# Patient Record
Sex: FEMALE | Race: WHITE | NOT HISPANIC OR LATINO | Employment: OTHER | URBAN - METROPOLITAN AREA
[De-identification: names, ages, dates, MRNs, and addresses within clinical notes are randomized per-mention and may not be internally consistent; named-entity substitution may affect disease eponyms.]

---

## 2017-02-22 DIAGNOSIS — F51.01 PRIMARY INSOMNIA: ICD-10-CM

## 2017-02-22 RX ORDER — TRAZODONE HYDROCHLORIDE 50 MG/1
TABLET, FILM COATED ORAL
Qty: 30 TABLET | Refills: 1 | Status: SHIPPED | OUTPATIENT
Start: 2017-02-22 | End: 2017-06-30

## 2017-02-22 NOTE — TELEPHONE ENCOUNTER
Prescription approved per [facility] Refill Protocol.  Medina SANTOS RN    Pending Prescriptions:                       Disp   Refills    traZODone (DESYREL) 50 MG tablet [Pharmacy*30 tab*0        Sig: TAKE 1 TABLET(50 MG) BY MOUTH EVERY EVENING AS NEEDED           FOR SLEEP         Last Written Prescription Date: 5/24/2016  Last Fill Quantity: 30; # refills: 1  Last Office Visit with [facility], [facility] or [facility] prescribing provider:  10/11/2016        Last PHQ-9 score on record=   PHQ-9 SCORE 5/24/2016   Total Score -   Total Score 0       Lab Results   Component Value Date    AST 21 10/11/2016     Lab Results   Component Value Date    ALT 22 10/11/2016

## 2017-06-30 ENCOUNTER — OFFICE VISIT (OUTPATIENT)
Dept: FAMILY MEDICINE | Facility: CLINIC | Age: 67
End: 2017-06-30
Payer: COMMERCIAL

## 2017-06-30 VITALS
OXYGEN SATURATION: 95 % | HEIGHT: 67 IN | SYSTOLIC BLOOD PRESSURE: 122 MMHG | TEMPERATURE: 99.9 F | BODY MASS INDEX: 21.79 KG/M2 | HEART RATE: 59 BPM | DIASTOLIC BLOOD PRESSURE: 69 MMHG | WEIGHT: 138.8 LBS

## 2017-06-30 DIAGNOSIS — H53.9 VISION CHANGES: ICD-10-CM

## 2017-06-30 DIAGNOSIS — E78.5 HYPERLIPIDEMIA LDL GOAL <100: ICD-10-CM

## 2017-06-30 DIAGNOSIS — N39.41 URGE INCONTINENCE: ICD-10-CM

## 2017-06-30 DIAGNOSIS — R68.89 ABNORMAL ANKLE BRACHIAL INDEX: ICD-10-CM

## 2017-06-30 DIAGNOSIS — F33.0 MAJOR DEPRESSIVE DISORDER, RECURRENT EPISODE, MILD (H): ICD-10-CM

## 2017-06-30 DIAGNOSIS — R41.3 MEMORY LOSS: ICD-10-CM

## 2017-06-30 DIAGNOSIS — F10.21 HISTORY OF ALCOHOL DEPENDENCE (H): ICD-10-CM

## 2017-06-30 DIAGNOSIS — Z13.6 CARDIOVASCULAR SCREENING; LDL GOAL LESS THAN 160: ICD-10-CM

## 2017-06-30 DIAGNOSIS — F51.01 PRIMARY INSOMNIA: ICD-10-CM

## 2017-06-30 DIAGNOSIS — I10 ESSENTIAL HYPERTENSION WITH GOAL BLOOD PRESSURE LESS THAN 140/90: Primary | ICD-10-CM

## 2017-06-30 PROCEDURE — 99214 OFFICE O/P EST MOD 30 MIN: CPT | Performed by: FAMILY MEDICINE

## 2017-06-30 RX ORDER — TOLTERODINE TARTRATE 2 MG/1
TABLET, EXTENDED RELEASE ORAL
Qty: 30 TABLET | Refills: 5 | Status: SHIPPED | OUTPATIENT
Start: 2017-06-30 | End: 2017-07-06

## 2017-06-30 RX ORDER — SERTRALINE HYDROCHLORIDE 100 MG/1
100 TABLET, FILM COATED ORAL DAILY
Qty: 90 TABLET | Refills: 1 | Status: SHIPPED | OUTPATIENT
Start: 2017-06-30 | End: 2018-02-23

## 2017-06-30 RX ORDER — ATORVASTATIN CALCIUM 10 MG/1
10 TABLET, FILM COATED ORAL DAILY
Qty: 90 TABLET | Refills: 1 | Status: SHIPPED | OUTPATIENT
Start: 2017-06-30 | End: 2018-02-23

## 2017-06-30 RX ORDER — TRAZODONE HYDROCHLORIDE 50 MG/1
TABLET, FILM COATED ORAL
Qty: 30 TABLET | Refills: 1 | Status: SHIPPED | OUTPATIENT
Start: 2017-06-30 | End: 2017-07-06

## 2017-06-30 RX ORDER — LISINOPRIL AND HYDROCHLOROTHIAZIDE 12.5; 2 MG/1; MG/1
1 TABLET ORAL DAILY
Qty: 90 TABLET | Refills: 1 | Status: SHIPPED | OUTPATIENT
Start: 2017-06-30 | End: 2018-02-23

## 2017-06-30 NOTE — MR AVS SNAPSHOT
After Visit Summary   6/30/2017    Kimmie Carranza    MRN: [id number]           Patient Information     Date Of Birth          1950        Visit Information        Provider Department      6/30/2017 1:15 PM Dee Castillo MD [facility]        Today's Diagnoses     Memory loss    -  1    Urge incontinence        Hyperlipidemia LDL goal <100        Essential hypertension with goal blood pressure less than 140/90        Primary insomnia        Major depressive disorder, recurrent episode, mild (H)        CARDIOVASCULAR SCREENING; LDL GOAL LESS THAN 160        Abnormal ankle brachial index        Vision changes          Care Instructions    Plan-  Please check your medications- see if you have been taking lipitor or not...  Call us and let us know either way.    Make appointment to see the new ophthalmologist...    Make an appointment to see the neurologist- bring your son/daughter in with you for a better evaluation.    Make an appointment for a skin check with the dermatologist.    Get back to going to [facility]- rule of 3's.          Follow-ups after your visit        Additional Services     NEUROLOGY ADULT REFERRAL       Your provider has referred you to: [facility] [place]: [facility] [phone]   [email or web address]  UM: Neurology [facility] [phone]   [email or web address]  General Neurology  N: [facility] [phone]   [email or web address]    Reason for Referral: Consult    Please be aware that coverage of these services is subject to the terms and limitations of your health insurance plan.  Call member services at your health plan with any benefit or coverage questions.      Please bring the following with you to your appointment:    (1) Any X-Rays, CTs or MRIs which have been performed.  Contact the facility where they  were done to arrange for  prior to your scheduled appointment.    (2) List of current medications  (3) This referral request   (4) Any documents/labs given to you for this referral            OPHTHALMOLOGY ADULT REFERRAL       Your provider has referred you to:  [place]: [facility] [phone] [email or web address]  [facility], ALESSANDRA [facility] [phone]         Please be aware that coverage of these services is subject to the terms and limitations of your health insurance plan.  Call member services at your health plan with any benefit or coverage questions.      Please bring the following to your appointment:  >>   Any x-rays, CTs or MRIs which have been performed.  Contact the facility where they were done to arrange for  prior to your scheduled appointment.  Any new CT, MRI or other procedures ordered by your specialist must be performed at a [place] facility or coordinated by your clinic's referral office.    >>   List of current medications   >>   This referral request   >>   Any documents/labs given to you for this referral                  Who to contact     If you have questions or need follow up information about today's clinic visit or your schedule please contact [facility] directly at [phone].  Normal or non-critical lab and imaging results will be communicated to you by MyChart, letter or phone within 4 business days after the clinic has received the results. If you do not hear from us within 7 days, please contact the clinic through MyChart or phone. If you have a critical or abnormal lab result, we will notify you by phone as soon as possible.  Submit refill requests through [employer] or call your pharmacy and they will forward the refill request to us. Please allow 3 business days for your refill to be completed.          Additional Information About Your Visit        MyChart Information      [email or web address] lets you send messages to your doctor, view your test results, renew your prescriptions, schedule appointments and more. To sign up, go to [email or web address] . Click on \"Log in\" on the left side of the screen, which will take you to the Welcome page. Then click on \"Sign up Now\" on the right side of the page.     You will be asked to enter the access code listed below, as well as some personal information. Please follow the directions to create your username and password.     Your access code is: [email or web address]  Expires: 2017  2:07 PM     Your access code will  in 90 days. If you need help or a new code, please call your [place] clinic or [phone].        Care EveryWhere ID     This is your Care EveryWhere ID. This could be used by other organizations to access your [place] medical records  [id number]        Your Vitals Were     Pulse Temperature Height Pulse Oximetry BMI (Body Mass Index)       59 99.9  F (37.7  C) (Oral) 5' 6.5\" (1.689 m) 95% 22.07 kg/m2        Blood Pressure from Last 3 Encounters:   17 122/69   10/24/16 146/80   10/11/16 136/82    Weight from Last 3 Encounters:   17 138 lb 12.8 oz (63 kg)   10/24/16 140 lb (63.5 kg)   10/11/16 140 lb 6.4 oz (63.7 kg)              We Performed the Following     NEUROLOGY ADULT REFERRAL     OPHTHALMOLOGY ADULT REFERRAL          Today's Medication Changes          These changes are accurate as of: 17  2:07 PM.  If you have any questions, ask your nurse or doctor.               These medicines have changed or have updated prescriptions.        Dose/Directions    traZODone 50 MG tablet   Commonly known as:  DESYREL   This may have changed:  See the new instructions.   Used for:  Primary insomnia   Changed by:  Dee Castillo MD        TAKE 1 TABLET(50 MG) BY MOUTH EVERY EVENING AS NEEDED FOR SLEEP   Quantity:  30 tablet   Refills:  1            Where to get your medicines      These medications [facility] to " [facility] [address] [place] [address] [address] [place] [address] AT [facility]  [address] [facility] [address]     Phone:  [phone]     atorvastatin 10 MG tablet    lisinopril-hydrochlorothiazide 20-12.5 MG per tablet    sertraline 100 MG tablet    tolterodine 2 MG tablet    traZODone 50 MG tablet                Primary Care Provider Office Phone # Fax #    Dee Castillo [phone]       [facility] [address]  [facility] [address]        Equal Access to Services     WILLOW COLBERT : Hadii aad ku hadasho Soomaali, waaxda luqadaha, qaybta kaalmada adeegyada, ronny daltonin hayaan adeeg chino velazquez . So [facility] [phone].    ATENCIÓN: Si habla español, tiene a maurice disposición servicios gratuitos de asistencia lingüística. Yemi al [phone].    We comply with applicable federal civil rights laws and [place] laws. We do not discriminate on the basis of race, color, national origin, age, disability sex, sexual orientation or gender identity.            Thank you!     Thank you for choosing [facility]  for your care. Our goal is always to provide you with excellent care. Hearing back from our patients is one way we can continue to improve our services. Please take a few minutes to complete the written survey that you may receive in the mail after your visit with us. Thank you!             Your Updated Medication List - Protect others around you: Learn how to safely use, store and throw away your medicines at www.disposemymeds.org.          This list is accurate as of: 6/30/17  2:07 PM.  Always use your most recent med list.                   Brand Name Dispense Instructions for use Diagnosis    aspirin 81 MG EC tablet     90 tablet    Take 1 tablet (81 mg) by mouth daily    CARDIOVASCULAR SCREENING; LDL GOAL LESS THAN 160, Abnormal ankle brachial index, Hypertension goal BP (blood pressure) < 140/90       atorvastatin 10 MG tablet    LIPITOR     90 tablet    Take 1 tablet (10 mg) by mouth daily    CARDIOVASCULAR SCREENING; LDL GOAL LESS THAN 160, Abnormal ankle brachial index       BL BLOOD PRESSURE MONITOR Kit     1 kit    1 Units. One monitor, check weekly.    Hypertension goal BP (blood pressure) < 140/90       lisinopril-hydrochlorothiazide 20-12.5 MG per tablet    PRINZIDE/ZESTORETIC    90 tablet    Take 1 tablet by mouth daily    Essential hypertension with goal blood pressure less than 140/90       sertraline 100 MG tablet    ZOLOFT    90 tablet    Take 1 tablet (100 mg) by mouth daily    Major depressive disorder, recurrent episode, mild (H)       tolterodine 2 MG tablet    DETROL    30 tablet    Take one tablet daily by mouth as needed.    Urge incontinence       traZODone 50 MG tablet    DESYREL    30 tablet    TAKE 1 TABLET(50 MG) BY MOUTH EVERY EVENING AS NEEDED FOR SLEEP    Primary insomnia

## 2017-06-30 NOTE — NURSING NOTE
"Chief Complaint   Patient presents with     Hypertension     /69  Pulse 59  Temp 99.9  F (37.7  C) (Oral)  Ht 5' 6.5\" (1.689 m)  Wt 138 lb 12.8 oz (63 kg)  SpO2 95%  BMI 22.07 kg/m2 Estimated body mass index is 22.07 kg/(m^2) as calculated from the following:    Height as of this encounter: 5' 6.5\" (1.689 m).    Weight as of this encounter: 138 lb 12.8 oz (63 kg).  BP completed using cuff size: regular       Health Maintenance due pending provider review:  PHQ9    PHQ/ACT/TOMASA--Gave pt questionnare      Eva Orona CMA  "

## 2017-06-30 NOTE — PATIENT INSTRUCTIONS
Plan-  Please check your medications- see if you have been taking lipitor or not...  Call us and let us know either way.    Make appointment to see the new ophthalmologist...    Make an appointment to see the neurologist- bring your son/daughter in with you for a better evaluation.    Make an appointment for a skin check with the dermatologist.    Get back to going to [facility]- rule of 3's.

## 2017-06-30 NOTE — PROGRESS NOTES
SUBJECTIVE:                                                    Kimmie Carranza is a 67 year old female who presents to clinic today for the following health issues:      Hypertension Follow-up      Outpatient blood pressures are not being checked.    Low Salt Diet: no added salt      Amount of exercise or physical activity: 2-3 days/week for an average of greater than 60 minutes - [facility], walks, stairs in house, hardly sits down    Problems taking medications regularly: No    Medication side effects: none    Diet: regular (no restrictions)    HTN- good control with current meds- no se's, will continue.  Weight- has been going down- states food doesn't taste as good.  Grandmother got thin when she got older, no issues, lived a long time.    Etoh- no drinking, did stop going to AA, ~9 months ago.  Easy for her- should go back.    MDD- PHQ-9 is 0 today.  Mood is pretty good.  Insomnia- using the trazodone sometimes- works fine.    Incontinence- Detrol- uses it just when she's going to be out.  0-3x/wk use, hasn't used for past 2 wks.    Wondering about neurology referral-  Short term memory loss-  Other people are commenting now...  Asked her daughter- thought she's lost ~25%, son said ~50%.  Pt notes she had a concussion in '97, and has ADD, and h/o dementia on father's side- he started getting forgetful in his 60s, but very slow progression.    Lipids- pt can't remember for sure if she's taking her lipitor or not.  At some points sure she's taking it, at other points not sure.    Mole check referral-   No fam h/o, left hip spot, may be changing.      Problem list and histories reviewed & adjusted, as indicated.  Additional history: as documented    Patient Active Problem List   Diagnosis     Anxiety state     Urge incontinence     Hyperlipidemia LDL goal <100     Mild major depression (H)     Osteoarthritis     Essential hypertension with goal blood pressure less than 140/90     Advanced directives,  "counseling/discussion     Alcohol abuse     Abnormal ankle brachial index     Mild atherosclerosis of carotid artery     Insomnia     Bilateral hip pain     Somatic dysfunction of lumbar region     Chronic bilateral low back pain without sciatica     Nonallopathic lesion of sacroiliac region     Sacroiliac joint pain     Somatic dysfunction of pelvis region     Lumbar compression fracture (H)     Past Surgical History:   Procedure Laterality Date     C NONSPECIFIC PROCEDURE      Tubal ligation     C NONSPECIFIC PROCEDURE      laporoscopy     COLONOSCOPY N/A 10/24/2016    Procedure: COMBINED COLONOSCOPY, SINGLE OR MULTIPLE BIOPSY/POLYPECTOMY BY BIOPSY;  Surgeon: Kwaku Henry MD;  Location:  GI       Social History   Substance Use Topics     Smoking status: Light Tobacco Smoker     Last attempt to quit: 2000     Smokeless tobacco: Never Used      Comment: smokes occasionally     Alcohol use No      Comment: quit completely      Family History   Problem Relation Age of Onset     Hypertension Mother      CEREBROVASCULAR DISEASE Mother       of complications of stroke at age 82     HEART DISEASE Mother      atrial fib     OSTEOPOROSIS Mother      two hip fx's     Neurologic Disorder Mother      MS     Hypertension Father      Neurologic Disorder Father      dementia- Alzheimers     GASTROINTESTINAL DISEASE Father      Depression Father      Hypertension Brother      Neurologic Disorder Maternal Grandmother      Neurologic Disorder Sister      C.A.D. Paternal Grandmother 62      at 62, of MI     C.A.D. Paternal Aunt 62     \"\"     C.A.D. Paternal Aunt 62     \"\"     Colon Cancer Paternal Uncle          No current outpatient prescriptions on file.     Allergies   Allergen Reactions     No Known Drug Allergies      Recent Labs   Lab Test  17   1813  10/11/16   1123  08/19/15   1439   01/21/15   1201   LDL   --   158*  96   --   99   HDL   --   74  74   --   79   TRIG   --   101  96  " " --   94   ALT  19  22  34   < >   --    CR  0.68  0.71  0.71   < >  0.69   GFRESTIMATED  86  82  82   < >  86   GFRESTBLACK  >90   GFR Calc    >90   GFR Calc    >90   GFR Calc     < >  >90   GFR Calc     POTASSIUM  3.6  4.0  3.7   < >  4.1    < > = values in this interval not displayed.      BP Readings from Last 3 Encounters:   07/06/17 155/80   06/30/17 122/69   10/24/16 146/80    Wt Readings from Last 3 Encounters:   07/06/17 138 lb (62.6 kg)   06/30/17 138 lb 12.8 oz (63 kg)   10/24/16 140 lb (63.5 kg)                  Labs reviewed in EPIC    Reviewed and updated as needed this visit by clinical staff       Reviewed and updated as needed this visit by Provider         ROS:  Constitutional, HEENT, cardiovascular, pulmonary, gi and gu systems are negative, except as otherwise noted.    OBJECTIVE:     /69  Pulse 59  Temp 99.9  F (37.7  C) (Oral)  Ht 5' 6.5\" (1.689 m)  Wt 138 lb 12.8 oz (63 kg)  SpO2 95%  BMI 22.07 kg/m2  Body mass index is 22.07 kg/(m^2).  GENERAL APPEARANCE: healthy, alert and no distress     EYES: PERRL, sclera clear     HENT: nose and mouth without ulcers or lesions     NECK: no adenopathy, no asymmetry, masses, or scars and thyroid normal to palpation     RESP: lungs clear to auscultation - no rales, rhonchi or wheezes     CV: regular rates and rhythm, normal S1 S2, no S3 or S4 and no murmur, click or rub      Abdomen: soft, nontender, no HSM or masses and bowel sounds normal     Ext: warm, dry, no edema      Neuro: CN 2-12 grossly nl, fundi WNL bilaterally, UE and LE strength 5/5, nl sensation and DTR's.  Normal gait.      Psych: full range affect, normal speech and grooming, judgement and insight intact       ASSESSMENT/PLAN:       ICD-10-CM    1. Memory loss R41.3 NEUROLOGY ADULT REFERRAL   2. Urge incontinence N39.41 DISCONTINUED: tolterodine (DETROL) 2 MG tablet   3. Hyperlipidemia LDL goal <100 E78.5    4. " Essential hypertension with goal blood pressure less than 140/90 I10 lisinopril-hydrochlorothiazide (PRINZIDE/ZESTORETIC) 20-12.5 MG per tablet   5. Primary insomnia F51.01 DISCONTINUED: traZODone (DESYREL) 50 MG tablet   6. Major depressive disorder, recurrent episode, mild (H) F33.0 sertraline (ZOLOFT) 100 MG tablet   7. CARDIOVASCULAR SCREENING; LDL GOAL LESS THAN 160 Z13.6 atorvastatin (LIPITOR) 10 MG tablet   8. Abnormal ankle brachial index R68.89 atorvastatin (LIPITOR) 10 MG tablet   9. Vision changes H53.9 OPHTHALMOLOGY ADULT REFERRAL       Meds refilled, no changes, though want to ensure she's taking the lipitor- she'll call to confirm.  Memory- unable to do any quick testing here today.  Will refer to neurology for formal eval, and urged her to bring her daughter/son with her to that appt - see pt instructions.  F/u in 6 months for physical and f/u .    Patient Instructions   Plan-  Please check your medications- see if you have been taking lipitor or not...  Call us and let us know either way.    Make appointment to see the new ophthalmologist...    Make an appointment to see the neurologist- bring your son/daughter in with you for a better evaluation.    Make an appointment for a skin check with the dermatologist.    Get back to going to [facility]- rule of 3's.      Dee Castillo MD  [facility]

## 2017-07-01 ASSESSMENT — PATIENT HEALTH QUESTIONNAIRE - PHQ9: SUM OF ALL RESPONSES TO PHQ QUESTIONS 1-9: 0

## 2017-07-06 ENCOUNTER — APPOINTMENT (OUTPATIENT)
Dept: CT IMAGING | Facility: CLINIC | Age: 67
DRG: 552 | End: 2017-07-06
Attending: FAMILY MEDICINE
Payer: COMMERCIAL

## 2017-07-06 ENCOUNTER — HOSPITAL ENCOUNTER (INPATIENT)
Facility: CLINIC | Age: 67
LOS: 2 days | Discharge: HOME | DRG: 552 | End: 2017-07-08
Attending: FAMILY MEDICINE | Admitting: SURGERY
Payer: COMMERCIAL

## 2017-07-06 DIAGNOSIS — S32.020A COMPRESSION FRACTURE OF L2 LUMBAR VERTEBRA, CLOSED, INITIAL ENCOUNTER (H): ICD-10-CM
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
## 2017-07-06 DIAGNOSIS — S32.020A COMPRESSION FRACTURE OF L2, CLOSED, INITIAL ENCOUNTER (H): ICD-10-CM

## 2017-07-06 PROBLEM — S32.000A LUMBAR COMPRESSION FRACTURE (H): Status: ACTIVE | Noted: 2017-07-06

## 2017-07-06 LAB
ALBUMIN SERPL-MCNC: 3.3 G/DL (ref 3.4–5)
ALP SERPL-CCNC: 43 U/L (ref 40–150)
ALT SERPL W P-5'-P-CCNC: 19 U/L (ref 0–50)
ANION GAP SERPL CALCULATED.3IONS-SCNC: 8 MMOL/L (ref 3–14)
APTT PPP: 34 SEC (ref 22–37)
AST SERPL W P-5'-P-CCNC: 22 U/L (ref 0–45)
BASOPHILS # BLD AUTO: 0 10E9/L (ref 0–0.2)
BASOPHILS NFR BLD AUTO: 0.2 %
BILIRUB SERPL-MCNC: 0.7 MG/DL (ref 0.2–1.3)
BUN SERPL-MCNC: 10 MG/DL (ref 7–30)
CALCIUM SERPL-MCNC: 8.4 MG/DL (ref 8.5–10.1)
CHLORIDE SERPL-SCNC: 107 MMOL/L (ref 94–109)
CO2 SERPL-SCNC: 27 MMOL/L (ref 20–32)
CREAT SERPL-MCNC: 0.68 MG/DL (ref 0.52–1.04)
DIFFERENTIAL METHOD BLD: NORMAL
EOSINOPHIL # BLD AUTO: 0.1 10E9/L (ref 0–0.7)
EOSINOPHIL NFR BLD AUTO: 2.4 %
ERYTHROCYTE [DISTWIDTH] IN BLOOD BY AUTOMATED COUNT: 13.1 % (ref 10–15)
GFR SERPL CREATININE-BSD FRML MDRD: 86 ML/MIN/1.7M2
GLUCOSE BLDC GLUCOMTR-MCNC: 94 MG/DL (ref 70–99)
GLUCOSE SERPL-MCNC: 82 MG/DL (ref 70–99)
HCT VFR BLD AUTO: 40.2 % (ref 35–47)
HGB BLD-MCNC: 13.4 G/DL (ref 11.7–15.7)
IMM GRANULOCYTES # BLD: 0 10E9/L (ref 0–0.4)
IMM GRANULOCYTES NFR BLD: 0.3 %
INR PPP: 1 (ref 0.86–1.14)
LYMPHOCYTES # BLD AUTO: 1.5 10E9/L (ref 0.8–5.3)
LYMPHOCYTES NFR BLD AUTO: 26.2 %
MCH RBC QN AUTO: 32.9 PG (ref 26.5–33)
MCHC RBC AUTO-ENTMCNC: 33.3 G/DL (ref 31.5–36.5)
MCV RBC AUTO: 99 FL (ref 78–100)
MONOCYTES # BLD AUTO: 0.6 10E9/L (ref 0–1.3)
MONOCYTES NFR BLD AUTO: 9.9 %
NEUTROPHILS # BLD AUTO: 3.6 10E9/L (ref 1.6–8.3)
NEUTROPHILS NFR BLD AUTO: 61 %
NRBC # BLD AUTO: 0 10*3/UL
NRBC BLD AUTO-RTO: 0 /100
PLATELET # BLD AUTO: 203 10E9/L (ref 150–450)
POTASSIUM SERPL-SCNC: 3.6 MMOL/L (ref 3.4–5.3)
PROT SERPL-MCNC: 6.5 G/DL (ref 6.8–8.8)
RBC # BLD AUTO: 4.07 10E12/L (ref 3.8–5.2)
SODIUM SERPL-SCNC: 142 MMOL/L (ref 133–144)
WBC # BLD AUTO: 5.9 10E9/L (ref 4–11)

## 2017-07-06 PROCEDURE — 25000128 H RX IP 250 OP 636: Performed by: EMERGENCY MEDICINE

## 2017-07-06 PROCEDURE — 25000132 ZZH RX MED GY IP 250 OP 250 PS 637: Performed by: FAMILY MEDICINE

## 2017-07-06 PROCEDURE — 25000128 H RX IP 250 OP 636: Performed by: SURGERY

## 2017-07-06 PROCEDURE — 85730 THROMBOPLASTIN TIME PARTIAL: CPT | Performed by: FAMILY MEDICINE

## 2017-07-06 PROCEDURE — 85025 COMPLETE CBC W/AUTO DIFF WBC: CPT | Performed by: FAMILY MEDICINE

## 2017-07-06 PROCEDURE — 00000146 ZZHCL STATISTIC GLUCOSE BY METER IP

## 2017-07-06 PROCEDURE — 12000008 ZZH R&B INTERMEDIATE UMMC

## 2017-07-06 PROCEDURE — 72131 CT LUMBAR SPINE W/O DYE: CPT

## 2017-07-06 PROCEDURE — 99285 EMERGENCY DEPT VISIT HI MDM: CPT | Mod: Z6 | Performed by: FAMILY MEDICINE

## 2017-07-06 PROCEDURE — 85610 PROTHROMBIN TIME: CPT | Performed by: FAMILY MEDICINE

## 2017-07-06 PROCEDURE — 96374 THER/PROPH/DIAG INJ IV PUSH: CPT | Performed by: FAMILY MEDICINE

## 2017-07-06 PROCEDURE — 68200002 ZZH TRAUMA EVALUATION W/O CC LEVEL II: Performed by: FAMILY MEDICINE

## 2017-07-06 PROCEDURE — 80053 COMPREHEN METABOLIC PANEL: CPT | Performed by: FAMILY MEDICINE

## 2017-07-06 PROCEDURE — 99285 EMERGENCY DEPT VISIT HI MDM: CPT | Mod: 25 | Performed by: FAMILY MEDICINE

## 2017-07-06 PROCEDURE — 25000132 ZZH RX MED GY IP 250 OP 250 PS 637: Performed by: SURGERY

## 2017-07-06 RX ORDER — OXYCODONE AND ACETAMINOPHEN 5; 325 MG/1; MG/1
1 TABLET ORAL ONCE
Status: COMPLETED | OUTPATIENT
Start: 2017-07-06 | End: 2017-07-06

## 2017-07-06 RX ORDER — DEXTROSE MONOHYDRATE, SODIUM CHLORIDE, AND POTASSIUM CHLORIDE 50; 1.49; 4.5 G/1000ML; G/1000ML; G/1000ML
INJECTION, SOLUTION INTRAVENOUS CONTINUOUS
Status: DISCONTINUED | OUTPATIENT
Start: 2017-07-06 | End: 2017-07-06

## 2017-07-06 RX ORDER — ACETAMINOPHEN 325 MG/1
650 TABLET ORAL EVERY 4 HOURS PRN
Status: DISCONTINUED | OUTPATIENT
Start: 2017-07-06 | End: 2017-07-08 | Stop reason: HOSPADM

## 2017-07-06 RX ORDER — LIDOCAINE 50 MG/G
1 PATCH TOPICAL
Status: DISCONTINUED | OUTPATIENT
Start: 2017-07-06 | End: 2017-07-08 | Stop reason: HOSPADM

## 2017-07-06 RX ORDER — IBUPROFEN 600 MG/1
600 TABLET, FILM COATED ORAL ONCE
Status: COMPLETED | OUTPATIENT
Start: 2017-07-06 | End: 2017-07-06

## 2017-07-06 RX ORDER — CYCLOBENZAPRINE HCL 10 MG
10 TABLET ORAL ONCE
Status: COMPLETED | OUTPATIENT
Start: 2017-07-06 | End: 2017-07-06

## 2017-07-06 RX ORDER — LIDOCAINE 40 MG/G
CREAM TOPICAL
Status: DISCONTINUED | OUTPATIENT
Start: 2017-07-06 | End: 2017-07-08 | Stop reason: HOSPADM

## 2017-07-06 RX ORDER — POLYETHYLENE GLYCOL 3350 17 G/17G
17 POWDER, FOR SOLUTION ORAL DAILY PRN
Status: DISCONTINUED | OUTPATIENT
Start: 2017-07-06 | End: 2017-07-08

## 2017-07-06 RX ORDER — AMOXICILLIN 250 MG
1-2 CAPSULE ORAL 2 TIMES DAILY
Status: DISCONTINUED | OUTPATIENT
Start: 2017-07-06 | End: 2017-07-08

## 2017-07-06 RX ORDER — OXYCODONE HYDROCHLORIDE 5 MG/1
5-10 TABLET ORAL
Status: DISCONTINUED | OUTPATIENT
Start: 2017-07-06 | End: 2017-07-08 | Stop reason: HOSPADM

## 2017-07-06 RX ORDER — BISACODYL 10 MG
10 SUPPOSITORY, RECTAL RECTAL DAILY PRN
Status: DISCONTINUED | OUTPATIENT
Start: 2017-07-06 | End: 2017-07-08 | Stop reason: HOSPADM

## 2017-07-06 RX ORDER — HYDROMORPHONE HYDROCHLORIDE 1 MG/ML
.3-.5 INJECTION, SOLUTION INTRAMUSCULAR; INTRAVENOUS; SUBCUTANEOUS
Status: COMPLETED | OUTPATIENT
Start: 2017-07-06 | End: 2017-07-06

## 2017-07-06 RX ORDER — IBUPROFEN 600 MG/1
600 TABLET, FILM COATED ORAL EVERY 6 HOURS PRN
Status: DISCONTINUED | OUTPATIENT
Start: 2017-07-06 | End: 2017-07-08 | Stop reason: HOSPADM

## 2017-07-06 RX ORDER — HYDROMORPHONE HYDROCHLORIDE 1 MG/ML
.3-.5 INJECTION, SOLUTION INTRAMUSCULAR; INTRAVENOUS; SUBCUTANEOUS
Status: DISCONTINUED | OUTPATIENT
Start: 2017-07-06 | End: 2017-07-08 | Stop reason: HOSPADM

## 2017-07-06 RX ORDER — NALOXONE HYDROCHLORIDE 0.4 MG/ML
.1-.4 INJECTION, SOLUTION INTRAMUSCULAR; INTRAVENOUS; SUBCUTANEOUS
Status: DISCONTINUED | OUTPATIENT
Start: 2017-07-06 | End: 2017-07-08 | Stop reason: HOSPADM

## 2017-07-06 RX ORDER — METHOCARBAMOL 750 MG/1
750 TABLET, FILM COATED ORAL EVERY 6 HOURS PRN
Status: DISCONTINUED | OUTPATIENT
Start: 2017-07-06 | End: 2017-07-08

## 2017-07-06 RX ADMIN — CYCLOBENZAPRINE HYDROCHLORIDE 10 MG: 10 TABLET, FILM COATED ORAL at 14:21

## 2017-07-06 RX ADMIN — SENNOSIDES AND DOCUSATE SODIUM 2 TABLET: 8.6; 5 TABLET ORAL at 21:21

## 2017-07-06 RX ADMIN — OXYCODONE HYDROCHLORIDE AND ACETAMINOPHEN 1 TABLET: 5; 325 TABLET ORAL at 14:21

## 2017-07-06 RX ADMIN — OXYCODONE HYDROCHLORIDE 5 MG: 5 TABLET ORAL at 23:45

## 2017-07-06 RX ADMIN — IBUPROFEN 600 MG: 600 TABLET ORAL at 13:24

## 2017-07-06 RX ADMIN — HYDROMORPHONE HYDROCHLORIDE 0.5 MG: 1 INJECTION, SOLUTION INTRAMUSCULAR; INTRAVENOUS; SUBCUTANEOUS at 19:51

## 2017-07-06 RX ADMIN — LIDOCAINE 1 PATCH: 50 PATCH TOPICAL at 21:21

## 2017-07-06 RX ADMIN — ENOXAPARIN SODIUM 40 MG: 40 INJECTION SUBCUTANEOUS at 21:21

## 2017-07-06 ASSESSMENT — ENCOUNTER SYMPTOMS
MYALGIAS: 1
NUMBNESS: 0
ARTHRALGIAS: 0
BACK PAIN: 1
NAUSEA: 0
HALLUCINATIONS: 0
WEAKNESS: 0
DIFFICULTY URINATING: 0
SLEEP DISTURBANCE: 1
ABDOMINAL PAIN: 0
ACTIVITY CHANGE: 1
COLOR CHANGE: 0
VOMITING: 0
BRUISES/BLEEDS EASILY: 0
DYSPHORIC MOOD: 1
FEVER: 0
DECREASED CONCENTRATION: 1
CHEST TIGHTNESS: 0
CONFUSION: 0
HEMATURIA: 0
SHORTNESS OF BREATH: 0
NECK PAIN: 0
VOICE CHANGE: 0
EYE REDNESS: 0
NECK STIFFNESS: 0
FACIAL SWELLING: 0
WOUND: 0
TROUBLE SWALLOWING: 0
HEADACHES: 0

## 2017-07-06 ASSESSMENT — PAIN DESCRIPTION - DESCRIPTORS: DESCRIPTORS: ACHING

## 2017-07-06 NOTE — IP AVS SNAPSHOT
[address] [address]    Phone:  [phone]                                       After Visit Summary   7/6/2017    Kimmie Carranza    MRN: [id number]           After Visit Summary Signature Page     I have received my discharge instructions, and my questions have been answered. I have discussed any challenges I see with this plan with the nurse or doctor.    ..........................................................................................................................................  Patient/Patient Representative Signature      ..........................................................................................................................................  Patient Representative Print Name and Relationship to Patient    ..................................................               ................................................  Date                                            Time    ..........................................................................................................................................  Reviewed by Signature/Title    ...................................................              ..............................................  Date                                                            Time

## 2017-07-06 NOTE — PHARMACY-ADMISSION MEDICATION HISTORY
Admission medication history interview status for the 7/6/2017 admission is complete. See Epic admission navigator for allergy information, pharmacy, prior to admission medications and immunization status.     Medication history interview sources:  Patient, Pharmacy ([facility] on [address])    Changes made to PTA medication list (reason)  Added: Multivitamin  Deleted: None  Changed: None    Additional medication history information (including reliability of information, actions taken by pharmacist):    Patient was a good historian of her medication history. Pharmacy was called to confirm strength of medications, as patient was unsure.  Patient takes the tolterodine approximately 3 times per month; last dose was taken about 2 weeks ago.  Patient takes trazodone approximately 5 times per month; last dose was taken last night.      Prior to Admission medications    Medication Sig Last Dose Taking? Auth Provider   Multiple Vitamins-Minerals (MULTIVITAMIN ADULT PO) Take 1 tablet by mouth daily 7/5/2017 at 9AM Yes Unknown, Entered By History   TOLTERODINE TARTRATE PO Take 2 mg by mouth daily as needed for incontinence (Takes approximately 3 times per month) Past Month at Unknown time Yes Unknown, Entered By History   TRAZODONE HCL PO Take 50 mg by mouth nightly as needed for sleep (Takes approximately 5 times per month.) 7/5/2017 at 11PM Yes Unknown, Entered By History   sertraline (ZOLOFT) 100 MG tablet Take 1 tablet (100 mg) by mouth daily 7/5/2017 at 9AM Yes Dee Castillo MD   lisinopril-hydrochlorothiazide (PRINZIDE/ZESTORETIC) 20-12.5 MG per tablet Take 1 tablet by mouth daily 7/5/2017 at 9AM Yes Dee Castillo MD   atorvastatin (LIPITOR) 10 MG tablet Take 1 tablet (10 mg) by mouth daily 7/5/2017 at 9AM Yes Dee Castillo MD   aspirin 81 MG EC tablet Take 1 tablet (81 mg) by mouth daily 7/5/2017 at 9AM Yes Dee Castillo MD   Blood Pressure Monitoring ( BLOOD PRESSURE MONITOR)  KIT 1 Units. One monitor, check weekly.   Dee Castillo MD         Medication history completed by: Yared Sanches, Pharmacy Intern
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
## 2017-07-07 ENCOUNTER — APPOINTMENT (OUTPATIENT)
Dept: GENERAL RADIOLOGY | Facility: CLINIC | Age: 67
DRG: 552 | End: 2017-07-07
Payer: COMMERCIAL

## 2017-07-07 ENCOUNTER — APPOINTMENT (OUTPATIENT)
Dept: PHYSICAL THERAPY | Facility: CLINIC | Age: 67
DRG: 552 | End: 2017-07-07
Attending: PHYSICIAN ASSISTANT
Payer: COMMERCIAL

## 2017-07-07 LAB
ALBUMIN UR-MCNC: NEGATIVE MG/DL
ANION GAP SERPL CALCULATED.3IONS-SCNC: 6 MMOL/L (ref 3–14)
APPEARANCE UR: CLEAR
BILIRUB UR QL STRIP: NEGATIVE
BUN SERPL-MCNC: 11 MG/DL (ref 7–30)
CALCIUM SERPL-MCNC: 8.5 MG/DL (ref 8.5–10.1)
CHLORIDE SERPL-SCNC: 108 MMOL/L (ref 94–109)
CO2 SERPL-SCNC: 25 MMOL/L (ref 20–32)
COLOR UR AUTO: YELLOW
CREAT SERPL-MCNC: 0.65 MG/DL (ref 0.52–1.04)
GFR SERPL CREATININE-BSD FRML MDRD: ABNORMAL ML/MIN/1.7M2
GLUCOSE SERPL-MCNC: 103 MG/DL (ref 70–99)
GLUCOSE UR STRIP-MCNC: NEGATIVE MG/DL
HGB UR QL STRIP: NEGATIVE
KETONES UR STRIP-MCNC: 5 MG/DL
LEUKOCYTE ESTERASE UR QL STRIP: ABNORMAL
MUCOUS THREADS #/AREA URNS LPF: PRESENT /LPF
NITRATE UR QL: NEGATIVE
PH UR STRIP: 5.5 PH (ref 5–7)
POTASSIUM SERPL-SCNC: 3.7 MMOL/L (ref 3.4–5.3)
RBC #/AREA URNS AUTO: 3 /HPF (ref 0–2)
SODIUM SERPL-SCNC: 139 MMOL/L (ref 133–144)
SP GR UR STRIP: 1.01 (ref 1–1.03)
SQUAMOUS #/AREA URNS AUTO: <1 /HPF (ref 0–1)
URN SPEC COLLECT METH UR: ABNORMAL
UROBILINOGEN UR STRIP-MCNC: NORMAL MG/DL (ref 0–2)
WBC #/AREA URNS AUTO: 16 /HPF (ref 0–2)

## 2017-07-07 PROCEDURE — 40000193 ZZH STATISTIC PT WARD VISIT

## 2017-07-07 PROCEDURE — 72100 X-RAY EXAM L-S SPINE 2/3 VWS: CPT

## 2017-07-07 PROCEDURE — 97116 GAIT TRAINING THERAPY: CPT | Mod: GP

## 2017-07-07 PROCEDURE — 87186 SC STD MICRODIL/AGAR DIL: CPT | Performed by: SURGERY

## 2017-07-07 PROCEDURE — 25000132 ZZH RX MED GY IP 250 OP 250 PS 637: Performed by: PHYSICIAN ASSISTANT

## 2017-07-07 PROCEDURE — 87086 URINE CULTURE/COLONY COUNT: CPT | Performed by: SURGERY

## 2017-07-07 PROCEDURE — 25000128 H RX IP 250 OP 636: Performed by: SURGERY

## 2017-07-07 PROCEDURE — 87088 URINE BACTERIA CULTURE: CPT | Performed by: SURGERY

## 2017-07-07 PROCEDURE — 25000132 ZZH RX MED GY IP 250 OP 250 PS 637: Performed by: SURGERY

## 2017-07-07 PROCEDURE — 97530 THERAPEUTIC ACTIVITIES: CPT | Mod: GP

## 2017-07-07 PROCEDURE — 36415 COLL VENOUS BLD VENIPUNCTURE: CPT | Performed by: SURGERY

## 2017-07-07 PROCEDURE — 97161 PT EVAL LOW COMPLEX 20 MIN: CPT | Mod: GP

## 2017-07-07 PROCEDURE — 12000001 ZZH R&B MED SURG/OB UMMC

## 2017-07-07 PROCEDURE — L0637 LSO SC R ANT/POS PNL PRE CST: HCPCS

## 2017-07-07 PROCEDURE — 80048 BASIC METABOLIC PNL TOTAL CA: CPT | Performed by: SURGERY

## 2017-07-07 PROCEDURE — 81001 URINALYSIS AUTO W/SCOPE: CPT | Performed by: FAMILY MEDICINE

## 2017-07-07 RX ORDER — ACETAMINOPHEN 325 MG/1
650 TABLET ORAL 4 TIMES DAILY
Status: DISCONTINUED | OUTPATIENT
Start: 2017-07-07 | End: 2017-07-08 | Stop reason: HOSPADM

## 2017-07-07 RX ORDER — ATORVASTATIN CALCIUM 10 MG/1
10 TABLET, FILM COATED ORAL
Status: DISCONTINUED | OUTPATIENT
Start: 2017-07-07 | End: 2017-07-08 | Stop reason: HOSPADM

## 2017-07-07 RX ORDER — LISINOPRIL AND HYDROCHLOROTHIAZIDE 12.5; 2 MG/1; MG/1
1 TABLET ORAL DAILY
Status: DISCONTINUED | OUTPATIENT
Start: 2017-07-07 | End: 2017-07-08 | Stop reason: HOSPADM

## 2017-07-07 RX ORDER — ASPIRIN 81 MG/1
81 TABLET ORAL DAILY
Status: DISCONTINUED | OUTPATIENT
Start: 2017-07-07 | End: 2017-07-08 | Stop reason: HOSPADM

## 2017-07-07 RX ORDER — SERTRALINE HYDROCHLORIDE 100 MG/1
100 TABLET, FILM COATED ORAL DAILY
Status: DISCONTINUED | OUTPATIENT
Start: 2017-07-07 | End: 2017-07-08 | Stop reason: HOSPADM

## 2017-07-07 RX ADMIN — IBUPROFEN 600 MG: 600 TABLET ORAL at 08:03

## 2017-07-07 RX ADMIN — IBUPROFEN 600 MG: 600 TABLET ORAL at 14:05

## 2017-07-07 RX ADMIN — ACETAMINOPHEN 650 MG: 325 TABLET, FILM COATED ORAL at 08:03

## 2017-07-07 RX ADMIN — OXYCODONE HYDROCHLORIDE 10 MG: 5 TABLET ORAL at 20:16

## 2017-07-07 RX ADMIN — ACETAMINOPHEN 650 MG: 325 TABLET, FILM COATED ORAL at 12:32

## 2017-07-07 RX ADMIN — ASPIRIN 81 MG: 81 TABLET, COATED ORAL at 08:53

## 2017-07-07 RX ADMIN — METHOCARBAMOL 750 MG: 750 TABLET ORAL at 08:03

## 2017-07-07 RX ADMIN — SERTRALINE HYDROCHLORIDE 100 MG: 100 TABLET ORAL at 08:53

## 2017-07-07 RX ADMIN — ACETAMINOPHEN 650 MG: 325 TABLET, FILM COATED ORAL at 20:12

## 2017-07-07 RX ADMIN — ACETAMINOPHEN 650 MG: 325 TABLET, FILM COATED ORAL at 03:59

## 2017-07-07 RX ADMIN — OXYCODONE HYDROCHLORIDE 10 MG: 5 TABLET ORAL at 08:03

## 2017-07-07 RX ADMIN — OXYCODONE HYDROCHLORIDE 10 MG: 5 TABLET ORAL at 14:05

## 2017-07-07 RX ADMIN — ATORVASTATIN CALCIUM 10 MG: 10 TABLET, FILM COATED ORAL at 20:12

## 2017-07-07 RX ADMIN — SENNOSIDES AND DOCUSATE SODIUM 2 TABLET: 8.6; 5 TABLET ORAL at 20:12

## 2017-07-07 RX ADMIN — ENOXAPARIN SODIUM 40 MG: 40 INJECTION SUBCUTANEOUS at 20:12

## 2017-07-07 RX ADMIN — OXYCODONE HYDROCHLORIDE 10 MG: 5 TABLET ORAL at 03:59

## 2017-07-07 RX ADMIN — SENNOSIDES AND DOCUSATE SODIUM 1 TABLET: 8.6; 5 TABLET ORAL at 08:11

## 2017-07-07 RX ADMIN — LISINOPRIL AND HYDROCHLOROTHIAZIDE 1 TABLET: 12.5; 2 TABLET ORAL at 12:31

## 2017-07-07 ASSESSMENT — ENCOUNTER SYMPTOMS
ENDOCRINE NEGATIVE: 1
EYES NEGATIVE: 1
HEMATOLOGIC/LYMPHATIC NEGATIVE: 1
NEUROLOGICAL NEGATIVE: 1
BACK PAIN: 1
GASTROINTESTINAL NEGATIVE: 1
PSYCHIATRIC NEGATIVE: 1
ALLERGIC/IMMUNOLOGIC NEGATIVE: 1
RESPIRATORY NEGATIVE: 1
CONSTITUTIONAL NEGATIVE: 1
CARDIOVASCULAR NEGATIVE: 1

## 2017-07-07 ASSESSMENT — VISUAL ACUITY
OU: BASELINE;GLASSES

## 2017-07-07 NOTE — PROGRESS NOTES
S: Pt was seen today at 6A for eval/fitting for a LS corset as ordered by Dr. Her Dumas    O/G: The objective/goal is to reduce pain and motion of the lumbar spine.    A: At this time I have fit pt with a size S/M EXOS Chairback with BOA closures.  The pt was instructed on donning/doffing; care and cleaning of the LSO was explained.  Care was taken in selection of the proper size LSO to insure an intimate fit, provide clearance at the rectus femoris, when sitting and to avoid impingement at the breast anteriorly and scapula posteriorly.  Upon completion of the initial fitting the pt had no complaints, and the fit of the orthosis appeared to be satisfactory at this time.    P: the pt was instructed to call if any problems or questions arise.
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
## 2017-07-07 NOTE — PROGRESS NOTES
Brief Neurosurgery Note:     X-rays reviewed. Look stable.     Follow up in 1 month with Neurosurgery PA in clinic with X-rays at that time.     Neurosurgery signing off.     Contact the neurosurgery resident on call with questions.    Dial * * [phone]: Enter [phone] when prompted.   Norberto Biggs MD, PhD  Neurosurgery PGY-3

## 2017-07-07 NOTE — PROGRESS NOTES
[facility], [place]    Trauma Service Tertiary Survey     Date of Service: 07/07/2017    Trauma mechanism:Fall from ~5ft  Time/date of injury: 7/5/2017  Known Injuries:  1. Acute fracture of the superior L2 endplate    Other diagnoses:   1. Acute pain   2. Hyperlipidemia  3. Hypertension  4. Tobacco Abuse  5. Depression     Procedure: None     Plan:  1. Tert examination negative for additional injuries.   2. Appreciate Neurosurgery consult for L2 fracture. Plan for chair back brace for comfort, no precautions necessary. Repeat upright films.   3. Regular diet.   4. Resume home medications  5. Pain control: oral opioids, prn muscle relaxants,   6. PT/OT  consult  7. Smoking cessation counseling      Code status: Full code confirmed with patient.     General Cares:  GI Prophylaxis: regular diet, low risk for stress ulcer  DVT Prophylaxis: Lovenox 40 SC   Date of last stool/Bowel Regimen: dulocolax  Pulmonary toilet: incentive spirometer: IS, incentive spirometry     ETOH: Kimmie Carranza was asked if in the last 3-6 months there has been a time when she had 4 or more drinks in a single day/outing.. Patient answer to the screening question was in the negative. No intervention needed..    SUBJECTIVE:  Course reviewed. No acute events overnight. Pain control improved today. Feels slightly better today. Denies numbness or tingling or weakness. Denies fever, chills or sweats.     Review of Systems   Constitutional: Negative.    HENT: Negative.    Eyes: Negative.    Respiratory: Negative.    Cardiovascular: Negative.    Gastrointestinal: Negative.    Endocrine: Negative.    Genitourinary: Negative.    Musculoskeletal: Positive for back pain.   Skin: Negative.    Allergic/Immunologic: Negative.    Neurological: Negative.    Hematological: Negative.    Psychiatric/Behavioral: Negative.        OBJECTIVE:  Blood pressure 144/70, pulse (!) 48, temperature 98.1  F (36.7  C), temperature source Oral,  resp. rate 16, weight 62.6 kg (138 lb), SpO2 98 %, not currently breastfeeding.  Physical Exam   Constitutional: She is oriented to person, place, and time. She appears well-developed and well-nourished.   HENT:   Head: Normocephalic.   Mouth/Throat: Oropharynx is clear and moist.   Eyes: Pupils are equal, round, and reactive to light. Right eye exhibits no discharge. Left eye exhibits no discharge. No scleral icterus.   Neck: Normal range of motion. No tracheal deviation present.   Cardiovascular: Normal rate, regular rhythm and intact distal pulses.  Exam reveals no gallop and no friction rub.    No murmur heard.  Pulmonary/Chest: No respiratory distress. She has no wheezes. She has no rales. She exhibits no tenderness.   Abdominal: Soft. Bowel sounds are normal. She exhibits no distension and no mass. There is no tenderness. There is no rebound and no guarding.   Musculoskeletal: Normal range of motion. She exhibits no edema or tenderness.   L2 compression fracture--back pain    Neurological: She is alert and oriented to person, place, and time.   Skin: Skin is warm and dry. No rash noted. No erythema. No pallor.       ROUTINE LABS: (Last four results)  CMP  Recent Labs  Lab 07/07/17  0751 07/06/17  1813    142   POTASSIUM 3.7 3.6   CHLORIDE 108 107   CO2 25 27   ANIONGAP 6 8   * 82   BUN 11 10   CR 0.65 0.68   GFRESTIMATED >90Non  GFR Calc 86   GFRESTBLACK >90African American GFR Calc >90African American GFR Calc   JEANNETTE 8.5 8.4*   PROTTOTAL  --  6.5*   ALBUMIN  --  3.3*   BILITOTAL  --  0.7   ALKPHOS  --  43   AST  --  22   ALT  --  19     CBC  Recent Labs  Lab 07/06/17  1813   WBC 5.9   RBC 4.07   HGB 13.4   HCT 40.2   MCV 99   MCH 32.9   MCHC 33.3   RDW 13.1        INR  Recent Labs  Lab 07/06/17  1813   INR 1.00     Arterial Blood GasNo lab results found in last 7 days.    RADIOLOGY:  Results for orders placed or performed during the hospital encounter of 07/06/17   Lumbar  spine CT w/o contrast    Narrative    CT LUMBAR SPINE WITHOUT CONTRAST  7/6/2017 3:38 PM     HISTORY: Fall with low back pain. Please include the SI joint.     TECHNIQUE: Axial images of the lumbar spine were obtained without  intravenous contrast. Multiplanar reformations were performed.   Radiation dose for this scan was reduced using automated exposure  control, adjustment of the mA and/or kV according to patient size, or  iterative reconstruction technique.    COMPARISON: None.    FINDINGS: There are 5 lumbar type vertebral bodies assumed for the  purposes of this dictation.    There is an acute appearing fracture involving the superior endplate  of L2. The fracture extends anteriorly to the anterior aspect of the  vertebral body fracture, but does not appear to involve the posterior  endplate. There is slight buckling of the superior aspect of the L2  vertebral body towards the spinal canal without significant spinal  canal narrowing. The fracture does not appear to extend into the  pedicles or lamina. No other fractures are identified.    Visualized SI joints are unremarkable.    Severe bilateral facet hypertrophy at L4-5 right greater than left  without significant spinal canal narrowing. Bilateral facet  hypertrophy at L5-S1, left greater than right without significant  spinal canal or neural foraminal narrowing.      Impression    IMPRESSION:  Acute fracture of the superior L2 endplate also involving  the anterior aspect of the vertebral body. The fracture does not  appear to involve the posterior endplate although there is slight  buckling of the superior vertebral body towards the spinal canal. No  significant spinal canal narrowing at this level. No other fractures  identified.    Results discussed with Dr. Cal Ward at 4:03 PM on 7/6/2017.    NASRIN MATHEWS MD   XR Lumbar Spine 2/3 Views    Narrative    Exam: 2 views of the lumbar spine dated 7/7/2017.    COMPARISON: Outside CT dated  7/6/2017.    CLINICAL HISTORY: Fall.    FINDINGS: AP and lateral views of the lumbar spine were obtained.  There are 5 lumbar type vertebral bodies for the purposes of this  dictation. The bones are mildly osteopenic appearing. Redemonstration  of known fracture involving the superior endplate of the L2 vertebral  body with irregularity along the anterior superior aspect, not  significantly changed since the CT from the day prior. Multilevel disc  space narrowing in the lumbar spine. Degenerative changes in the left  hip, partially visualized.      Impression    IMPRESSION:  1. Stable appearance of known fracture involving the superior endplate  of the L2 vertebral body, better visualized on the CT scan from the  day prior.  2. Multilevel degenerative disc disease in the lumbar spine.    MD Yamil ESCOBAR PA-C

## 2017-07-07 NOTE — PLAN OF CARE
"Problem: Goal Outcome Summary  Goal: Goal Outcome Summary  Outcome: Improving  VSS. Neuro intact. Pt c/o mid to low back pain that is accompanied by spasms that \"feel like birthing contractions\". She was given tylenol, ibuprofen, methcarbamol, and oxycodone which patient reports have provided excellent relief from pain. Orthotics provided her with a soft back brace. She ambulates with SBA. Good PO regular diet. Pt plans to discharge home tomorrow after pain regimen is solidified.       "

## 2017-07-07 NOTE — PLAN OF CARE
Problem: Goal Outcome Summary  Goal: Goal Outcome Summary  PT 6A: Eval completed and 1x treatment initiated. LSO brace donned for OOB mobility. Bed mobility and transfers SBA. Patient ambulated 250' x2 and navigated 9 stairs with SBA, steady throughout. Pain well controlled during session with occasional low back muscle spasm. Patient plans to discharge home tomorrow, has met PT goals for safe d/c, will have assist form  and daughter. No further acute PT needs, will complete orders.     REC: Discharge home with assist from family and OP PT for pain control, core stabilization, and increased mobility. Patient in agreement with plan.     Physical Therapy Discharge Summary     Reason for therapy discharge:    Goals met for safe discharge home, planned discharge to home tomorrow.     Progress towards therapy goal(s). See goals on Care Plan in [facility] electronic health record for goal details.  Goals met     Therapy recommendation(s):    Continued therapy is recommended.  Rationale/Recommendations:  OP PT for increased core strengthening/stability, and increased activity tolerance with decreased pain.
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
## 2017-07-07 NOTE — PROVIDER NOTIFICATION
Writer notified regarding 2 different orders regarding HOB restrictions-orders written only 2 minutes apart.  1 order stated partial spinal prec (flat), other order states HOB < 30 degrees unless brace on.  [facility].  Verbally stated HOB to remain <30 degrees.  Writer asked MD to change orders but no new order place.  Updated bedside RN, Flower.  Will continue with POC.

## 2017-07-07 NOTE — PLAN OF CARE
Problem: Goal Outcome Summary  Goal: Goal Outcome Summary  Outcome: No Change  VSS, HR in the high 40's. A&Ox4. Neuros intact. Lower back pain controlled w/ prn oxy. Pt on strict bedrest, HOB <30 degrees. Voiding spont via bedpan. Regular diet. PIV SL. Daughter at bedside. Plan for xrays this morning. Continue to monitor and follow POC.

## 2017-07-07 NOTE — PROGRESS NOTES
[facility], [place]  Trauma Education Note    Date of Service: 07/07/2017     Trauma Mechanism: Fall from deck 5 ft.  Known Injuries:  Acute fracture of the superior L2 endplate     Assessment:    Education Needs   Vertebral fx care, pain management, osteoporosis, fall prevention, injury prevention.    Primary Learner: Patient    Other Learner(s): None    Barriers: None    Learning Style: Explain, handouts       Plan:    Education provided: Discussed what a compression fx is, treatment, s/sx of complications and osteoporosis. Discussed fall prevention post discharge and need to put railing on deck.    Trauma Mechanism: Fall from deck, discussed need for railing.    Treatment Plan Education: Discussed pain management, rehab, chairback brace and use.  Handouts provided: Vertebral compression fracture, osteoporosis  Follow up Education Needs: none    Samantha Sullivan

## 2017-07-07 NOTE — PLAN OF CARE
Problem: Goal Outcome Summary  Goal: Goal Outcome Summary  Outcome: No Change  Pt is AxOx4. VSS for her. HR runs in the high 40s and low 50s. [place] [facility] Pt was brought over by [facility] [employer]. Pt was able to slide from stretcher to bed. Pt had a L-2 fracture. Pt was given oxycodone in the [place] [facility] right before transfer. Pt has not requested any prn pain medication. Pt states she is comfortable at this time Pt was given a scheduled lidocaine patch on her lower back. Family brought pt a sandwich for dinner. No issues with swallowing. Plan is for daughter to spend the night. PIV SL. Pt has not voided yet. Will perform a UA when she does.  We will continue to monitor her.

## 2017-07-08 VITALS
HEART RATE: 67 BPM | RESPIRATION RATE: 16 BRPM | TEMPERATURE: 99.6 F | WEIGHT: 138 LBS | DIASTOLIC BLOOD PRESSURE: 67 MMHG | SYSTOLIC BLOOD PRESSURE: 135 MMHG | BODY MASS INDEX: 21.94 KG/M2 | OXYGEN SATURATION: 90 %

## 2017-07-08 LAB
CREAT SERPL-MCNC: 0.73 MG/DL (ref 0.52–1.04)
GFR SERPL CREATININE-BSD FRML MDRD: 79 ML/MIN/1.7M2
PLATELET # BLD AUTO: 192 10E9/L (ref 150–450)

## 2017-07-08 PROCEDURE — 25000132 ZZH RX MED GY IP 250 OP 250 PS 637: Performed by: SURGERY

## 2017-07-08 PROCEDURE — 25000132 ZZH RX MED GY IP 250 OP 250 PS 637: Performed by: PHYSICIAN ASSISTANT

## 2017-07-08 PROCEDURE — 36415 COLL VENOUS BLD VENIPUNCTURE: CPT | Performed by: SURGERY

## 2017-07-08 PROCEDURE — 85049 AUTOMATED PLATELET COUNT: CPT | Performed by: SURGERY

## 2017-07-08 PROCEDURE — 82565 ASSAY OF CREATININE: CPT | Performed by: SURGERY

## 2017-07-08 RX ORDER — CYCLOBENZAPRINE HCL 10 MG
10 TABLET ORAL 3 TIMES DAILY PRN
Status: DISCONTINUED | OUTPATIENT
Start: 2017-07-08 | End: 2017-07-08 | Stop reason: HOSPADM

## 2017-07-08 RX ORDER — BISACODYL 10 MG
10 SUPPOSITORY, RECTAL RECTAL DAILY PRN
Status: DISCONTINUED | OUTPATIENT
Start: 2017-07-08 | End: 2017-07-08

## 2017-07-08 RX ORDER — LIDOCAINE 50 MG/G
2 PATCH TOPICAL EVERY 24 HOURS
Qty: 30 PATCH | Refills: 0 | Status: SHIPPED | OUTPATIENT
Start: 2017-07-08 | End: 2018-02-28

## 2017-07-08 RX ORDER — AMOXICILLIN 250 MG
2 CAPSULE ORAL 2 TIMES DAILY
Status: DISCONTINUED | OUTPATIENT
Start: 2017-07-08 | End: 2017-07-08 | Stop reason: HOSPADM

## 2017-07-08 RX ORDER — POLYETHYLENE GLYCOL 3350 17 G/17G
17 POWDER, FOR SOLUTION ORAL DAILY
Qty: 7 PACKET | Refills: 1 | Status: SHIPPED | OUTPATIENT
Start: 2017-07-08 | End: 2017-07-18

## 2017-07-08 RX ORDER — ACETAMINOPHEN 325 MG/1
1000 TABLET ORAL 3 TIMES DAILY
Qty: 100 TABLET | Refills: 0 | Status: SHIPPED | OUTPATIENT
Start: 2017-07-08 | End: 2018-08-27

## 2017-07-08 RX ORDER — POLYETHYLENE GLYCOL 3350 17 G/17G
17 POWDER, FOR SOLUTION ORAL DAILY
Status: DISCONTINUED | OUTPATIENT
Start: 2017-07-08 | End: 2017-07-08 | Stop reason: HOSPADM

## 2017-07-08 RX ORDER — BISACODYL 10 MG
10 SUPPOSITORY, RECTAL RECTAL DAILY PRN
Qty: 30 SUPPOSITORY | Refills: 0 | Status: SHIPPED | OUTPATIENT
Start: 2017-07-08 | End: 2018-02-28

## 2017-07-08 RX ORDER — CYCLOBENZAPRINE HCL 10 MG
10 TABLET ORAL 3 TIMES DAILY PRN
Qty: 42 TABLET | Refills: 0 | Status: SHIPPED | OUTPATIENT
Start: 2017-07-08 | End: 2018-02-28

## 2017-07-08 RX ORDER — SULFAMETHOXAZOLE/TRIMETHOPRIM 800-160 MG
1 TABLET ORAL 2 TIMES DAILY
Status: DISCONTINUED | OUTPATIENT
Start: 2017-07-08 | End: 2017-07-08 | Stop reason: HOSPADM

## 2017-07-08 RX ORDER — OXYCODONE HYDROCHLORIDE 5 MG/1
5-10 TABLET ORAL
Qty: 30 TABLET | Refills: 0 | Status: SHIPPED | OUTPATIENT
Start: 2017-07-08 | End: 2017-07-13

## 2017-07-08 RX ORDER — CEFTRIAXONE 1 G/1
1 INJECTION, POWDER, FOR SOLUTION INTRAMUSCULAR; INTRAVENOUS EVERY 24 HOURS
Status: DISCONTINUED | OUTPATIENT
Start: 2017-07-08 | End: 2017-07-08

## 2017-07-08 RX ORDER — SULFAMETHOXAZOLE/TRIMETHOPRIM 800-160 MG
1 TABLET ORAL 2 TIMES DAILY
Qty: 14 TABLET | Refills: 0 | Status: SHIPPED | OUTPATIENT
Start: 2017-07-08 | End: 2017-07-15

## 2017-07-08 RX ADMIN — ACETAMINOPHEN 650 MG: 325 TABLET, FILM COATED ORAL at 15:46

## 2017-07-08 RX ADMIN — OXYCODONE HYDROCHLORIDE 10 MG: 5 TABLET ORAL at 08:00

## 2017-07-08 RX ADMIN — OXYCODONE HYDROCHLORIDE 10 MG: 5 TABLET ORAL at 11:15

## 2017-07-08 RX ADMIN — METHOCARBAMOL 750 MG: 750 TABLET ORAL at 04:29

## 2017-07-08 RX ADMIN — SENNOSIDES AND DOCUSATE SODIUM 2 TABLET: 8.6; 5 TABLET ORAL at 07:59

## 2017-07-08 RX ADMIN — ASPIRIN 81 MG: 81 TABLET, COATED ORAL at 07:59

## 2017-07-08 RX ADMIN — SERTRALINE HYDROCHLORIDE 100 MG: 100 TABLET ORAL at 07:59

## 2017-07-08 RX ADMIN — ACETAMINOPHEN 650 MG: 325 TABLET, FILM COATED ORAL at 08:00

## 2017-07-08 RX ADMIN — OXYCODONE HYDROCHLORIDE 10 MG: 5 TABLET ORAL at 01:37

## 2017-07-08 RX ADMIN — LISINOPRIL AND HYDROCHLOROTHIAZIDE 1 TABLET: 12.5; 2 TABLET ORAL at 07:59

## 2017-07-08 RX ADMIN — OXYCODONE HYDROCHLORIDE 10 MG: 5 TABLET ORAL at 15:46

## 2017-07-08 RX ADMIN — SULFAMETHOXAZOLE AND TRIMETHOPRIM 1 TABLET: 800; 160 TABLET ORAL at 12:59

## 2017-07-08 RX ADMIN — CYCLOBENZAPRINE HYDROCHLORIDE 10 MG: 10 TABLET, FILM COATED ORAL at 12:59

## 2017-07-08 ASSESSMENT — VISUAL ACUITY
OU: NORMAL ACUITY;BASELINE
OU: NORMAL ACUITY;BASELINE
OU: BASELINE;GLASSES
OU: BASELINE;GLASSES
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
## 2017-07-08 NOTE — PLAN OF CARE
Problem: Pain, Acute (Adult)  Goal: Identify Related Risk Factors and Signs and Symptoms  Related risk factors and signs and symptoms are identified upon initiation of Human Response Clinical Practice Guideline (CPG)   Outcome: No Change  VSS. A&Ox4. Moderate relief with oxycodone and robaxin. Up with SBA, LSO brace for comfort. Reg diet. Voiding spont. Passing flatus. PIV SL. Cont to monitor and with POC.

## 2017-07-08 NOTE — PLAN OF CARE
Problem: Individualization  Goal: Patient Preferences  Outcome: Improving  TMAX 99.6 orally, other vitals stable.  Neuros intact except for slight BLE weakness d/t low back pain.  Pt. Endorses low back pain and states that it radiates down her legs.  Pt. Has been taking Oxycodone prn and scheduled Tylenol with some relief.  MD started pt. On Flexeril in place of Robaxin bc she felt the Robaxin made her feel sleepy.  Pt. Has been wearing a chair back brace for comfort.  Ambulates independently in her room and is voiding spontaneously.  MD has stressed the importance of the pt having a BM in the near future.  Pt. Is aware and plans to take bowel meds when she gets home; declined to take the bowel meds (a side from [facility]) prior to dc.  The plan is for pt. To dc home with assist (this afternoon) and will follow up with OP PT.

## 2017-07-08 NOTE — DISCHARGE SUMMARY
[facility], [place]    Discharge Summary  Trauma Surgery Service    Date of Admission:  7/6/2017  Date of Discharge:  7/8/2017  4:30 PM  Discharging Provider: Dr. Schwarz/Aundrea Guerra PA-C     Date of Service (when I saw the patient): 07/08/17    Primary Provider: Dee Castillo  Primary Care clinic: [facility] [address] [address]  Phone: [phone]  Fax number: [phone]     Discharge Diagnoses     Acute fracture of the superior L2 endplate  Acute dramatic pain  Hyperlipidemia  Hypertension  Tobacco abuse  Depression    Hospital Course   HPI: Kimmie Carranza is a 67 year old female who presents as a direct admission with acute fracture of the superior L2 endplate. She was at home yesterday, when she fell off her deck ~5ft secondary to broken chair. Landed on her back and had immediate pain. No LOC, amnesia to events, or neuro deficits. Immediate back pain. Able to sleep through the night comfortable. Noted only severe spasms when walking and subsequently sought medical treatment today. Upon work-up at [facility], noted to have L2 compression fracture of endplate by CT scan. Transferred here for additional management. At time of exam only complains of back pain with movement.    Patient was seen by neurosurgery surgery.  She was placed in chairback brace for comfort.  Medical management is recommended.  Upright x-ray with hairback brace showed a stable alignment.  Patient pain was adequately controlled.  Patient was discharged home with a plan to follow up with neurosurgery in 1 month with repeat x-ray and follow-up.  She will discharge home in stable condition.  Follow up with a primary care doctor in the meantime.  Advised to return to the emergency room.  Referral to outpatient physical therapy, no limitation with therapy but recommended chairback brace.  Patient understands and agrees with the plan.    Traumatic  Injury  The patient sustained the above injury as a result of fall from 5 ft height.  She was seen by the Trauma Resource Nurse and injury prevention education was performed.  The mechanism of injury and factors contributing to the accident were discussed with the patient.  Strategies on how to prevent future accidents were reviewed.  The patient underwent tertiary examination to evaluate for additional injuries.  The systematic review did not find any other injuries.    Therapy Recommendations:   Current status of physical therapies on discharge: Continued therapy is recommended.  Rationale/Recommendations:  OP PT for increased core strengthening/stability, and increased activity tolerance with decreased pain.    Current status of occupational therapies on discharge: No OT needs were noted.      Significant Results and Procedures   None    Code Status   Full Code  Physical Exam                      Vitals:    07/06/17 1245   Weight: 62.6 kg (138 lb)     /67 (BP Location: Left arm)  Pulse 67  Temp 99.6  F (37.6  C) (Oral)  Resp 16  Wt 62.6 kg (138 lb)  SpO2 90%  BMI 21.94 kg/m2    Vital Signs with Ranges  Constitutional: She is oriented to person, place, and time. She appears well-developed and well-nourished.   HENT:   Head: Normocephalic.   Mouth/Throat: Oropharynx is clear and moist.   Eyes: Pupils are equal, round, and reactive to light. Right eye exhibits no discharge. Left eye exhibits no discharge. No scleral icterus.   Neck: Normal range of motion. No tracheal deviation present.   Cardiovascular: Normal rate, regular rhythm and intact distal pulses.  Exam reveals no gallop and no friction rub.    No murmur heard.  Pulmonary/Chest: No respiratory distress. She has no wheezes. She has no rales. She exhibits no tenderness.   Abdominal: Soft. Bowel sounds are normal. She exhibits no distension and no mass. There is no tenderness. There is no rebound and no guarding.   Musculoskeletal: Normal range of  motion. She exhibits no edema or tenderness.   L2 compression fracture--back pain    Neurological: She is alert and oriented to person, place, and time.   Skin: Skin is warm and dry. No rash noted. No erythema. No pallor.     Discharge Disposition   Discharged to home  Condition at discharge: Stable  Discharge VS: Blood pressure 148/80, pulse 81, temperature 99  F (37.2  C), temperature source Oral, resp. rate 16, weight 62.6 kg (138 lb), SpO2 95 %, not currently breastfeeding.    Consultations This Hospital Stay   MEDICATION HISTORY IP PHARMACY CONSULT  NEUROSURGERY IP CONSULT  ORTHOSIS SPINAL IP CONSULT  PHYSICAL THERAPY ADULT IP CONSULT    Discharge Orders     Physical Therapy Referral     Physical Therapy Referral     Reason for your hospital stay   You were hospitalized and treated for the following conditions:  1. L2 compression fracture   2. Acute pain     You were seen by the following services:  Trauma Service  Neurosurgery   Physical and Occupational therapies     Adult [facility] Follow-up and recommended labs and tests   Follow up with your primary care provider for continued medical care and hospital follow up in 5-10 days.     Trauma Clinic as needed   [facility]  Floor 4  [address] [place], [place] [address]   Appointments: [phone]    Neurosurgery Clinic -Follow up in 1 month with Neurosurgery PA in clinic with repeat AP/Lateral lumbar X-rays at that time.   Floor 3   [address] [place] [address]   Appointments: [phone]            Appointments on [place] and/or [facility] (with [facility] or [facility] provider or service). Call [phone] if you haven't heard regarding these appointments within 7 days of discharge.     Activity   Your activity upon discharge:   Brace as needed for comfort   Take your medications as directed by your care team  Increase your activity as you tolerate. Make sure you get enough rest. Limit strenuous activities until you feel  better.   Do not drive or operate machinery while on narcotic pain medications.   Do not drink alcohol while on narcotic pain medications.     When to contact your care team   Should you have any questions, you can reach us in the following ways. During weekday working hours Monday-Friday, 7:00 am - 4:00 pm, call [phone] to reach our nurse. After 4:00pm and on on weekends call  [phone] and ask  to page  the Trauma provider on call or neurosurgery on call provider     Return to the emergency department if you notice the following: fever over 101.5F,  feel dizzy or faint, fast or irregular heart beats, heavy sweating, increased shortness of breath, changes in walking, speech, or thinking or confusion,  constant nausea or vomiting, persistent pain or new drainage from your wounds.     In case of an emergency go to Emergency department or call [phone].     Reason for your hospital stay   Kimmie Carranza is a 67 year old female who presents as a direct admission with acute fracture of the superior L2 endplate. She was at home yesterday, when she fell off her deck ~5ft secondary to broken chair     Follow Up and recommended labs and tests   Follow up with primary care provider, Dee Castillo, within 7 days to evaluate treatment change.  No follow up labs or test are needed.     Activity   Your activity upon discharge: activity as tolerated, brace for comfort     When to contact your care team   Call your primary doctor if you have any of the following: temperature greater than 101F or increased pain.     Full Code     Full Code     Diet   Follow this diet upon discharge: regular diet     Diet   Follow this diet upon discharge:  Orders Placed This Encounter     Regular Diet Adult     Diet       Discharge Medications   Current Discharge Medication List      START taking these medications    Details   oxyCODONE (ROXICODONE) 5 MG IR tablet Take 1-2 tablets (5-10 mg) by mouth every 3 hours as needed for  moderate to severe pain  Qty: 30 tablet, Refills: 0    Associated Diagnoses: Compression fracture of L2 lumbar vertebra, closed, initial encounter (H)      acetaminophen (TYLENOL) 325 MG tablet Take 3 tablets (975 mg) by mouth 3 times daily  Qty: 100 tablet, Refills: 0    Associated Diagnoses: Compression fracture of L2 lumbar vertebra, closed, initial encounter (H)      lidocaine (LIDODERM) 5 % Patch Place 2 patches onto the skin every 24 hours  Qty: 30 patch, Refills: 0    Associated Diagnoses: Compression fracture of L2 lumbar vertebra, closed, initial encounter (H)      bisacodyl (DULCOLAX) 10 MG Suppository Place 1 suppository (10 mg) rectally daily as needed for constipation (IF Miralax ineffective)  Qty: 30 suppository, Refills: 0    Associated Diagnoses: Compression fracture of L2 lumbar vertebra, closed, initial encounter (H)      polyethylene glycol (MIRALAX/GLYCOLAX) Packet Take 17 g by mouth daily  Qty: 7 packet, Refills: 1    Associated Diagnoses: Compression fracture of L2 lumbar vertebra, closed, initial encounter (H)      sulfamethoxazole-trimethoprim (BACTRIM DS/SEPTRA DS) 800-160 MG per tablet Take 1 tablet by mouth 2 times daily for 7 days  Qty: 14 tablet, Refills: 0    Associated Diagnoses: Compression fracture of L2 lumbar vertebra, closed, initial encounter (H)      cyclobenzaprine (FLEXERIL) 10 MG tablet Take 1 tablet (10 mg) by mouth 3 times daily as needed for muscle spasms  Qty: 42 tablet, Refills: 0    Associated Diagnoses: Compression fracture of L2 lumbar vertebra, closed, initial encounter (H)         CONTINUE these medications which have NOT CHANGED    Details   Multiple Vitamins-Minerals (MULTIVITAMIN ADULT PO) Take 1 tablet by mouth daily      TOLTERODINE TARTRATE PO Take 2 mg by mouth daily as needed for incontinence (Takes approximately 3 times per month)      TRAZODONE HCL PO Take 50 mg by mouth nightly as needed for sleep (Takes approximately 5 times per month.)      sertraline  (ZOLOFT) 100 MG tablet Take 1 tablet (100 mg) by mouth daily  Qty: 90 tablet, Refills: 1    Associated Diagnoses: Major depressive disorder, recurrent episode, mild (H)      lisinopril-hydrochlorothiazide (PRINZIDE/ZESTORETIC) 20-12.5 MG per tablet Take 1 tablet by mouth daily  Qty: 90 tablet, Refills: 1    Associated Diagnoses: Essential hypertension with goal blood pressure less than 140/90      atorvastatin (LIPITOR) 10 MG tablet Take 1 tablet (10 mg) by mouth daily  Qty: 90 tablet, Refills: 1    Associated Diagnoses: CARDIOVASCULAR SCREENING; LDL GOAL LESS THAN 160; Abnormal ankle brachial index      aspirin 81 MG EC tablet Take 1 tablet (81 mg) by mouth daily  Qty: 90 tablet, Refills: 3    Associated Diagnoses: CARDIOVASCULAR SCREENING; LDL GOAL LESS THAN 160; Abnormal ankle brachial index; Hypertension goal BP (blood pressure) < 140/90      Blood Pressure Monitoring (BL BLOOD PRESSURE MONITOR) KIT 1 Units. One monitor, check weekly.  Qty: 1 kit, Refills: 0    Associated Diagnoses: Hypertension goal BP (blood pressure) < 140/90           Allergies   Allergies   Allergen Reactions     No Known Drug Allergies      Data   Most Recent 3 CBC's:  Recent Labs   Lab Test  07/08/17   0816  07/06/17   1813   WBC   --   5.9   HGB   --   13.4   MCV   --   99   PLT  192  203      Most Recent 3 BMP's:  Recent Labs   Lab Test  07/08/17   0816  07/07/17   0751  07/06/17 1813  10/11/16   1123   NA   --   139  142  139   POTASSIUM   --   3.7  3.6  4.0   CHLORIDE   --   108  107  107   CO2   --   25  27  26   BUN   --   11  10  13   CR  0.73  0.65  0.68  0.71   ANIONGAP   --   6  8  6   JEANNETTE   --   8.5  8.4*  8.8   GLC   --   103*  82  79     Most Recent 2 LFT's:  Recent Labs   Lab Test  07/06/17   1813  10/11/16   1123   AST  22  21   ALT  19  22   ALKPHOS  43  53   BILITOTAL  0.7  0.7     Most Recent INR's and Anticoagulation Dosing History:  Anticoagulation Dose History     Recent Dosing and Labs Latest Ref Rng & Units  7/6/2017    INR 0.86 - 1.14 1.00        Most Recent 3 Troponin's:No lab results found.  Most Recent 6 Bacteria Isolates From Any Culture (See EPIC Reports for Culture Details):  Recent Labs   Lab Test  07/07/17   0001   CULT  Culture in progress     Most Recent TSH, T4 and A1c Labs:No lab results found.  Results for orders placed or performed during the hospital encounter of 07/06/17   Lumbar spine CT w/o contrast    Narrative    CT LUMBAR SPINE WITHOUT CONTRAST  7/6/2017 3:38 PM     HISTORY: Fall with low back pain. Please include the SI joint.     TECHNIQUE: Axial images of the lumbar spine were obtained without  intravenous contrast. Multiplanar reformations were performed.   Radiation dose for this scan was reduced using automated exposure  control, adjustment of the mA and/or kV according to patient size, or  iterative reconstruction technique.    COMPARISON: None.    FINDINGS: There are 5 lumbar type vertebral bodies assumed for the  purposes of this dictation.    There is an acute appearing fracture involving the superior endplate  of L2. The fracture extends anteriorly to the anterior aspect of the  vertebral body fracture, but does not appear to involve the posterior  endplate. There is slight buckling of the superior aspect of the L2  vertebral body towards the spinal canal without significant spinal  canal narrowing. The fracture does not appear to extend into the  pedicles or lamina. No other fractures are identified.    Visualized SI joints are unremarkable.    Severe bilateral facet hypertrophy at L4-5 right greater than left  without significant spinal canal narrowing. Bilateral facet  hypertrophy at L5-S1, left greater than right without significant  spinal canal or neural foraminal narrowing.      Impression    IMPRESSION:  Acute fracture of the superior L2 endplate also involving  the anterior aspect of the vertebral body. The fracture does not  appear to involve the posterior endplate although  there is slight  buckling of the superior vertebral body towards the spinal canal. No  significant spinal canal narrowing at this level. No other fractures  identified.    Results discussed with Dr. Cal Ward at 4:03 PM on 7/6/2017.    NASRIN MATHEWS MD   XR Lumbar Spine 2/3 Views    Narrative    Exam: 2 views of the lumbar spine dated 7/7/2017.    COMPARISON: Outside CT dated 7/6/2017.    CLINICAL HISTORY: Fall.    FINDINGS: AP and lateral views of the lumbar spine were obtained.  There are 5 lumbar type vertebral bodies for the purposes of this  dictation. The bones are mildly osteopenic appearing. Redemonstration  of known fracture involving the superior endplate of the L2 vertebral  body with irregularity along the anterior superior aspect, not  significantly changed since the CT from the day prior. Multilevel disc  space narrowing in the lumbar spine. Degenerative changes in the left  hip, partially visualized.      Impression    IMPRESSION:  1. Stable appearance of known fracture involving the superior endplate  of the L2 vertebral body, better visualized on the CT scan from the  day prior.  2. Multilevel degenerative disc disease in the lumbar spine.    JESE LEI MD       Time Spent on this Encounter   I, Aundrea Guerra PA-C , personally saw the patient today and spent greater than 30 minutes discharging this patient.    We appreciate the opportunity to care for your patient while in the hospital.  Should you have any questions about their injuries or this discharge summary our contact information is below.    Trauma Services  [facility]   Department of Critical Care and Acute Care Surgery  [address]  [place] [address]  Office: [phone]  Clinic Nurse: [phone]

## 2017-07-08 NOTE — PLAN OF CARE
Problem: Goal Outcome Summary  Goal: Goal Outcome Summary  Outcome: Improving  AVSS - moy but per pt is her baseline. Neuros intact. Pain well controlled with PRN Oxycodone x1 and Robaxin x1. PIV SL. Reg. VDSP. LSO for comfort. Will continue to monitor and follow with POC.

## 2017-07-08 NOTE — PLAN OF CARE
Problem: Goal Outcome Summary  Goal: Goal Outcome Summary  Outcome: Adequate for Discharge Date Met:  07/08/17  AVS Printed and reviewed, Pain medications given. Medications to DC pharm. Transport called to take patient to pharmacy and to front doors to meet  who will be taking her home. Ready for Discharge.

## 2017-07-10 ENCOUNTER — TELEPHONE (OUTPATIENT)
Dept: FAMILY MEDICINE | Facility: CLINIC | Age: 67
End: 2017-07-10

## 2017-07-10 ENCOUNTER — CARE COORDINATION (OUTPATIENT)
Dept: CARE COORDINATION | Facility: CLINIC | Age: 67
End: 2017-07-10

## 2017-07-10 DIAGNOSIS — S32.020A COMPRESSION FRACTURE OF L2 LUMBAR VERTEBRA, CLOSED, INITIAL ENCOUNTER (H): ICD-10-CM

## 2017-07-10 LAB
BACTERIA SPEC CULT: ABNORMAL
Lab: ABNORMAL
MICRO REPORT STATUS: ABNORMAL
MICROORGANISM SPEC CULT: ABNORMAL
MICROORGANISM SPEC CULT: ABNORMAL
SPECIMEN SOURCE: ABNORMAL

## 2017-07-10 NOTE — PROGRESS NOTES
"[facility]  \"Hello, my name is Zoila Espinoza , and I am calling from the [facility].  I want to check in and see how you are doing, after leaving the hospital.  You may also receive a call from your Care Coordinator (care team), but I want to make sure you don t have any urgent needs.  I have a couple questions to review with you:     Post-Discharge Outreach                                                    Kimmie Carranza is a 67 year old female     Follow-up Appointments           Adult [facility] Follow-up and recommended labs and tests         Follow up with your primary care provider for continued medical care and hospital follow up in 5-10 days.      Trauma Clinic as needed   [facility]  Floor 4  [address] [place] [address]   Appointments: [phone]     Neurosurgery Clinic -Follow up in 1 month with Neurosurgery PA in clinic with repeat AP/Lateral lumbar X-rays at that time.   Floor 3   [address] [place] [address]   Appointments: [phone]                 Appointments on [place] and/or [facility] (with [facility] or [facility] provider or service). Call [phone] if you haven't heard regarding these appointments within 7 days of discharge.               Follow Up and recommended labs and tests         Follow up with primary care provider, Dee Castillo, within 7 days to evaluate treatment change.  No follow up labs or test are needed.                  Care Team:    Patient Care Team       Relationship Specialty Notifications Start End    Dee Castillo MD PCP - General   [birth year]     Phone: [phone] Fax: [phone]         [facility] [address] [address]            Transition of Care Review                                                      Patient was called three times and no answer so post 24 hr DC follow up calls will be closed " out                     Plan                                                      Thanks for your time.  Your Care Coordinator may follow-up within the next couple days.  In the meantime if you have questions, concerns or problems call your care team.        Zoila Espinoza

## 2017-07-10 NOTE — TELEPHONE ENCOUNTER
Hospital F/U 7/8/2017 DX: Compression Fracture Of L2 Lumbar Vertebra, Closed, Initial Encounter ED/IP 0/1    [phone] (home)

## 2017-07-11 NOTE — TELEPHONE ENCOUNTER
ED / Discharge Outreach Protocol    Patient Contact    Attempt # 1    Was call answered?  No.  Unable to leave message. (VM not setup)    Discharge Orders     Physical Therapy Referral      Physical Therapy Referral      Reason for your hospital stay   You were hospitalized and treated for the following conditions:  1. L2 compression fracture   2. Acute pain     You were seen by the following services:  Trauma Service  Neurosurgery   Physical and Occupational therapies      Adult [facility] Follow-up and recommended labs and tests   Follow up with your primary care provider for continued medical care and hospital follow up in 5-10 days.     Trauma Clinic as needed   [facility]  Floor 4  [address] [place] [address]   Appointments: [phone]    Neurosurgery Clinic -Follow up in 1 month with Neurosurgery PA in clinic with repeat AP/Lateral lumbar X-rays at that time.   Floor 3   [address] [place] [address]   Appointments: [phone]    Appointments on [place] and/or [facility] (with [facility] or [facility] provider or service). Call [phone] if you haven't heard regarding these appointments within 7 days of discharge.      Activity   Your activity upon discharge:   Brace as needed for comfort   Take your medications as directed by your care team  Increase your activity as you tolerate. Make sure you get enough rest. Limit strenuous activities until you feel better.   Do not drive or operate machinery while on narcotic pain medications.   Do not drink alcohol while on narcotic pain medications.      When to contact your care team   Should you have any questions, you can reach us in the following ways. During weekday working hours Monday-Friday, 7:00 am - 4:00 pm, call [phone] to reach our nurse. After 4:00pm and on on weekends call  [phone] and ask  to page  the Trauma provider on call or neurosurgery on call provider     Return to the emergency  department if you notice the following: fever over 101.5F,  feel dizzy or faint, fast or irregular heart beats, heavy sweating, increased shortness of breath, changes in walking, speech, or thinking or confusion,  constant nausea or vomiting, persistent pain or new drainage from your wounds.     In case of an emergency go to Emergency department or call 911.       Follow Up and recommended labs and tests   Follow up with primary care provider, Dee Castillo, within 7 days to evaluate treatment change.  No follow up labs or test are needed.

## 2017-07-12 NOTE — TELEPHONE ENCOUNTER
ED / Discharge Outreach Protocol    Patient Contact    Attempt # 2    Was call answered?  No.  Left message on voicemail with information to call me back (now  working)    Medina SANTOS RN

## 2017-07-13 RX ORDER — OXYCODONE HYDROCHLORIDE 5 MG/1
5-10 TABLET ORAL
Qty: 10 TABLET | Refills: 0 | Status: SHIPPED | OUTPATIENT
Start: 2017-07-13 | End: 2018-02-28

## 2017-07-13 NOTE — TELEPHONE ENCOUNTER
Patient informed.  Pt would like daughter Rosa Carranza to p/u Rx  Left at  for daughter to p/u  Medina SANTOS RN

## 2017-07-13 NOTE — TELEPHONE ENCOUNTER
CW,  Patient is scheduled for hospital f/u appt with you 7/18/2017  States she is doing well.  Only has 2 tabs of Oxycodone left to get through the weekend.  Reviewed [place] , only controlled substance refill from 7/8/2017 #30 Oxycodone by Aundrea Guerra.  Patient requesting a couple tabs to get through.  Please advise.  Medina SANTOS RN

## 2017-07-13 NOTE — TELEPHONE ENCOUNTER
She's gone through a lot of the #30- 28 since d/c on 7/8/17...  Needs to taper down her use and use sparingly, ruth with her history of etoh dependence to avoid risk of dependence on narcotics.  Will send in #10, and she should make these last or wean off completely.    Thanks- CW

## 2017-07-18 ENCOUNTER — OFFICE VISIT (OUTPATIENT)
Dept: FAMILY MEDICINE | Facility: CLINIC | Age: 67
End: 2017-07-18
Payer: COMMERCIAL

## 2017-07-18 VITALS
BODY MASS INDEX: 21.52 KG/M2 | DIASTOLIC BLOOD PRESSURE: 80 MMHG | HEART RATE: 63 BPM | TEMPERATURE: 98.5 F | OXYGEN SATURATION: 100 % | WEIGHT: 137.1 LBS | SYSTOLIC BLOOD PRESSURE: 127 MMHG | HEIGHT: 67 IN
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
## 2017-07-18 DIAGNOSIS — S32.000D LUMBAR COMPRESSION FRACTURE, WITH ROUTINE HEALING, SUBSEQUENT ENCOUNTER: Primary | ICD-10-CM

## 2017-07-18 PROCEDURE — 99495 TRANSJ CARE MGMT MOD F2F 14D: CPT | Performed by: FAMILY MEDICINE

## 2017-07-18 NOTE — MR AVS SNAPSHOT
After Visit Summary   7/18/2017    Kimmie Carranza    MRN: [id number]           Patient Information     Date Of Birth          1950        Visit Information        Provider Department      7/18/2017 3:30 PM Dee Castillo MD [facility]        Today's Diagnoses     Lumbar compression fracture, with routine healing, subsequent encounter    -  1    Fall, subsequent encounter           Follow-ups after your visit        Your next 10 appointments already scheduled     Aug 29, 2017 12:00 PM CDT   (Arrive by 11:45 AM)   XR LUMBAR SPINE 2/3 VIEWS with UCXR1   [facility] ([facility])    [address]  [address] [facility] [id number] [phone]           Please bring a list of your current medicines to your exam. (Include vitamins, minerals and over-thecounter medicines.) Leave your valuables at home.  Tell your doctor if there is a chance you may be pregnant.  You do not need to do anything special for this exam.            Aug 29, 2017 12:30 PM CDT   (Arrive by 12:15 PM)   New Patient Visit with Nancy Carlton PA-C   [facility] Neurosurgery ([facility])    [address]  [address] [facility] [address] [phone]              Future tests that were ordered for you today     Open Future Orders        Priority Expected Expires Ordered    MA Screen Bilateral w/Zhen Routine  8/3/2018 8/3/2017            Who to contact     If you have questions or need follow up information about today's clinic visit or your schedule please contact [facility] directly at [phone].  Normal or non-critical lab and imaging results will be communicated to you by MyChart, letter or phone within 4 business days after the clinic has received the results. If you do not hear from us within 7 days, please contact the clinic through MyChart or phone. If you have a critical or abnormal lab result, we will  "notify you by phone as soon as possible.  Submit refill requests through [employer] or call your pharmacy and they will forward the refill request to us. Please allow 3 business days for your refill to be completed.          Additional Information About Your Visit        [employer] Information     Emair lets you send messages to your doctor, view your test results, renew your prescriptions, schedule appointments and more. To sign up, go to [email or web address] . Click on \"Log in\" on the left side of the screen, which will take you to the Welcome page. Then click on \"Sign up Now\" on the right side of the page.     You will be asked to enter the access code listed below, as well as some personal information. Please follow the directions to create your username and password.     Your access code is: [email or web address]  Expires: 2017  2:07 PM     Your access code will  in 90 days. If you need help or a new code, please call your [facility] clinic or [phone].        Care EveryWhere ID     This is your Care EveryWhere ID. This could be used by other organizations to access your [facility] medical records  [id number]        Your Vitals Were     Pulse Temperature Height Pulse Oximetry BMI (Body Mass Index)       63 98.5  F (36.9  C) (Oral) 5' 6.5\" (1.689 m) 100% 21.8 kg/m2        Blood Pressure from Last 3 Encounters:   17 127/80   17 135/67   17 122/69    Weight from Last 3 Encounters:   17 137 lb 1.6 oz (62.2 kg)   17 138 lb (62.6 kg)   17 138 lb 12.8 oz (63 kg)              Today, you had the following     No orders found for display       Primary Care Provider Office Phone # Fax #    Dee Castillo [phone] [address] [address]        Equal Access to Services     WILLOW COLBERT AH: Yanni De Leon, phill lau, qaybta kaalmada adeegronny ventura. So [facility] " [phone].    ATENCIÓN: Si margot bowman, tiene a maurice disposición servicios gratuitos de asistencia lingüística. Yemi infante [phone].    We comply with applicable federal civil rights laws and [place] laws. We do not discriminate on the basis of race, color, national origin, age, disability sex, sexual orientation or gender identity.            Thank you!     Thank you for choosing [facility]  for your care. Our goal is always to provide you with excellent care. Hearing back from our patients is one way we can continue to improve our services. Please take a few minutes to complete the written survey that you may receive in the mail after your visit with us. Thank you!             Your Updated Medication List - Protect others around you: Learn how to safely use, store and throw away your medicines at www.disposemymeds.org.          This list is accurate as of: 7/18/17 11:59 PM.  Always use your most recent med list.                   Brand Name Dispense Instructions for use Diagnosis    acetaminophen 325 MG tablet    TYLENOL    100 tablet    Take 3 tablets (975 mg) by mouth 3 times daily    Compression fracture of L2 lumbar vertebra, closed, initial encounter (H)       aspirin 81 MG EC tablet     90 tablet    Take 1 tablet (81 mg) by mouth daily    CARDIOVASCULAR SCREENING; LDL GOAL LESS THAN 160, Abnormal ankle brachial index, Hypertension goal BP (blood pressure) < 140/90       atorvastatin 10 MG tablet    LIPITOR    90 tablet    Take 1 tablet (10 mg) by mouth daily    CARDIOVASCULAR SCREENING; LDL GOAL LESS THAN 160, Abnormal ankle brachial index       bisacodyl 10 MG Suppository    DULCOLAX    30 suppository    Place 1 suppository (10 mg) rectally daily as needed for constipation (IF Miralax ineffective)    Compression fracture of L2 lumbar vertebra, closed, initial encounter (H)       BL BLOOD PRESSURE MONITOR Kit     1 kit    1 Units. One monitor, check weekly.    Hypertension goal BP (blood  pressure) < 140/90       cyclobenzaprine 10 MG tablet    FLEXERIL    42 tablet    Take 1 tablet (10 mg) by mouth 3 times daily as needed for muscle spasms    Compression fracture of L2 lumbar vertebra, closed, initial encounter (H)       lidocaine 5 % Patch    LIDODERM    30 patch    Place 2 patches onto the skin every 24 hours    Compression fracture of L2 lumbar vertebra, closed, initial encounter (H)       lisinopril-hydrochlorothiazide 20-12.5 MG per tablet    PRINZIDE/ZESTORETIC    90 tablet    Take 1 tablet by mouth daily    Essential hypertension with goal blood pressure less than 140/90       MULTIVITAMIN ADULT PO      Take 1 tablet by mouth daily        oxyCODONE 5 MG IR tablet    ROXICODONE    10 tablet    Take 1-2 tablets (5-10 mg) by mouth every 3 hours as needed for moderate to severe pain    Compression fracture of L2 lumbar vertebra, closed, initial encounter (H)       sertraline 100 MG tablet    ZOLOFT    90 tablet    Take 1 tablet (100 mg) by mouth daily    Major depressive disorder, recurrent episode, mild (H)       TOLTERODINE TARTRATE PO      Take 2 mg by mouth daily as needed for incontinence (Takes approximately 3 times per month)        TRAZODONE HCL PO      Take 50 mg by mouth nightly as needed for sleep (Takes approximately 5 times per month.)
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
## 2017-07-18 NOTE — NURSING NOTE
"Chief Complaint   Patient presents with     Hospital F/U       Initial /80  Pulse 63  Temp 98.5  F (36.9  C) (Oral)  Ht 5' 6.5\" (1.689 m)  Wt 137 lb 1.6 oz (62.2 kg)  SpO2 100%  BMI 21.8 kg/m2 Estimated body mass index is 21.8 kg/(m^2) as calculated from the following:    Height as of this encounter: 5' 6.5\" (1.689 m).    Weight as of this encounter: 137 lb 1.6 oz (62.2 kg).  Medication Reconciliation: complete      Health Maintenance that is potentially due pending provider review:  NONE    n/a    CHERELLE Boyd  "

## 2017-07-19 ENCOUNTER — THERAPY VISIT (OUTPATIENT)
Dept: PHYSICAL THERAPY | Facility: CLINIC | Age: 67
End: 2017-07-19
Payer: COMMERCIAL

## 2017-07-19 DIAGNOSIS — M54.50 LUMBAGO: Primary | ICD-10-CM

## 2017-07-19 PROCEDURE — 97161 PT EVAL LOW COMPLEX 20 MIN: CPT | Mod: GP | Performed by: PHYSICAL THERAPIST

## 2017-07-19 PROCEDURE — G8978 MOBILITY CURRENT STATUS: HCPCS | Mod: GP | Performed by: PHYSICAL THERAPIST

## 2017-07-19 PROCEDURE — 97110 THERAPEUTIC EXERCISES: CPT | Mod: GP | Performed by: PHYSICAL THERAPIST

## 2017-07-19 PROCEDURE — G8979 MOBILITY GOAL STATUS: HCPCS | Mod: GP | Performed by: PHYSICAL THERAPIST

## 2017-07-19 ASSESSMENT — PATIENT HEALTH QUESTIONNAIRE - PHQ9: SUM OF ALL RESPONSES TO PHQ QUESTIONS 1-9: 7

## 2017-07-19 NOTE — PROGRESS NOTES
Subjective:    Patient is a 67 year old female presenting with rehab back hpi.   Kimmie Carranza is a 67 year old female with a lumbar condition.  Condition occurred with:  A fall/slip.  Condition occurred: in the community.  This is a new condition  Pt reports she fell on July 5th 2017 and fx her L2 vertebrae. She was in the hospital for 2 days and then released. PT referral 7-8-17. .    Patient reports pain:  Lower lumbar spine, mid lumbar spine and upper lumbar spine.  Radiates to:  No radiation.  Pain is described as aching and is intermittent and reported as 4/10.  Associated symptoms:  Loss of motion/stiffness. Pain is worse during the day and worse in the P.M..  Symptoms are exacerbated by bending, standing and walking and relieved by rest.  Since onset symptoms are gradually improving.  Special tests:  X-ray.                                                    Objective:    System         Lumbar/SI Evaluation  ROM:    AROM Lumbar:   Flexion:          Limited 75% slight pain  Ext:                    To neutral   Side Bend:        Left:  Limited 50% slight pain    Right:  Limited 50% slight pain  Rotation:           Left:     Right:   Side Glide:        Left:     Right:           Lumbar Myotomes:  normal            Lumbar DTR's:  not assessed      Cord Signs:  not assessed    Lumbar Dermtomes:  normal                Neural Tension/Mobility:  Lumbar:  Normal        Lumbar Palpation:  not assessed      Functional Tests:  not assessed        Lumbar Provocation:  normal      Spinal Segmental Conclusions: L2 compression fx                                                       General     ROS    Assessment/Plan:      Patient is a 67 year old female with lumbar complaints.    Patient has the following significant findings with corresponding treatment plan.                Diagnosis 1:  LBP L2 compression fx  Pain -  hot/cold therapy and manual therapy  Decreased ROM/flexibility - manual therapy and therapeutic  exercise  Decreased joint mobility - manual therapy and therapeutic exercise  Impaired muscle performance - neuro re-education  Decreased function - therapeutic activities    Therapy Evaluation Codes:   1) History comprised of:   Personal factors that impact the plan of care:      None.    Comorbidity factors that impact the plan of care are:      None.     Medications impacting care: None.  2) Examination of Body Systems comprised of:   Body structures and functions that impact the plan of care:      Lumbar spine.   Activity limitations that impact the plan of care are:      Standing and Walking.  3) Clinical presentation characteristics are:   Stable/Uncomplicated.  4) Decision-Making    Low complexity using standardized patient assessment instrument and/or measureable assessment of functional outcome.  Cumulative Therapy Evaluation is: Low complexity.    Previous and current functional limitations:  (See Goal Flow Sheet for this information)    Short term and Long term goals: (See Goal Flow Sheet for this information)     Communication ability:  Patient appears to be able to clearly communicate and understand verbal and written communication and follow directions correctly.  Treatment Explanation - The following has been discussed with the patient:   RX ordered/plan of care  Anticipated outcomes  Possible risks and side effects  This patient would benefit from PT intervention to resume normal activities.   Rehab potential is good.    Frequency:  1 X week, once daily  Duration:  for 6 weeks  Discharge Plan:  Achieve all LTG.  Independent in home treatment program.  Reach maximal therapeutic benefit.    Please refer to the daily flowsheet for treatment today, total treatment time and time spent performing 1:1 timed codes.

## 2017-07-19 NOTE — MR AVS SNAPSHOT
"              After Visit Summary   7/19/2017    Kimmie Carranza    MRN: [id number]           Patient Information     Date Of Birth          1950        Visit Information        Provider Department      7/19/2017 9:30 AM Maryse Lino, PT [place] [facility] Physical Therapy        Today's Diagnoses     Lumbago    -  1       Follow-ups after your visit        Your next 10 appointments already scheduled     Jul 26, 2017 11:30 AM CDT   GAL[facility] Spine with Maryse Lino PT   [facility] Physical Therapy ([facility] [place]  )    [address]  [place] [address] [phone]              Who to contact     If you have questions or need follow up information about today's clinic visit or your schedule please contact [place] [facility] PHYSICAL THERAPY directly at [phone].  Normal or non-critical lab and imaging results will be communicated to you by BuildCirclehart, letter or phone within 4 business days after the clinic has received the results. If you do not hear from us within 7 days, please contact the clinic through BuildCirclehart or phone. If you have a critical or abnormal lab result, we will notify you by phone as soon as possible.  Submit refill requests through [employer] or call your pharmacy and they will forward the refill request to us. Please allow 3 business days for your refill to be completed.          Additional Information About Your Visit        BuildCirclehart Information     [employer] lets you send messages to your doctor, view your test results, renew your prescriptions, schedule appointments and more. To sign up, go to [email or web address] . Click on \"Log in\" on the left side of the screen, which will take you to the Welcome page. Then click on \"Sign up Now\" on the right side of the page.     You will be asked to enter the access code listed below, as well as some personal information. Please follow the directions to create " your username and password.     Your access code is: [email or web address]  Expires: 2017  2:07 PM     Your access code will  in 90 days. If you need help or a new code, please call your [place] clinic or [phone].        Care EveryWhere ID     This is your Care EveryWhere ID. This could be used by other organizations to access your [place] medical records  [id number]         Blood Pressure from Last 3 Encounters:   17 127/80   17 135/67   17 122/69    Weight from Last 3 Encounters:   17 62.2 kg (137 lb 1.6 oz)   17 62.6 kg (138 lb)   17 63 kg (138 lb 12.8 oz)              We Performed the Following     HC PT EVAL, LOW COMPLEXITY     GALO INITIAL EVAL REPORT     THERAPEUTIC EXERCISES        Primary Care Provider Office Phone # Fax #    Dee Castillo [phone]       [facility] [address] [address]        Equal Access to Services     WILLOW COLBERT AH: Hadii aad ku hadasho Soomaali, waaxda luqadaha, qaybta kaalmada adeegyada, ronny velazquez . So [facility] [phone].    ATENCIÓN: Si habla español, tiene a maurice disposición servicios gratuitos de asistencia lingüística. Llame al [phone].    We comply with applicable federal civil rights laws and [place] laws. We do not discriminate on the basis of race, color, national origin, age, disability sex, sexual orientation or gender identity.            Thank you!     Thank you for choosing [facility] PHYSICAL THERAP[facility]  for your care. Our goal is always to provide you with excellent care. Hearing back from our patients is one way we can continue to improve our services. Please take a few minutes to complete the written survey that you may receive in the mail after your visit with us. Thank you!             Your Updated Medication List - Protect others around you: Learn how to safely use, store and throw away your  medicines at www.disposemymeds.org.          This list is accurate as of: 7/19/17 10:09 AM.  Always use your most recent med list.                   Brand Name Dispense Instructions for use Diagnosis    acetaminophen 325 MG tablet    TYLENOL    100 tablet    Take 3 tablets (975 mg) by mouth 3 times daily    Compression fracture of L2 lumbar vertebra, closed, initial encounter (H)       aspirin 81 MG EC tablet     90 tablet    Take 1 tablet (81 mg) by mouth daily    CARDIOVASCULAR SCREENING; LDL GOAL LESS THAN 160, Abnormal ankle brachial index, Hypertension goal BP (blood pressure) < 140/90       atorvastatin 10 MG tablet    LIPITOR    90 tablet    Take 1 tablet (10 mg) by mouth daily    CARDIOVASCULAR SCREENING; LDL GOAL LESS THAN 160, Abnormal ankle brachial index       bisacodyl 10 MG Suppository    DULCOLAX    30 suppository    Place 1 suppository (10 mg) rectally daily as needed for constipation (IF Miralax ineffective)    Compression fracture of L2 lumbar vertebra, closed, initial encounter (H)       BL BLOOD PRESSURE MONITOR Kit     1 kit    1 Units. One monitor, check weekly.    Hypertension goal BP (blood pressure) < 140/90       cyclobenzaprine 10 MG tablet    FLEXERIL    42 tablet    Take 1 tablet (10 mg) by mouth 3 times daily as needed for muscle spasms    Compression fracture of L2 lumbar vertebra, closed, initial encounter (H)       lidocaine 5 % Patch    LIDODERM    30 patch    Place 2 patches onto the skin every 24 hours    Compression fracture of L2 lumbar vertebra, closed, initial encounter (H)       lisinopril-hydrochlorothiazide 20-12.5 MG per tablet    PRINZIDE/ZESTORETIC    90 tablet    Take 1 tablet by mouth daily    Essential hypertension with goal blood pressure less than 140/90       MULTIVITAMIN ADULT PO      Take 1 tablet by mouth daily        oxyCODONE 5 MG IR tablet    ROXICODONE    10 tablet    Take 1-2 tablets (5-10 mg) by mouth every 3 hours as needed for moderate to severe pain     Compression fracture of L2 lumbar vertebra, closed, initial encounter (H)       sertraline 100 MG tablet    ZOLOFT    90 tablet    Take 1 tablet (100 mg) by mouth daily    Major depressive disorder, recurrent episode, mild (H)       TOLTERODINE TARTRATE PO      Take 2 mg by mouth daily as needed for incontinence (Takes approximately 3 times per month)        TRAZODONE HCL PO      Take 50 mg by mouth nightly as needed for sleep (Takes approximately 5 times per month.)

## 2017-07-19 NOTE — LETTER
[facility] ATHLETIC [facility] PHYSICAL [facility]  [address]  [place] [address] [phone]    2017    Re: Kimmie Carranza   :   1950  MRN:  [id number]   REFERRING PHYSICIAN:   Aundrea Guerra    [facility] ATHLETIC [facility] PHYSICAL [facility]  Date of Initial Evaluation:  2017  Visits: 1 Rxs Used: 1  Reason for Referral:  Lumbago    EVALUATION SUMMARY  Subjective:  Patient is a 67 year old female presenting with rehab back hpi.   Kimmie Carranza is a 67 year old female with a lumbar condition.  Condition occurred with:  A fall/slip.  Condition occurred: in the community.  This is a new condition  Pt reports she fell on 2017 and fx her L2 vertebrae. She was in the hospital for 2 days and then released. PT referral 17.     Patient reports pain:  Lower lumbar spine, mid lumbar spine and upper lumbar spine.  Radiates to:  No radiation.  Pain is described as aching and is intermittent and reported as 4/10.  Associated symptoms:  Loss of motion/stiffness. Pain is worse during the day and worse in the P.M..  Symptoms are exacerbated by bending, standing and walking and relieved by rest.  Since onset symptoms are gradually improving.  Special tests:  X-ray.                      Pertinent medical history includes:  Osteoarthritis, high blood pressure and depression.  Medical allergies: no.  Other surgeries include:  No.  Current medications:  Anti-inflammatory, muscle relaxants and anti-depressants.  Current occupation is Retired. Primary job tasks include:  Lifting and other (Carrying, Pushing/Pulling).  Oswestry Score: 40 %    Objective:  System  Lumbar/SI Evaluation  ROM:    AROM Lumbar:   Flexion:          Limited 75% slight pain  Ext:                    To neutral   Side Bend:        Left:  Limited 50% slight pain    Right:  Limited 50% slight pain  Rotation:           Left:     Right:   Side Glide:        Left:     Right:   Lumbar Myotomes:   normal  Lumbar DTR's:  not assessed  Cord Signs:  not assessed  Lumbar Dermtomes:  normal  Neural Tension/Mobility:  Lumbar:  Normal    Lumbar Palpation:  not assessed  Functional Tests:  not assessed  Lumbar Provocation:  normal  Spinal Segmental Conclusions: L2 compression fx  General   ROS        Re: Kimmie Carranza   :   1950    Assessment/Plan:    Patient is a 67 year old female with lumbar complaints.    Patient has the following significant findings with corresponding treatment plan.                Diagnosis 1:  LBP L2 compression fx  Pain -  hot/cold therapy and manual therapy  Decreased ROM/flexibility - manual therapy and therapeutic exercise  Decreased joint mobility - manual therapy and therapeutic exercise  Impaired muscle performance - neuro re-education  Decreased function - therapeutic activities    Therapy Evaluation Codes:   1) History comprised of:   Personal factors that impact the plan of care:      None.    Comorbidity factors that impact the plan of care are:      None.     Medications impacting care: None.  2) Examination of Body Systems comprised of:   Body structures and functions that impact the plan of care:      Lumbar spine.   Activity limitations that impact the plan of care are:      Standing and Walking.  3) Clinical presentation characteristics are:   Stable/Uncomplicated.  4) Decision-Making    Low complexity using standardized patient assessment instrument and/or measureable   assessment of functional outcome.  Cumulative Therapy Evaluation is: Low complexity.  Previous and current functional limitations:  (See Goal Flow Sheet for this information)    Short term and Long term goals: (See Goal Flow Sheet for this information)   Communication ability:  Patient appears to be able to clearly communicate and understand verbal and written communication and follow directions correctly.  Treatment Explanation - The following has been discussed with the patient:   RX ordered/plan of  care  Anticipated outcomes  Possible risks and side effects  This patient would benefit from PT intervention to resume normal activities.   Rehab potential is good.    Frequency:  1 X week, once daily  Duration:  for 6 weeks  Discharge Plan:  Achieve all LTG.  Independent in home treatment program.  Reach maximal therapeutic benefit.    Thank you for your referral.    INQUIRIES  Therapist:  Maryse Lino  PT [facility]  [facility] PHYSICAL THERAP[facility]  [address]  [place] [address]  Phone: [phone]  Fax: [phone]

## 2017-07-19 NOTE — PROGRESS NOTES
Subjective:    Patient is a 67 year old female presenting with rehab back hpi.                                      Pertinent medical history includes:  Osteoarthritis, high blood pressure and depression.  Medical allergies: no.  Other surgeries include:  No.  Current medications:  Anti-inflammatory, muscle relaxants and anti-depressants.  Current occupation is Retired.    Primary job tasks include:  Lifting and other (Carrying, Pushing/Pulling).              Oswestry Score: 40 %                 Objective:    System    Physical Exam    General     ROS    Assessment/Plan:

## 2017-07-26 ENCOUNTER — THERAPY VISIT (OUTPATIENT)
Dept: PHYSICAL THERAPY | Facility: CLINIC | Age: 67
End: 2017-07-26
Payer: COMMERCIAL

## 2017-07-26 DIAGNOSIS — M54.50 LUMBAGO: ICD-10-CM

## 2017-07-26 PROCEDURE — 97110 THERAPEUTIC EXERCISES: CPT | Mod: GP | Performed by: PHYSICAL THERAPIST

## 2017-07-26 NOTE — MR AVS SNAPSHOT
"              After Visit Summary   2017    Kimmie Carranza    MRN: [id number]           Patient Information     Date Of Birth          1950        Visit Information        Provider Department      2017 11:30 AM Maryse Lino PT [facility] Physical Therapy        Today's Diagnoses     Lumbago           Follow-ups after your visit        Who to contact     If you have questions or need follow up information about today's clinic visit or your schedule please contact [facility] PHYSICAL THERAPY directly at [phone].  Normal or non-critical lab and imaging results will be communicated to you by edjinghart, letter or phone within 4 business days after the clinic has received the results. If you do not hear from us within 7 days, please contact the clinic through edjinghart or phone. If you have a critical or abnormal lab result, we will notify you by phone as soon as possible.  Submit refill requests through [employer] or call your pharmacy and they will forward the refill request to us. Please allow 3 business days for your refill to be completed.          Additional Information About Your Visit        MyChart Information     [employer] lets you send messages to your doctor, view your test results, renew your prescriptions, schedule appointments and more. To sign up, go to [email or web address] . Click on \"Log in\" on the left side of the screen, which will take you to the Welcome page. Then click on \"Sign up Now\" on the right side of the page.     You will be asked to enter the access code listed below, as well as some personal information. Please follow the directions to create your username and password.     Your access code is: [email or web address]  Expires: 2017  2:07 PM     Your access code will  in 90 days. If you need help or a new code, please call your [place] clinic or [phone].        Care EveryWhere ID     This is your Care " EveryWhere ID. This could be used by other organizations to access your [place] medical records  [id number]         Blood Pressure from Last 3 Encounters:   07/18/17 127/80   07/08/17 135/67   06/30/17 122/69    Weight from Last 3 Encounters:   07/18/17 62.2 kg (137 lb 1.6 oz)   07/06/17 62.6 kg (138 lb)   06/30/17 63 kg (138 lb 12.8 oz)              We Performed the Following     THERAPEUTIC EXERCISES        Primary Care Provider Office Phone # Fax #    Dee Castillo [phone]       [facility] [address]  [facility] [address]        Equal Access to Services     WILLOW COLBERT : Hadii kerry ku hadasho Soginaali, waaxda luqadaha, qaybta kaalmada adeegyada, waxay krystlein amadou velazquez . So [facility] [phone].    ATENCIÓN: Si habla español, tiene a maurice disposición servicios gratuitos de asistencia lingüística. Yemi al [phone].    We comply with applicable federal civil rights laws and [place] laws. We do not discriminate on the basis of race, color, national origin, age, disability sex, sexual orientation or gender identity.            Thank you!     Thank you for choosing [facility] PHYSICAL THERAPY  for your care. Our goal is always to provide you with excellent care. Hearing back from our patients is one way we can continue to improve our services. Please take a few minutes to complete the written survey that you may receive in the mail after your visit with us. Thank you!             Your Updated Medication List - Protect others around you: Learn how to safely use, store and throw away your medicines at www.disposemymeds.org.          This list is accurate as of: 7/26/17 11:59 AM.  Always use your most recent med list.                   Brand Name Dispense Instructions for use Diagnosis    acetaminophen 325 MG tablet    TYLENOL    100 tablet    Take 3 tablets (975 mg) by mouth 3 times daily    Compression fracture  of L2 lumbar vertebra, closed, initial encounter (H)       aspirin 81 MG EC tablet     90 tablet    Take 1 tablet (81 mg) by mouth daily    CARDIOVASCULAR SCREENING; LDL GOAL LESS THAN 160, Abnormal ankle brachial index, Hypertension goal BP (blood pressure) < 140/90       atorvastatin 10 MG tablet    LIPITOR    90 tablet    Take 1 tablet (10 mg) by mouth daily    CARDIOVASCULAR SCREENING; LDL GOAL LESS THAN 160, Abnormal ankle brachial index       bisacodyl 10 MG Suppository    DULCOLAX    30 suppository    Place 1 suppository (10 mg) rectally daily as needed for constipation (IF Miralax ineffective)    Compression fracture of L2 lumbar vertebra, closed, initial encounter (H)       BL BLOOD PRESSURE MONITOR Kit     1 kit    1 Units. One monitor, check weekly.    Hypertension goal BP (blood pressure) < 140/90       cyclobenzaprine 10 MG tablet    FLEXERIL    42 tablet    Take 1 tablet (10 mg) by mouth 3 times daily as needed for muscle spasms    Compression fracture of L2 lumbar vertebra, closed, initial encounter (H)       lidocaine 5 % Patch    LIDODERM    30 patch    Place 2 patches onto the skin every 24 hours    Compression fracture of L2 lumbar vertebra, closed, initial encounter (H)       lisinopril-hydrochlorothiazide 20-12.5 MG per tablet    PRINZIDE/ZESTORETIC    90 tablet    Take 1 tablet by mouth daily    Essential hypertension with goal blood pressure less than 140/90       MULTIVITAMIN ADULT PO      Take 1 tablet by mouth daily        oxyCODONE 5 MG IR tablet    ROXICODONE    10 tablet    Take 1-2 tablets (5-10 mg) by mouth every 3 hours as needed for moderate to severe pain    Compression fracture of L2 lumbar vertebra, closed, initial encounter (H)       sertraline 100 MG tablet    ZOLOFT    90 tablet    Take 1 tablet (100 mg) by mouth daily    Major depressive disorder, recurrent episode, mild (H)       TOLTERODINE TARTRATE PO      Take 2 mg by mouth daily as needed for incontinence (Takes  approximately 3 times per month)        TRAZODONE HCL PO      Take 50 mg by mouth nightly as needed for sleep (Takes approximately 5 times per month.)

## 2017-07-31 DIAGNOSIS — S32.020A COMPRESSION FRACTURE OF L2 (H): Primary | ICD-10-CM

## 2017-08-14 ENCOUNTER — HOSPITAL ENCOUNTER (OUTPATIENT)
Dept: MAMMOGRAPHY | Facility: CLINIC | Age: 67
Discharge: HOME | End: 2017-08-14
Attending: FAMILY MEDICINE | Admitting: FAMILY MEDICINE
Payer: COMMERCIAL

## 2017-08-14 DIAGNOSIS — Z12.31 VISIT FOR SCREENING MAMMOGRAM: ICD-10-CM

## 2017-08-14 PROCEDURE — 77063 BREAST TOMOSYNTHESIS BI: CPT

## 2017-08-14 PROCEDURE — G0202 SCR MAMMO BI INCL CAD: HCPCS

## 2017-08-18 PROBLEM — M54.50 LUMBAGO: Status: RESOLVED | Noted: 2017-07-19 | Resolved: 2017-08-18

## 2017-08-18 NOTE — PROGRESS NOTES
Subjective:    HPI                    Objective:    System    Physical Exam    General     ROS    Assessment/Plan:      DISCHARGE REPORT    Progress reporting period is from 7-19-17 to 7-26-17.       SUBJECTIVE   Subjective: Doing well.     Current Pain level: 3/10.     Previous pain level was  4/10  .   Changes in function:  Yes (See Goal flowsheet attached for changes in current functional level)  Adverse reaction to treatment or activity: None    OBJECTIVE  Changes noted in objective findings:  The objective findings below are from DOS 7-26-17.  Objective: Feeling less pain. Usually having only slight LBP. Did  not go back to the pool yet. Is planning to see MD for xrays after the 1st of August. Has been up on her feet with not much pain. Plan for her to work on her own.      ASSESSMENT/PLAN  Updated problem list and treatment plan: Diagnosis 1:  LBP    STG/LTGs have been met or progress has been made towards goals:  Yes (See Goal flow sheet completed today.)  Assessment of Progress: The patient's condition is improving.  Self Management Plans:  Patient has been instructed in a home treatment program.    Kimmie continues to require the following intervention to meet STG and LTG's:  PT intervention is no longer required to meet STG/LTG.    Recommendations:  This patient is ready to be discharged from therapy and continue their home treatment program.    Please refer to the daily flowsheet for treatment today, total treatment time and time spent performing 1:1 timed codes.

## 2017-08-29 ENCOUNTER — OFFICE VISIT (OUTPATIENT)
Dept: NEUROSURGERY | Facility: CLINIC | Age: 67
End: 2017-08-29

## 2017-08-29 VITALS
BODY MASS INDEX: 23.14 KG/M2 | HEART RATE: 50 BPM | SYSTOLIC BLOOD PRESSURE: 157 MMHG | WEIGHT: 144 LBS | HEIGHT: 66 IN | DIASTOLIC BLOOD PRESSURE: 78 MMHG

## 2017-08-29 DIAGNOSIS — S32.020D COMPRESSION FRACTURE OF L2, WITH ROUTINE HEALING, SUBSEQUENT ENCOUNTER: Primary | ICD-10-CM

## 2017-08-29 ASSESSMENT — ENCOUNTER SYMPTOMS
ARTHRALGIAS: 1
JOINT SWELLING: 0
MYALGIAS: 1
STIFFNESS: 1
MUSCLE WEAKNESS: 0
NECK PAIN: 0
MUSCLE CRAMPS: 0
BACK PAIN: 1

## 2017-08-29 ASSESSMENT — PAIN SCALES - GENERAL: PAINLEVEL: NO PAIN (0)
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
## 2017-08-29 NOTE — NURSING NOTE
Chief Complaint   Patient presents with     Consult     ED NOTE 07/07/2017/ XRAY PRIOR, L2 compression fracture.    Gila Alvarez, CMA

## 2017-08-29 NOTE — MR AVS SNAPSHOT
After Visit Summary   8/29/2017    Kimmie Carranza    MRN: [id number]           Patient Information     Date Of Birth          1950        Visit Information        Provider Department      8/29/2017 12:30 PM Nancy Carlton PA-C [facility] Neurosurgery        Today's Diagnoses     Compression fracture of L2 (H)    -  1       Follow-ups after your visit        Follow-up notes from your care team     Return in about 4 weeks (around 9/26/2017) for Routine Visit.      Your next 10 appointments already scheduled     Sep 26, 2017  2:00 PM CDT   (Arrive by 1:45 PM)   XR LUMBAR SPINE 2/3 VIEWS with UCXR1   [facility] ([facility] [place])    [address]  [address] [facility] [address] [phone]           Please bring a list of your current medicines to your exam. (Include vitamins, minerals and over-thecounter medicines.) Leave your valuables at home.  Tell your doctor if there is a chance you may be pregnant.  You do not need to do anything special for this exam.            Sep 26, 2017  2:30 PM CDT   (Arrive by 2:15 PM)   Return Visit with JEN [facility] Neurosurgery ([facility])    [address]  3rd Floor  [facility] [address] [phone]              Future tests that were ordered for you today     Open Future Orders        Priority Expected Expires Ordered    XR Lumbar Spine 2-3 Views* Routine 8/29/2017 8/29/2018 8/29/2017            Who to contact     Please call your clinic at [phone] to:    Ask questions about your health    Make or cancel appointments    Discuss your medicines    Learn about your test results    Speak to your doctor   If you have compliments or concerns about an experience at your clinic, or if you wish to file a complaint, please contact [facility] Patient Relations at [phone] or email us at [email or web address]          "Additional Information About Your Visit        e(ye)BRAINhart Information     Devario is an electronic gateway that provides easy, online access to your medical records. With Devario, you can request a clinic appointment, read your test results, renew a prescription or communicate with your care team.     To sign up for Devario visit the website at [email or web address]   You will be asked to enter the access code listed below, as well as some personal information. Please follow the directions to create your username and password.     Your access code is: [email or web address]  Expires: 2017  2:07 PM     Your access code will  in 90 days. If you need help or a new code, please contact your [facility] or call [phone] for assistance.        Care EveryWhere ID     This is your Care EveryWhere ID. This could be used by other organizations to access your [place] medical records  [id number]        Your Vitals Were     Pulse Height BMI (Body Mass Index)             50 1.676 m (5' 6\") 23.24 kg/m2          Blood Pressure from Last 3 Encounters:   17 157/78   17 127/80   17 135/67    Weight from Last 3 Encounters:   17 65.3 kg (144 lb)   17 62.2 kg (137 lb 1.6 oz)   17 62.6 kg (138 lb)               Primary Care Provider Office Phone # Fax #    Dee Castillo [phone] [address] [id number]        Equal Access to Services     [facility] : Hadii aad ku hadasho Soomaali, waaxda luqadaha, qaybta kaalmada binta, ronny velazquez . So [facility] [phone].    ATENCIÓN: Si habla español, tiene a maurice disposición servicios gratuitos de asistencia lingüística. Llame al [phone].    We comply with applicable federal civil rights laws and [place] laws. We do not discriminate on the basis of race, color, national origin, age, disability sex, sexual orientation or gender " identity.            Thank you!     Thank you for choosing [facility] NEUROSURGERY  for your care. Our goal is always to provide you with excellent care. Hearing back from our patients is one way we can continue to improve our services. Please take a few minutes to complete the written survey that you may receive in the mail after your visit with us. Thank you!             Your Updated Medication List - Protect others around you: Learn how to safely use, store and throw away your medicines at www.disposemymeds.org.          This list is accurate as of: 8/29/17  1:15 PM.  Always use your most recent med list.                   Brand Name Dispense Instructions for use Diagnosis    acetaminophen 325 MG tablet    TYLENOL    100 tablet    Take 3 tablets (975 mg) by mouth 3 times daily    Compression fracture of L2 lumbar vertebra, closed, initial encounter (H)       aspirin 81 MG EC tablet     90 tablet    Take 1 tablet (81 mg) by mouth daily    CARDIOVASCULAR SCREENING; LDL GOAL LESS THAN 160, Abnormal ankle brachial index, Hypertension goal BP (blood pressure) < 140/90       atorvastatin 10 MG tablet    LIPITOR    90 tablet    Take 1 tablet (10 mg) by mouth daily    CARDIOVASCULAR SCREENING; LDL GOAL LESS THAN 160, Abnormal ankle brachial index       bisacodyl 10 MG Suppository    DULCOLAX    30 suppository    Place 1 suppository (10 mg) rectally daily as needed for constipation (IF Miralax ineffective)    Compression fracture of L2 lumbar vertebra, closed, initial encounter (H)       BL BLOOD PRESSURE MONITOR Kit     1 kit    1 Units. One monitor, check weekly.    Hypertension goal BP (blood pressure) < 140/90       cyclobenzaprine 10 MG tablet    FLEXERIL    42 tablet    Take 1 tablet (10 mg) by mouth 3 times daily as needed for muscle spasms    Compression fracture of L2 lumbar vertebra, closed, initial encounter (H)       lidocaine 5 % Patch    LIDODERM    30 patch    Place 2 patches onto the skin every 24 hours     Compression fracture of L2 lumbar vertebra, closed, initial encounter (H)       lisinopril-hydrochlorothiazide 20-12.5 MG per tablet    PRINZIDE/ZESTORETIC    90 tablet    Take 1 tablet by mouth daily    Essential hypertension with goal blood pressure less than 140/90       MULTIVITAMIN ADULT PO      Take 1 tablet by mouth daily        oxyCODONE 5 MG IR tablet    ROXICODONE    10 tablet    Take 1-2 tablets (5-10 mg) by mouth every 3 hours as needed for moderate to severe pain    Compression fracture of L2 lumbar vertebra, closed, initial encounter (H)       sertraline 100 MG tablet    ZOLOFT    90 tablet    Take 1 tablet (100 mg) by mouth daily    Major depressive disorder, recurrent episode, mild (H)       TOLTERODINE TARTRATE PO      Take 2 mg by mouth daily as needed for incontinence (Takes approximately 3 times per month)        TRAZODONE HCL PO      Take 50 mg by mouth nightly as needed for sleep (Takes approximately 5 times per month.)

## 2017-09-08 ENCOUNTER — TRANSFERRED RECORDS (OUTPATIENT)
Dept: HEALTH INFORMATION MANAGEMENT | Facility: CLINIC | Age: 67
End: 2017-09-08

## 2018-02-23 DIAGNOSIS — R68.89 ABNORMAL ANKLE BRACHIAL INDEX: ICD-10-CM

## 2018-02-23 DIAGNOSIS — F33.0 MAJOR DEPRESSIVE DISORDER, RECURRENT EPISODE, MILD (H): ICD-10-CM

## 2018-02-23 DIAGNOSIS — Z13.6 CARDIOVASCULAR SCREENING; LDL GOAL LESS THAN 160: ICD-10-CM

## 2018-02-23 DIAGNOSIS — I10 ESSENTIAL HYPERTENSION WITH GOAL BLOOD PRESSURE LESS THAN 140/90: ICD-10-CM

## 2018-02-23 RX ORDER — ATORVASTATIN CALCIUM 10 MG/1
TABLET, FILM COATED ORAL
Qty: 30 TABLET | Refills: 0 | Status: SHIPPED | OUTPATIENT
Start: 2018-02-23 | End: 2018-02-28

## 2018-02-23 RX ORDER — LISINOPRIL AND HYDROCHLOROTHIAZIDE 12.5; 2 MG/1; MG/1
TABLET ORAL
Qty: 30 TABLET | Refills: 0 | Status: SHIPPED | OUTPATIENT
Start: 2018-02-23 | End: 2018-02-28

## 2018-02-23 NOTE — TELEPHONE ENCOUNTER
"Requested Prescriptions   Pending Prescriptions Disp Refills     atorvastatin (LIPITOR) 10 MG tablet [Pharmacy Med Name: ATORVASTATIN 10MG TABLETS] 90 tablet 0     Sig: TAKE 1 TABLET(10 MG) BY MOUTH DAILY    Statins Protocol Failed    2/23/2018 10:29 AM       Failed - LDL on file in past 12 months    Recent Labs   Lab Test  10/11/16   1123   LDL  158*            Passed - No abnormal creatine kinase in past 12 months    No lab results found.         Passed - Recent or future visit with authorizing provider    Patient had office visit in the last year or has a visit in the next 30 days with authorizing provider.  See \"Patient Info\" tab in inbasket, or \"Choose Columns\" in Meds & Orders section of the refill encounter.            Passed - Patient is age 18 or older       Passed - No active pregnancy on record       Passed - No positive pregnancy test in past 12 months        lisinopril-hydrochlorothiazide (PRINZIDE/ZESTORETIC) 20-12.5 MG per tablet [Pharmacy Med Name: LISINOPRIL-HCTZ 20/12.5MG TABLETS] 90 tablet 0     Sig: TAKE 1 TABLET BY MOUTH DAILY    Diuretics (Including Combos) Protocol Failed    2/23/2018 10:29 AM       Failed - Blood pressure under 140/90 in past 12 months    BP Readings from Last 3 Encounters:   08/29/17 157/78   07/18/17 127/80   07/08/17 135/67                Passed - Recent or future visit with authorizing provider's specialty    Patient had office visit in the last year or has a visit in the next 30 days with authorizing provider.  See \"Patient Info\" tab in inbasket, or \"Choose Columns\" in Meds & Orders section of the refill encounter.            Passed - Patient is age 18 or older       Passed - No active pregancy on record       Passed - Normal serum creatinine on file in past 12 months    Recent Labs   Lab Test  07/08/17   0816   CR  0.73             Passed - Normal serum potassium on file in past 12 months    Recent Labs   Lab Test  07/07/17   0751   POTASSIUM  3.7                   " Passed - Normal serum sodium on file in past 12 months    Recent Labs   Lab Test  07/07/17   0751   NA  139             Passed - No positive pregnancy test in past 12 months

## 2018-02-23 NOTE — TELEPHONE ENCOUNTER
Medication is being filled for 1 time refill only due to:  Patient needs to be seen because it has been more than one year since last visit.   Due for physical and labs.  Last 10/11/16.  Vianey Almendarez RN

## 2018-02-26 RX ORDER — SERTRALINE HYDROCHLORIDE 100 MG/1
TABLET, FILM COATED ORAL
Qty: 30 TABLET | Refills: 0 | Status: SHIPPED | OUTPATIENT
Start: 2018-02-26 | End: 2018-02-28

## 2018-02-26 NOTE — TELEPHONE ENCOUNTER
"Medication is being filled for 1 time refill only due to:  upcoming appt   Medina SANTOS RN    Requested Prescriptions   Pending Prescriptions Disp Refills     sertraline (ZOLOFT) 100 MG tablet [Pharmacy Med Name: SERTRALINE 100MG TABLETS] 90 tablet 0     Sig: TAKE 1 TABLET(100 MG) BY MOUTH DAILY    SSRIs Protocol Failed    2/23/2018  4:46 PM       Failed - PHQ-9 score less than 5 in past 6 months    The PHQ-9 criteria is meant to fail. It requires a PHQ-9 score review         Failed - No positive pregnancy test in last 12 months       Passed - Patient is age 18 or older       Passed - No active pregnancy on record       Passed - Recent (6 mo) or future visit with authorizing provider's specialty    Patient had office visit in the last 6 months or has a visit in the next 30 days with authorizing provider.  See \"Patient Info\" tab in inbasket, or \"Choose Columns\" in Meds & Orders section of the refill encounter.            Next 5 appointments (look out 90 days)     Feb 28, 2018  1:30 PM CST   PHYSICAL with Dee Castillo MD   [facility] ([facility])    [address] [id number] [phone]                  "

## 2018-02-28 ENCOUNTER — OFFICE VISIT (OUTPATIENT)
Dept: FAMILY MEDICINE | Facility: CLINIC | Age: 68
End: 2018-02-28
Payer: COMMERCIAL

## 2018-02-28 VITALS
WEIGHT: 136.5 LBS | HEIGHT: 66 IN | SYSTOLIC BLOOD PRESSURE: 124 MMHG | TEMPERATURE: 99.7 F | OXYGEN SATURATION: 97 % | DIASTOLIC BLOOD PRESSURE: 68 MMHG | BODY MASS INDEX: 21.94 KG/M2 | HEART RATE: 53 BPM

## 2018-02-28 DIAGNOSIS — R41.3 MEMORY CHANGE: ICD-10-CM

## 2018-02-28 DIAGNOSIS — Z00.00 ENCOUNTER FOR ROUTINE ADULT HEALTH EXAMINATION WITHOUT ABNORMAL FINDINGS: Primary | ICD-10-CM

## 2018-02-28 DIAGNOSIS — I10 ESSENTIAL HYPERTENSION WITH GOAL BLOOD PRESSURE LESS THAN 140/90: ICD-10-CM

## 2018-02-28 DIAGNOSIS — Z13.6 CARDIOVASCULAR SCREENING; LDL GOAL LESS THAN 160: ICD-10-CM

## 2018-02-28 DIAGNOSIS — F33.0 MAJOR DEPRESSIVE DISORDER, RECURRENT EPISODE, MILD (H): ICD-10-CM

## 2018-02-28 DIAGNOSIS — S32.020D CLOSED COMPRESSION FRACTURE OF L2 LUMBAR VERTEBRA WITH ROUTINE HEALING, SUBSEQUENT ENCOUNTER: ICD-10-CM

## 2018-02-28 DIAGNOSIS — R68.89 ABNORMAL ANKLE BRACHIAL INDEX: ICD-10-CM

## 2018-02-28 DIAGNOSIS — F32.0 MILD MAJOR DEPRESSION (H): ICD-10-CM

## 2018-02-28 DIAGNOSIS — F41.1 ANXIETY STATE: ICD-10-CM

## 2018-02-28 DIAGNOSIS — E78.5 HYPERLIPIDEMIA LDL GOAL <100: ICD-10-CM

## 2018-02-28 DIAGNOSIS — F51.01 PRIMARY INSOMNIA: ICD-10-CM

## 2018-02-28 PROBLEM — S32.020A CLOSED COMPRESSION FRACTURE OF SECOND LUMBAR VERTEBRA (H): Status: ACTIVE | Noted: 2017-07-06

## 2018-02-28 PROCEDURE — G0439 PPPS, SUBSEQ VISIT: HCPCS | Performed by: FAMILY MEDICINE

## 2018-02-28 PROCEDURE — 80048 BASIC METABOLIC PNL TOTAL CA: CPT | Performed by: FAMILY MEDICINE

## 2018-02-28 PROCEDURE — 82043 UR ALBUMIN QUANTITATIVE: CPT | Performed by: FAMILY MEDICINE

## 2018-02-28 PROCEDURE — 36415 COLL VENOUS BLD VENIPUNCTURE: CPT | Performed by: FAMILY MEDICINE

## 2018-02-28 PROCEDURE — 80061 LIPID PANEL: CPT | Performed by: FAMILY MEDICINE

## 2018-02-28 RX ORDER — LISINOPRIL AND HYDROCHLOROTHIAZIDE 12.5; 2 MG/1; MG/1
1 TABLET ORAL DAILY
Qty: 90 TABLET | Refills: 1 | Status: SHIPPED | OUTPATIENT
Start: 2018-02-28 | End: 2018-09-11

## 2018-02-28 RX ORDER — SERTRALINE HYDROCHLORIDE 100 MG/1
50 TABLET, FILM COATED ORAL DAILY
Qty: 90 TABLET | Refills: 1 | Status: SHIPPED | OUTPATIENT
Start: 2018-02-28 | End: 2019-04-10

## 2018-02-28 RX ORDER — ATORVASTATIN CALCIUM 10 MG/1
TABLET, FILM COATED ORAL
Qty: 90 TABLET | Refills: 1 | Status: SHIPPED | OUTPATIENT
Start: 2018-02-28 | End: 2018-09-11

## 2018-02-28 RX ORDER — TRAZODONE HYDROCHLORIDE 50 MG/1
50 TABLET, FILM COATED ORAL
Qty: 30 TABLET | Refills: 3 | Status: SHIPPED | OUTPATIENT
Start: 2018-02-28 | End: 2019-05-05

## 2018-02-28 ASSESSMENT — ACTIVITIES OF DAILY LIVING (ADL)
CURRENT_FUNCTION: NO ASSISTANCE NEEDED
I_NEED_ASSISTANCE_FOR_THE_FOLLOWING_DAILY_ACTIVITIES:: NO ASSISTANCE IS NEEDED

## 2018-02-28 NOTE — MR AVS SNAPSHOT
After Visit Summary   2/28/2018    Kimmie Carranza    MRN: [id number]           Patient Information     Date Of Birth          1950        Visit Information        Provider Department      2/28/2018 1:30 PM Dee Castillo MD [facility]        Today's Diagnoses     Encounter for routine adult health examination without abnormal findings    -  1    Anxiety state        Mild major depression (H)        Essential hypertension with goal blood pressure less than 140/90        Hyperlipidemia LDL goal <100        Closed compression fracture of L2 lumbar vertebra with routine healing, subsequent encounter        Primary insomnia        Major depressive disorder, recurrent episode, mild (H)        CARDIOVASCULAR SCREENING; LDL GOAL LESS THAN 160        Abnormal ankle brachial index          Care Instructions      Preventive Health Recommendations    Female Ages 65 +    Yearly exam:     See your health care provider every year in order to  o Review health changes.   o Discuss preventive care.    o Review your medicines if your doctor has prescribed any.      You no longer need a yearly Pap test unless you've had an abnormal Pap test in the past 10 years. If you have vaginal symptoms, such as bleeding or discharge, be sure to talk with your provider about a Pap test.      Every 1 to 2 years, have a mammogram.  If you are over 69, talk with your health care provider about whether or not you want to continue having screening mammograms.      Every 10 years, have a colonoscopy. Or, have a yearly FIT test (stool test). These exams will check for colon cancer.       Have a cholesterol test every 5 years, or more often if your doctor advises it.       Have a diabetes test (fasting glucose) every three years. If you are at risk for diabetes, you should have this test more often.       At age 65, have a bone density scan (DEXA) to check for osteoporosis (brittle bone disease).    Shots:    Get a flu  shot each year.    Get a tetanus shot every 10 years.    Talk to your doctor about your pneumonia vaccines. There are now two you should receive - Pneumovax (PPSV 23) and Prevnar (PCV 13).    Talk to your doctor about the shingles vaccine.    Talk to your doctor about the hepatitis B vaccine.    Nutrition:     Eat at least 5 servings of fruits and vegetables each day.      Eat whole-grain bread, whole-wheat pasta and brown rice instead of white grains and rice.      Talk to your provider about Calcium and Vitamin D.     Lifestyle    Exercise at least 150 minutes a week (30 minutes a day, 5 days a week). This will help you control your weight and prevent disease.      Limit alcohol to one drink per day.      No smoking.       Wear sunscreen to prevent skin cancer.       See your dentist twice a year for an exam and cleaning.      See your eye doctor every 1 to 2 years to screen for conditions such as glaucoma, macular degeneration and cataracts.          Follow-ups after your visit        Additional Services     OPHTHALMOLOGY ADULT REFERRAL       Your provider has referred you to:  N: [facility] [place] [phone] [email or web address]      Please be aware that coverage of these services is subject to the terms and limitations of your health insurance plan.  Call member services at your health plan with any benefit or coverage questions.      Please bring the following to your appointment:  >>   Any x-rays, CTs or MRIs which have been performed.  Contact the facility where they were done to arrange for  prior to your scheduled appointment.  Any new CT, MRI or other procedures ordered by your specialist must be performed at a [place] facility or coordinated by your clinic's referral office.    >>   List of current medications   >>   This referral request   >>   Any documents/labs given to you for this referral                  Who to contact     If you have questions or  "need follow up information about today's clinic visit or your schedule please contact [facility] directly at [phone].  Normal or non-critical lab and imaging results will be communicated to you by MyChart, letter or phone within 4 business days after the clinic has received the results. If you do not hear from us within 7 days, please contact the clinic through [facility] or phone. If you have a critical or abnormal lab result, we will notify you by phone as soon as possible.  Submit refill requests through [employer] or call your pharmacy and they will forward the refill request to us. Please allow 3 business days for your refill to be completed.          Additional Information About Your Visit        [employer] Information     [employer] lets you send messages to your doctor, view your test results, renew your prescriptions, schedule appointments and more. To sign up, go to [email or web address] . Click on \"Log in\" on the left side of the screen, which will take you to the Welcome page. Then click on \"Sign up Now\" on the right side of the page.     You will be asked to enter the access code listed below, as well as some personal information. Please follow the directions to create your username and password.     Your access code is: [email or web address]  Expires: 2018  2:20 PM     Your access code will  in 90 days. If you need help or a new code, please call your [place] clinic or [phone].        Care EveryWhere ID     This is your Care EveryWhere ID. This could be used by other organizations to access your [place] medical records  [id number]        Your Vitals Were     Pulse Temperature Height Pulse Oximetry Breastfeeding? BMI (Body Mass Index)    53 99.7  F (37.6  C) (Oral) 5' 6\" (1.676 m) 97% No 22.03 kg/m2       Blood Pressure from Last 3 Encounters:   18 124/68   17 157/78   17 127/80    Weight from Last 3 Encounters:   18 136 lb 8 oz (61.9 kg)   17 144 lb (65.3 " kg)   07/18/17 137 lb 1.6 oz (62.2 kg)              We Performed the Following     Albumin Random Urine Quantitative with Creat Ratio     Basic metabolic panel  (Ca, Cl, CO2, Creat, Gluc, K, Na, BUN)     Lipid panel reflex to direct LDL Fasting     OPHTHALMOLOGY ADULT REFERRAL          Today's Medication Changes          These changes are accurate as of 2/28/18  2:20 PM.  If you have any questions, ask your nurse or doctor.               These medicines have changed or have updated prescriptions.        Dose/Directions    atorvastatin 10 MG tablet   Commonly known as:  LIPITOR   This may have changed:  See the new instructions.   Used for:  CARDIOVASCULAR SCREENING; LDL GOAL LESS THAN 160, Abnormal ankle brachial index   Changed by:  Dee Castillo MD        TAKE 1 TABLET(10 MG) BY MOUTH DAILY   Quantity:  90 tablet   Refills:  1       lisinopril-hydrochlorothiazide 20-12.5 MG per tablet   Commonly known as:  PRINZIDE/ZESTORETIC   This may have changed:  See the new instructions.   Used for:  Essential hypertension with goal blood pressure less than 140/90   Changed by:  Dee Castillo MD        Dose:  1 tablet   Take 1 tablet by mouth daily   Quantity:  90 tablet   Refills:  1       sertraline 100 MG tablet   Commonly known as:  ZOLOFT   This may have changed:  See the new instructions.   Used for:  Major depressive disorder, recurrent episode, mild (H)   Changed by:  Dee Castillo MD        Dose:  50 mg   Take 0.5 tablets (50 mg) by mouth daily   Quantity:  90 tablet   Refills:  1       * TRAZODONE HCL PO   This may have changed:  Another medication with the same name was added. Make sure you understand how and when to take each.   Changed by:  Dee Castillo MD        Dose:  50 mg   Take 50 mg by mouth nightly as needed for sleep (Takes approximately 5 times per month.)   Refills:  0       * traZODone 50 MG tablet   Commonly known as:  DESYREL   This may have changed:  You  were already taking a medication with the same name, and this prescription was added. Make sure you understand how and when to take each.   Used for:  Primary insomnia   Changed by:  Dee Castillo MD        Dose:  50 mg   Take 1 tablet (50 mg) by mouth nightly as needed Takes approximately 5 times per month.   Quantity:  30 tablet   Refills:  3       * Notice:  This list has 2 medication(s) that are the same as other medications prescribed for you. Read the directions carefully, and ask your doctor or other care provider to review them with you.         Where to get your medicines      These medications were sent to [facility] [address] [place] [address] AT [address] [address]    Hours:  24-hours Phone:  [phone]     atorvastatin 10 MG tablet    lisinopril-hydrochlorothiazide 20-12.5 MG per tablet    sertraline 100 MG tablet    traZODone 50 MG tablet                Primary Care Provider Office Phone # Fax #    Dee Castillo [phone] [address] [address]        Equal Access to Services     MELLISSA COLBERT AH: Hadii aad ku hadasho Soomaali, waaxda luqadaha, qaybta kaalmada adealmayajayashree, ronny wilson. So [facility] [phone].    ATENCIÓN: Si habla español, tiene a maurice disposición servicios gratuitos de asistencia lingüística. Yemi al [phone].    We comply with applicable federal civil rights laws and [place] laws. We do not discriminate on the basis of race, color, national origin, age, disability, sex, sexual orientation, or gender identity.            Thank you!     Thank you for choosing [facility]  for your care. Our goal is always to provide you with excellent care. Hearing back from our patients is one way we can continue to improve our services. Please take a few minutes to complete the written survey that you may receive in the mail after your  visit with us. Thank you!             Your Updated Medication List - Protect others around you: Learn how to safely use, store and throw away your medicines at www.disposemymeds.org.          This list is accurate as of 2/28/18  2:20 PM.  Always use your most recent med list.                   Brand Name Dispense Instructions for use Diagnosis    acetaminophen 325 MG tablet    TYLENOL    100 tablet    Take 3 tablets (975 mg) by mouth 3 times daily    Compression fracture of L2 lumbar vertebra, closed, initial encounter (H)       aspirin 81 MG EC tablet     90 tablet    Take 1 tablet (81 mg) by mouth daily    CARDIOVASCULAR SCREENING; LDL GOAL LESS THAN 160, Abnormal ankle brachial index, Hypertension goal BP (blood pressure) < 140/90       atorvastatin 10 MG tablet    LIPITOR    90 tablet    TAKE 1 TABLET(10 MG) BY MOUTH DAILY    CARDIOVASCULAR SCREENING; LDL GOAL LESS THAN 160, Abnormal ankle brachial index       BL BLOOD PRESSURE MONITOR Kit     1 kit    1 Units. One monitor, check weekly.    Hypertension goal BP (blood pressure) < 140/90       lisinopril-hydrochlorothiazide 20-12.5 MG per tablet    PRINZIDE/ZESTORETIC    90 tablet    Take 1 tablet by mouth daily    Essential hypertension with goal blood pressure less than 140/90       MULTIVITAMIN ADULT PO      Take 1 tablet by mouth daily        sertraline 100 MG tablet    ZOLOFT    90 tablet    Take 0.5 tablets (50 mg) by mouth daily    Major depressive disorder, recurrent episode, mild (H)       TOLTERODINE TARTRATE PO      Take 2 mg by mouth daily as needed for incontinence (Takes approximately 3 times per month)        * TRAZODONE HCL PO      Take 50 mg by mouth nightly as needed for sleep (Takes approximately 5 times per month.)        * traZODone 50 MG tablet    DESYREL    30 tablet    Take 1 tablet (50 mg) by mouth nightly as needed Takes approximately 5 times per month.    Primary insomnia       * Notice:  This list has 2 medication(s) that are the same  as other medications prescribed for you. Read the directions carefully, and ask your doctor or other care provider to review them with you.

## 2018-02-28 NOTE — PROGRESS NOTES
SUBJECTIVE:   Kimmie Carranza is a 67 year old female who presents for Preventive Visit.    Are you in the first 12 months of your Medicare coverage?  No    Physical   Annual:     Getting at least 3 servings of Calcium per day::  Yes    Bi-annual eye exam::  Yes    Dental care twice a year::  Yes    Sleep apnea or symptoms of sleep apnea::  None    Diet::  Regular (no restrictions)    Frequency of exercise::  2-3 days/week    Duration of exercise::  30-45 minutes    Taking medications regularly::  Yes    Medication side effects::  None    Additional concerns today::  No    Ability to successfully perform activities of daily living: no assistance needed  Home Safety:  Lack of grab bars in the bathroom  Hearing Impairment: need to ask people to speak up or repeat themselves, find that men's voices are easier to understand than woman's and difficulty understanding soft or whispered speech    Pt will go [facility] test.    Fall risk:  Fallen 2 or more times in the past year?: No  Any fall with injury in the past year?: Yes    COGNITIVE SCREEN  1) Repeat 3 items (Banana, Sunrise, Chair)    2) Clock draw: NORMAL  3) 3 item recall: Recalls 3 objects  Results: 3 items recalled: COGNITIVE IMPAIRMENT LESS LIKELY    Mini-CogTM Copyright S [employer]. Licensed by the author for use in [facility]; reprinted with permission (ender@[email or web address]). All rights reserved.        ----F/u L2 compression fx in 7/17.  Doing much better.   Doing back strengthening at the gym.  Main sx is stiffness, hard to get totally upright after sitting for awhile.  Walking, standing, sitting, lying down are fine.  Did see neurosurg in 8/17, thought she was doing well.  Rec f/u in 9/17, but pt forgot about appt.  Likely fine unless worsening sx's.    ---Memory concerns-   Pt did go to a neurologist to discuss her memory- she didn't think there was much memory loss.  Offered neuropsych testing, but optional, and pt did not pursue.  B12 and vit  D testing normal.    --Should probably get her eyes checked.  [place], [place], seems like she got the wrong rx.    --MDD- mood is doing 'fine, totally fine'.  Would like to stay on the zoloft.  Has been stable for awhile, less stressors.  Would be interested in trying to lower dose to 50mg/d.    --HTN- stable on current meds.  No se's.  Urine albumin- high at last check in 10/16, will recheck today, along with BMP.    --Lipids- high in '16 with LDL in '160s.  Is fasting today- will recheck.  Has been back on lipitor since ~6/17.    --Insomnia- taking the trazodone prn, maybe 5x/times/mo.  No se's.   Would like to continue.      Reviewed and updated as needed this visit by clinical staff  Tobacco  Allergies  Meds  Problems       Reviewed and updated as needed this visit by Provider  Allergies  Meds  Problems        Social History   Substance Use Topics     Smoking status: Light Tobacco Smoker     Last attempt to quit: 1/1/2000     Smokeless tobacco: Never Used      Comment: smokes occasionally     Alcohol use No      Comment: quit completely 2-2015       No flowsheet data found.      Today's PHQ-2 Score:   PHQ-2 ( 1999 [employer]) 2/28/2018   Q1: Little interest or pleasure in doing things 0   Q2: Feeling down, depressed or hopeless 0   PHQ-2 Score 0   Q1: Little interest or pleasure in doing things Not at all   Q2: Feeling down, depressed or hopeless Not at all   PHQ-2 Score 0       Do you feel safe in your environment - Yes    Do you have a Health Care Directive?: No: Advance care planning reviewed with patient; information given to patient to review.    Current providers sharing in care for this patient include:   Patient Care Team:  Dee Castillo MD as PCP - General  Nancy Carlton PA-C as Physician Assistant (Neurosurgery)    The following health maintenance items are reviewed in Epic and correct as of today:  Health Maintenance   Topic Date Due     URINE DRUG SCREEN Q1 YR  05/22/1965     TOMASA  QUESTIONNAIRE 1 YEAR  10/11/2017     DEPRESSION ACTION PLAN Q1 YR  10/11/2017     PHQ-9 Q6 MONTHS  01/18/2018     FALL RISK ASSESSMENT  06/30/2018     INFLUENZA VACCINE (SYSTEM ASSIGNED)  09/01/2018     MAMMO SCREEN Q2 YR (SYSTEM ASSIGNED)  08/14/2019     ADVANCE DIRECTIVE PLANNING Q5 YRS  08/19/2020     COLONOSCOPY Q5 YR  10/24/2021     TETANUS IMMUNIZATION (SYSTEM ASSIGNED)  01/18/2023     LIPID SCREEN Q5 YR FEMALE (SYSTEM ASSIGNED)  02/28/2023     DEXA SCAN SCREENING (SYSTEM ASSIGNED)  Completed     PNEUMOCOCCAL  Completed     HEPATITIS C SCREENING  Completed     Labs reviewed in EPIC  BP Readings from Last 3 Encounters:   02/28/18 124/68   08/29/17 157/78   07/18/17 127/80    Wt Readings from Last 3 Encounters:   02/28/18 136 lb 8 oz (61.9 kg)   08/29/17 144 lb (65.3 kg)   07/18/17 137 lb 1.6 oz (62.2 kg)                  Patient Active Problem List   Diagnosis     Anxiety state     Urge incontinence     Hyperlipidemia LDL goal <100     Mild major depression (H)     Osteoarthritis     Essential hypertension with goal blood pressure less than 140/90     Advanced directives, counseling/discussion     Alcohol abuse     Abnormal ankle brachial index     Mild atherosclerosis of carotid artery     Insomnia     Bilateral hip pain     Somatic dysfunction of lumbar region     Chronic bilateral low back pain without sciatica     Nonallopathic lesion of sacroiliac region     Sacroiliac joint pain     Somatic dysfunction of pelvis region     Closed compression fracture of second lumbar vertebra (H)     Memory change     Past Surgical History:   Procedure Laterality Date     C NONSPECIFIC PROCEDURE  1985    Tubal ligation     C NONSPECIFIC PROCEDURE  1979    laporoscopy     COLONOSCOPY N/A 10/24/2016    Procedure: COMBINED COLONOSCOPY, SINGLE OR MULTIPLE BIOPSY/POLYPECTOMY BY BIOPSY;  Surgeon: Kwaku Henry MD;  Location:  GI       Social History   Substance Use Topics     Smoking status: Light Tobacco Smoker      "Last attempt to quit: 2000     Smokeless tobacco: Never Used      Comment: smokes occasionally     Alcohol use No      Comment: quit completely      Family History   Problem Relation Age of Onset     Hypertension Mother      CEREBROVASCULAR DISEASE Mother       of complications of stroke at age 82     HEART DISEASE Mother      atrial fib     OSTEOPOROSIS Mother      two hip fx's     Neurologic Disorder Mother      MS     Hypertension Father      Neurologic Disorder Father      dementia- Alzheimers     GASTROINTESTINAL DISEASE Father      Depression Father      Hypertension Brother      Neurologic Disorder Maternal Grandmother      Neurologic Disorder Sister      C.A.D. Paternal Grandmother 62      at 62, of MI     C.A.D. Paternal Aunt 62     \"\"     C.A.D. Paternal Aunt 62     \"\"     Colon Cancer Paternal Uncle          Current Outpatient Prescriptions   Medication Sig Dispense Refill     traZODone (DESYREL) 50 MG tablet Take 1 tablet (50 mg) by mouth nightly as needed Takes approximately 5 times per month. 30 tablet 3     sertraline (ZOLOFT) 100 MG tablet Take 0.5 tablets (50 mg) by mouth daily 90 tablet 1     atorvastatin (LIPITOR) 10 MG tablet TAKE 1 TABLET(10 MG) BY MOUTH DAILY 90 tablet 1     lisinopril-hydrochlorothiazide (PRINZIDE/ZESTORETIC) 20-12.5 MG per tablet Take 1 tablet by mouth daily 90 tablet 1     Multiple Vitamins-Minerals (MULTIVITAMIN ADULT PO) Take 1 tablet by mouth daily       TOLTERODINE TARTRATE PO Take 2 mg by mouth daily as needed for incontinence (Takes approximately 3 times per month)       TRAZODONE HCL PO Take 50 mg by mouth nightly as needed for sleep (Takes approximately 5 times per month.)       aspirin 81 MG EC tablet Take 1 tablet (81 mg) by mouth daily 90 tablet 3     Blood Pressure Monitoring ( BLOOD PRESSURE MONITOR) KIT 1 Units. One monitor, check weekly. 1 kit 0     acetaminophen (TYLENOL) 325 MG tablet Take 3 tablets (975 mg) by mouth 3 times daily 100 " "tablet 0     Allergies   Allergen Reactions     No Known Drug Allergies      Recent Labs   Lab Test  02/28/18   1427  07/08/17   0816  07/07/17   0751  07/06/17   1813  10/11/16   1123  08/19/15   1439   LDL  95   --    --    --   158*  96   HDL  82   --    --    --   74  74   TRIG  50   --    --    --   101  96   ALT   --    --    --   19  22  34   CR  0.71  0.73  0.65  0.68  0.71  0.71   GFRESTIMATED  82  79  >90  Non  GFR Calc    86  82  82   GFRESTBLACK  >90  >90  African American GFR Calc    >90   GFR Calc    >90   GFR Calc    >90   GFR Calc    >90   GFR Calc     POTASSIUM  3.9   --   3.7  3.6  4.0  3.7        Review of Systems  Constitutional, HEENT, cardiovascular, pulmonary, GI, , musculoskeletal, neuro, skin, endocrine and psych systems are negative, except as otherwise noted.    OBJECTIVE:   /68  Pulse 53  Temp 99.7  F (37.6  C) (Oral)  Ht 5' 6\" (1.676 m)  Wt 136 lb 8 oz (61.9 kg)  SpO2 97%  Breastfeeding? No  BMI 22.03 kg/m2 Estimated body mass index is 22.03 kg/(m^2) as calculated from the following:    Height as of this encounter: 5' 6\" (1.676 m).    Weight as of this encounter: 136 lb 8 oz (61.9 kg).  Physical Exam  GENERAL APPEARANCE: healthy, alert and no distress  EYES: Eyes grossly normal to inspection, PERRL and conjunctivae and sclerae normal  HENT: ear canals and TM's normal, nose and mouth without ulcers or lesions, oropharynx clear and oral mucous membranes moist  NECK: no adenopathy, no asymmetry, masses, or scars and thyroid normal to palpation  RESP: lungs clear to auscultation - no rales, rhonchi or wheezes  BREAST: normal without masses, tenderness or nipple discharge and no palpable axillary masses or adenopathy  CV: regular rate and rhythm, normal S1 S2, no S3 or S4, no murmur, click or rub, no peripheral edema and peripheral pulses strong  ABDOMEN: soft, nontender, no hepatosplenomegaly, no " masses and bowel sounds normal  MS: no musculoskeletal defects are noted and gait is age appropriate without ataxia  SKIN: no suspicious lesions or rashes  NEURO: Normal strength and tone, sensory exam grossly normal, mentation intact and speech normal  PSYCH: mentation appears normal and affect normal/bright    ASSESSMENT / PLAN:       ICD-10-CM    1. Encounter for routine adult health examination without abnormal findings Z00.00 OPHTHALMOLOGY ADULT REFERRAL   2. Anxiety state F41.1    3. Mild major depression (H) F32.0    4. Essential hypertension with goal blood pressure less than 140/90 I10 Basic metabolic panel  (Ca, Cl, CO2, Creat, Gluc, K, Na, BUN)     Albumin Random Urine Quantitative with Creat Ratio     lisinopril-hydrochlorothiazide (PRINZIDE/ZESTORETIC) 20-12.5 MG per tablet   5. Hyperlipidemia LDL goal <100 E78.5 Lipid panel reflex to direct LDL Fasting   6. Closed compression fracture of L2 lumbar vertebra with routine healing, subsequent encounter S32.020D    7. Primary insomnia F51.01 traZODone (DESYREL) 50 MG tablet   8. Major depressive disorder, recurrent episode, mild (H) F33.0 sertraline (ZOLOFT) 100 MG tablet   9. CARDIOVASCULAR SCREENING; LDL GOAL LESS THAN 160 Z13.6 atorvastatin (LIPITOR) 10 MG tablet   10. Abnormal ankle brachial index R68.89 atorvastatin (LIPITOR) 10 MG tablet   11. Memory change R41.3      CPE- Discussed diet, calcium and exercise.  Went over Self Breast Exam.  Thin prep pap was not done.  Eyes and Teeth or UTD or recommended f/u.  No immunizations needed today.  See orders below for tests ordered and screening needed.      Anxiety/MDD- sx's stable for awhile on zoloft 100mg/d.  Will try lowering dose to 50mg/d and see how she's doing.  F/u for her regular q6mo f/u if doing well, rtc for appointment if sx's worsening.    HTN- good control on current meds, will cont.  Labs as above today.    Lipids- elevated at last check, but now back on lipitor 10mg/d.  Will recheck  "fasting labs today.    Memory concerns- saw neurology, B12/Vit D testing okay, was offered neuropsych testing, but pt declined for now.    Hearing/eye concerns- will check hearing test at [facility], eye referral given.    Insomnia- cont prn trazodone use.      End of Life Planning:  Patient currently has an advanced directive: Yes.  Practitioner is supportive of decision.    COUNSELING:  Reviewed preventive health counseling, as reflected in patient instructions        Estimated body mass index is 22.03 kg/(m^2) as calculated from the following:    Height as of this encounter: 5' 6\" (1.676 m).    Weight as of this encounter: 136 lb 8 oz (61.9 kg).  Weight management plan noted, stable and monitoring   reports that she has been smoking.  She has never used smokeless tobacco.      Appropriate preventive services were discussed with this patient, including applicable screening as appropriate for cardiovascular disease, diabetes, osteopenia/osteoporosis, and glaucoma.  As appropriate for age/gender, discussed screening for colorectal cancer, prostate cancer, breast cancer, and cervical cancer. Checklist reviewing preventive services available has been given to the patient.    Reviewed patients plan of care and provided an AVS. The Basic Care Plan (routine screening as documented in Health Maintenance) for Kimmie meets the Care Plan requirement. This Care Plan has been established and reviewed with the Patient.    Counseling Resources:  ATP IV Guidelines  Pooled Cohorts Equation Calculator  Breast Cancer Risk Calculator  FRAX Risk Assessment  ICSI Preventive Guidelines  Dietary Guidelines for Americans, 2010  USDA's MyPlate  ASA Prophylaxis  Lung CA Screening    Dee Castillo MD  [facility]  "

## 2018-02-28 NOTE — LETTER
March 1, 2018      Kimmie Carranza  [address]  [place] [facility] [address]        Dear ,    We are writing to inform you of your test results.    -Your microalbumin level (which is the urine test that can signal signs of early chronic kidney disease if elevated to >30) looks lower (better) this time which is good to see.   -Your basic metabolic panel (which includes electrolyte levels, blood sugar level and kidney function tests) is normal.   -Your cholesterol panel looks much better back on the lipitor with a low LDL (the bad cholesterol - down from 158 to 95) and a continued high HDL (the good cholesterol).     Resulted Orders   Lipid panel reflex to direct LDL Fasting   Result Value Ref Range    Cholesterol 187 <200 mg/dL    Triglycerides 50 <150 mg/dL    HDL Cholesterol 82 >49 mg/dL    LDL Cholesterol Calculated 95 <100 mg/dL      Comment:      Desirable:       <100 mg/dl    Non HDL Cholesterol 105 <130 mg/dL   Basic metabolic panel  (Ca, Cl, CO2, Creat, Gluc, K, Na, BUN)   Result Value Ref Range    Sodium 138 133 - 144 mmol/L    Potassium 3.9 3.4 - 5.3 mmol/L    Chloride 102 94 - 109 mmol/L    Carbon Dioxide 28 20 - 32 mmol/L    Anion Gap 8 3 - 14 mmol/L    Glucose 84 70 - 99 mg/dL    Urea Nitrogen 13 7 - 30 mg/dL    Creatinine 0.71 0.52 - 1.04 mg/dL    GFR Estimate 82 >60 mL/min/1.7m2      Comment:      Non  GFR Calc    GFR Estimate If Black >90 >60 mL/min/1.7m2      Comment:       GFR Calc    Calcium 9.4 8.5 - 10.1 mg/dL   Albumin Random Urine Quantitative with Creat Ratio   Result Value Ref Range    Creatinine Urine 49 mg/dL    Albumin Urine mg/L 9 mg/L    Albumin Urine mg/g Cr 18.52 0 - 25 mg/g Cr       If you have any questions or concerns, please call the clinic at the number listed above.       Sincerely,        Dee Castillo MD

## 2018-03-01 LAB
ANION GAP SERPL CALCULATED.3IONS-SCNC: 8 MMOL/L (ref 3–14)
BUN SERPL-MCNC: 13 MG/DL (ref 7–30)
CALCIUM SERPL-MCNC: 9.4 MG/DL (ref 8.5–10.1)
CHLORIDE SERPL-SCNC: 102 MMOL/L (ref 94–109)
CHOLEST SERPL-MCNC: 187 MG/DL
CO2 SERPL-SCNC: 28 MMOL/L (ref 20–32)
CREAT SERPL-MCNC: 0.71 MG/DL (ref 0.52–1.04)
CREAT UR-MCNC: 49 MG/DL
GFR SERPL CREATININE-BSD FRML MDRD: 82 ML/MIN/1.7M2
GLUCOSE SERPL-MCNC: 84 MG/DL (ref 70–99)
HDLC SERPL-MCNC: 82 MG/DL
LDLC SERPL CALC-MCNC: 95 MG/DL
MICROALBUMIN UR-MCNC: 9 MG/L
MICROALBUMIN/CREAT UR: 18.52 MG/G CR (ref 0–25)
NONHDLC SERPL-MCNC: 105 MG/DL
POTASSIUM SERPL-SCNC: 3.9 MMOL/L (ref 3.4–5.3)
SODIUM SERPL-SCNC: 138 MMOL/L (ref 133–144)
TRIGL SERPL-MCNC: 50 MG/DL

## 2018-03-01 ASSESSMENT — PATIENT HEALTH QUESTIONNAIRE - PHQ9: SUM OF ALL RESPONSES TO PHQ QUESTIONS 1-9: 0

## 2018-03-01 NOTE — PROGRESS NOTES
Please send letter below with copy of results.  Thanks! CW    Here are your lab results from your recent visit...  -Your microalbumin level (which is the urine test that can signal signs of early chronic kidney disease if elevated to >30) looks lower (better) this time which is good to see.  -Your basic metabolic panel (which includes electrolyte levels, blood sugar level and kidney function tests) is normal.  -Your cholesterol panel looks much better back on the lipitor with a low LDL (the bad cholesterol - down from 158 to 95) and a continued high HDL (the good cholesterol).     Please let me know if you have any questions.  Best,   Juwan Castillo MD

## 2018-08-08 NOTE — IP AVS SNAPSHOT
MRN:[id number]                      After Visit Summary   7/6/2017    Kimmie Carranza    MRN: [id number]           Thank you!     Thank you for choosing [facility] for your care. Our goal is always to provide you with excellent care. Hearing back from our patients is one way we can continue to improve our services. Please take a few minutes to complete the written survey that you may receive in the mail after you visit with us. Thank you!        Patient Information     Date Of Birth          1950        Designated Caregiver       Most Recent Value    Caregiver    Will someone help with your care after discharge? yes    Name of designated caregiver Rohith    Phone number of caregiver [phone]    Caregiver address [address]      About your hospital stay     You were admitted on:  July 6, 2017 You last received care in the:  [address] [facility]    You were discharged on:  July 8, 2017        Reason for your hospital stay       You were hospitalized and treated for the following conditions:  1. L2 compression fracture   2. Acute pain     You were seen by the following services:  Trauma Service  Neurosurgery   Physical and Occupational therapies            Reason for your hospital stay       Kimmie Carranza is a 67 year old female who presents as a direct admission with acute fracture of the superior L2 endplate. She was at home yesterday, when she fell off her deck ~5ft secondary to broken chair                  Who to Call     For medical emergencies, please call 911.  For non-urgent questions about your medical care, please call your primary care provider or clinic, [phone]          Attending Provider     Provider Specialty    Cal Ward MD Emergency Medicine    [place], Delmis Harvey MD Surgery       Primary Care Provider Office Phone # Fax #    Dee Castillo [phone]       When to contact your care team       Should you have any  1. Have you been to the ER, urgent care clinic since your last visit? Hospitalized since your last visit? No     2. Have you seen or consulted any other health care providers outside of the [address] [place] [address] since your last visit? Include any pap smears or colon screening.  No questions, you can reach us in the following ways. During weekday working hours Monday-Friday, 7:00 am - 4:00 pm, call [phone] to reach our nurse. After 4:00pm and on on weekends call  [phone] and ask  to page  the Trauma provider on call or neurosurgery on call provider     Return to the emergency department if you notice the following: fever over 101.5F,  feel dizzy or faint, fast or irregular heart beats, heavy sweating, increased shortness of breath, changes in walking, speech, or thinking or confusion,  constant nausea or vomiting, persistent pain or new drainage from your wounds.     In case of an emergency go to Emergency department or call 911.            When to contact your care team       Call your primary doctor if you have any of the following: temperature greater than 101F or increased pain.                  After Care Instructions     Activity       Your activity upon discharge:   Brace as needed for comfort   Take your medications as directed by your care team  Increase your activity as you tolerate. Make sure you get enough rest. Limit strenuous activities until you feel better.   Do not drive or operate machinery while on narcotic pain medications.   Do not drink alcohol while on narcotic pain medications.            Activity       Your activity upon discharge: activity as tolerated, brace for comfort            Diet       Follow this diet upon discharge: regular diet            Diet       Follow this diet upon discharge:   Orders Placed This Encounter      Regular Diet Adult      Diet                  Follow-up Appointments     Adult [facility] [place] [facility] Follow-up and recommended labs and tests       Follow up with your primary care provider for continued medical care and hospital follow up in 5-10 days.     Trauma Clinic as needed   [facility]  Floor 4  [address] [place] [address]   Appointments: [phone]    Neurosurgery Clinic -Follow up in 1 month  "with Neurosurgery PA in clinic with repeat AP/Lateral lumbar X-rays at that time.   Floor 3   [address] [place] [address]   Appointments: [phone]            Appointments on [place] and/or [facility] (with [facility] or [facility] provider or service). Call [phone] if you haven't heard regarding these appointments within 7 days of discharge.            Follow Up and recommended labs and tests       Follow up with primary care provider, Dee Castillo, within 7 days to evaluate treatment change.  No follow up labs or test are needed.                  Additional Services     Physical Therapy Referral       Dx: L2 compression fracture   Evaluate and treat as indicated            Physical Therapy Referral       *This therapy referral will be filtered to a centralized scheduling office at [facility] and the patient will receive a call to schedule an appointment at a [facility] location most convenient for them. *     [facility] provides Physical Therapy evaluation and treatment and many specialty services across the [facility] system.  If requesting a specialty program, please choose from the list below.    If you have not heard from the scheduling office within 2 business days, please call [phone] for all locations, with the exception of [place], please call [phone].  Treatment: Evaluation & Treatment  Special Instructions/Modalities: Evaluate and treat  Special Programs: None    Please be aware that coverage of these services is subject to the terms and limitations of your health insurance plan.  Call member services at your health plan with any benefit or coverage questions.      **Note to Provider:  If you are referring outside of [facility] for the therapy appointment, please list the name of the location in the \"special instructions\" above, print the referral and give to the patient to schedule the appointment.                  Pending Results  " "   Date and Time Order Name Status Description    2017 0001 Urine Culture Aerobic Bacterial Preliminary             Statement of Approval     Ordered          17 1446  I have reviewed and agree with all the recommendations and orders detailed in this document.  EFFECTIVE NOW     Approved and electronically signed by:  Aundrea Guerra PA-C             Admission Information     Date & Time Provider Department Dept. Phone    2017 Delmis Rose MD [address] [facility] [phone]      Your Vitals Were     Blood Pressure Pulse Temperature Respirations Weight Pulse Oximetry    135/67 (BP Location: Left arm) 67 99.6  F (37.6  C) (Oral) 16 62.6 kg (138 lb) 90%    BMI (Body Mass Index)                   21.94 kg/m2           MyChart Information     [facility] lets you send messages to your doctor, view your test results, renew your prescriptions, schedule appointments and more. To sign up, go to [email or web address] . Click on \"Log in\" on the left side of the screen, which will take you to the Welcome page. Then click on \"Sign up Now\" on the right side of the page.     You will be asked to enter the access code listed below, as well as some personal information. Please follow the directions to create your username and password.     Your access code is: [email or web address]  Expires: 2017  2:07 PM     Your access code will  in 90 days. If you need help or a new code, please call your [facility] clinic or [phone].        Care EveryWhere ID     This is your Care EveryWhere ID. This could be used by other organizations to access your [facility] medical records  [id number]        Equal Access to Services     [facility] Medical CenterJULIA AH: Hadii kerry De Leon, waaxda luqadaha, qaybta kaalmada ronny judd. So [facility] [phone].    ATENCIÓN: Si habla español, tiene a maurice disposición servicios gratuitos de asistencia lingüística. Llame al [phone].    We " comply with applicable federal civil rights laws and [place] laws. We do not discriminate on the basis of race, color, national origin, age, disability sex, sexual orientation or gender identity.               Review of your medicines      START taking        Dose / Directions    acetaminophen 325 MG tablet   Commonly known as:  TYLENOL   Used for:  Compression fracture of L2 lumbar vertebra, closed, initial encounter (H)        Dose:  1000 mg   Take 3 tablets (975 mg) by mouth 3 times daily   Quantity:  100 tablet   Refills:  0       bisacodyl 10 MG Suppository   Commonly known as:  DULCOLAX   Used for:  Compression fracture of L2 lumbar vertebra, closed, initial encounter (H)        Dose:  10 mg   Place 1 suppository (10 mg) rectally daily as needed for constipation (IF Miralax ineffective)   Quantity:  30 suppository   Refills:  0       cyclobenzaprine 10 MG tablet   Commonly known as:  FLEXERIL   Used for:  Compression fracture of L2 lumbar vertebra, closed, initial encounter (H)        Dose:  10 mg   Take 1 tablet (10 mg) by mouth 3 times daily as needed for muscle spasms   Quantity:  42 tablet   Refills:  0       lidocaine 5 % Patch   Commonly known as:  LIDODERM   Used for:  Compression fracture of L2 lumbar vertebra, closed, initial encounter (H)        Dose:  2 patch   Place 2 patches onto the skin every 24 hours   Quantity:  30 patch   Refills:  0       oxyCODONE 5 MG IR tablet   Commonly known as:  ROXICODONE   Used for:  Compression fracture of L2 lumbar vertebra, closed, initial encounter (H)        Dose:  5-10 mg   Take 1-2 tablets (5-10 mg) by mouth every 3 hours as needed for moderate to severe pain   Quantity:  30 tablet   Refills:  0       polyethylene glycol Packet   Commonly known as:  MIRALAX/GLYCOLAX   Indication:  Constipation   Used for:  Compression fracture of L2 lumbar vertebra, closed, initial encounter (H)        Dose:  17 g   Take 17 g by mouth daily   Quantity:  7 packet   Refills:   1       sulfamethoxazole-trimethoprim 800-160 MG per tablet   Commonly known as:  BACTRIM DS/SEPTRA DS   Indication:  Urinary Tract Infection   Used for:  Compression fracture of L2 lumbar vertebra, closed, initial encounter (H)        Dose:  1 tablet   Take 1 tablet by mouth 2 times daily for 7 days   Quantity:  14 tablet   Refills:  0         CONTINUE these medicines which have NOT CHANGED        Dose / Directions    aspirin 81 MG EC tablet   Used for:  CARDIOVASCULAR SCREENING; LDL GOAL LESS THAN 160, Abnormal ankle brachial index, Hypertension goal BP (blood pressure) < 140/90        Dose:  81 mg   Take 1 tablet (81 mg) by mouth daily   Quantity:  90 tablet   Refills:  3       atorvastatin 10 MG tablet   Commonly known as:  LIPITOR   Used for:  CARDIOVASCULAR SCREENING; LDL GOAL LESS THAN 160, Abnormal ankle brachial index        Dose:  10 mg   Take 1 tablet (10 mg) by mouth daily   Quantity:  90 tablet   Refills:  1       BL BLOOD PRESSURE MONITOR Kit   Used for:  Hypertension goal BP (blood pressure) < 140/90        Dose:  1 Units   1 Units. One monitor, check weekly.   Quantity:  1 kit   Refills:  0       lisinopril-hydrochlorothiazide 20-12.5 MG per tablet   Commonly known as:  PRINZIDE/ZESTORETIC   Used for:  Essential hypertension with goal blood pressure less than 140/90        Dose:  1 tablet   Take 1 tablet by mouth daily   Quantity:  90 tablet   Refills:  1       MULTIVITAMIN ADULT PO        Dose:  1 tablet   Take 1 tablet by mouth daily   Refills:  0       sertraline 100 MG tablet   Commonly known as:  ZOLOFT   Used for:  Major depressive disorder, recurrent episode, mild (H)        Dose:  100 mg   Take 1 tablet (100 mg) by mouth daily   Quantity:  90 tablet   Refills:  1       TOLTERODINE TARTRATE PO        Dose:  2 mg   Take 2 mg by mouth daily as needed for incontinence (Takes approximately 3 times per month)   Refills:  0       TRAZODONE HCL PO        Dose:  50 mg   Take 50 mg by mouth nightly as  needed for sleep (Takes approximately 5 times per month.)   Refills:  0            Where to get your medicines      These medications were sent to [facility] [id number] [facility] [address] [place] [address] AT [address] [address]     Phone:  [phone]     acetaminophen 325 MG tablet    bisacodyl 10 MG Suppository    cyclobenzaprine 10 MG tablet    lidocaine 5 % Patch    polyethylene glycol Packet    sulfamethoxazole-trimethoprim 800-160 MG per tablet         Some of these will need a paper prescription and others can be bought over the counter. Ask your nurse if you have questions.     Bring a paper prescription for each of these medications     oxyCODONE 5 MG IR tablet                Protect others around you: Learn how to safely use, store and throw away your medicines at www.disposemymeds.org.             Medication List: This is a list of all your medications and when to take them. Check marks below indicate your daily home schedule. Keep this list as a reference.      Medications           Morning Afternoon Evening Bedtime As Needed    acetaminophen 325 MG tablet   Commonly known as:  TYLENOL   Take 3 tablets (975 mg) by mouth 3 times daily   Last time this was given:  650 mg on 7/8/2017  8:00 AM                                aspirin 81 MG EC tablet   Take 1 tablet (81 mg) by mouth daily   Last time this was given:  81 mg on 7/8/2017  7:59 AM                                atorvastatin 10 MG tablet   Commonly known as:  LIPITOR   Take 1 tablet (10 mg) by mouth daily   Last time this was given:  10 mg on 7/7/2017  8:12 PM                                bisacodyl 10 MG Suppository   Commonly known as:  DULCOLAX   Place 1 suppository (10 mg) rectally daily as needed for constipation (IF Miralax ineffective)                                BL BLOOD PRESSURE MONITOR Kit   1 Units. One monitor, check weekly.                                cyclobenzaprine 10 MG tablet   Commonly  known as:  FLEXERIL   Take 1 tablet (10 mg) by mouth 3 times daily as needed for muscle spasms   Last time this was given:  10 mg on 7/8/2017 12:59 PM                                lidocaine 5 % Patch   Commonly known as:  LIDODERM   Place 2 patches onto the skin every 24 hours   Last time this was given:  1 patch on 7/6/2017  9:21 PM                                lisinopril-hydrochlorothiazide 20-12.5 MG per tablet   Commonly known as:  PRINZIDE/ZESTORETIC   Take 1 tablet by mouth daily   Last time this was given:  1 tablet on 7/8/2017  7:59 AM                                MULTIVITAMIN ADULT PO   Take 1 tablet by mouth daily                                oxyCODONE 5 MG IR tablet   Commonly known as:  ROXICODONE   Take 1-2 tablets (5-10 mg) by mouth every 3 hours as needed for moderate to severe pain   Last time this was given:  10 mg on 7/8/2017 11:15 AM                                polyethylene glycol Packet   Commonly known as:  MIRALAX/GLYCOLAX   Take 17 g by mouth daily                                sertraline 100 MG tablet   Commonly known as:  ZOLOFT   Take 1 tablet (100 mg) by mouth daily   Last time this was given:  100 mg on 7/8/2017  7:59 AM                                sulfamethoxazole-trimethoprim 800-160 MG per tablet   Commonly known as:  BACTRIM DS/SEPTRA DS   Take 1 tablet by mouth 2 times daily for 7 days   Last time this was given:  1 tablet on 7/8/2017 12:59 PM                                TOLTERODINE TARTRATE PO   Take 2 mg by mouth daily as needed for incontinence (Takes approximately 3 times per month)                                TRAZODONE HCL PO   Take 50 mg by mouth nightly as needed for sleep (Takes approximately 5 times per month.)

## 2018-08-27 ENCOUNTER — OFFICE VISIT (OUTPATIENT)
Dept: FAMILY MEDICINE | Facility: CLINIC | Age: 68
End: 2018-08-27
Payer: COMMERCIAL

## 2018-08-27 VITALS
HEIGHT: 66 IN | WEIGHT: 136.1 LBS | SYSTOLIC BLOOD PRESSURE: 170 MMHG | BODY MASS INDEX: 21.87 KG/M2 | OXYGEN SATURATION: 98 % | HEART RATE: 45 BPM | TEMPERATURE: 98.5 F | DIASTOLIC BLOOD PRESSURE: 78 MMHG

## 2018-08-27 DIAGNOSIS — R30.0 DYSURIA: Primary | ICD-10-CM

## 2018-08-27 LAB
ALBUMIN UR-MCNC: NEGATIVE MG/DL
APPEARANCE UR: CLEAR
BACTERIA #/AREA URNS HPF: ABNORMAL /HPF
BILIRUB UR QL STRIP: NEGATIVE
COLOR UR AUTO: YELLOW
GLUCOSE UR STRIP-MCNC: NEGATIVE MG/DL
HGB UR QL STRIP: ABNORMAL
KETONES UR STRIP-MCNC: NEGATIVE MG/DL
LEUKOCYTE ESTERASE UR QL STRIP: ABNORMAL
NITRATE UR QL: NEGATIVE
NON-SQ EPI CELLS #/AREA URNS LPF: ABNORMAL /LPF
PH UR STRIP: 5.5 PH (ref 5–7)
RBC #/AREA URNS AUTO: ABNORMAL /HPF
SOURCE: ABNORMAL
SP GR UR STRIP: 1.02 (ref 1–1.03)
UROBILINOGEN UR STRIP-ACNC: 0.2 EU/DL (ref 0.2–1)
WBC #/AREA URNS AUTO: ABNORMAL /HPF

## 2018-08-27 PROCEDURE — 81001 URINALYSIS AUTO W/SCOPE: CPT | Performed by: FAMILY MEDICINE

## 2018-08-27 PROCEDURE — 99213 OFFICE O/P EST LOW 20 MIN: CPT | Performed by: FAMILY MEDICINE

## 2018-08-27 NOTE — PROGRESS NOTES
SUBJECTIVE:   Kimmie Carranza is a 68 year old female who presents to clinic today for the following health issues:    URINARY TRACT SYMPTOMS      Duration: 1-2 weeks or so    Description  urgency and tingling     Intensity:  mild    Accompanying signs and symptoms:  Fever/chills: no   Flank pain no   Nausea and vomiting: no   Vaginal symptoms: none  Abdominal/Pelvic Pain: no     History  History of frequent UTI's: no   History of kidney stones: no   Sexually Active: no   Possibility of pregnancy: No    Precipitating or alleviating factors: None    Therapies tried and outcome: OTC Demenals   Outcome: with relief    Patient presents to clinic for evaluation of persistent urinary symptoms for the past 1 week.  Has not been sexually active.  Denies any blood in her urine or stool.  Denies any vaginal bleeding or vaginal discharge.  Denies any personal history of renal stones.  Denies any fevers or chills.    Problem list and histories reviewed & adjusted, as indicated.  Additional history: as documented    Patient Active Problem List   Diagnosis     Anxiety state     Urge incontinence     Hyperlipidemia LDL goal <100     Mild major depression (H)     Osteoarthritis     Essential hypertension with goal blood pressure less than 140/90     Advanced directives, counseling/discussion     Alcohol abuse     Abnormal ankle brachial index     Mild atherosclerosis of carotid artery     Insomnia     Bilateral hip pain     Somatic dysfunction of lumbar region     Chronic bilateral low back pain without sciatica     Nonallopathic lesion of sacroiliac region     Sacroiliac joint pain     Somatic dysfunction of pelvis region     Closed compression fracture of second lumbar vertebra (H)     Memory change     Past Surgical History:   Procedure Laterality Date     C NONSPECIFIC PROCEDURE  1985    Tubal ligation     C NONSPECIFIC PROCEDURE  1979    laporoscopy     COLONOSCOPY N/A 10/24/2016    Procedure: COMBINED COLONOSCOPY, SINGLE OR  "MULTIPLE BIOPSY/POLYPECTOMY BY BIOPSY;  Surgeon: Kwaku Henry MD;  Location:  GI       Social History   Substance Use Topics     Smoking status: Light Tobacco Smoker     Last attempt to quit: 2000     Smokeless tobacco: Never Used      Comment: smokes occasionally     Alcohol use No      Comment: quit completely      Family History   Problem Relation Age of Onset     Hypertension Mother      Cerebrovascular Disease Mother       of complications of stroke at age 82     HEART DISEASE Mother      atrial fib     Osteoperosis Mother      two hip fx's     Neurologic Disorder Mother      MS     Hypertension Father      Neurologic Disorder Father      dementia- Alzheimers     GASTROINTESTINAL DISEASE Father      Depression Father      Hypertension Brother      Neurologic Disorder Maternal Grandmother      Neurologic Disorder Sister      C.A.D. Paternal Grandmother 62      at 62, of [place]     C.A.D. Paternal Aunt 62     \"\"     C.A.D. Paternal Aunt 62     \"\"     Colon Cancer Paternal Uncle            Reviewed and updated as needed this visit by clinical staff       Reviewed and updated as needed this visit by Provider         ROS:  Constitutional, HEENT, cardiovascular, pulmonary, gi and gu systems are negative, except as otherwise noted.    OBJECTIVE:     /78  Pulse (!) 45  Temp 98.5  F (36.9  C) (Oral)  Ht 5' 6\" (1.676 m)  Wt 136 lb 1.6 oz (61.7 kg)  SpO2 98%  Breastfeeding? No  BMI 21.97 kg/m2  Body mass index is 21.97 kg/(m^2).  GENERAL: healthy, alert and no distress  RESP: lungs clear to auscultation - no rales, rhonchi or wheezes  CV: regular rate and rhythm, normal S1 S2, no S3 or S4, no murmur, click or rub, no peripheral edema and peripheral pulses strong  ABDOMEN: soft, nontender, no hepatosplenomegaly, no masses and bowel sounds normal  BACK: no CVA tenderness, no paralumbar tenderness    Diagnostic Test Results:  Results for orders placed or performed in visit on 18 " (from the past 24 hour(s))   UA reflex to Microscopic and Culture   Result Value Ref Range    Color Urine Yellow     Appearance Urine Clear     Glucose Urine Negative NEG^Negative mg/dL    Bilirubin Urine Negative NEG^Negative    Ketones Urine Negative NEG^Negative mg/dL    Specific Gravity Urine 1.020 1.003 - 1.035    Blood Urine Trace (A) NEG^Negative    pH Urine 5.5 5.0 - 7.0 pH    Protein Albumin Urine Negative NEG^Negative mg/dL    Urobilinogen Urine 0.2 0.2 - 1.0 EU/dL    Nitrite Urine Negative NEG^Negative    Leukocyte Esterase Urine Small (A) NEG^Negative    Source Midstream Urine    Urine Microscopic   Result Value Ref Range    WBC Urine 0 - 5 OTO5^0 - 5 /HPF    RBC Urine 2-5 (A) OTO2^O - 2 /HPF    Squamous Epithelial /LPF Urine Few FEW^Few /LPF    Bacteria Urine Few (A) NEG^Negative /HPF       ASSESSMENT/PLAN:   1. Dysuria  Assessment: minimal urinary symptoms, trace blood and small LE otherwise clean sample. No indication for treatment today. I would recommend increasing her fluid hydration. Return to clinic if urinary symptoms persisted. Patient was informed that she will need a repeat UA to ensure that the red blood cells in her urine resolve.    Plan:  - UA reflex to Microscopic and Culture  - Urine Microscopic    Latoya Eugene MD  [facility]

## 2018-08-27 NOTE — MR AVS SNAPSHOT
"              After Visit Summary   2018    Kimmie Carranza    MRN: [id number]           Patient Information     Date Of Birth          1950        Visit Information        Provider Department      2018 9:40 AM Latoya Eugene MD [facility]        Today's Diagnoses     Dysuria    -  1       Follow-ups after your visit        Who to contact     If you have questions or need follow up information about today's clinic visit or your schedule please contact [facility] directly at [phone].  Normal or non-critical lab and imaging results will be communicated to you by Fivetranhart, letter or phone within 4 business days after the clinic has received the results. If you do not hear from us within 7 days, please contact the clinic through Fivetranhart or phone. If you have a critical or abnormal lab result, we will notify you by phone as soon as possible.  Submit refill requests through Comviva or call your pharmacy and they will forward the refill request to us. Please allow 3 business days for your refill to be completed.          Additional Information About Your Visit        MyChart Information     Comviva lets you send messages to your doctor, view your test results, renew your prescriptions, schedule appointments and more. To sign up, go to [email or web address] . Click on \"Log in\" on the left side of the screen, which will take you to the Welcome page. Then click on \"Sign up Now\" on the right side of the page.     You will be asked to enter the access code listed below, as well as some personal information. Please follow the directions to create your username and password.     Your access code is: [email or web address]  Expires: 2018  9:38 AM     Your access code will  in 90 days. If you need help or a new code, please call your [facility] or [phone].        Care EveryWhere ID     This is your Care EveryWhere ID. This could be used by other organizations to access " "your [place] medical records  [id number]        Your Vitals Were     Pulse Temperature Height Pulse Oximetry Breastfeeding? BMI (Body Mass Index)    45 98.5  F (36.9  C) (Oral) 5' 6\" (1.676 m) 98% No 21.97 kg/m2       Blood Pressure from Last 3 Encounters:   08/27/18 170/78   02/28/18 124/68   08/29/17 157/78    Weight from Last 3 Encounters:   08/27/18 136 lb 1.6 oz (61.7 kg)   02/28/18 136 lb 8 oz (61.9 kg)   08/29/17 144 lb (65.3 kg)              We Performed the Following     UA reflex to Microscopic and Culture     Urine Microscopic        Primary Care Provider Office Phone # Fax #    Dee Castillo [phone] [address] [address]        Equal Access to Services     [facility]: Hadii aad ku hadasho Soomaali, waaxda luqadaha, qaybta kaalmada adeegyada, waxay idiin hayaan chris skinneraragerardo velazquez . So [facility] [phone].    ATENCIÓN: Si habla español, tiene a maurice disposición servicios gratuitos de asistencia lingüística. Yemi al [phone].    We comply with applicable federal civil rights laws and [place] laws. We do not discriminate on the basis of race, color, national origin, age, disability, sex, sexual orientation, or gender identity.            Thank you!     Thank you for choosing [facility]  for your care. Our goal is always to provide you with excellent care. Hearing back from our patients is one way we can continue to improve our services. Please take a few minutes to complete the written survey that you may receive in the mail after your visit with us. Thank you!             Your Updated Medication List - Protect others around you: Learn how to safely use, store and throw away your medicines at www.disposemymeds.org.          This list is accurate as of 8/27/18  3:44 PM.  Always use your most recent med list.                   Brand Name Dispense Instructions for use Diagnosis    aspirin 81 MG EC tablet     90 tablet    Take 1 " tablet (81 mg) by mouth daily    CARDIOVASCULAR SCREENING; LDL GOAL LESS THAN 160, Abnormal ankle brachial index, Hypertension goal BP (blood pressure) < 140/90       atorvastatin 10 MG tablet    LIPITOR    90 tablet    TAKE 1 TABLET(10 MG) BY MOUTH DAILY    CARDIOVASCULAR SCREENING; LDL GOAL LESS THAN 160, Abnormal ankle brachial index       BL BLOOD PRESSURE MONITOR Kit     1 kit    1 Units. One monitor, check weekly.    Hypertension goal BP (blood pressure) < 140/90       lisinopril-hydrochlorothiazide 20-12.5 MG per tablet    PRINZIDE/ZESTORETIC    90 tablet    Take 1 tablet by mouth daily    Essential hypertension with goal blood pressure less than 140/90       MULTIVITAMIN ADULT PO      Take 1 tablet by mouth daily        sertraline 100 MG tablet    ZOLOFT    90 tablet    Take 0.5 tablets (50 mg) by mouth daily    Major depressive disorder, recurrent episode, mild (H)       TOLTERODINE TARTRATE PO      Take 2 mg by mouth daily as needed for incontinence (Takes approximately 3 times per month)        * TRAZODONE HCL PO      Take 50 mg by mouth nightly as needed for sleep (Takes approximately 5 times per month.)        * traZODone 50 MG tablet    DESYREL    30 tablet    Take 1 tablet (50 mg) by mouth nightly as needed Takes approximately 5 times per month.    Primary insomnia       * Notice:  This list has 2 medication(s) that are the same as other medications prescribed for you. Read the directions carefully, and ask your doctor or other care provider to review them with you.

## 2018-08-28 ASSESSMENT — PATIENT HEALTH QUESTIONNAIRE - PHQ9: SUM OF ALL RESPONSES TO PHQ QUESTIONS 1-9: 0

## 2018-09-11 ENCOUNTER — OFFICE VISIT (OUTPATIENT)
Dept: FAMILY MEDICINE | Facility: CLINIC | Age: 68
End: 2018-09-11
Payer: COMMERCIAL

## 2018-09-11 VITALS
HEART RATE: 61 BPM | HEIGHT: 66 IN | DIASTOLIC BLOOD PRESSURE: 88 MMHG | SYSTOLIC BLOOD PRESSURE: 146 MMHG | BODY MASS INDEX: 21.6 KG/M2 | WEIGHT: 134.4 LBS | OXYGEN SATURATION: 96 % | TEMPERATURE: 98.7 F

## 2018-09-11 DIAGNOSIS — Z13.6 CARDIOVASCULAR SCREENING; LDL GOAL LESS THAN 160: ICD-10-CM

## 2018-09-11 DIAGNOSIS — I65.22 MILD ATHEROSCLEROSIS OF LEFT CAROTID ARTERY: ICD-10-CM

## 2018-09-11 DIAGNOSIS — E78.5 HYPERLIPIDEMIA LDL GOAL <100: ICD-10-CM

## 2018-09-11 DIAGNOSIS — I10 ESSENTIAL HYPERTENSION WITH GOAL BLOOD PRESSURE LESS THAN 140/90: Primary | ICD-10-CM

## 2018-09-11 DIAGNOSIS — R68.89 ABNORMAL ANKLE BRACHIAL INDEX: ICD-10-CM

## 2018-09-11 PROCEDURE — 80048 BASIC METABOLIC PNL TOTAL CA: CPT | Performed by: FAMILY MEDICINE

## 2018-09-11 PROCEDURE — 99214 OFFICE O/P EST MOD 30 MIN: CPT | Performed by: FAMILY MEDICINE

## 2018-09-11 PROCEDURE — 36415 COLL VENOUS BLD VENIPUNCTURE: CPT | Performed by: FAMILY MEDICINE

## 2018-09-11 RX ORDER — ATORVASTATIN CALCIUM 20 MG/1
TABLET, FILM COATED ORAL
Qty: 30 TABLET | Refills: 1 | Status: SHIPPED | OUTPATIENT
Start: 2018-09-11 | End: 2019-05-05

## 2018-09-11 RX ORDER — LISINOPRIL AND HYDROCHLOROTHIAZIDE 12.5; 2 MG/1; MG/1
2 TABLET ORAL DAILY
Qty: 60 TABLET | Refills: 0 | Status: SHIPPED | OUTPATIENT
Start: 2018-09-11 | End: 2018-10-03

## 2018-09-11 NOTE — PROGRESS NOTES
,   /SUBJECTIVE:   Kimmie Carranza is a 68 year old female who presents to clinic today for the following health issues:    Hypertension Follow-up    Outpatient blood pressures are being checked at home.  Results are 175's/90's.    Low Salt Diet: no added salt    Amount of exercise or physical activity: 3 days a week    Problems taking medications regularly: Yes, problems remembering to take (occationally)    Medication side effects: none    Diet: regular (no restrictions)    BP very high at recent visit to r/o UTI.  At home, readings have been high since that appt-  205/99, 159/100, 148/83, 180/80,  176/82, 159/108, 174/91, 191/89, 210/91, 169/81, 137/72, 105/71, 102/79, 169/85, 159/96, 158/90, 157/97, 134/84, 140/97.    BP recheck today - 146/86  165/88 and 173/59    Went to cardiovascular screening place- last went there ~3 yrs ago.  They said she should come in because her L carotid artery was abnormal, and they should recheck.  SHINE slightly abnl on one side, but doesn't have claudication sx's.    Smokes- one a couple times a week, bums them from friends.    The 10-year ASCVD risk score (Sally NED Jr, et al., 2013) is: 19%    Values used to calculate the score:      Age: 68 years      Sex: Female      Is Non- : No      Diabetic: No      Tobacco smoker: Yes      Systolic Blood Pressure: 146 mmHg      Is BP treated: Yes      HDL Cholesterol: 82 mg/dL      Total Cholesterol: 187 mg/dL       Problem list and histories reviewed & adjusted, as indicated.  Additional history: as documented    Patient Active Problem List   Diagnosis     Anxiety state     Urge incontinence     Hyperlipidemia LDL goal <100     Mild major depression (H)     Osteoarthritis     Essential hypertension with goal blood pressure less than 140/90     Advanced directives, counseling/discussion     Alcohol abuse     Abnormal ankle brachial index     Mild atherosclerosis of carotid artery     Insomnia     Bilateral hip pain      Somatic dysfunction of lumbar region     Chronic bilateral low back pain without sciatica     Nonallopathic lesion of sacroiliac region     Sacroiliac joint pain     Somatic dysfunction of pelvis region     Closed compression fracture of second lumbar vertebra (H)     Memory change      Current Outpatient Prescriptions   Medication Sig Dispense Refill     aspirin 81 MG EC tablet Take 1 tablet (81 mg) by mouth daily 90 tablet 3     atorvastatin (LIPITOR) 20 MG tablet TAKE 1 TABLET(10 MG) BY MOUTH DAILY 30 tablet 1     Blood Pressure Monitor KIT 1 Units daily as needed 1 kit 0     Blood Pressure Monitoring (BL BLOOD PRESSURE MONITOR) KIT 1 Units. One monitor, check weekly. 1 kit 0     lisinopril-hydrochlorothiazide (PRINZIDE/ZESTORETIC) 20-12.5 MG per tablet Take 2 tablets by mouth daily 60 tablet 0     Multiple Vitamins-Minerals (MULTIVITAMIN ADULT PO) Take 1 tablet by mouth daily       sertraline (ZOLOFT) 100 MG tablet Take 0.5 tablets (50 mg) by mouth daily 90 tablet 1     TOLTERODINE TARTRATE PO Take 2 mg by mouth daily as needed for incontinence (Takes approximately 3 times per month)       traZODone (DESYREL) 50 MG tablet Take 1 tablet (50 mg) by mouth nightly as needed Takes approximately 5 times per month. 30 tablet 3     [DISCONTINUED] atorvastatin (LIPITOR) 10 MG tablet TAKE 1 TABLET(10 MG) BY MOUTH DAILY 90 tablet 1     [DISCONTINUED] lisinopril-hydrochlorothiazide (PRINZIDE/ZESTORETIC) 20-12.5 MG per tablet Take 1 tablet by mouth daily 90 tablet 1     [DISCONTINUED] TRAZODONE HCL PO Take 50 mg by mouth nightly as needed for sleep (Takes approximately 5 times per month.)       Allergies   Allergen Reactions     No Known Drug Allergies      Recent Labs   Lab Test  02/28/18   1427  07/08/17   0816  07/07/17   0751  07/06/17   1813  10/11/16   1123  08/19/15   1439   LDL  95   --    --    --   158*  96   HDL  82   --    --    --   74  74   TRIG  50   --    --    --   101  96   ALT   --    --    --   19  22  34  "  CR  0.71  0.73  0.65  0.68  0.71  0.71   GFRESTIMATED  82  79  >90  Non  GFR Calc    86  82  82   GFRESTBLACK  >90  >90  African American GFR Calc    >90   GFR Calc    >90   GFR Calc    >90   GFR Calc    >90   GFR Calc     POTASSIUM  3.9   --   3.7  3.6  4.0  3.7      BP Readings from Last 3 Encounters:   09/11/18 146/88   08/27/18 170/78   02/28/18 124/68    Wt Readings from Last 3 Encounters:   09/11/18 134 lb 6.4 oz (61 kg)   08/27/18 136 lb 1.6 oz (61.7 kg)   02/28/18 136 lb 8 oz (61.9 kg)                  Labs reviewed in EPIC    Reviewed and updated as needed this visit by clinical staff  Tobacco  Allergies  Meds       Reviewed and updated as needed this visit by Provider         ROS:  Constitutional, HEENT, cardiovascular, pulmonary, gi and gu systems are negative, except as otherwise noted.    OBJECTIVE:     /88  Pulse 61  Temp 98.7  F (37.1  C) (Oral)  Ht 5' 6\" (1.676 m)  Wt 134 lb 6.4 oz (61 kg)  SpO2 96%  Breastfeeding? No  BMI 21.69 kg/m2  Body mass index is 21.69 kg/(m^2).  GENERAL APPEARANCE: healthy, alert and no distress     EYES: PERRL, sclera clear     HENT: nose and mouth without ulcers or lesions     NECK: no adenopathy, no asymmetry, masses, or scars and thyroid normal to palpation     RESP: lungs clear to auscultation - no rales, rhonchi or wheezes     CV: regular rates and rhythm, normal S1 S2, no S3 or S4 and no murmur, click or rub      Abdomen: soft, nontender, no HSM or masses and bowel sounds normal     Ext: warm, dry, no edema       ASSESSMENT/PLAN:       ICD-10-CM    1. Essential hypertension with goal blood pressure less than 140/90 I10 Blood Pressure Monitor KIT     lisinopril-hydrochlorothiazide (PRINZIDE/ZESTORETIC) 20-12.5 MG per tablet     Basic metabolic panel  (Ca, Cl, CO2, Creat, Gluc, K, Na, BUN)   2. Mild atherosclerosis of left carotid artery I65.22    3. Hyperlipidemia LDL goal " <100 E78.5    4. CARDIOVASCULAR SCREENING; LDL GOAL LESS THAN 160 Z13.6 atorvastatin (LIPITOR) 20 MG tablet   5. Abnormal ankle brachial index R68.89 atorvastatin (LIPITOR) 20 MG tablet     HTN- Pt with increased BP readings today and at last visit.  Home BP monitor much higher than office reading side-to-side- rec new monitor- rx sent.  Will double lisin/hctz.  BMP today and rtc in 1-3 weeks for BP and BMP recheck.    Lipids- likely could benefit from higher-intensity statin, so will increase lipitor from 10mg to 20mg/d.      Cardiovascular risk- pt had screening done a few yrs ago, discussed best interventions are improving BP control, lipid control, stopping smoking and improving her diet. Okay to stay on asa 81mg with her ASCVD risk and fam h/o.  With no sx's, would not intervene for PAD or mild carotid artery stenosis.  Recs as below.    Risks and benefits of medication(s) including potential side effects reviewed with patient.  Questions answered.     RTC in 1-3 wks.      Patient Instructions   Stop Smoking completely.    Look at Eat to Live/Eat for Health books (can start with [place] documentary).    Hypertension-   Increase lisinopril/hctz to 2 tab daily (so 40mg/25mg/day).    Return for appointment with Dr. Castillo in 1-3 weeks.    Lipids-  Increase lipitor to 20mg/day.  Recheck fasting lipids after 6 weeks or appointment after that time.      Dee Castillo MD  [facility]

## 2018-09-11 NOTE — MR AVS SNAPSHOT
After Visit Summary   9/11/2018    Kimmie Carranza    MRN: [id number]           Patient Information     Date Of Birth          1950        Visit Information        Provider Department      9/11/2018 3:00 PM Dee Castillo MD [facility]        Today's Diagnoses     Essential hypertension with goal blood pressure less than 140/90    -  1    Mild atherosclerosis of left carotid artery        Hyperlipidemia LDL goal <100        CARDIOVASCULAR SCREENING; LDL GOAL LESS THAN 160        Abnormal ankle brachial index          Care Instructions    Stop Smoking completely.    Look at Eat to Live/Eat for Health books (can start with [place] documentary).    Hypertension-   Increase lisinopril/hctz to 2 tab daily (so 40mg/25mg/day).    Return for appointment with Dr. Castillo in 1-3 weeks.    Lipids-  Increase lipitor to 20mg/day.  Recheck fasting lipids after 6 weeks or appointment after that time.            Follow-ups after your visit        Who to contact     If you have questions or need follow up information about today's clinic visit or your schedule please contact [facility] directly at [phone].  Normal or non-critical lab and imaging results will be communicated to you by Stabilitechhart, letter or phone within 4 business days after the clinic has received the results. If you do not hear from us within 7 days, please contact the clinic through FRESSt or phone. If you have a critical or abnormal lab result, we will notify you by phone as soon as possible.  Submit refill requests through Glowbiotics or call your pharmacy and they will forward the refill request to us. Please allow 3 business days for your refill to be completed.          Additional Information About Your Visit        MyChart Information     Glowbiotics lets you send messages to your doctor, view your test results, renew your prescriptions, schedule appointments and more. To sign up, go to  [email or web address] . Click on \"Log in\" on the left side of the screen, which will take you to the Welcome page. Then click on \"Sign up Now\" on the right side of the page.     You will be asked to enter the access code listed below, as well as some personal information. Please follow the directions to create your username and password.     Your access code is: [email or web address]  Expires: 2018  9:38 AM     Your access code will  in 90 days. If you need help or a new code, please call your [place] clinic or [phone].        Care EveryWhere ID     This is your Care EveryWhere ID. This could be used by other organizations to access your [place] medical records  [id number]        Your Vitals Were     Pulse Temperature Height Pulse Oximetry Breastfeeding? BMI (Body Mass Index)    61 98.7  F (37.1  C) (Oral) 5' 6\" (1.676 m) 96% No 21.69 kg/m2       Blood Pressure from Last 3 Encounters:   18 146/88   18 170/78   18 124/68    Weight from Last 3 Encounters:   18 134 lb 6.4 oz (61 kg)   18 136 lb 1.6 oz (61.7 kg)   18 136 lb 8 oz (61.9 kg)              We Performed the Following     Basic metabolic panel  (Ca, Cl, CO2, Creat, Gluc, K, Na, BUN)          Today's Medication Changes          These changes are accurate as of 18  4:14 PM.  If you have any questions, ask your nurse or doctor.               These medicines have changed or have updated prescriptions.        Dose/Directions    atorvastatin 20 MG tablet   Commonly known as:  LIPITOR   This may have changed:  medication strength   Used for:  CARDIOVASCULAR SCREENING; LDL GOAL LESS THAN 160, Abnormal ankle brachial index   Changed by:  Dee Castillo MD        TAKE 1 TABLET(10 MG) BY MOUTH DAILY   Quantity:  30 tablet   Refills:  1       * BL BLOOD PRESSURE MONITOR Kit   This may have changed:  Another medication with the same name was added. Make sure you understand how and when to take each.   Used " for:  Hypertension goal BP (blood pressure) < 140/90   Changed by:  Dee Castillo MD        Dose:  1 Units   1 Units. One monitor, check weekly.   Quantity:  1 kit   Refills:  0       * Blood Pressure Monitor Kit   This may have changed:  You were already taking a medication with the same name, and this prescription was added. Make sure you understand how and when to take each.   Used for:  Essential hypertension with goal blood pressure less than 140/90   Changed by:  Dee Castillo MD        Dose:  1 Units   1 Units daily as needed   Quantity:  1 kit   Refills:  0       lisinopril-hydrochlorothiazide 20-12.5 MG per tablet   Commonly known as:  PRINZIDE/ZESTORETIC   This may have changed:  how much to take   Used for:  Essential hypertension with goal blood pressure less than 140/90   Changed by:  Dee Castillo MD        Dose:  2 tablet   Take 2 tablets by mouth daily   Quantity:  60 tablet   Refills:  0       * Notice:  This list has 2 medication(s) that are the same as other medications prescribed for you. Read the directions carefully, and ask your doctor or other care provider to review them with you.         Where to get your medicines      These medications were sent to [facility] [address] [place] [address] AT [place] [address]    Hours:  24-hours Phone:  [phone]     atorvastatin 20 MG tablet    Blood Pressure Monitor Kit    lisinopril-hydrochlorothiazide 20-12.5 MG per tablet                Primary Care Provider Office Phone # Fax #    Dee Castillo [phone]       [facility] [address] [address]        Equal Access to Services     [facility]: Hadii kerry iglesias hadasho Soomaali, waaxda luqadaha, qaybta kaalmada adeegyada, ronny wilson. So [facility] [place] [phone].    ATENCIÓN: Si habla español, tiene a maurice disposición servicios gratuitos de asistencia  lingüísticaDenise Bragg al [phone].    We comply with applicable federal civil rights laws and [place] laws. We do not discriminate on the basis of race, color, national origin, age, disability, sex, sexual orientation, or gender identity.            Thank you!     Thank you for choosing [facility]  for your care. Our goal is always to provide you with excellent care. Hearing back from our patients is one way we can continue to improve our services. Please take a few minutes to complete the written survey that you may receive in the mail after your visit with us. Thank you!             Your Updated Medication List - Protect others around you: Learn how to safely use, store and throw away your medicines at www.disposemymeds.org.          This list is accurate as of 9/11/18  4:14 PM.  Always use your most recent med list.                   Brand Name Dispense Instructions for use Diagnosis    aspirin 81 MG EC tablet     90 tablet    Take 1 tablet (81 mg) by mouth daily    CARDIOVASCULAR SCREENING; LDL GOAL LESS THAN 160, Abnormal ankle brachial index, Hypertension goal BP (blood pressure) < 140/90       atorvastatin 20 MG tablet    LIPITOR    30 tablet    TAKE 1 TABLET(10 MG) BY MOUTH DAILY    CARDIOVASCULAR SCREENING; LDL GOAL LESS THAN 160, Abnormal ankle brachial index       * BL BLOOD PRESSURE MONITOR Kit     1 kit    1 Units. One monitor, check weekly.    Hypertension goal BP (blood pressure) < 140/90       * Blood Pressure Monitor Kit     1 kit    1 Units daily as needed    Essential hypertension with goal blood pressure less than 140/90       lisinopril-hydrochlorothiazide 20-12.5 MG per tablet    PRINZIDE/ZESTORETIC    60 tablet    Take 2 tablets by mouth daily    Essential hypertension with goal blood pressure less than 140/90       MULTIVITAMIN ADULT PO      Take 1 tablet by mouth daily        sertraline 100 MG tablet    ZOLOFT    90 tablet    Take 0.5 tablets (50 mg) by mouth daily    Major  depressive disorder, recurrent episode, mild (H)       TOLTERODINE TARTRATE PO      Take 2 mg by mouth daily as needed for incontinence (Takes approximately 3 times per month)        traZODone 50 MG tablet    DESYREL    30 tablet    Take 1 tablet (50 mg) by mouth nightly as needed Takes approximately 5 times per month.    Primary insomnia       * Notice:  This list has 2 medication(s) that are the same as other medications prescribed for you. Read the directions carefully, and ask your doctor or other care provider to review them with you.

## 2018-09-11 NOTE — PATIENT INSTRUCTIONS
Stop Smoking completely.    Look at Eat to Live/Eat for Health books (can start with [place] documentary).    Hypertension-   Increase lisinopril/hctz to 2 tab daily (so 40mg/25mg/day).    Return for appointment with Dr. Castillo in 1-3 weeks.    Lipids-  Increase lipitor to 20mg/day.  Recheck fasting lipids after 6 weeks or appointment after that time.

## 2018-09-12 LAB
ANION GAP SERPL CALCULATED.3IONS-SCNC: 11 MMOL/L (ref 3–14)
BUN SERPL-MCNC: 10 MG/DL (ref 7–30)
CALCIUM SERPL-MCNC: 9.2 MG/DL (ref 8.5–10.1)
CHLORIDE SERPL-SCNC: 105 MMOL/L (ref 94–109)
CO2 SERPL-SCNC: 26 MMOL/L (ref 20–32)
CREAT SERPL-MCNC: 0.62 MG/DL (ref 0.52–1.04)
GFR SERPL CREATININE-BSD FRML MDRD: >90 ML/MIN/1.7M2
GLUCOSE SERPL-MCNC: 74 MG/DL (ref 70–99)
POTASSIUM SERPL-SCNC: 3.9 MMOL/L (ref 3.4–5.3)
SODIUM SERPL-SCNC: 142 MMOL/L (ref 133–144)

## 2018-09-13 DIAGNOSIS — N39.41 URGE INCONTINENCE: ICD-10-CM

## 2018-09-13 NOTE — PROGRESS NOTES
Please send letter below with copy of results.  Thanks! CW    Here are your lab results from your recent visit...  -Your basic metabolic panel (which includes electrolyte levels, blood sugar level and kidney function tests) looks normal.    Please let me know if you have any questions.  Best,   Juwan Castillo MD

## 2018-09-14 RX ORDER — TOLTERODINE TARTRATE 2 MG/1
TABLET, EXTENDED RELEASE ORAL
Qty: 30 TABLET | Refills: 5 | Status: SHIPPED | OUTPATIENT
Start: 2018-09-14 | End: 2019-05-05

## 2018-09-14 NOTE — TELEPHONE ENCOUNTER
"Routing refill request to provider for review/approval because:  Medication is reported/historical  Medina SANTOS RN    Requested Prescriptions   Pending Prescriptions Disp Refills     tolterodine (DETROL) 2 MG tablet [Pharmacy Med Name: TOLTERODINE TARTRATE 2MG TABLETS] 30 tablet 0     Sig: TAKE 1 TABLET BY MOUTH DAILY AS NEEDED    Muscarinic Antagonists (Urinary Incontinence Agents) Passed    9/13/2018  3:28 PM       Passed - Recent (12 mo) or future (30 days) visit within the authorizing provider's specialty    Patient had office visit in the last 12 months or has a visit in the next 30 days with authorizing provider or within the authorizing provider's specialty.  See \"Patient Info\" tab in inbasket, or \"Choose Columns\" in Meds & Orders section of the refill encounter.           Passed - Patient does not have a diagnosis of glaucoma on the problem list    If glaucoma diagnosis is new, refer refill to physician.         Passed - Patient is 18 years of age or older            "

## 2018-09-26 ENCOUNTER — TELEPHONE (OUTPATIENT)
Dept: FAMILY MEDICINE | Facility: CLINIC | Age: 68
End: 2018-09-26

## 2018-09-26 NOTE — TELEPHONE ENCOUNTER
Called pt and LMM on cell number to call back to schedule follow up HTN appointment with Dr. Castillo.LIZZ Maher, CMA

## 2018-10-03 ENCOUNTER — OFFICE VISIT (OUTPATIENT)
Dept: FAMILY MEDICINE | Facility: CLINIC | Age: 68
End: 2018-10-03
Payer: COMMERCIAL

## 2018-10-03 VITALS
DIASTOLIC BLOOD PRESSURE: 52 MMHG | HEIGHT: 66 IN | SYSTOLIC BLOOD PRESSURE: 104 MMHG | WEIGHT: 134 LBS | BODY MASS INDEX: 21.53 KG/M2 | HEART RATE: 52 BPM | OXYGEN SATURATION: 98 % | TEMPERATURE: 98.6 F

## 2018-10-03 DIAGNOSIS — E78.5 HYPERLIPIDEMIA LDL GOAL <100: ICD-10-CM

## 2018-10-03 DIAGNOSIS — L98.9 SKIN LESION: ICD-10-CM

## 2018-10-03 DIAGNOSIS — I10 ESSENTIAL HYPERTENSION WITH GOAL BLOOD PRESSURE LESS THAN 140/90: Primary | ICD-10-CM

## 2018-10-03 PROCEDURE — 99214 OFFICE O/P EST MOD 30 MIN: CPT | Performed by: FAMILY MEDICINE

## 2018-10-03 RX ORDER — LISINOPRIL AND HYDROCHLOROTHIAZIDE 12.5; 2 MG/1; MG/1
1 TABLET ORAL DAILY
Qty: 30 TABLET | Refills: 0 | Status: SHIPPED | OUTPATIENT
Start: 2018-10-03 | End: 2019-04-11

## 2018-10-03 NOTE — NURSING NOTE
"Chief Complaint   Patient presents with     Hypertension     /52  Pulse 52  Temp 98.6  F (37  C) (Oral)  Ht 5' 6\" (1.676 m)  Wt 134 lb (60.8 kg)  SpO2 98%  BMI 21.63 kg/m2 Estimated body mass index is 21.63 kg/(m^2) as calculated from the following:    Height as of this encounter: 5' 6\" (1.676 m).    Weight as of this encounter: 134 lb (60.8 kg).        Health Maintenance due pending provider review:  NONE    n/a    Marti Erazo CMA  "

## 2018-10-03 NOTE — MR AVS SNAPSHOT
After Visit Summary   10/3/2018    Kimmie Carranza    MRN: [id number]           Patient Information     Date Of Birth          1950        Visit Information        Provider Department      10/3/2018 3:30 PM Dee Castillo MD [facility]        Today's Diagnoses     Essential hypertension with goal blood pressure less than 140/90    -  1    Skin lesion        Hyperlipidemia LDL goal <100           Follow-ups after your visit        Additional Services     DERMATOLOGY REFERRAL       Your provider has referred you to: [facility]: [facility] [phone]   [email or web address]  [facility]: [facility] [place] [phone]   [email or web address]    Please be aware that coverage of these services is subject to the terms and limitations of your health insurance plan.  Call member services at your health plan with any benefit or coverage questions.      Please bring the following with you to your appointment:    (1) Any X-Rays, CTs or MRIs which have been performed.  Contact the facility where they were done to arrange for  prior to your scheduled appointment.    (2) List of current medications  (3) This referral request   (4) Any documents/labs given to you for this referral                  Follow-up notes from your care team     Return in about 4 weeks (around 10/31/2018) for BP Recheck, Depression follow-up, Fasting labs at appointment.      Your next 10 appointments already scheduled     Oct 30, 2018  9:30 AM CDT   Office Visit with Dee Castillo MD   [facility] ([facility])    [address] [address] [phone]           Bring a current list of meds and any records pertaining to this visit. For Physicals, please bring immunization records and any forms needing to be filled out. Please arrive 10 minutes early to complete paperwork.              Who to  "contact     If you have questions or need follow up information about today's clinic visit or your schedule please contact [facility] directly at [phone].  Normal or non-critical lab and imaging results will be communicated to you by MyChart, letter or phone within 4 business days after the clinic has received the results. If you do not hear from us within 7 days, please contact the clinic through MyChart or phone. If you have a critical or abnormal lab result, we will notify you by phone as soon as possible.  Submit refill requests through [address] or call your pharmacy and they will forward the refill request to us. Please allow 3 business days for your refill to be completed.          Additional Information About Your Visit        [facility] Information     [facility] lets you send messages to your doctor, view your test results, renew your prescriptions, schedule appointments and more. To sign up, go to [email or web address] . Click on \"Log in\" on the left side of the screen, which will take you to the Welcome page. Then click on \"Sign up Now\" on the right side of the page.     You will be asked to enter the access code listed below, as well as some personal information. Please follow the directions to create your username and password.     Your access code is: [email or web address]  Expires: 2018  9:38 AM     Your access code will  in 90 days. If you need help or a new code, please call your [place] clinic or [phone].        Care EveryWhere ID     This is your Care EveryWhere ID. This could be used by other organizations to access your [place] medical records  [id number]        Your Vitals Were     Pulse Temperature Height Pulse Oximetry BMI (Body Mass Index)       52 98.6  F (37  C) (Oral) 5' 6\" (1.676 m) 98% 21.63 kg/m2        Blood Pressure from Last 3 Encounters:   10/03/18 104/52   18 146/88   18 170/78    Weight from Last 3 Encounters:   10/03/18 134 lb (60.8 kg) "   09/11/18 134 lb 6.4 oz (61 kg)   08/27/18 136 lb 1.6 oz (61.7 kg)              We Performed the Following     DERMATOLOGY REFERRAL          Today's Medication Changes          These changes are accurate as of 10/3/18 11:59 PM.  If you have any questions, ask your nurse or doctor.               These medicines have changed or have updated prescriptions.        Dose/Directions    * BL BLOOD PRESSURE MONITOR Kit   This may have changed:  Another medication with the same name was added. Make sure you understand how and when to take each.   Used for:  Hypertension goal BP (blood pressure) < 140/90   Changed by:  Dee Castillo MD        Dose:  1 Units   1 Units. One monitor, check weekly.   Quantity:  1 kit   Refills:  0       * Blood Pressure Monitor Kit   This may have changed:  Another medication with the same name was added. Make sure you understand how and when to take each.   Used for:  Essential hypertension with goal blood pressure less than 140/90   Changed by:  Dee Castillo MD        Dose:  1 Units   1 Units daily as needed   Quantity:  1 kit   Refills:  0       * Blood Pressure Monitor Kit   This may have changed:  You were already taking a medication with the same name, and this prescription was added. Make sure you understand how and when to take each.   Used for:  Essential hypertension with goal blood pressure less than 140/90   Changed by:  Dee Castillo MD        Dose:  1 Units   1 Units daily as needed (blood pressure check)   Quantity:  1 kit   Refills:  0       lisinopril-hydrochlorothiazide 20-12.5 MG per tablet   Commonly known as:  PRINZIDE/ZESTORETIC   This may have changed:  how much to take   Used for:  Essential hypertension with goal blood pressure less than 140/90   Changed by:  Dee Castillo MD        Dose:  1 tablet   Take 1 tablet by mouth daily   Quantity:  30 tablet   Refills:  0       * Notice:  This list has 3 medication(s) that are the same as  other medications prescribed for you. Read the directions carefully, and ask your doctor or other care provider to review them with you.         Where to get your medicines      These medications were sent to [facility] [id number] [place] [address] AT [address] [address]    Hours:  24-hours Phone:  [phone]     Blood Pressure Monitor Kit    lisinopril-hydrochlorothiazide 20-12.5 MG per tablet                Primary Care Provider Office Phone # Fax #    Dee Castillo [phone] [address] [address]        Equal Access to Services     WILLOW COLBERT : Hadii aad ku hadasho Soomaali, waaxda luqadaha, qaybta kaalmada adeegyada, waxyan wilson. So [facility] [phone].    ATENCIÓN: Si habla español, tiene a maurice disposición servicios gratuitos de asistencia lingüística. [facility] [phone].    We comply with applicable federal civil rights laws and [place] laws. We do not discriminate on the basis of race, color, national origin, age, disability, sex, sexual orientation, or gender identity.            Thank you!     Thank you for choosing [facility]  for your care. Our goal is always to provide you with excellent care. Hearing back from our patients is one way we can continue to improve our services. Please take a few minutes to complete the written survey that you may receive in the mail after your visit with us. Thank you!             Your Updated Medication List - Protect others around you: Learn how to safely use, store and throw away your medicines at www.disposemymeds.org.          This list is accurate as of 10/3/18 11:59 PM.  Always use your most recent med list.                   Brand Name Dispense Instructions for use Diagnosis    aspirin 81 MG EC tablet     90 tablet    Take 1 tablet (81 mg) by mouth daily    CARDIOVASCULAR SCREENING; LDL GOAL LESS THAN 160,  Abnormal ankle brachial index, Hypertension goal BP (blood pressure) < 140/90       atorvastatin 20 MG tablet    LIPITOR    30 tablet    TAKE 1 TABLET(10 MG) BY MOUTH DAILY    CARDIOVASCULAR SCREENING; LDL GOAL LESS THAN 160, Abnormal ankle brachial index       * BL BLOOD PRESSURE MONITOR Kit     1 kit    1 Units. One monitor, check weekly.    Hypertension goal BP (blood pressure) < 140/90       * Blood Pressure Monitor Kit     1 kit    1 Units daily as needed    Essential hypertension with goal blood pressure less than 140/90       * Blood Pressure Monitor Kit     1 kit    1 Units daily as needed (blood pressure check)    Essential hypertension with goal blood pressure less than 140/90       lisinopril-hydrochlorothiazide 20-12.5 MG per tablet    PRINZIDE/ZESTORETIC    30 tablet    Take 1 tablet by mouth daily    Essential hypertension with goal blood pressure less than 140/90       MULTIVITAMIN ADULT PO      Take 1 tablet by mouth daily        sertraline 100 MG tablet    ZOLOFT    90 tablet    Take 0.5 tablets (50 mg) by mouth daily    Major depressive disorder, recurrent episode, mild (H)       tolterodine 2 MG tablet    DETROL    30 tablet    TAKE 1 TABLET BY MOUTH DAILY AS NEEDED    Urge incontinence       traZODone 50 MG tablet    DESYREL    30 tablet    Take 1 tablet (50 mg) by mouth nightly as needed Takes approximately 5 times per month.    Primary insomnia       * Notice:  This list has 3 medication(s) that are the same as other medications prescribed for you. Read the directions carefully, and ask your doctor or other care provider to review them with you.

## 2018-10-03 NOTE — PROGRESS NOTES
SUBJECTIVE:   Kimmie Carranza is a 68 year old female who presents to clinic today for the following health issues:    Hypertension Follow-up    [facility] 154/86    Low Salt Diet: no added salt    Amount of exercise or physical activity: 3 x week    Problems taking medications regularly: No    Medication side effects: unknown    Diet: regular (no restrictions)    Follow-up from last visit ~ 3wks ago- was having very high BP readings at home prior to that time.  Office bp 146/88 at that visit, so we doubled the prinzide 2 tablets/day (from 1/day).  Since then, she did feel lightheaded yesterday, little bit, maybe one other time.  While she had been standing up for a bit.  No vertigo, just felt woozy.  Didn't have to sit down, felt fine.  BP recheck 96/62 today.    At last visit lipitor increased to 20mg/d (from 10mg/d) due to newer recommendations- no se's.    Hasn't smoked any cigarettes- couple yrs of just smoking every couple weeks, when around friends who smoke- bums one.      Problem list and histories reviewed & adjusted, as indicated.  Additional history: as documented    Patient Active Problem List   Diagnosis     Anxiety state     Urge incontinence     Hyperlipidemia LDL goal <100     Mild major depression (H)     Osteoarthritis     Essential hypertension with goal blood pressure less than 140/90     Advanced directives, counseling/discussion     Alcohol abuse     Abnormal ankle brachial index     Mild atherosclerosis of carotid artery     Insomnia     Bilateral hip pain     Somatic dysfunction of lumbar region     Chronic bilateral low back pain without sciatica     Nonallopathic lesion of sacroiliac region     Sacroiliac joint pain     Somatic dysfunction of pelvis region     Closed compression fracture of second lumbar vertebra (H)     Memory change      Current Outpatient Prescriptions   Medication Sig Dispense Refill     aspirin 81 MG EC tablet Take 1 tablet (81 mg) by  mouth daily 90 tablet 3     atorvastatin (LIPITOR) 20 MG tablet TAKE 1 TABLET(10 MG) BY MOUTH DAILY 30 tablet 1     Blood Pressure Monitor KIT 1 Units daily as needed (blood pressure check) 1 kit 0     lisinopril-hydrochlorothiazide (PRINZIDE/ZESTORETIC) 20-12.5 MG per tablet Take 1 tablet by mouth daily 30 tablet 0     Multiple Vitamins-Minerals (MULTIVITAMIN ADULT PO) Take 1 tablet by mouth daily       sertraline (ZOLOFT) 100 MG tablet Take 0.5 tablets (50 mg) by mouth daily 90 tablet 1     tolterodine (DETROL) 2 MG tablet TAKE 1 TABLET BY MOUTH DAILY AS NEEDED 30 tablet 5     traZODone (DESYREL) 50 MG tablet Take 1 tablet (50 mg) by mouth nightly as needed Takes approximately 5 times per month. 30 tablet 3     Blood Pressure Monitor KIT 1 Units daily as needed 1 kit 0     Blood Pressure Monitoring (BL BLOOD PRESSURE MONITOR) KIT 1 Units. One monitor, check weekly. (Patient not taking: Reported on 10/3/2018) 1 kit 0     Allergies   Allergen Reactions     No Known Drug Allergies      Recent Labs   Lab Test  09/11/18   1616  02/28/18   1427   07/06/17   1813  10/11/16   1123  08/19/15   1439   LDL   --   95   --    --   158*  96   HDL   --   82   --    --   74  74   TRIG   --   50   --    --   101  96   ALT   --    --    --   19  22  34   CR  0.62  0.71   < >  0.68  0.71  0.71   GFRESTIMATED  >90  82   < >  86  82  82   GFRESTBLACK  >90  >90   < >  >90   GFR Calc    >90   GFR Calc    >90   GFR Calc     POTASSIUM  3.9  3.9   < >  3.6  4.0  3.7    < > = values in this interval not displayed.      BP Readings from Last 3 Encounters:   10/03/18 104/52   09/11/18 146/88   08/27/18 170/78    Wt Readings from Last 3 Encounters:   10/03/18 134 lb (60.8 kg)   09/11/18 134 lb 6.4 oz (61 kg)   08/27/18 136 lb 1.6 oz (61.7 kg)           Labs reviewed in EPIC    Reviewed and updated as needed this visit by clinical staff  Tobacco  Allergies  Meds  Problems       Reviewed and  "updated as needed this visit by Provider  Allergies  Meds  Problems         ROS:  Constitutional, HEENT, cardiovascular, pulmonary, gi and gu systems are negative, except as otherwise noted.    OBJECTIVE:     /52  Pulse 52  Temp 98.6  F (37  C) (Oral)  Ht 5' 6\" (1.676 m)  Wt 134 lb (60.8 kg)  SpO2 98%  BMI 21.63 kg/m2  Body mass index is 21.63 kg/(m^2).   BP recheck 96/62  GENERAL APPEARANCE: healthy, alert and no distress     EYES: PERRL, sclera clear     HENT: nose and mouth without ulcers or lesions     NECK: no adenopathy, no asymmetry, masses, or scars and thyroid normal to palpation     RESP: lungs clear to auscultation - no rales, rhonchi or wheezes     CV: regular rates and rhythm, normal S1 S2, no S3 or S4 and no murmur, click or rub      Abdomen: soft, nontender, no HSM or masses and bowel sounds normal     Ext: warm, dry, no edema      Psych: full range affect, normal speech and grooming, judgement and insight intact     Diagnostic Test Results:  Results for orders placed or performed in visit on 09/11/18   Basic metabolic panel  (Ca, Cl, CO2, Creat, Gluc, K, Na, BUN)   Result Value Ref Range    Sodium 142 133 - 144 mmol/L    Potassium 3.9 3.4 - 5.3 mmol/L    Chloride 105 94 - 109 mmol/L    Carbon Dioxide 26 20 - 32 mmol/L    Anion Gap 11 3 - 14 mmol/L    Glucose 74 70 - 99 mg/dL    Urea Nitrogen 10 7 - 30 mg/dL    Creatinine 0.62 0.52 - 1.04 mg/dL    GFR Estimate >90 >60 mL/min/1.7m2    GFR Estimate If Black >90 >60 mL/min/1.7m2    Calcium 9.2 8.5 - 10.1 mg/dL       ASSESSMENT/PLAN:       ICD-10-CM    1. Essential hypertension with goal blood pressure less than 140/90 I10 lisinopril-hydrochlorothiazide (PRINZIDE/ZESTORETIC) 20-12.5 MG per tablet     Blood Pressure Monitor KIT   2. Skin lesion L98.9 DERMATOLOGY REFERRAL   3. Hyperlipidemia LDL goal <100 E78.5      HTN- BP quite low, and very low on recheck today after increasing prinzide to 40/25mg/d at last visit.  [facility], and systolic was in 150s, but she has been having some mild orthostatic sx's.  Will have her lower prinzide back down to 20/12.5mg/d, and will send in a blood monitoring kit for home to check on her own (bring back to f/u appt to check [id number]).  Risks and benefits of medication(s) including potential side effects reviewed with patient.  Questions answered.     Lipids- doing well with the lipitor increase from 10mg/d to 20mg/d, no se's.  Will recheck fasting lipids at next appt in 3-4 weeks.    Skin concerns- will refer to derm per pt request.    Cont avoiding bumming cigarettes from her friends- has avoided having any since last visit.     Dee Castillo MD  [facility]

## 2019-04-10 ENCOUNTER — TELEPHONE (OUTPATIENT)
Dept: FAMILY MEDICINE | Facility: CLINIC | Age: 69
End: 2019-04-10

## 2019-04-10 ENCOUNTER — OFFICE VISIT (OUTPATIENT)
Dept: FAMILY MEDICINE | Facility: CLINIC | Age: 69
End: 2019-04-10
Payer: COMMERCIAL

## 2019-04-10 VITALS
OXYGEN SATURATION: 99 % | WEIGHT: 134.1 LBS | TEMPERATURE: 98 F | BODY MASS INDEX: 21.55 KG/M2 | HEIGHT: 66 IN | HEART RATE: 49 BPM | DIASTOLIC BLOOD PRESSURE: 70 MMHG | SYSTOLIC BLOOD PRESSURE: 148 MMHG

## 2019-04-10 DIAGNOSIS — E78.5 HYPERLIPIDEMIA LDL GOAL <100: ICD-10-CM

## 2019-04-10 DIAGNOSIS — Z00.01 ENCOUNTER FOR ROUTINE ADULT MEDICAL EXAM WITH ABNORMAL FINDINGS: Primary | ICD-10-CM

## 2019-04-10 DIAGNOSIS — I10 ESSENTIAL HYPERTENSION WITH GOAL BLOOD PRESSURE LESS THAN 140/90: ICD-10-CM

## 2019-04-10 DIAGNOSIS — G47.00 INSOMNIA, UNSPECIFIED TYPE: ICD-10-CM

## 2019-04-10 DIAGNOSIS — F41.1 ANXIETY STATE: ICD-10-CM

## 2019-04-10 DIAGNOSIS — R41.3 MEMORY CHANGE: ICD-10-CM

## 2019-04-10 DIAGNOSIS — F33.0 MAJOR DEPRESSIVE DISORDER, RECURRENT EPISODE, MILD (H): ICD-10-CM

## 2019-04-10 DIAGNOSIS — Z00.01 ENCOUNTER FOR GENERAL ADULT MEDICAL EXAMINATION WITH ABNORMAL FINDINGS: ICD-10-CM

## 2019-04-10 DIAGNOSIS — F32.0 MILD MAJOR DEPRESSION (H): ICD-10-CM

## 2019-04-10 PROCEDURE — 80048 BASIC METABOLIC PNL TOTAL CA: CPT | Performed by: FAMILY MEDICINE

## 2019-04-10 PROCEDURE — G0439 PPPS, SUBSEQ VISIT: HCPCS | Performed by: FAMILY MEDICINE

## 2019-04-10 PROCEDURE — 80061 LIPID PANEL: CPT | Performed by: FAMILY MEDICINE

## 2019-04-10 PROCEDURE — 99214 OFFICE O/P EST MOD 30 MIN: CPT | Mod: 25 | Performed by: FAMILY MEDICINE

## 2019-04-10 PROCEDURE — 82043 UR ALBUMIN QUANTITATIVE: CPT | Performed by: FAMILY MEDICINE

## 2019-04-10 PROCEDURE — 36415 COLL VENOUS BLD VENIPUNCTURE: CPT | Performed by: FAMILY MEDICINE

## 2019-04-10 RX ORDER — SERTRALINE HYDROCHLORIDE 100 MG/1
100 TABLET, FILM COATED ORAL DAILY
Qty: 30 TABLET | Refills: 1 | Status: SHIPPED | OUTPATIENT
Start: 2019-04-10 | End: 2019-05-21

## 2019-04-10 ASSESSMENT — ANXIETY QUESTIONNAIRES
7. FEELING AFRAID AS IF SOMETHING AWFUL MIGHT HAPPEN: SEVERAL DAYS
IF YOU CHECKED OFF ANY PROBLEMS ON THIS QUESTIONNAIRE, HOW DIFFICULT HAVE THESE PROBLEMS MADE IT FOR YOU TO DO YOUR WORK, TAKE CARE OF THINGS AT HOME, OR GET ALONG WITH OTHER PEOPLE: NOT DIFFICULT AT ALL
5. BEING SO RESTLESS THAT IT IS HARD TO SIT STILL: SEVERAL DAYS
2. NOT BEING ABLE TO STOP OR CONTROL WORRYING: SEVERAL DAYS
3. WORRYING TOO MUCH ABOUT DIFFERENT THINGS: SEVERAL DAYS
6. BECOMING EASILY ANNOYED OR IRRITABLE: NOT AT ALL
1. FEELING NERVOUS, ANXIOUS, OR ON EDGE: SEVERAL DAYS
GAD7 TOTAL SCORE: 6

## 2019-04-10 ASSESSMENT — ACTIVITIES OF DAILY LIVING (ADL): CURRENT_FUNCTION: NO ASSISTANCE NEEDED

## 2019-04-10 ASSESSMENT — PATIENT HEALTH QUESTIONNAIRE - PHQ9
5. POOR APPETITE OR OVEREATING: SEVERAL DAYS
SUM OF ALL RESPONSES TO PHQ QUESTIONS 1-9: 7

## 2019-04-10 ASSESSMENT — MIFFLIN-ST. JEOR: SCORE: 1155.02

## 2019-04-10 NOTE — LETTER
April 12, 2019      Kimmie Carranza  [address] [facility] [address]        Dear ,    We are writing to inform you of your test results.    -Your microalbumin level (which is the urine test that can signal signs of early chronic kidney disease if elevated to >30) looks very low which is good to see, especially given the higher blood pressure readings at your recent appointment.   -Your basic metabolic panel (which includes electrolyte levels, blood sugar level and kidney function tests) looks completely normal.   -Your cholesterol panel looks fine, but a bit worse than last time with an LDL (the bad cholesterol) of 120, which is up from 95.  Your HDL (the good cholesterol) still looks great in the high 70s.     Resulted Orders   Lipid panel reflex to direct LDL Fasting   Result Value Ref Range    Cholesterol 208 (H) <200 mg/dL      Comment:      Desirable:       <200 mg/dl    Triglycerides 54 <150 mg/dL      Comment:      Fasting specimen    HDL Cholesterol 77 >49 mg/dL    LDL Cholesterol Calculated 120 (H) <100 mg/dL      Comment:      Above desirable:  100-129 mg/dl  Borderline High:  130-159 mg/dL  High:             160-189 mg/dL  Very high:       >189 mg/dl      Non HDL Cholesterol 131 (H) <130 mg/dL      Comment:      Above Desirable:  130-159 mg/dl  Borderline high:  160-189 mg/dl  High:             190-219 mg/dl  Very high:       >219 mg/dl     Basic metabolic panel  (Ca, Cl, CO2, Creat, Gluc, K, Na, BUN)   Result Value Ref Range    Sodium 140 133 - 144 mmol/L    Potassium 3.8 3.4 - 5.3 mmol/L    Chloride 106 94 - 109 mmol/L    Carbon Dioxide 25 20 - 32 mmol/L    Anion Gap 9 3 - 14 mmol/L    Glucose 70 70 - 99 mg/dL      Comment:      Fasting specimen    Urea Nitrogen 18 7 - 30 mg/dL    Creatinine 0.72 0.52 - 1.04 mg/dL    GFR Estimate 85 >60 mL/min/[1.73_m2]      Comment:      Non  GFR Calc  Starting 12/18/2018, serum creatinine based estimated GFR (eGFR) will be   calculated  using the Chronic Kidney Disease Epidemiology Collaboration   (CKD-EPI) equation.      GFR Estimate If Black >90 >60 mL/min/[1.73_m2]      Comment:       GFR Calc  Starting 12/18/2018, serum creatinine based estimated GFR (eGFR) will be   calculated using the Chronic Kidney Disease Epidemiology Collaboration   (CKD-EPI) equation.      Calcium 9.1 8.5 - 10.1 mg/dL   Albumin Random Urine Quantitative with Creat Ratio   Result Value Ref Range    Creatinine Urine 90 mg/dL    Albumin Urine mg/L 10 mg/L    Albumin Urine mg/g Cr 11.72 0 - 25 mg/g Cr       If you have any questions or concerns, please call the clinic at the number listed above.       Sincerely,        Dee Castillo MD

## 2019-04-10 NOTE — Clinical Note
Please call pt if she hasn't called back with the medication/dose she's taking for her blood pressure.  Prinzide should have run out many months ago, but she thinks she has been taking old ones.Will send in new rx when we know what she'd been taking.Also make sure she has a f/u appt, and has daughter come to that appointment.

## 2019-04-10 NOTE — PATIENT INSTRUCTIONS
Will increase your zoloft back up to 100mg/day.    Follow-up in 6 weeks for mood/anxiety follow-up.  Will also recheck your blood pressure to see if still high.  Check your blood pressure at gym and write down results.  Bring in all of your pill bottles (should have run out of current prinzide prescription in ~11/18).    Bring in your daughter to the appointment so we can talk about memory issues.      Patient Education   Personalized Prevention Plan  You are due for the preventive services outlined below.  Your care team is available to assist you in scheduling these services.  If you have already completed any of these items, please share that information with your care team to update in your medical record.  Health Maintenance Due   Topic Date Due     URINE DRUG SCREEN Q1 YR  05/22/1965     Zoster (Shingles) Vaccine (2 of 3) 05/09/2012     TOMASA QUESTIONNAIRE 1 YEAR  10/11/2017     Depression Assessment - every 6 months  02/27/2019     Annual Wellness Visit  02/28/2019     FALL RISK ASSESSMENT  02/28/2019       Signs of Hearing Loss     Hearing much better with one ear can be a sign of hearing loss.     Hearing loss is a problem shared by many people. In fact, it is one of the most common health conditions, particularly as people age. Most people over age 65 have some hearing loss, and by age 80, almost everyone does. Because hearing loss usually occurs slowly over the years, you may not realize your hearing ability has gotten worse.  Have your hearing checked  Contact your healthcare provider if you:    Have to strain to hear normal conversation    Have to watch other people s faces very carefully to follow what they re saying    Need to ask people to repeat what they ve said    Often misunderstand what people are saying    Turn the volume of the television or radio up so high that others complain    Feel that people are mumbling when they re talking to you    Find that the effort to hear leaves you feeling tired  and irritated    Notice, when using the phone, that you hear better with one ear than the other  Date Last Reviewed: 12/1/2016 2000-2018 [employer]. [address] [place] [address], [place] [address]. All rights reserved. This information is not intended as a substitute for professional medical care. Always follow your healthcare professional's instructions.          Urinary Incontinence, Female (Adult)  Urinary incontinence means loss of control of the bladder. This problem affects many women, especially as they get older. If you have incontinence, you may be embarrassed to ask for help. But know that this problem can be treated.  Types of Incontinence  There are different types of incontinence. Two of the main types are described here. You can have more than one type.    Stress incontinence. With this type, urine leaks when pressure (stress) is put on the bladder. This may happen when you cough, sneeze, or laugh. Stress incontinence most often occurs because the pelvic floor muscles that support the bladder and urethra are weak. This can happen after pregnancy and vaginal childbirth or a hysterectomy. It can also be due to excess body weight or hormone changes.    Urge incontinence (also called overactive bladder). With this type, a sudden urge to urinate is felt often. This may happen even though there may not be much urine in the bladder. The need to urinate often during the night is common. Urge incontinence most often occurs because of bladder spasms. This may be due to bladder irritation or infection. Damage to bladder nerves or pelvic muscles, constipation, and certain medicines can also lead to urge incontinence.  Treatment of urinary incontinence depends on the cause. Further evaluation is needed to find the type you have. This will likely include an exam and certain tests. Based on the results, you and your healthcare provider can then plan treatment. Until a diagnosis is made, the home care tips  below can help relieve symptoms.  Home care    Do pelvic floor muscle exercises, if they are prescribed. The pelvic floor muscles help support the bladder and urethra. Many women find that their symptoms improve when doing special exercises that strengthen these muscles. To do the exercises contract the muscles you would use to stop your stream of urine, but do this when you re not urinating. Hold for 10 seconds, then relax. Repeat 10 to 20 times in a row, at least 3 times a day. Your provider may give you other instructions for how to do the exercises and how often.    Keep a bladder diary. This helps track how often and how much you urinate over a set period of time. Bring this diary with you to your next visit with the provider. The information can help your provider learn more about your bladder problem.    Lose weight, if advised to by your provider. Excess weight puts pressure on the bladder. Your provider can help you create a weight-loss plan that s right for you. This may include exercising more and making certain diet changes.    Don't consume foods and drinks that may irritate the bladder. These can include alcohol and caffeinated drinks.    Quit smoking. Smoking and other tobacco use can lead to chronic cough that strains the pelvic floor muscles. Smoking may also damage the bladder and urethra. Talk with your provider about treatments or methods you can use to quit smoking.    If drinking large amounts of fluid causes you to have symptoms, you may be advised to limit your fluid intake. You may also be advised to drink most of your fluids during the day and to limit fluids at night.    If you re worried about urine leakage or accidents, you may wear absorbent pads to catch urine. Change the pads often. This helps reduce discomfort. It may also reduce the risk of skin or bladder infections.  Follow-up care  Follow up with your healthcare provider, or as directed. It may take some to find the right  treatment for your problem. Your treatment plan may include special therapies or medicines. Certain procedures or surgery may also be options. Be sure to discuss any questions you have with your provider.  When to seek medical advice  Call the healthcare provider right away if any of these occur:    Fever of 100.4 F (38 C) or higher, or as directed by your provider    Bladder pain or fullness    Abdominal swelling    Nausea or vomiting    Back pain    Weakness, dizziness or fainting  Date Last Reviewed: 10/1/2017    6115-4213 [employer]. [address] [place] [address]. All rights reserved. This information is not intended as a substitute for professional medical care. Always follow your healthcare professional's instructions.

## 2019-04-10 NOTE — NURSING NOTE
"Chief Complaint   Patient presents with     Physical     /70   Pulse (!) 49   Temp 98  F (36.7  C) (Oral)   Ht 1.676 m (5' 6\")   Wt 60.8 kg (134 lb 1.6 oz)   SpO2 99%   BMI 21.64 kg/m   Estimated body mass index is 21.64 kg/m  as calculated from the following:    Height as of this encounter: 1.676 m (5' 6\").    Weight as of this encounter: 60.8 kg (134 lb 1.6 oz).        Health Maintenance due pending provider review:  PHQ9 and GAD7    PHQ/ACT/TOMASA--Gave pt questionnare    Marti Erazo CMA  "

## 2019-04-10 NOTE — PROGRESS NOTES
"SUBJECTIVE:   Kimmie Carranza is a 68 year old female who presents for Preventive Visit.  Are you in the first 12 months of your Medicare coverage?  No    Healthy Habits:     In general, how would you rate your overall health?  Good    Frequency of exercise:  2-3 days/week    Duration of exercise:  30-45 minutes    Do you usually eat at least 4 servings of fruit and vegetables a day, include whole grains    & fiber and avoid regularly eating high fat or \"junk\" foods?  Yes    Taking medications regularly:  No    Barriers to taking medications:  Problems remembering to take them    Ability to successfully perform activities of daily living:  No assistance needed    Home Safety:  Lack of grab bars in the bathroom    Hearing Impairment:  Feel that people are mumbling or not speaking clearly and need to ask people to speak up or repeat themselves    In the past 6 months, have you been bothered by leaking of urine? Yes    In general, how would you rate your overall mental or emotional health?  Good      PHQ-2 Total Score: 2    Additional concerns today:  Yes    MDD- zoloft 50mg/d.  She does think she's been more anxious on the lower dose.  Lowered dose from 100mg/d to 50mg/d back in 2/18.  Does have a week medication set-up.  Sometimes does forget a dose, or gets too late.  Did send in as 90 tabs to take 1/2 tabs last in 2/18, so could have had a year supply.      HTN- lowered her prinzide from 40/25mg/d to 20/12.5mg/d at her 10/18 appt.  Had recommended f/u in 3 wks after that, and only sent in #30 of meds, but she doesn't recall those instructions.  As only #30 given to her at her 10/18 appt, should have run out, but she doesn't think she has- unsure why- likely due to 'stock at home'?  She hasn't had dizziness since the lowered dose, though.  Can't recall any of the BP's if she's checked.  Pt will call when she gets home to look at what dose she's been taking lately...      Memory-   Has missed a couple appts, forgot some " of them.  Forgot if she checked her BP on her own or not, doesn't recall readings.  Living with daughter some of the time, helping out with her grandson.  5d/wk there, 2d/wk at her house.  Appt/gym closer to her house, so that's been hard.  Driving still.   does many of the bills/taxes.    Her father had dementia- started likely in his 60s.    Not Alzheimers, general dementia, did end up in a memory care unit, lived into his 90s.  Alex also had dementia.  PGGM- also likely had dementia.    She did see neurology in the past- see 2/18 notes.  They offered neuropsych testing, but pt did not pursue.    She has hearing aids, but didn't wear them today.      Six Item Cognitive Impairment Test   (6CIT):      What year is it?                               Correct - 0 points    What month is it?                               Correct - 0 points      Give the patient an address to remember with five components:   Bakari Workman ( first and last name - 2 components)   [address]  (number and name of street - 2 components)   [place] ( city - 1 component)      About what time is it (within the hour)? Correct - 0 points    Count backwards from 20 to 1:   Correct - 0 points    Say the months of the year in reverse: Correct - 0 points    Repeat the address phrase:   Correct - 0 points    Total 6CIT Score:      0/28    Interpretation: The 6CIT uses an inverse score and questions are weighted to produce a total out of 28. Scores of 0-7 are considered normal and 8 or more significant.    Advantages The test has high sensitivity without compromising specificity even in mild dementia. It is easy to translate linguistically and culturally.  Disadvantages The main disadvantage is in the scoring and weighting of the test, which is initially confusing, however computer models have simplified this greatly.    Probability Statistics: At the 7/8 cut off: Overall figures sensitivity 90% specificity 100%, in mild dementia  sensitivity = 78% , specificity = 100%    Copyright 2000 [facility], [facility]. Courtesy of Dr. Matty Saunders        Do you feel safe in your environment? Yes    Do you have a Health Care Directive? Yes: Advance Directive has been received and scanned.       Fall risk  Fallen 2 or more times in the past year?: No  Any fall with injury in the past year?: No    Cognitive Screening   1) Repeat 3 items (Leader, Season, Table)    2) Clock draw: NORMAL  3) 3 item recall: Recalls 3 objects  Results: 3 items recalled: COGNITIVE IMPAIRMENT LESS LIKELY    Mini-CogTM Copyright S Chey. Licensed by the author for use in [facility]; reprinted with permission (soveronica@[facility]). All rights reserved.      Do you have sleep apnea, excessive snoring or daytime drowsiness?: yes    Reviewed and updated as needed this visit by clinical staff  Tobacco  Allergies  Meds  Med Hx  Surg Hx  Fam Hx  Soc Hx        Reviewed and updated as needed this visit by Provider  Tobacco  Allergies  Meds  Problems  Med Hx  Surg Hx  Fam Hx        Social History     Tobacco Use     Smoking status: Light Tobacco Smoker     Last attempt to quit: 2000     Years since quittin.2     Smokeless tobacco: Never Used     Tobacco comment: smokes occasionally   Substance Use Topics     Alcohol use: No     Alcohol/week: 0.0 oz     Comment: quit completely          Alcohol Use 4/10/2019   Prescreen: >3 drinks/day or >7 drinks/week? Not Applicable   Prescreen: >3 drinks/day or >7 drinks/week? -   No flowsheet data found.      Current providers sharing in care for this patient include:   Patient Care Team:  Dee Castillo MD as PCP - General  Dee Castillo MD as Assigned PCP  Nancy Carlton PA-C as Physician Assistant (Neurosurgery)    The following health maintenance items are reviewed in Epic and correct as of today:  Health Maintenance   Topic Date Due     ZOSTER IMMUNIZATION (2 of 3)  05/09/2012     FALL RISK ASSESSMENT  02/28/2019     MAMMO SCREEN Q2 YR (SYSTEM ASSIGNED)  08/14/2019     PHQ-9 Q6 MONTHS  10/10/2019     MEDICARE ANNUAL WELLNESS VISIT  04/10/2020     ADVANCE DIRECTIVE PLANNING Q5 YRS  08/19/2020     COLONOSCOPY Q5 YR  10/24/2021     DTAP/TDAP/TD IMMUNIZATION (3 - Td) 01/18/2023     LIPID SCREEN Q5 YR FEMALE (SYSTEM ASSIGNED)  04/10/2024     DEXA SCAN SCREENING (SYSTEM ASSIGNED)  Completed     DEPRESSION ACTION PLAN  Completed     INFLUENZA VACCINE  Completed     PNEUMOCOCCAL IMMUNIZATION 65+ LOW/MEDIUM RISK  Completed     HEPATITIS C SCREENING  Completed     IPV IMMUNIZATION  Aged Out     MENINGITIS IMMUNIZATION  Aged Out       Labs reviewed in EPIC  BP Readings from Last 3 Encounters:   04/10/19 148/70   10/03/18 104/52   09/11/18 146/88    Wt Readings from Last 3 Encounters:   04/10/19 60.8 kg (134 lb 1.6 oz)   10/03/18 60.8 kg (134 lb)   09/11/18 61 kg (134 lb 6.4 oz)                  Patient Active Problem List   Diagnosis     Anxiety state     Urge incontinence     Hyperlipidemia LDL goal <100     Mild major depression (H)     Osteoarthritis     Essential hypertension with goal blood pressure less than 140/90     Advanced directives, counseling/discussion     Alcohol abuse     Abnormal ankle brachial index     Mild atherosclerosis of carotid artery     Insomnia     Bilateral hip pain     Somatic dysfunction of lumbar region     Chronic bilateral low back pain without sciatica     Nonallopathic lesion of sacroiliac region     Sacroiliac joint pain     Somatic dysfunction of pelvis region     Closed compression fracture of second lumbar vertebra (H)     Memory change     Past Surgical History:   Procedure Laterality Date     C NONSPECIFIC PROCEDURE  1985    Tubal ligation     C NONSPECIFIC PROCEDURE  1979    laporoscopy     COLONOSCOPY N/A 10/24/2016    Procedure: COMBINED COLONOSCOPY, SINGLE OR MULTIPLE BIOPSY/POLYPECTOMY BY BIOPSY;  Surgeon: Kwaku Henry MD;   "Location:  GI       Social History     Tobacco Use     Smoking status: Light Tobacco Smoker     Last attempt to quit: 2000     Years since quittin.2     Smokeless tobacco: Never Used     Tobacco comment: smokes occasionally   Substance Use Topics     Alcohol use: No     Alcohol/week: 0.0 oz     Comment: quit completely      Family History   Problem Relation Age of Onset     Hypertension Mother      Cerebrovascular Disease Mother          of complications of stroke at age 82     Heart Disease Mother         atrial fib     Osteoporosis Mother         two hip fx's     Neurologic Disorder Mother         MS     Hypertension Father      Neurologic Disorder Father         dementia- Alzheimers     Gastrointestinal Disease Father      Depression Father      Hypertension Brother      Neurologic Disorder Maternal Grandmother      Neurologic Disorder Sister      C.A.D. Paternal Grandmother 62         at 62, of MI     C.A.D. Paternal Aunt 62        \"\"     C.A.D. Paternal Aunt 62        \"\"     Colon Cancer Paternal Uncle          Current Outpatient Medications   Medication Sig Dispense Refill     aspirin 81 MG EC tablet Take 1 tablet (81 mg) by mouth daily 90 tablet 3     atorvastatin (LIPITOR) 20 MG tablet TAKE 1 TABLET(10 MG) BY MOUTH DAILY 30 tablet 1     Blood Pressure Monitor KIT 1 Units daily as needed (blood pressure check) 1 kit 0     Blood Pressure Monitor KIT 1 Units daily as needed 1 kit 0     lisinopril-hydrochlorothiazide (PRINZIDE/ZESTORETIC) 20-12.5 MG per tablet Take 1 tablet by mouth daily 30 tablet 0     Multiple Vitamins-Minerals (MULTIVITAMIN ADULT PO) Take 1 tablet by mouth daily       sertraline (ZOLOFT) 100 MG tablet Take 1 tablet (100 mg) by mouth daily 30 tablet 1     traZODone (DESYREL) 50 MG tablet Take 1 tablet (50 mg) by mouth nightly as needed Takes approximately 5 times per month. 30 tablet 3     Blood Pressure Monitoring (BL BLOOD PRESSURE MONITOR) KIT 1 Units. One monitor, " "check weekly. (Patient not taking: Reported on 10/3/2018) 1 kit 0     tolterodine (DETROL) 2 MG tablet TAKE 1 TABLET BY MOUTH DAILY AS NEEDED (Patient not taking: Reported on 4/10/2019) 30 tablet 5     Allergies   Allergen Reactions     No Known Drug Allergies      Recent Labs   Lab Test 04/10/19  1126 09/11/18  1616 02/28/18  1427  07/06/17  1813 10/11/16  1123 08/19/15  1439   *  --  95  --   --  158* 96   HDL 77  --  82  --   --  74 74   TRIG 54  --  50  --   --  101 96   ALT  --   --   --   --  19 22 34   CR 0.72 0.62 0.71   < > 0.68 0.71 0.71   GFRESTIMATED 85 >90 82   < > 86 82 82   GFRESTBLACK >90 >90 >90   < > >90   GFR Calc   >90   GFR Calc   >90   GFR Calc     POTASSIUM 3.8 3.9 3.9   < > 3.6 4.0 3.7    < > = values in this interval not displayed.        Review of Systems  Constitutional, HEENT, cardiovascular, pulmonary, gi and gu systems are negative, except as otherwise noted.    OBJECTIVE:   /70   Pulse (!) 49   Temp 98  F (36.7  C) (Oral)   Ht 1.676 m (5' 6\")   Wt 60.8 kg (134 lb 1.6 oz)   SpO2 99%   BMI 21.64 kg/m   Estimated body mass index is 21.64 kg/m  as calculated from the following:    Height as of this encounter: 1.676 m (5' 6\").    Weight as of this encounter: 60.8 kg (134 lb 1.6 oz).   Bp recheck 156/80.  Physical Exam   GENERAL: healthy, alert and no distress  EYES: Eyes grossly normal to inspection, PERRL and conjunctivae and sclerae normal  HENT: ear canals and TM's normal, nose and mouth without ulcers or lesions  NECK: no adenopathy, no asymmetry, masses, or scars and thyroid normal to palpation  RESP: lungs clear to auscultation - no rales, rhonchi or wheezes  BREAST: normal without masses, tenderness or nipple discharge and no palpable axillary masses or adenopathy  CV: regular rate and rhythm, normal S1 S2, no S3 or S4, no murmur, click or rub, no peripheral edema and peripheral pulses strong  ABDOMEN: soft, " nontender, no hepatosplenomegaly, no masses and bowel sounds normal  MS: no gross musculoskeletal defects noted, no edema  SKIN: no suspicious lesions or rashes  NEURO: Normal strength and tone, mentation intact and speech normal  PSYCH: mentation appears normal, affect normal/bright    Diagnostic Test Results:  Results for orders placed or performed in visit on 04/10/19   Lipid panel reflex to direct LDL Fasting   Result Value Ref Range    Cholesterol 208 (H) <200 mg/dL    Triglycerides 54 <150 mg/dL    HDL Cholesterol 77 >49 mg/dL    LDL Cholesterol Calculated 120 (H) <100 mg/dL    Non HDL Cholesterol 131 (H) <130 mg/dL   Basic metabolic panel  (Ca, Cl, CO2, Creat, Gluc, K, Na, BUN)   Result Value Ref Range    Sodium 140 133 - 144 mmol/L    Potassium 3.8 3.4 - 5.3 mmol/L    Chloride 106 94 - 109 mmol/L    Carbon Dioxide 25 20 - 32 mmol/L    Anion Gap 9 3 - 14 mmol/L    Glucose 70 70 - 99 mg/dL    Urea Nitrogen 18 7 - 30 mg/dL    Creatinine 0.72 0.52 - 1.04 mg/dL    GFR Estimate 85 >60 mL/min/[1.73_m2]    GFR Estimate If Black >90 >60 mL/min/[1.73_m2]    Calcium 9.1 8.5 - 10.1 mg/dL   Albumin Random Urine Quantitative with Creat Ratio   Result Value Ref Range    Creatinine Urine 90 mg/dL    Albumin Urine mg/L 10 mg/L    Albumin Urine mg/g Cr 11.72 0 - 25 mg/g Cr     TOMASA-7 SCORE 5/24/2016 10/11/2016 4/10/2019   Total Score 0 0 6       ASSESSMENT / PLAN:       ICD-10-CM    1. Encounter for routine adult medical exam with abnormal findings Z00.01    2. Major depressive disorder, recurrent episode, mild (H) F33.0 sertraline (ZOLOFT) 100 MG tablet   3. Anxiety state F41.1    4. Essential hypertension with goal blood pressure less than 140/90 I10 Basic metabolic panel  (Ca, Cl, CO2, Creat, Gluc, K, Na, BUN)     Albumin Random Urine Quantitative with Creat Ratio   5. Memory change R41.3    6. Hyperlipidemia LDL goal <100 E78.5 Lipid panel reflex to direct LDL Fasting   7. Insomnia, unspecified type G47.00    8. Mild major  depression (H) F32.0 sertraline (ZOLOFT) 100 MG tablet   9. Encounter for general adult medical examination with abnormal findings Z00.01      CPE - Discussed diet, calcium and exercise.  Went over Self Breast Exam.  Thin prep pap was not done.  Eyes and Teeth or UTD or recommended f/u.  No immunizations needed today, but discussed shingrix- she'll try to remember to check with her insurance.  See orders below for tests ordered and screening needed.      MDD/Anxiety- Zoloft lowered to 50mg/d from 100mg/d at her 2/18 a little over a year ago.  Not addressed since.  Did get the equivalent of a year's supply at that time, but would have run out a couple months ago if taking them regularly.  She does think her anxiety is a bit worse, and her TOMASA is up to '6', from '0' the last couple times.  Will increase zoloft back up to 100mg/d and f/u in ~6 wks for recheck.    HTN- Last addressed in 10/18, when her BP was lower, and she was having dizziness.    Lowered her prinzide in half, to 20/12.5mg/d, but only sent in #30 with the instructions to f/u in 3 wks to recheck BP and check labs.  Pt doesn't recall instructions, doesn't recall BP readings if/when she's checked.  Does recall she's missed some appts.  She does think she's been taking the prinzide- how?  Thinks she had some old bottles stashed around.  But she would have needed ~4-5 months of extra medications.  Pt will call when she gets home to check on the bottle she's been taking, and we'll have triage call later if she forgets to call to check on dose.      (Addendum- triage called pt after appt, and pt reports she was taking the 20/12.5mg of prinzide.  With her elevated BP, will increase the lisinopril to 30mg/d, and do a separate rx for the hydrochlorothiazide 12.5mg/d.  Will recheck BP, bottles, and BMP when she returns for her f/u appt in 6 wks).    Memory changes- concerned with pt's lack of recall of changes to her blood pressure medications, no recollection of  "home/gym BP checks, forgotten appts.  6-CIT testing today is okay, however.  Will have her bring in her medication bottles to the next appt, and bring in her daughter to get her impressions of her mother's memory.  Pt does have a fam h/o dementia- father in his 60s.  Will have triage call her to confirm medication doses, confirm f/u appt and remind her to bring her daughter.    Lipids- will check fasting lipids today- on lipitor 20mg/d, no se's.  Lipitor rx should have run out in 11/18.  Lipid panel from appt today with only a slight increase in LDL, though - from 90s to 120.  LDL was up to 178 in 10/16 when she was likely off her medications.  Wonder if she is taking her pills intermittently??    Insomnia- pt takes trazodone prn. Last sent in 2/18-   Called pharmacy to check on last date of fills for her rx meds- all were from 2018.  Very concerning for her memory.  Prinzide - 9/18  zoloft - 10/18  lipitor- 6/18  Trazodone- 3/18      Return in about 6 weeks (around 5/22/2019) for Depression follow-up, Anxiety follow-up, BP Recheck.  Triage called pt to make appt, and reminded her to bring her daughter (or ).      End of Life Planning:  Patient currently has an advanced directive: Yes.  Practitioner is supportive of decision.    COUNSELING:  Reviewed preventive health counseling, as reflected in patient instructions    BP Readings from Last 1 Encounters:   04/10/19 148/70     Estimated body mass index is 21.64 kg/m  as calculated from the following:    Height as of this encounter: 1.676 m (5' 6\").    Weight as of this encounter: 60.8 kg (134 lb 1.6 oz).      Weight management plan noted, stable and monitoring     reports that she has been smoking.  She has never used smokeless tobacco.      Appropriate preventive services were discussed with this patient, including applicable screening as appropriate for cardiovascular disease, diabetes, osteopenia/osteoporosis, and glaucoma.  As appropriate for age/gender, " discussed screening for colorectal cancer, prostate cancer, breast cancer, and cervical cancer. Checklist reviewing preventive services available has been given to the patient.    Reviewed patients plan of care and provided an AVS. The Basic Care Plan (routine screening as documented in Health Maintenance) for Kimmie meets the Care Plan requirement. This Care Plan has been established and reviewed with the Patient.    Counseling Resources:  ATP IV Guidelines  Pooled Cohorts Equation Calculator  Breast Cancer Risk Calculator  FRAX Risk Assessment  ICSI Preventive Guidelines  Dietary Guidelines for Americans, 2010  [employer]'s MyPlate  ASA Prophylaxis  Lung CA Screening    Dee Castillo MD  [facility]    Identified Health Risks:    The patient was provided with written information regarding signs of hearing loss.  Information on urinary incontinence and treatment options given to patient.

## 2019-04-10 NOTE — TELEPHONE ENCOUNTER
Dee Castillo MD  [facility] [place] Triage             Please call pt if she hasn't called back with the medication/dose she's taking for her blood pressure.  Prinzide should have run out many months ago, but she thinks she has been taking old ones.   Will send in new rx when we know what she'd been taking.   Also make sure she has a f/u appt, and has daughter come to that appointment.

## 2019-04-11 LAB
ANION GAP SERPL CALCULATED.3IONS-SCNC: 9 MMOL/L (ref 3–14)
BUN SERPL-MCNC: 18 MG/DL (ref 7–30)
CALCIUM SERPL-MCNC: 9.1 MG/DL (ref 8.5–10.1)
CHLORIDE SERPL-SCNC: 106 MMOL/L (ref 94–109)
CHOLEST SERPL-MCNC: 208 MG/DL
CO2 SERPL-SCNC: 25 MMOL/L (ref 20–32)
CREAT SERPL-MCNC: 0.72 MG/DL (ref 0.52–1.04)
CREAT UR-MCNC: 90 MG/DL
GFR SERPL CREATININE-BSD FRML MDRD: 85 ML/MIN/{1.73_M2}
GLUCOSE SERPL-MCNC: 70 MG/DL (ref 70–99)
HDLC SERPL-MCNC: 77 MG/DL
LDLC SERPL CALC-MCNC: 120 MG/DL
MICROALBUMIN UR-MCNC: 10 MG/L
MICROALBUMIN/CREAT UR: 11.72 MG/G CR (ref 0–25)
NONHDLC SERPL-MCNC: 131 MG/DL
POTASSIUM SERPL-SCNC: 3.8 MMOL/L (ref 3.4–5.3)
SODIUM SERPL-SCNC: 140 MMOL/L (ref 133–144)
TRIGL SERPL-MCNC: 54 MG/DL

## 2019-04-11 RX ORDER — LISINOPRIL AND HYDROCHLOROTHIAZIDE 12.5; 2 MG/1; MG/1
1 TABLET ORAL DAILY
Qty: 30 TABLET | Refills: 1 | Status: SHIPPED | OUTPATIENT
Start: 2019-04-11 | End: 2019-06-28

## 2019-04-11 ASSESSMENT — ANXIETY QUESTIONNAIRES: GAD7 TOTAL SCORE: 6

## 2019-04-11 NOTE — RESULT ENCOUNTER NOTE
Please send letter below with copy of results.  Thanks! CW    Here are your lab results from your recent visit...  -Your microalbumin level (which is the urine test that can signal signs of early chronic kidney disease if elevated to >30) looks very low which is good to see, especially given the higher blood pressure readings at your recent appointment.  -Your basic metabolic panel (which includes electrolyte levels, blood sugar level and kidney function tests) looks completely normal.  -Your cholesterol panel looks fine, but a bit worse than last time with an LDL (the bad cholesterol) of 120, which is up from 95.  Your HDL (the good cholesterol) still looks great in the high 70s.    Please let me know if you have any questions.  Best,   Juwan Castillo MD

## 2019-04-11 NOTE — TELEPHONE ENCOUNTER
Noted - will postpone this message so can call pt ~1-2 weeks before her appointment to remind her of below  Appt is 5/28/2019  Will postpone message to 5/20/2019  Medina SANTOS RN

## 2019-04-11 NOTE — TELEPHONE ENCOUNTER
CW,   Spoke with pt - confirmed she's been taking Lisinopril Hydrochlorothiazide 20-12.5mg  Pended Rx and pharmacy   Discussed appt too     Next 5 appointments (look out 90 days)    May 28, 2019 10:00 AM CDT  Office Visit with Dee Castillo MD  [facility] [address] [facility] [address] [phone]        Will bring daughter with   Please advise on refill  Thanks,  Medina SANTOS RN

## 2019-04-11 NOTE — TELEPHONE ENCOUNTER
Thank you for calling her.    I called her pharmacy, and got the last time she filled any of her rx meds from there.  All were in 2018, so she should have been out of the prinzide and lipitor for quite some time unless she had a lot of extras at home as she stated. The zoloft would be close given she's been taking 1/2 tab, and she takes the trazodone prn.       Prinzide - last fill 9/18 (#60 sent)  Lipitor- last fill 6/18 (#90 rx from 2/18 with 1 refill, #30 send in 9/18, but not filled after that)  Zoloft - last fill 10/18 (#90 rx, but 180 day supply)  Trazodone- last fill 3/18 (takes prn)    Will send in #30 w/ refill of the prinzide at the same dose for now as it's hard to know if she's been taking them consistently or not, and recheck at her next appt.    Triage- could you call her prior to her next appt and remind her to bring in her rx bottles, recordings of some of her BP checks, and remind her to bring in her daughter or  to the appt?    Thank you!  CW

## 2019-05-05 ENCOUNTER — APPOINTMENT (OUTPATIENT)
Dept: GENERAL RADIOLOGY | Facility: CLINIC | Age: 69
DRG: 281 | End: 2019-05-05
Attending: EMERGENCY MEDICINE
Payer: COMMERCIAL

## 2019-05-05 ENCOUNTER — HOSPITAL ENCOUNTER (INPATIENT)
Facility: CLINIC | Age: 69
LOS: 1 days | Discharge: HOME | DRG: 281 | End: 2019-05-06
Attending: EMERGENCY MEDICINE | Admitting: INTERNAL MEDICINE
Payer: COMMERCIAL

## 2019-05-05 ENCOUNTER — APPOINTMENT (OUTPATIENT)
Dept: CT IMAGING | Facility: CLINIC | Age: 69
DRG: 281 | End: 2019-05-05
Attending: EMERGENCY MEDICINE
Payer: COMMERCIAL

## 2019-05-05 DIAGNOSIS — I10 ESSENTIAL HYPERTENSION WITH GOAL BLOOD PRESSURE LESS THAN 140/90: ICD-10-CM

## 2019-05-05 DIAGNOSIS — R07.9 CHEST PAIN, UNSPECIFIED TYPE: ICD-10-CM

## 2019-05-05 DIAGNOSIS — R07.89 CHEST WALL PAIN: Primary | ICD-10-CM

## 2019-05-05 DIAGNOSIS — R07.9 CHEST PAIN: ICD-10-CM

## 2019-05-05 LAB
ALBUMIN SERPL-MCNC: 4.5 G/DL (ref 3.4–5)
ALBUMIN UR-MCNC: NEGATIVE MG/DL
ALP SERPL-CCNC: 54 U/L (ref 40–150)
ALT SERPL W P-5'-P-CCNC: 27 U/L (ref 0–50)
ANION GAP SERPL CALCULATED.3IONS-SCNC: 8 MMOL/L (ref 3–14)
APPEARANCE UR: CLEAR
AST SERPL W P-5'-P-CCNC: 35 U/L (ref 0–45)
BACTERIA #/AREA URNS HPF: ABNORMAL /HPF
BASOPHILS # BLD AUTO: 0 10E9/L (ref 0–0.2)
BASOPHILS NFR BLD AUTO: 0.2 %
BILIRUB DIRECT SERPL-MCNC: 0.2 MG/DL (ref 0–0.2)
BILIRUB SERPL-MCNC: 0.7 MG/DL (ref 0.2–1.3)
BILIRUB UR QL STRIP: NEGATIVE
BUN SERPL-MCNC: 17 MG/DL (ref 7–30)
CALCIUM SERPL-MCNC: 9.1 MG/DL (ref 8.5–10.1)
CHLORIDE SERPL-SCNC: 102 MMOL/L (ref 94–109)
CO2 SERPL-SCNC: 29 MMOL/L (ref 20–32)
COLOR UR AUTO: ABNORMAL
CREAT SERPL-MCNC: 0.75 MG/DL (ref 0.52–1.04)
D DIMER PPP FEU-MCNC: 0.5 UG/ML FEU (ref 0–0.5)
DIFFERENTIAL METHOD BLD: NORMAL
EOSINOPHIL # BLD AUTO: 0.1 10E9/L (ref 0–0.7)
EOSINOPHIL NFR BLD AUTO: 1.2 %
ERYTHROCYTE [DISTWIDTH] IN BLOOD BY AUTOMATED COUNT: 13.3 % (ref 10–15)
GFR SERPL CREATININE-BSD FRML MDRD: 81 ML/MIN/{1.73_M2}
GLUCOSE SERPL-MCNC: 101 MG/DL (ref 70–99)
GLUCOSE UR STRIP-MCNC: NEGATIVE MG/DL
HCT VFR BLD AUTO: 43.6 % (ref 35–47)
HGB BLD-MCNC: 14.5 G/DL (ref 11.7–15.7)
HGB UR QL STRIP: NEGATIVE
IMM GRANULOCYTES # BLD: 0 10E9/L (ref 0–0.4)
IMM GRANULOCYTES NFR BLD: 0.1 %
KETONES UR STRIP-MCNC: NEGATIVE MG/DL
LEUKOCYTE ESTERASE UR QL STRIP: NEGATIVE
LIPASE SERPL-CCNC: 288 U/L (ref 73–393)
LYMPHOCYTES # BLD AUTO: 2.1 10E9/L (ref 0.8–5.3)
LYMPHOCYTES NFR BLD AUTO: 25.5 %
MAGNESIUM SERPL-MCNC: 2 MG/DL (ref 1.6–2.3)
MCH RBC QN AUTO: 32.4 PG (ref 26.5–33)
MCHC RBC AUTO-ENTMCNC: 33.3 G/DL (ref 31.5–36.5)
MCV RBC AUTO: 98 FL (ref 78–100)
MONOCYTES # BLD AUTO: 0.4 10E9/L (ref 0–1.3)
MONOCYTES NFR BLD AUTO: 5.2 %
NEUTROPHILS # BLD AUTO: 5.5 10E9/L (ref 1.6–8.3)
NEUTROPHILS NFR BLD AUTO: 67.8 %
NITRATE UR QL: NEGATIVE
NRBC # BLD AUTO: 0 10*3/UL
NRBC BLD AUTO-RTO: 0 /100
PH UR STRIP: 7.5 PH (ref 5–7)
PLATELET # BLD AUTO: 266 10E9/L (ref 150–450)
POTASSIUM SERPL-SCNC: 3.9 MMOL/L (ref 3.4–5.3)
PROT SERPL-MCNC: 8.3 G/DL (ref 6.8–8.8)
RBC # BLD AUTO: 4.47 10E12/L (ref 3.8–5.2)
RBC #/AREA URNS AUTO: 2 /HPF (ref 0–2)
SODIUM SERPL-SCNC: 139 MMOL/L (ref 133–144)
SOURCE: ABNORMAL
SP GR UR STRIP: 1 (ref 1–1.03)
TROPONIN I SERPL-MCNC: 0.18 UG/L (ref 0–0.04)
TROPONIN I SERPL-MCNC: 0.38 UG/L (ref 0–0.04)
TROPONIN I SERPL-MCNC: 0.44 UG/L (ref 0–0.04)
TROPONIN I SERPL-MCNC: <0.015 UG/L (ref 0–0.04)
UROBILINOGEN UR STRIP-MCNC: NORMAL MG/DL (ref 0–2)
WBC # BLD AUTO: 8.1 10E9/L (ref 4–11)
WBC #/AREA URNS AUTO: 1 /HPF (ref 0–5)

## 2019-05-05 PROCEDURE — 36415 COLL VENOUS BLD VENIPUNCTURE: CPT | Performed by: INTERNAL MEDICINE

## 2019-05-05 PROCEDURE — 85025 COMPLETE CBC W/AUTO DIFF WBC: CPT | Performed by: EMERGENCY MEDICINE

## 2019-05-05 PROCEDURE — 25000128 H RX IP 250 OP 636: Performed by: EMERGENCY MEDICINE

## 2019-05-05 PROCEDURE — 96368 THER/DIAG CONCURRENT INF: CPT

## 2019-05-05 PROCEDURE — 93010 ELECTROCARDIOGRAM REPORT: CPT | Mod: 59 | Performed by: EMERGENCY MEDICINE

## 2019-05-05 PROCEDURE — 96366 THER/PROPH/DIAG IV INF ADDON: CPT

## 2019-05-05 PROCEDURE — 84484 ASSAY OF TROPONIN QUANT: CPT | Performed by: EMERGENCY MEDICINE

## 2019-05-05 PROCEDURE — 84484 ASSAY OF TROPONIN QUANT: CPT | Performed by: INTERNAL MEDICINE

## 2019-05-05 PROCEDURE — 93005 ELECTROCARDIOGRAM TRACING: CPT

## 2019-05-05 PROCEDURE — 83690 ASSAY OF LIPASE: CPT | Performed by: EMERGENCY MEDICINE

## 2019-05-05 PROCEDURE — 25000125 ZZHC RX 250: Performed by: EMERGENCY MEDICINE

## 2019-05-05 PROCEDURE — 93308 TTE F-UP OR LMTD: CPT | Mod: 26 | Performed by: EMERGENCY MEDICINE

## 2019-05-05 PROCEDURE — 93005 ELECTROCARDIOGRAM TRACING: CPT | Mod: 76 | Performed by: EMERGENCY MEDICINE

## 2019-05-05 PROCEDURE — 80076 HEPATIC FUNCTION PANEL: CPT | Performed by: EMERGENCY MEDICINE

## 2019-05-05 PROCEDURE — 85379 FIBRIN DEGRADATION QUANT: CPT | Performed by: EMERGENCY MEDICINE

## 2019-05-05 PROCEDURE — 80048 BASIC METABOLIC PNL TOTAL CA: CPT | Performed by: EMERGENCY MEDICINE

## 2019-05-05 PROCEDURE — 83735 ASSAY OF MAGNESIUM: CPT | Performed by: EMERGENCY MEDICINE

## 2019-05-05 PROCEDURE — 96375 TX/PRO/DX INJ NEW DRUG ADDON: CPT | Performed by: EMERGENCY MEDICINE

## 2019-05-05 PROCEDURE — 93005 ELECTROCARDIOGRAM TRACING: CPT | Performed by: EMERGENCY MEDICINE

## 2019-05-05 PROCEDURE — 96376 TX/PRO/DX INJ SAME DRUG ADON: CPT | Performed by: EMERGENCY MEDICINE

## 2019-05-05 PROCEDURE — 93308 TTE F-UP OR LMTD: CPT | Performed by: EMERGENCY MEDICINE

## 2019-05-05 PROCEDURE — 99285 EMERGENCY DEPT VISIT HI MDM: CPT | Mod: 25 | Performed by: EMERGENCY MEDICINE

## 2019-05-05 PROCEDURE — 25000132 ZZH RX MED GY IP 250 OP 250 PS 637: Performed by: EMERGENCY MEDICINE

## 2019-05-05 PROCEDURE — 74174 CTA ABD&PLVS W/CONTRAST: CPT

## 2019-05-05 PROCEDURE — 93010 ELECTROCARDIOGRAM REPORT: CPT | Performed by: INTERNAL MEDICINE

## 2019-05-05 PROCEDURE — 96366 THER/PROPH/DIAG IV INF ADDON: CPT | Performed by: EMERGENCY MEDICINE

## 2019-05-05 PROCEDURE — 81001 URINALYSIS AUTO W/SCOPE: CPT | Performed by: EMERGENCY MEDICINE

## 2019-05-05 PROCEDURE — 96365 THER/PROPH/DIAG IV INF INIT: CPT | Mod: 59 | Performed by: EMERGENCY MEDICINE

## 2019-05-05 PROCEDURE — G0378 HOSPITAL OBSERVATION PER HR: HCPCS

## 2019-05-05 PROCEDURE — 85520 HEPARIN ASSAY: CPT | Performed by: INTERNAL MEDICINE

## 2019-05-05 PROCEDURE — 71045 X-RAY EXAM CHEST 1 VIEW: CPT

## 2019-05-05 RX ORDER — HEPARIN SODIUM 10000 [USP'U]/100ML
0-3500 INJECTION, SOLUTION INTRAVENOUS CONTINUOUS
Status: DISCONTINUED | OUTPATIENT
Start: 2019-05-05 | End: 2019-05-06

## 2019-05-05 RX ORDER — NITROGLYCERIN 0.4 MG/1
0.4 TABLET SUBLINGUAL EVERY 5 MIN PRN
Status: DISCONTINUED | OUTPATIENT
Start: 2019-05-05 | End: 2019-05-06 | Stop reason: HOSPADM

## 2019-05-05 RX ORDER — ASPIRIN 81 MG/1
324 TABLET, CHEWABLE ORAL ONCE
Status: COMPLETED | OUTPATIENT
Start: 2019-05-05 | End: 2019-05-05

## 2019-05-05 RX ORDER — MORPHINE SULFATE 4 MG/ML
2 INJECTION, SOLUTION INTRAMUSCULAR; INTRAVENOUS ONCE
Status: COMPLETED | OUTPATIENT
Start: 2019-05-05 | End: 2019-05-05

## 2019-05-05 RX ORDER — HEPARIN SODIUM 10000 [USP'U]/100ML
0-3500 INJECTION, SOLUTION INTRAVENOUS CONTINUOUS
Status: DISCONTINUED | OUTPATIENT
Start: 2019-05-05 | End: 2019-05-05

## 2019-05-05 RX ORDER — IOPAMIDOL 755 MG/ML
100 INJECTION, SOLUTION INTRAVASCULAR ONCE
Status: COMPLETED | OUTPATIENT
Start: 2019-05-05 | End: 2019-05-05

## 2019-05-05 RX ORDER — NALOXONE HYDROCHLORIDE 0.4 MG/ML
.1-.4 INJECTION, SOLUTION INTRAMUSCULAR; INTRAVENOUS; SUBCUTANEOUS
Status: DISCONTINUED | OUTPATIENT
Start: 2019-05-05 | End: 2019-05-06 | Stop reason: HOSPADM

## 2019-05-05 RX ORDER — SERTRALINE HYDROCHLORIDE 100 MG/1
100 TABLET, FILM COATED ORAL DAILY
Status: DISCONTINUED | OUTPATIENT
Start: 2019-05-06 | End: 2019-05-06 | Stop reason: HOSPADM

## 2019-05-05 RX ORDER — ATORVASTATIN CALCIUM 10 MG/1
10 TABLET, FILM COATED ORAL DAILY
COMMUNITY
End: 2019-05-05

## 2019-05-05 RX ORDER — MORPHINE SULFATE 2 MG/ML
2 INJECTION, SOLUTION INTRAMUSCULAR; INTRAVENOUS ONCE
Status: COMPLETED | OUTPATIENT
Start: 2019-05-05 | End: 2019-05-05

## 2019-05-05 RX ORDER — NITROGLYCERIN 20 MG/100ML
.07-2 INJECTION INTRAVENOUS CONTINUOUS
Status: DISCONTINUED | OUTPATIENT
Start: 2019-05-05 | End: 2019-05-06

## 2019-05-05 RX ORDER — ACETAMINOPHEN 325 MG/1
650 TABLET ORAL EVERY 4 HOURS PRN
Status: DISCONTINUED | OUTPATIENT
Start: 2019-05-05 | End: 2019-05-06 | Stop reason: HOSPADM

## 2019-05-05 RX ORDER — ASPIRIN 81 MG/1
81 TABLET ORAL DAILY
Status: DISCONTINUED | OUTPATIENT
Start: 2019-05-06 | End: 2019-05-06 | Stop reason: HOSPADM

## 2019-05-05 RX ORDER — TOLTERODINE TARTRATE 2 MG/1
2 TABLET, EXTENDED RELEASE ORAL DAILY PRN
COMMUNITY
End: 2021-12-15

## 2019-05-05 RX ORDER — LIDOCAINE 40 MG/G
CREAM TOPICAL
Status: DISCONTINUED | OUTPATIENT
Start: 2019-05-05 | End: 2019-05-06 | Stop reason: HOSPADM

## 2019-05-05 RX ORDER — LORAZEPAM 1 MG/1
1 TABLET ORAL ONCE
Status: COMPLETED | OUTPATIENT
Start: 2019-05-05 | End: 2019-05-05

## 2019-05-05 RX ORDER — NITROGLYCERIN 0.4 MG/1
0.4 TABLET SUBLINGUAL EVERY 5 MIN PRN
Status: DISCONTINUED | OUTPATIENT
Start: 2019-05-05 | End: 2019-05-05

## 2019-05-05 RX ORDER — HYDRALAZINE HYDROCHLORIDE 20 MG/ML
10 INJECTION INTRAMUSCULAR; INTRAVENOUS ONCE
Status: COMPLETED | OUTPATIENT
Start: 2019-05-05 | End: 2019-05-05

## 2019-05-05 RX ORDER — ATORVASTATIN CALCIUM 10 MG/1
10 TABLET, FILM COATED ORAL DAILY
Status: DISCONTINUED | OUTPATIENT
Start: 2019-05-06 | End: 2019-05-06 | Stop reason: HOSPADM

## 2019-05-05 RX ORDER — ONDANSETRON 2 MG/ML
4 INJECTION INTRAMUSCULAR; INTRAVENOUS ONCE
Status: COMPLETED | OUTPATIENT
Start: 2019-05-05 | End: 2019-05-05

## 2019-05-05 RX ORDER — ATORVASTATIN CALCIUM 20 MG/1
20 TABLET, FILM COATED ORAL DAILY
COMMUNITY
End: 2019-07-24

## 2019-05-05 RX ORDER — TRAZODONE HYDROCHLORIDE 50 MG/1
50 TABLET, FILM COATED ORAL
COMMUNITY
End: 2020-10-14

## 2019-05-05 RX ORDER — NITROGLYCERIN 0.4 MG/1
0.4 TABLET SUBLINGUAL ONCE
Status: COMPLETED | OUTPATIENT
Start: 2019-05-05 | End: 2019-05-05

## 2019-05-05 RX ORDER — ACETAMINOPHEN 650 MG/1
650 SUPPOSITORY RECTAL EVERY 4 HOURS PRN
Status: DISCONTINUED | OUTPATIENT
Start: 2019-05-05 | End: 2019-05-06 | Stop reason: HOSPADM

## 2019-05-05 RX ORDER — ALUMINA, MAGNESIA, AND SIMETHICONE 2400; 2400; 240 MG/30ML; MG/30ML; MG/30ML
30 SUSPENSION ORAL EVERY 4 HOURS PRN
Status: DISCONTINUED | OUTPATIENT
Start: 2019-05-05 | End: 2019-05-06 | Stop reason: HOSPADM

## 2019-05-05 RX ORDER — LISINOPRIL AND HYDROCHLOROTHIAZIDE 12.5; 2 MG/1; MG/1
1 TABLET ORAL DAILY
Status: DISCONTINUED | OUTPATIENT
Start: 2019-05-06 | End: 2019-05-06 | Stop reason: HOSPADM

## 2019-05-05 RX ADMIN — NITROGLYCERIN 0.4 MG: 0.4 TABLET SUBLINGUAL at 16:39

## 2019-05-05 RX ADMIN — NITROGLYCERIN 0.4 MG: 0.4 TABLET SUBLINGUAL at 12:08

## 2019-05-05 RX ADMIN — ASPIRIN 81 MG CHEWABLE TABLET 324 MG: 81 TABLET CHEWABLE at 15:34

## 2019-05-05 RX ADMIN — HEPARIN SODIUM 750 UNITS/HR: 10000 INJECTION, SOLUTION INTRAVENOUS at 22:48

## 2019-05-05 RX ADMIN — SODIUM CHLORIDE 75 ML: 9 INJECTION, SOLUTION INTRAVENOUS at 13:50

## 2019-05-05 RX ADMIN — LORAZEPAM 1 MG: 1 TABLET ORAL at 12:08

## 2019-05-05 RX ADMIN — NITROGLYCERIN 0.4 MG: 0.4 TABLET SUBLINGUAL at 17:06

## 2019-05-05 RX ADMIN — NITROGLYCERIN 0.07 MCG/KG/MIN: 20 INJECTION INTRAVENOUS at 17:25

## 2019-05-05 RX ADMIN — Medication 2 G: at 17:29

## 2019-05-05 RX ADMIN — HYDRALAZINE HYDROCHLORIDE 10 MG: 20 INJECTION INTRAMUSCULAR; INTRAVENOUS at 17:04

## 2019-05-05 RX ADMIN — MORPHINE SULFATE 2 MG: 2 INJECTION, SOLUTION INTRAMUSCULAR; INTRAVENOUS at 17:18

## 2019-05-05 RX ADMIN — ONDANSETRON 4 MG: 2 INJECTION INTRAMUSCULAR; INTRAVENOUS at 17:02

## 2019-05-05 RX ADMIN — IOPAMIDOL 80 ML: 755 INJECTION, SOLUTION INTRAVENOUS at 13:49

## 2019-05-05 RX ADMIN — MORPHINE SULFATE 2 MG: 4 INJECTION, SOLUTION INTRAMUSCULAR; INTRAVENOUS at 18:08

## 2019-05-05 ASSESSMENT — MIFFLIN-ST. JEOR: SCORE: 1146.41

## 2019-05-05 NOTE — PHARMACY-ADMISSION MEDICATION HISTORY
"Admission medication history for the May 5, 2019 admission is complete.     Interview sources:  Patient, Patient's daughter, Ganesh    Reliability of source: Good    Medication compliance: Poor - patient reports recent \"short term memory loss\" problems that she is currently trying to address with her PCP. This has resulted in her continuously forgetting to take her medications. In April/May 2019, patient refilled lisinopril, sertraline, and tolterodine but not atorvastatin. Prior to this fill, her last fill of these 4 medications was September 2018 for 30 days supply.    Preferred Outpatient Pharmacy: [facility]    Changes made to PTA medication list (reason)  Added: None  Deleted: None  Changed: Atorvastatin: 10mg tablet take 1 tablet by mouth daily changed to 20mg tablet take 1 tablet by mouth daily.    Additional medication history information:   Patient confirmed the only medication she took today was her lisinopril/HCTZ.  Patient reports that her sertraline dose was increased from 50mg daily to 100mg daily a few weeks ago due to increasing anxiety.  Telephone encounter from 4/10/2019 indicates issues with compliance based on pharmacy dispensing history.  Tolterodine: Confirmed with pharmacy this is PRN. Per chart review, patient takes this for trips or if she's drinking a lot of fluids.    Prior to Admission medications    Medication Sig Last Dose Taking? Auth Provider   aspirin 81 MG EC tablet Take 1 tablet (81 mg) by mouth daily Past Month at Unknown Yes Dee Castillo MD   lisinopril-hydrochlorothiazide (PRINZIDE/ZESTORETIC) 20-12.5 MG tablet Take 1 tablet by mouth daily 5/5/2019 at AM Yes Dee Castillo MD   Multiple Vitamins-Minerals (MULTIVITAMIN ADULT PO) Take 1 tablet by mouth daily 5/4/2019 at AM Yes Unknown, Entered By History   sertraline (ZOLOFT) 100 MG tablet Take 1 tablet (100 mg) by mouth daily 5/4/2019 at Unknown time Yes Dee Castillo MD   atorvastatin (LIPITOR) " 20 MG tablet Take 20 mg by mouth daily Not taking greater than 6 months  Unknown, Entered By History   Blood Pressure Monitor KIT 1 Units daily as needed (blood pressure check)   Dee Castillo MD   tolterodine (DETROL) 2 MG tablet Take 2 mg by mouth daily as needed for incontinence (Uses rarely) More than a month at Unknown time  Unknown, Entered By History   traZODone (DESYREL) 50 MG tablet Take 50 mg by mouth nightly as needed for sleep (Uses very rarely) More than a month at Unknown time  Unknown, Entered By History       Time spent: 25 minutes    Medication history completed by:   Yared Sanches, Pharmacy Intern
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
## 2019-05-05 NOTE — LETTER
May 6, 2019      To Whom It May Concern,     Kathryn Carranza was admitted to the the [facility] due to an acute medical condition on 5/5/19. Due to her care and admission, she missed a flight though it was medically necessary she was in the hospital.                Sincerely,          Jeremias Hermosillo PA-C   May 6, 2019

## 2019-05-05 NOTE — LETTER
May 6, 2019      To Whom It May Concern,     Kathryn Carranza was admitted to the [facility] due to an acute medical condition on 5/5/19. Due to her care and admission, she missed a flight. It was medically necessary that she was in the hospital.             Sincerely,      Jeremias Hermosillo PA-C   May 6, 2019
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
## 2019-05-05 NOTE — ED NOTES
Patient signed out to me at the end of my partner's shift with the plan to transfer the patient to  under the care of cardiology after aspirin and nitroglycerin sublingually.  Heparin was apparently not recommended.    Upon my evaluation the patient, the patient states she is still having pain rated at an 8 on a 1-10 scale and her blood pressure was still uncontrolled after hydralazine.  Hydralazine had been used by my partner because the patient was already in a sinus bradycardia.    Patient was then given morphine as well as started on nitro drip for her chest pain and blood pressure control by me.    Medications   nitroGLYcerin (NITROSTAT) sublingual tablet 0.4 mg (0.4 mg Sublingual Given 5/5/19 1706)   nitroGLYcerin 50 mg in D5W 250 mL (adult std) infusion (0.093 mcg/kg/min × 61.2 kg Intravenous Rate/Dose Change 5/5/19 1755)   nitroGLYcerin (NITROSTAT) sublingual tablet 0.4 mg (0.4 mg Sublingual Given 5/5/19 1208)   LORazepam (ATIVAN) tablet 1 mg (1 mg Oral Given 5/5/19 1208)   iopamidol (ISOVUE-370) solution 100 mL (80 mLs Intravenous Given 5/5/19 1349)   saline bag (75 mLs Intravenous Given 5/5/19 1350)   aspirin (ASA) chewable tablet 324 mg (324 mg Oral Given 5/5/19 1534)   nitroGLYcerin (NITROSTAT) sublingual tablet 0.4 mg (0.4 mg Sublingual Given 5/5/19 1639)   ondansetron (ZOFRAN) injection 4 mg (4 mg Intravenous Given 5/5/19 1702)   hydrALAZINE (APRESOLINE) injection 10 mg (10 mg Intravenous Given 5/5/19 1704)   morphine (PF) injection 2 mg (2 mg Intravenous Given 5/5/19 1718)   magnesium sulfate 2 g in NS intermittent infusion ([employer]ar[employer] or [facility] Cmpd) (2 g Intravenous New Bag 5/5/19 1729)   morphine (PF) injection 2 mg (2 mg Intravenous Given 5/5/19 1808)       At this time the patient is pain-free with a blood pressure of 146/74 and a pulse of 58    Patient will be transferred to  under the care of cardiology    Final diagnoses:   Chest pain - probable nonSTEMI     Rickey He MD        Rickey He MD  05/05/19 [id number]

## 2019-05-05 NOTE — ED TRIAGE NOTES
Did yoga a few days ago and is having right sided chest spasm that came on about 3 days ago.  Says the spasms get better when she bends over to stretch while she is standing.    Pt is hypertensive with a BP of 185/85.  She did take her lisinipril this morning.  Has a temp of 100.      Is concerned because she leaves for [employer] tomorrow.

## 2019-05-06 ENCOUNTER — APPOINTMENT (OUTPATIENT)
Dept: CARDIOLOGY | Facility: CLINIC | Age: 69
DRG: 281 | End: 2019-05-06
Payer: COMMERCIAL

## 2019-05-06 VITALS
DIASTOLIC BLOOD PRESSURE: 58 MMHG | TEMPERATURE: 98.5 F | RESPIRATION RATE: 16 BRPM | HEIGHT: 66 IN | OXYGEN SATURATION: 98 % | WEIGHT: 132.2 LBS | SYSTOLIC BLOOD PRESSURE: 127 MMHG | BODY MASS INDEX: 21.24 KG/M2 | HEART RATE: 55 BPM

## 2019-05-06 PROBLEM — R07.9 CHEST PAIN: Status: ACTIVE | Noted: 2019-05-06

## 2019-05-06 LAB
ANION GAP SERPL CALCULATED.3IONS-SCNC: 8 MMOL/L (ref 3–14)
BUN SERPL-MCNC: 16 MG/DL (ref 7–30)
CALCIUM SERPL-MCNC: 9 MG/DL (ref 8.5–10.1)
CHLORIDE SERPL-SCNC: 103 MMOL/L (ref 94–109)
CO2 SERPL-SCNC: 27 MMOL/L (ref 20–32)
CREAT SERPL-MCNC: 0.77 MG/DL (ref 0.52–1.04)
ERYTHROCYTE [DISTWIDTH] IN BLOOD BY AUTOMATED COUNT: 13.3 % (ref 10–15)
GFR SERPL CREATININE-BSD FRML MDRD: 79 ML/MIN/{1.73_M2}
GLUCOSE SERPL-MCNC: 82 MG/DL (ref 70–99)
HCT VFR BLD AUTO: 42.8 % (ref 35–47)
HGB BLD-MCNC: 13.8 G/DL (ref 11.7–15.7)
INTERPRETATION ECG - MUSE: NORMAL
LMWH PPP CHRO-ACNC: 0.25 IU/ML
LMWH PPP CHRO-ACNC: 0.34 IU/ML
MAGNESIUM SERPL-MCNC: 2.4 MG/DL (ref 1.6–2.3)
MCH RBC QN AUTO: 31.7 PG (ref 26.5–33)
MCHC RBC AUTO-ENTMCNC: 32.2 G/DL (ref 31.5–36.5)
MCV RBC AUTO: 98 FL (ref 78–100)
PLATELET # BLD AUTO: 253 10E9/L (ref 150–450)
POTASSIUM SERPL-SCNC: 3.2 MMOL/L (ref 3.4–5.3)
POTASSIUM SERPL-SCNC: 4.7 MMOL/L (ref 3.4–5.3)
RBC # BLD AUTO: 4.35 10E12/L (ref 3.8–5.2)
SODIUM SERPL-SCNC: 138 MMOL/L (ref 133–144)
TROPONIN I SERPL-MCNC: 0.35 UG/L (ref 0–0.04)
WBC # BLD AUTO: 7 10E9/L (ref 4–11)

## 2019-05-06 PROCEDURE — 99152 MOD SED SAME PHYS/QHP 5/>YRS: CPT | Performed by: INTERNAL MEDICINE

## 2019-05-06 PROCEDURE — 96368 THER/DIAG CONCURRENT INF: CPT

## 2019-05-06 PROCEDURE — 99238 HOSP IP/OBS DSCHRG MGMT 30/<: CPT | Mod: 25 | Performed by: PHYSICIAN ASSISTANT

## 2019-05-06 PROCEDURE — 25000128 H RX IP 250 OP 636: Performed by: INTERNAL MEDICINE

## 2019-05-06 PROCEDURE — B2111ZZ FLUOROSCOPY OF MULTIPLE CORONARY ARTERIES USING LOW OSMOLAR CONTRAST: ICD-10-PCS | Performed by: INTERNAL MEDICINE

## 2019-05-06 PROCEDURE — 84132 ASSAY OF SERUM POTASSIUM: CPT | Performed by: INTERNAL MEDICINE

## 2019-05-06 PROCEDURE — 25000132 ZZH RX MED GY IP 250 OP 250 PS 637: Performed by: INTERNAL MEDICINE

## 2019-05-06 PROCEDURE — 93454 CORONARY ARTERY ANGIO S&I: CPT | Performed by: INTERNAL MEDICINE

## 2019-05-06 PROCEDURE — 84484 ASSAY OF TROPONIN QUANT: CPT | Performed by: INTERNAL MEDICINE

## 2019-05-06 PROCEDURE — 85027 COMPLETE CBC AUTOMATED: CPT | Performed by: EMERGENCY MEDICINE

## 2019-05-06 PROCEDURE — C1894 INTRO/SHEATH, NON-LASER: HCPCS | Performed by: INTERNAL MEDICINE

## 2019-05-06 PROCEDURE — 93306 TTE W/DOPPLER COMPLETE: CPT | Mod: 26 | Performed by: INTERNAL MEDICINE

## 2019-05-06 PROCEDURE — 25000132 ZZH RX MED GY IP 250 OP 250 PS 637: Performed by: PHYSICIAN ASSISTANT

## 2019-05-06 PROCEDURE — 93005 ELECTROCARDIOGRAM TRACING: CPT

## 2019-05-06 PROCEDURE — 36415 COLL VENOUS BLD VENIPUNCTURE: CPT | Performed by: INTERNAL MEDICINE

## 2019-05-06 PROCEDURE — 93306 TTE W/DOPPLER COMPLETE: CPT

## 2019-05-06 PROCEDURE — 83735 ASSAY OF MAGNESIUM: CPT | Performed by: EMERGENCY MEDICINE

## 2019-05-06 PROCEDURE — 96366 THER/PROPH/DIAG IV INF ADDON: CPT

## 2019-05-06 PROCEDURE — G0378 HOSPITAL OBSERVATION PER HR: HCPCS

## 2019-05-06 PROCEDURE — 80048 BASIC METABOLIC PNL TOTAL CA: CPT | Performed by: EMERGENCY MEDICINE

## 2019-05-06 PROCEDURE — 40000987 ZZH STATISTIC CONSCIOUS SEDATION < 10 MINUTES: Performed by: INTERNAL MEDICINE

## 2019-05-06 PROCEDURE — 25000125 ZZHC RX 250: Performed by: INTERNAL MEDICINE

## 2019-05-06 PROCEDURE — 93454 CORONARY ARTERY ANGIO S&I: CPT | Mod: 26 | Performed by: INTERNAL MEDICINE

## 2019-05-06 PROCEDURE — C1887 CATHETER, GUIDING: HCPCS | Performed by: INTERNAL MEDICINE

## 2019-05-06 PROCEDURE — 21400000 ZZH R&B CCU UMMC

## 2019-05-06 PROCEDURE — 36415 COLL VENOUS BLD VENIPUNCTURE: CPT | Performed by: EMERGENCY MEDICINE

## 2019-05-06 PROCEDURE — 85520 HEPARIN ASSAY: CPT | Performed by: EMERGENCY MEDICINE

## 2019-05-06 PROCEDURE — 93010 ELECTROCARDIOGRAM REPORT: CPT | Performed by: INTERNAL MEDICINE

## 2019-05-06 PROCEDURE — 27210794 ZZH OR GENERAL SUPPLY STERILE: Performed by: INTERNAL MEDICINE

## 2019-05-06 RX ORDER — NITROGLYCERIN 5 MG/ML
VIAL (ML) INTRAVENOUS
Status: DISCONTINUED | OUTPATIENT
Start: 2019-05-06 | End: 2019-05-06 | Stop reason: HOSPADM

## 2019-05-06 RX ORDER — TRAMADOL HYDROCHLORIDE 50 MG/1
50 TABLET ORAL
Status: DISCONTINUED | OUTPATIENT
Start: 2019-05-06 | End: 2019-05-06 | Stop reason: HOSPADM

## 2019-05-06 RX ORDER — AMLODIPINE BESYLATE 2.5 MG/1
2.5 TABLET ORAL AT BEDTIME
Qty: 60 TABLET | Refills: 0 | Status: SHIPPED | OUTPATIENT
Start: 2019-05-06 | End: 2019-06-28

## 2019-05-06 RX ORDER — NALOXONE HYDROCHLORIDE 0.4 MG/ML
.1-.4 INJECTION, SOLUTION INTRAMUSCULAR; INTRAVENOUS; SUBCUTANEOUS
Status: CANCELLED | OUTPATIENT
Start: 2019-05-06

## 2019-05-06 RX ORDER — AMLODIPINE BESYLATE 2.5 MG/1
2.5 TABLET ORAL AT BEDTIME
Status: DISCONTINUED | OUTPATIENT
Start: 2019-05-06 | End: 2019-05-06 | Stop reason: HOSPADM

## 2019-05-06 RX ORDER — MAGNESIUM SULFATE HEPTAHYDRATE 40 MG/ML
4 INJECTION, SOLUTION INTRAVENOUS EVERY 4 HOURS PRN
Status: DISCONTINUED | OUTPATIENT
Start: 2019-05-06 | End: 2019-05-06 | Stop reason: HOSPADM

## 2019-05-06 RX ORDER — TRAMADOL HYDROCHLORIDE 50 MG/1
50 TABLET ORAL EVERY 6 HOURS PRN
Qty: 20 TABLET | Refills: 0 | Status: SHIPPED | OUTPATIENT
Start: 2019-05-06 | End: 2019-05-11

## 2019-05-06 RX ORDER — ACETAMINOPHEN 325 MG/1
650 TABLET ORAL EVERY 4 HOURS PRN
Qty: 30 TABLET | Refills: 0 | Status: SHIPPED | OUTPATIENT
Start: 2019-05-06 | End: 2023-04-03

## 2019-05-06 RX ORDER — NALOXONE HYDROCHLORIDE 0.4 MG/ML
.2-.4 INJECTION, SOLUTION INTRAMUSCULAR; INTRAVENOUS; SUBCUTANEOUS
Status: CANCELLED | OUTPATIENT
Start: 2019-05-06 | End: 2019-05-07

## 2019-05-06 RX ORDER — HEPARIN SODIUM 1000 [USP'U]/ML
INJECTION, SOLUTION INTRAVENOUS; SUBCUTANEOUS
Status: DISCONTINUED | OUTPATIENT
Start: 2019-05-06 | End: 2019-05-06 | Stop reason: HOSPADM

## 2019-05-06 RX ORDER — NICARDIPINE HYDROCHLORIDE 2.5 MG/ML
INJECTION INTRAVENOUS
Status: DISCONTINUED | OUTPATIENT
Start: 2019-05-06 | End: 2019-05-06 | Stop reason: HOSPADM

## 2019-05-06 RX ORDER — LANOLIN ALCOHOL/MO/W.PET/CERES
3 CREAM (GRAM) TOPICAL
Status: DISCONTINUED | OUTPATIENT
Start: 2019-05-06 | End: 2019-05-06 | Stop reason: HOSPADM

## 2019-05-06 RX ORDER — ATROPINE SULFATE 0.1 MG/ML
0.5 INJECTION INTRAVENOUS EVERY 5 MIN PRN
Status: CANCELLED | OUTPATIENT
Start: 2019-05-06 | End: 2019-05-07

## 2019-05-06 RX ORDER — LORAZEPAM 0.5 MG/1
1 TABLET ORAL ONCE
Status: COMPLETED | OUTPATIENT
Start: 2019-05-06 | End: 2019-05-06

## 2019-05-06 RX ORDER — POTASSIUM CHLORIDE 1.5 G/1.58G
20-40 POWDER, FOR SOLUTION ORAL
Status: DISCONTINUED | OUTPATIENT
Start: 2019-05-06 | End: 2019-05-06 | Stop reason: HOSPADM

## 2019-05-06 RX ORDER — FENTANYL CITRATE 50 UG/ML
25-50 INJECTION, SOLUTION INTRAMUSCULAR; INTRAVENOUS
Status: CANCELLED | OUTPATIENT
Start: 2019-05-06 | End: 2019-05-07

## 2019-05-06 RX ORDER — POTASSIUM CHLORIDE 7.45 MG/ML
10 INJECTION INTRAVENOUS
Status: DISCONTINUED | OUTPATIENT
Start: 2019-05-06 | End: 2019-05-06 | Stop reason: HOSPADM

## 2019-05-06 RX ORDER — LIDOCAINE 40 MG/G
CREAM TOPICAL
Status: CANCELLED | OUTPATIENT
Start: 2019-05-06

## 2019-05-06 RX ORDER — FENTANYL CITRATE 50 UG/ML
INJECTION, SOLUTION INTRAMUSCULAR; INTRAVENOUS
Status: DISCONTINUED | OUTPATIENT
Start: 2019-05-06 | End: 2019-05-06 | Stop reason: HOSPADM

## 2019-05-06 RX ORDER — POTASSIUM CHLORIDE 750 MG/1
20-40 TABLET, EXTENDED RELEASE ORAL
Status: DISCONTINUED | OUTPATIENT
Start: 2019-05-06 | End: 2019-05-06 | Stop reason: HOSPADM

## 2019-05-06 RX ORDER — POTASSIUM CL/LIDO/0.9 % NACL 10MEQ/0.1L
10 INTRAVENOUS SOLUTION, PIGGYBACK (ML) INTRAVENOUS
Status: DISCONTINUED | OUTPATIENT
Start: 2019-05-06 | End: 2019-05-06 | Stop reason: HOSPADM

## 2019-05-06 RX ORDER — POTASSIUM CHLORIDE 29.8 MG/ML
20 INJECTION INTRAVENOUS
Status: DISCONTINUED | OUTPATIENT
Start: 2019-05-06 | End: 2019-05-06 | Stop reason: HOSPADM

## 2019-05-06 RX ORDER — FLUMAZENIL 0.1 MG/ML
0.2 INJECTION, SOLUTION INTRAVENOUS
Status: CANCELLED | OUTPATIENT
Start: 2019-05-06 | End: 2019-05-07

## 2019-05-06 RX ADMIN — POTASSIUM CHLORIDE 40 MEQ: 750 TABLET, EXTENDED RELEASE ORAL at 08:05

## 2019-05-06 RX ADMIN — FENTANYL CITRATE 50 MCG: 50 INJECTION, SOLUTION INTRAMUSCULAR; INTRAVENOUS at 15:49

## 2019-05-06 RX ADMIN — LISINOPRIL AND HYDROCHLOROTHIAZIDE 1 TABLET: 12.5; 2 TABLET ORAL at 09:46

## 2019-05-06 RX ADMIN — ASPIRIN 81 MG: 81 TABLET, COATED ORAL at 09:46

## 2019-05-06 RX ADMIN — TRAMADOL HYDROCHLORIDE 50 MG: 50 TABLET, COATED ORAL at 20:04

## 2019-05-06 RX ADMIN — AMLODIPINE BESYLATE 2.5 MG: 2.5 TABLET ORAL at 20:04

## 2019-05-06 RX ADMIN — SERTRALINE HYDROCHLORIDE 100 MG: 100 TABLET ORAL at 09:46

## 2019-05-06 RX ADMIN — LORAZEPAM 1 MG: 0.5 TABLET ORAL at 09:46

## 2019-05-06 RX ADMIN — POTASSIUM CHLORIDE 20 MEQ: 750 TABLET, EXTENDED RELEASE ORAL at 10:34

## 2019-05-06 RX ADMIN — ATORVASTATIN CALCIUM 10 MG: 10 TABLET, FILM COATED ORAL at 09:46

## 2019-05-06 ASSESSMENT — ACTIVITIES OF DAILY LIVING (ADL)
TRANSFERRING: 0-->INDEPENDENT
ADLS_ACUITY_SCORE: 12
SWALLOWING: 0-->SWALLOWS FOODS/LIQUIDS WITHOUT DIFFICULTY
TOILETING: 0-->INDEPENDENT
ADLS_ACUITY_SCORE: 12
DRESS: 0-->INDEPENDENT
BATHING: 0-->INDEPENDENT
FALL_HISTORY_WITHIN_LAST_SIX_MONTHS: NO
RETIRED_COMMUNICATION: 0-->UNDERSTANDS/COMMUNICATES WITHOUT DIFFICULTY
AMBULATION: 0-->INDEPENDENT
COGNITION: 0 - NO COGNITION ISSUES REPORTED
ADLS_ACUITY_SCORE: 12
RETIRED_EATING: 0-->INDEPENDENT
ADLS_ACUITY_SCORE: 12

## 2019-05-06 NOTE — DISCHARGE SUMMARY
"  [address] [address]  p: [phone]    Discharge Summary: Cardiology Service    Kimmie Carranza MRN# [id number]   YOB: 1950 Age: 68 year old       Admission Date: 5/5/2019  Discharge Date: 05/06/19      Discharge Diagnoses:  1. NSTEMI secondary to hypertensive urgency   2. Hypertensive urgency, resolved   3. Musculoskeletal chest wall pain and spasm  4. HLD  5. Thoracic aortic arch dilated 4.0 CM  6. Tobacco       Pertinent Procedures:  Coronary angiogram     Consults:  NA    Imaging with results:  Echocardiogram 5/5/19:  Global and regional left ventricular function is normal with an EF of 60-65%.  Right ventricular function, chamber size, wall motion, and thickness are  normal.  The inferior vena cava is normal.  No pericardial effusion is present.    Coronary Angiogram 5/6/19:  Minimal non-obstructive CAD     Other imaging studies:  CTA: IMPRESSION: The root of the aortic arch measures 4.0 cm in diameter.  There is no evidence of aortic dissection or acute abnormality.    Brief HPI:  Ms. JAKE Carranza is a 67yo woman with a PMH notable for HTN, HLD, MDD and anxiety, urinary urge incontinence who presented to the ED on 5/5 with chief complaint of chest discomfort.    Per patient, she was in her usual state of health until 3 days prior to presenting to the emergency room.  She had completed her yoga workout and had attended a massage appointment.  She began to develop an intermittent \"chest contractions\" along her left mid axillary chest extending below her left breast.  This was associated with the mild nausea, was unchanged based on activity level, was improved by deep stretches such as bending down to touch her toes and deep slow inspirations.  She also states the pain was somewhat positional, improved by laying on her right side and worse with laying on her left side. Over the next 3 days she continued to have intermittent " "episodes of \"chest contractions\" which progressed in severity. She presented on Sunday, May 5 because of concerns due to the severity of pain and was notably hypertensive with /110 mmHg and a troponin was elevated to 0.03.     She did well throughout her stay. She did have one recurrence of her discomfort the morning of discharge but nothing after. Coronary arteries were without focal stenosis or culprit to explain her symptoms, and her TTE was without any structural abnormality. At this point, suspect her symptoms were due to MSK chest wall pain.       Hospital Course by Diagnosis:    Chest Pain, likely musculoskeletal pain with muscle spasm  NSTEMI secondary to hypertensive urgency, clean coronaries   Cardiac risk factors including HTN, HLD, occasional tobacco, and strong family history of premature CAD. Presents with recurrent episodes of substernal CP that was atypical in that it was relieved with some positional maneuvering. EKG reflects possible LVH but no acute ST segment change or TWI. Troponin was elevated to 0.3 > 0.4 (peak) and suspect this was in part due to hypertensive urgency as her presenting BP was 190/110 mmHg. Her BP was elevated likely due to pain. Renal functio WNL. TTE with normal biventricular function, no valvular disease or other structural abnormality seen. CT & CXR reassuring as well; no evidence of acute fracture or other etiology. Considered doing a non-invasive study such as CT coronaries however patient had acute onset of crushing chest pain at rest that improved with increasing the nitroglycerin drip so proceeded with coronary angiogram that showed minimal, non-obstructing CAD. Cannot exclude coronary vasospasm and so will start her on a low dose of amlodipine to treat this as well has her BP. Low suspicion for myocarditis. We will also treat her pain and send her home to follow up with her PCP.    - Tylenol 325 mg 1-2 tablets q 4   - Ultram 50 mg as needed q 6 hours for 5 days " for mod-severe pain   - Continue daily asa and statin for primary prevention  - Amlodipine 2.5 mg at bedtime, may titrate to 5 mg >> 10 mg   - Follow up with PCP, can titrate BP medications and follow up regarding chest wall pain and spasm     # Hypertensive urgency:  Home meds: lisinopril/hctz. BPs improved since transfer on Nitroglycerin gtt. No h/a nor AMS, but does have slight troponin elevation. May have been d/t pain. serotonin specific reuptake inhibitor (sertraline) can also aggravate.   - Will start amlodipine 2.5 mg at bedtime and discharge for follow up with PCP for BP recheck and consideration of workup for secondary hypertensive sources though her BP is WNL at time of discharge     # HLD - continue pta statin    # Depression/Anxiety - cont PTA sertraline  # Insomnia - PTA trazodone        Condition on discharge  Temp:  [97.6  F (36.4  C)-98.7  F (37.1  C)] 97.6  F (36.4  C)  Pulse:  [54-97] 61  Heart Rate:  [54-78] 68  Resp:  [10-30] 16  BP: ()/() 115/59  SpO2:  [94 %-100 %] 95 %  General: Alert, interactive, NAD  Eyes: sclera anicteric, EOMI  Neck: JVP flat, carotid 2+ bilaterally  Cardiovascular: regular rate and rhythm, normal S1 and S2, no murmurs, gallops, or rubs  Resp: clear to auscultation bilaterally, no rales, wheezes, or rhonchi  GI: Soft, nontender, nondistended. +BS.  No HSM or masses, no rebound or guarding.  Extremities: No edema, no cyanosis or clubbing, dorsalis pedis and posterior tibialis pulses 2+ bilaterally. Right radial wrist without hematoma or bruit.   Skin: Warm and dry, no jaundice or rash  Neuro: CN 2-12 intact, moves all extremities equally  Psych: Alert & oriented x 3        Discharge medications:   Current Discharge Medication List      START taking these medications    Details   acetaminophen (TYLENOL) 325 MG tablet Take 2 tablets (650 mg) by mouth every 4 hours as needed for mild pain  Qty: 30 tablet, Refills: 0    Associated Diagnoses: Chest wall pain       amLODIPine (NORVASC) 2.5 MG tablet Take 1 tablet (2.5 mg) by mouth At Bedtime  Qty: 60 tablet, Refills: 0    Associated Diagnoses: Essential hypertension with goal blood pressure less than 140/90      traMADol (ULTRAM) 50 MG tablet Take 1 tablet (50 mg) by mouth every 6 hours as needed for moderate to severe pain or severe pain  Qty: 20 tablet, Refills: 0    Associated Diagnoses: Chest wall pain         CONTINUE these medications which have NOT CHANGED    Details   aspirin 81 MG EC tablet Take 1 tablet (81 mg) by mouth daily  Qty: 90 tablet, Refills: 3    Associated Diagnoses: CARDIOVASCULAR SCREENING; LDL GOAL LESS THAN 160; Abnormal ankle brachial index; Hypertension goal BP (blood pressure) < 140/90      lisinopril-hydrochlorothiazide (PRINZIDE/ZESTORETIC) 20-12.5 MG tablet Take 1 tablet by mouth daily  Qty: 30 tablet, Refills: 1    Associated Diagnoses: Essential hypertension with goal blood pressure less than 140/90      Multiple Vitamins-Minerals (MULTIVITAMIN ADULT PO) Take 1 tablet by mouth daily      sertraline (ZOLOFT) 100 MG tablet Take 1 tablet (100 mg) by mouth daily  Qty: 30 tablet, Refills: 1    Associated Diagnoses: Major depressive disorder, recurrent episode, mild (H); Mild major depression (H)      atorvastatin (LIPITOR) 20 MG tablet Take 20 mg by mouth daily      Blood Pressure Monitor KIT 1 Units daily as needed (blood pressure check)  Qty: 1 kit, Refills: 0    Associated Diagnoses: Essential hypertension with goal blood pressure less than 140/90      tolterodine (DETROL) 2 MG tablet Take 2 mg by mouth daily as needed for incontinence (Uses rarely)      traZODone (DESYREL) 50 MG tablet Take 50 mg by mouth nightly as needed for sleep (Uses very rarely)             Labs or imaging requiring follow-up after discharge:  BMP      Follow-up:  PCP in 5-7 days     Code status:  Full    Discharge d/w Dr. Deneen LOTT   Cardiology  Pager [phone]    Patient Care Team:  Jonathan  Dee Mcgee MD as PCP - General  [facility]  Nancy Carlton PA-C as Physician Assistant (Neurosurgery)

## 2019-05-06 NOTE — ED NOTES
Patient had a brief episode of recurrent chest pain and at this time will be loaded with heparin.  Patient again now pain-free.    MD Neri Sellers Ford Christian, MD  05/05/19 1041

## 2019-05-06 NOTE — PLAN OF CARE
D: Admit 5/5 with CP x 3 days; positive trops. Hx: Anxiety, depression, PAD, family hx of CAD.   I/A: Vitals stable in absence of CP. CP episodes x 2 today; patient reports crushing, radiating pain to back, severity 7/10, lasting 10 minutes. Nitroglycerin gtt running at 0.192 mcg/kg/min; stopped in cathlab. Hepain gtt at 750ml/hr; 10a without change in rate today; stopped prior to cath lab. SB with long QT. Lorazepam 1mg given x1 today for anxiety. NPO for angiogram today. Patient slept on and off today waiting for angiogram. Left for cath lab about 1430; report received from cath lab at 1600. No evidence of vessel disease; TR band in place with 13 ml air.   P: Update CSI if questions/concerns.   Elaine Valentin RN

## 2019-05-06 NOTE — PROGRESS NOTES
"  Cardiology Progress Note  Interval:  No acute events overnight  Patient feeling well this AM, then had recurrence of chest pain   Heparin and nitroglycerin gtt running  BP well controlled    Plan:   -NPO for coronary angiogram  -TTE today with normal bivent function   -Continue heparin for now  -Continue nitroglycerine for now     Objective:  Most recent vital signs:  /89 (BP Location: Left arm)   Pulse 60   Temp 98.7  F (37.1  C) (Oral)   Resp 16   Ht 1.676 m (5' 6\")   Wt 60 kg (132 lb 3.2 oz)   SpO2 98%   BMI 21.34 kg/m    Temp:  [98.1  F (36.7  C)-100  F (37.8  C)] 98.7  F (37.1  C)  Pulse:  [] 60  Heart Rate:  [52-96] 69  Resp:  [10-40] 16  BP: ()/() 121/89  SpO2:  [90 %-100 %] 98 %  Wt Readings from Last 2 Encounters:   05/05/19 60 kg (132 lb 3.2 oz)   04/10/19 60.8 kg (134 lb 1.6 oz)       Intake/Output Summary (Last 24 hours) at 5/6/2019 1126  Last data filed at 5/6/2019 0830  Gross per 24 hour   Intake 415.22 ml   Output 200 ml   Net 215.22 ml     Physical exam:  General: In bed, in NAD  HEENT: EOMI, PERRLA, no scleral icterus or injection  CARDIAC: RRR, no m/r/g appreciated. Peripheral pulses 2+  RESP: CTAB, no wheezes, rhonchi or crackles appreciated.  GI: NABS, NT/ND, no guarding or rebound  :   EXTREMITIES: NO LE edema, pulses 2+. No bruits appreciated.   SKIN: No acute lesions appreciated  NEURO: Alert and oriented X3, CN II-XII grossly intact, no focal neurological deficits noted    Labs (Past three days):  CBC  Recent Labs   Lab 05/06/19  0552 05/05/19  1230   WBC 7.0 8.1   RBC 4.35 4.47   HGB 13.8 14.5   HCT 42.8 43.6   MCV 98 98   MCH 31.7 32.4   MCHC 32.2 33.3   RDW 13.3 13.3    266     BMP  Recent Labs   Lab 05/06/19  0552 05/05/19  1230    139   POTASSIUM 3.2* 3.9   CHLORIDE 103 102   CO2 27 29   ANIONGAP 8 8   GLC 82 101*   BUN 16 17   CR 0.77 0.75   GFRESTIMATED 79 81   GFRESTBLACK >90 >90   JEANNETTE 9.0 9.1   MAG 2.4* 2.0     Troponins:  Lab Results " "  Component Value Date    TROPI 0.347 () 05/06/2019    TROPI 0.443 () 05/05/2019    TROPI 0.380 () 05/05/2019    TROPI 0.176 () 05/05/2019    TROPI <0.015 05/05/2019        INRNo lab results found in last 7 days.  Liver panel  Recent Labs   Lab 05/05/19  1230   PROTTOTAL 8.3   ALBUMIN 4.5   BILITOTAL 0.7   ALKPHOS 54   AST 35   ALT 27       Imaging/procedure results:    TTE 5/5/19  Global and regional left ventricular function is normal with an EF of 60-65%.  Right ventricular function, chamber size, wall motion, and thickness are  normal.  The inferior vena cava is normal.  No pericardial effusion is present.      Assessment & Plan:    Chest Pain  NSTEMI of undetermined etiology  Cardiac risk factors including HTN, HLD, occasional tobacco, and strong family history of premature CAD. Presents with recurrent episodes of substernal CP, no clear exertional component - symptoms due seem to occur of their own accord. Each episode of chest pain described as \"crushing,\" lasting about 30min, with associated nausea but no SOB. EKG reflects history of hypertension and possible LVH but no acute ST segment change or TWI. Troponin was elevated to 0.3 > 0.4 (peak) and suspect this was in part due to hypertensive urgency. Renal functio WNL. TTE today with normal biventricular function, no valvular disease or other structural abnormality seen. CT & CXR reassuring as well. Considered doing a non-invasive study such as CT coronaries however patient had acute onset of crushing chest pain at rest that improved with increasing the nitroglycerin drip so will plan to cath due to ongoing symptoms as well as cardiac risk factors as outlined above.  - NPO for coronary angiogram today   - repeat EKG  - heparin & asa   - nitroglycerin gtt for now   - If no culprit lesion to correspond with symptoms, will consider symptom management and discharge      # Hypertensive urgency:  Home meds: lisinopril/hctz. BPs improved since transfer on " Nitroglycerin gtt. No h/a nor AMS, but does have slight troponin elevation. May have been d/t pain. serotonin specific reuptake inhibitor (sertraline) can also aggravate.   - BMP  - trend trop as above   - continue nitroglycerin gtt   - consider evaluation for other etiologies if no cardiac cause found  - Will start amlodipine 5 mg at bedtime and discharge for follow up with PCP      # HLD - continue pta statin    # Depression/Anxiety - cont PTA sertraline  # Insomnia - hold trazodone for now; can add back if needed, melatonin    Patient discussed w/ Dr. Ojeda.    Jeremias LOTT  [facility]  Cardiology  [id number]

## 2019-05-06 NOTE — PLAN OF CARE
D: Chest pain, trop elevated x3. PMhx dep/anxiety, HTN, mild PAD, admits to smoking 4 cigarettes a day.    I: Monitored vitals and assessed pt status.  Running: Heparin @750u/hr, nitroglycerin @0.9mcg/kg/min  PRN:    A: A0x4. VSS, on 2L NC. SB 50-60. Afebrile. Pain controlled with NTG. Pt is pain free at the moment.    No intake/output data recorded.    Temp:  [98.1  F (36.7  C)-100  F (37.8  C)] 98.1  F (36.7  C)  Pulse:  [] 58  Heart Rate:  [52-96] 58  Resp:  [10-40] 16  BP: ()/() 145/76  SpO2:  [90 %-100 %] 97 %      P: Continue to monitor Pt status and report changes to treatment team. NPO for angiogram Monday.

## 2019-05-06 NOTE — PROGRESS NOTES
Admission at 2200    Diagnosis:CP  Admitted from:[facility]  Via: Cart  Accompanied by: EMT  Belongings: Placed in closet; valuables sent home with family, declined sending any items to security.  Admission Profile: started and it needs to be ompleted  Teaching: orientation to unit, call don't fall, use of console, meal times, visiting hours, when to call for the RN (angina/sob/dizziness, etc.), and enforced importance of safety   Access: Pt came with PIV- infusing at 0.9  Telemetry: Placed on patient  Height/Weight: Completed  Pt came with nitroglycerin gtt at 0.9mcg/kg/min via PIV.    Up on arrival pt denied any michelle or discomfort.     Pt is stable. Started Heparin gtt at ~2248.

## 2019-05-07 ENCOUNTER — PATIENT OUTREACH (OUTPATIENT)
Dept: CARE COORDINATION | Facility: CLINIC | Age: 69
End: 2019-05-07

## 2019-05-07 LAB — INTERPRETATION ECG - MUSE: NORMAL

## 2019-05-07 NOTE — PLAN OF CARE
Vss.  Monitor shows sr.  Right wrist tr band off at 2030.  Site wdl.  Voiding.  Instructed on discharge. Info given on meds.  Denies questions.  Discharged to home.  Her friend drove her.

## 2019-05-14 ENCOUNTER — OFFICE VISIT (OUTPATIENT)
Dept: FAMILY MEDICINE | Facility: CLINIC | Age: 69
End: 2019-05-14
Payer: COMMERCIAL

## 2019-05-14 VITALS
RESPIRATION RATE: 16 BRPM | SYSTOLIC BLOOD PRESSURE: 145 MMHG | DIASTOLIC BLOOD PRESSURE: 79 MMHG | HEART RATE: 57 BPM | BODY MASS INDEX: 21.21 KG/M2 | HEIGHT: 66 IN | TEMPERATURE: 98.8 F | OXYGEN SATURATION: 98 % | WEIGHT: 132 LBS

## 2019-05-14 DIAGNOSIS — I10 ESSENTIAL HYPERTENSION WITH GOAL BLOOD PRESSURE LESS THAN 140/90: ICD-10-CM

## 2019-05-14 DIAGNOSIS — I16.9 HYPERTENSIVE CRISIS: ICD-10-CM

## 2019-05-14 DIAGNOSIS — R07.89 ATYPICAL CHEST PAIN: Primary | ICD-10-CM

## 2019-05-14 DIAGNOSIS — R41.3 MEMORY DEFICIT: ICD-10-CM

## 2019-05-14 LAB — VIT B12 SERPL-MCNC: 629 PG/ML (ref 193–986)

## 2019-05-14 PROCEDURE — 82607 VITAMIN B-12: CPT | Performed by: FAMILY MEDICINE

## 2019-05-14 PROCEDURE — 80053 COMPREHEN METABOLIC PANEL: CPT | Performed by: FAMILY MEDICINE

## 2019-05-14 PROCEDURE — 99214 OFFICE O/P EST MOD 30 MIN: CPT | Performed by: FAMILY MEDICINE

## 2019-05-14 PROCEDURE — 36415 COLL VENOUS BLD VENIPUNCTURE: CPT | Performed by: FAMILY MEDICINE

## 2019-05-14 PROCEDURE — 84443 ASSAY THYROID STIM HORMONE: CPT | Performed by: FAMILY MEDICINE

## 2019-05-14 ASSESSMENT — MIFFLIN-ST. JEOR: SCORE: 1145.5

## 2019-05-14 NOTE — PROGRESS NOTES
SUBJECTIVE:   Kimmie Carranza is a 68 year old female who presents to clinic today for the following   health issues: She is here today with her daughter Abbie for the first time.    Hospital Follow-up Visit:    Hospital/Nursing Home/IP Rehab Facility: [facility]  Date of Admission: 05/05/2019  Date of Discharge: 05/06/2019  Reason(s) for Admission: chest pain        Pt started having a 'contraction feeling' in her chest.  At ER, sx's improved with nitroglycerine, and tropinins were increasing, so they checked coronary angiogram- which was clear (minimal non-obstructive CAD), and CTA which had no evidence of dissection or acute abnormality and a cardiac ECHO which looked good.    They dx her with a hypertensive crisis found with very high BPs.  They thought her chest sx's were likely due to MSK pain, or muscle spasm.    They did not talk about if she had been taking her medications regularly.  They added norvasc 2.5mg/d to add to her prinzide for BP and possible coronary vasospasm.   They also gave her ultram for the pain.  Rec continuing her daily asa and statin.      Daughter Abbie here today.  She does note short-term memory issues with her mother.  Sx's for last couple yrs, hadn't noticed much worsening lately, though.  Not all the time or terrible.  She could see her forgetting to take her pills.  Then she'll remember, or say 'she hasn't taken it'.    Lives with her daughter (Abbie- here today) much of the time (helps take care of Abbie's son), and the other part of the time she lives with her  and other daughter (Rosa) ~40% of the time.    She's driving, doesn't seem to have issues with it as far as Abbie or pt has noticed.  She helps take care of Abbie's son, who has special needs and meds.  She's with him for about 6 hrs after he gets home from school while mother is at work.  He gets pills at two different times after school.  Her daughter has never suspected that he's missed  any of his meds that are to be given by her mom.      Six Item Cognitive Impairment Test   (6CIT):      What year is it?                               Correct - 0 points    What month is it?                               Correct - 0 points      Give the patient an address to remember with five components:   Bakari Workman ( first and last name - 2 components)   [address]  (number and name of street - 2 components)   [place] ( city - 1 component)      About what time is it (within the hour)? Correct - 0 points    Count backwards from 20 to 1:   Correct - 0 points    Say the months of the year in reverse: Correct - 0 points    Repeat the address phrase:   2 errors - 4 points    Total 6CIT Score:      4/28    Interpretation: The 6CIT uses an inverse score and questions are weighted to produce a total out of 28. Scores of 0-7 are considered normal and 8 or more significant.    Advantages The test has high sensitivity without compromising specificity even in mild dementia. It is easy to translate linguistically and culturally.  Disadvantages The main disadvantage is in the scoring and weighting of the test, which is initially confusing, however computer models have simplified this greatly.    Probability Statistics: At the 7/8 cut off: Overall figures sensitivity 90% specificity 100%, in mild dementia sensitivity = 78% , specificity = 100%    Copyright 2000 The [facility], [facility]. Courtesy of Dr. Matty Saunders            Problems taking medications regularly:  No, but patient states she feels a little more tired with new bp med       Medication changes since discharge: Yes, patient was prescribed new med for bp       Problems adhering to non-medication therapy:  None    Summary of hospitalization:  [facility] discharge summary reviewed  Diagnostic Tests/Treatments reviewed.  Follow up needed: none  Other Healthcare Providers Involved in Patient s Care:         None  Update since  discharge: improved.     Post Discharge Medication Reconciliation: discharge medications reconciled, continue medications without change.  Will discontinue ultram, though, as she hasn't been taking that for pain.    Plan of care communicated with patient and family     Coding guidelines for this visit:  Type of Medical   Decision Making Face-to-Face Visit       within 7 Days of discharge Face-to-Face Visit        within 14 days of discharge   Moderate Complexity [id number]   High Complexity [id number]                Additional history: as documented    Reviewed  and updated as needed this visit by clinical staff  Tobacco  Allergies  Meds  Problems  Med Hx  Surg Hx  Fam Hx         Reviewed and updated as needed this visit by Provider  Tobacco  Allergies  Meds  Problems  Med Hx  Surg Hx  Fam Hx           Patient Active Problem List   Diagnosis     Anxiety state     Urge incontinence     Hyperlipidemia LDL goal <100     Mild major depression (H)     Osteoarthritis     Essential hypertension with goal blood pressure less than 140/90     Advanced directives, counseling/discussion     Alcohol abuse     Abnormal ankle brachial index     Mild atherosclerosis of carotid artery     Insomnia     Bilateral hip pain     Somatic dysfunction of lumbar region     Chronic bilateral low back pain without sciatica     Nonallopathic lesion of sacroiliac region     Sacroiliac joint pain     Somatic dysfunction of pelvis region     Closed compression fracture of second lumbar vertebra (H)     Memory change     Chest pain     Past Surgical History:   Procedure Laterality Date     C NONSPECIFIC PROCEDURE  1985    Tubal ligation     C NONSPECIFIC PROCEDURE  1979    laporoscopy     COLONOSCOPY N/A 10/24/2016    Procedure: COMBINED COLONOSCOPY, SINGLE OR MULTIPLE BIOPSY/POLYPECTOMY BY BIOPSY;  Surgeon: Kwaku Henry MD;  Location:  GI     CV CORONARY ANGIOGRAM N/A 5/6/2019    Procedure: Coronary Angiogram;  Surgeon:  "Viktor Brothers MD;  Location:  HEART CARDIAC CATH LAB       Social History     Tobacco Use     Smoking status: Light Tobacco Smoker     Last attempt to quit: 2000     Years since quittin.3     Smokeless tobacco: Never Used     Tobacco comment: smokes occasionally   Substance Use Topics     Alcohol use: No     Alcohol/week: 0.0 oz     Comment: quit completely      Family History   Problem Relation Age of Onset     Hypertension Mother      Cerebrovascular Disease Mother          of complications of stroke at age 82     Heart Disease Mother         atrial fib     Osteoporosis Mother         two hip fx's     Neurologic Disorder Mother         MS     Hypertension Father      Neurologic Disorder Father         dementia- Alzheimers     Gastrointestinal Disease Father      Depression Father      Hypertension Brother      Neurologic Disorder Maternal Grandmother      Neurologic Disorder Sister      C.A.D. Paternal Grandmother 62         at 62, of MI     C.A.D. Paternal Aunt 62        \"\"     C.A.D. Paternal Aunt 62        \"\"     Colon Cancer Paternal Uncle          Current Outpatient Medications   Medication Sig Dispense Refill     acetaminophen (TYLENOL) 325 MG tablet Take 2 tablets (650 mg) by mouth every 4 hours as needed for mild pain 30 tablet 0     amLODIPine (NORVASC) 2.5 MG tablet Take 1 tablet (2.5 mg) by mouth At Bedtime 60 tablet 0     aspirin 81 MG EC tablet Take 1 tablet (81 mg) by mouth daily 90 tablet 3     atorvastatin (LIPITOR) 20 MG tablet Take 20 mg by mouth daily       lisinopril-hydrochlorothiazide (PRINZIDE/ZESTORETIC) 20-12.5 MG tablet Take 1 tablet by mouth daily 30 tablet 1     Multiple Vitamins-Minerals (MULTIVITAMIN ADULT PO) Take 1 tablet by mouth daily       order for DME Equipment being ordered: Digital home blood pressure monitor kit 1 Units 0     sertraline (ZOLOFT) 100 MG tablet Take 1 tablet (100 mg) by mouth daily 30 tablet 1     tolterodine (DETROL) 2 MG " "tablet Take 2 mg by mouth daily as needed for incontinence (Uses rarely)       traZODone (DESYREL) 50 MG tablet Take 50 mg by mouth nightly as needed for sleep (Uses very rarely)       Blood Pressure Monitor KIT 1 Units daily as needed (blood pressure check) 1 kit 0     Allergies   Allergen Reactions     No Known Drug Allergies      Recent Labs   Lab Test 05/14/19  1116 05/06/19  1350 05/06/19  0552 05/05/19  1230 04/10/19  1126  02/28/18  1427  07/06/17  1813 10/11/16  1123   LDL  --   --   --   --  120*  --  95  --   --  158*   HDL  --   --   --   --  77  --  82  --   --  74   TRIG  --   --   --   --  54  --  50  --   --  101   ALT 27  --   --  27  --   --   --   --  19 22   CR 0.80  --  0.77 0.75 0.72   < > 0.71   < > 0.68 0.71   GFRESTIMATED 75  --  79 81 85   < > 82   < > 86 82   GFRESTBLACK 87  --  >90 >90 >90   < > >90   < > >90   GFR Calc   >90   GFR Calc     POTASSIUM 4.5 4.7 3.2* 3.9 3.8   < > 3.9   < > 3.6 4.0   TSH 1.23  --   --   --   --   --   --   --   --   --     < > = values in this interval not displayed.      BP Readings from Last 3 Encounters:   05/14/19 145/79   05/06/19 127/58   04/10/19 148/70    Wt Readings from Last 3 Encounters:   05/14/19 59.9 kg (132 lb)   05/05/19 60 kg (132 lb 3.2 oz)   04/10/19 60.8 kg (134 lb 1.6 oz)                  Labs reviewed in EPIC    ROS:  Constitutional, HEENT, cardiovascular, pulmonary, gi and gu systems are negative, except as otherwise noted.    OBJECTIVE:     /79   Pulse 57   Temp 98.8  F (37.1  C) (Oral)   Resp 16   Ht 1.676 m (5' 6\")   Wt 59.9 kg (132 lb)   SpO2 98%   BMI 21.31 kg/m    Body mass index is 21.31 kg/m .  GENERAL APPEARANCE: healthy, alert and no distress     EYES: PERRL, sclera clear, EOMI     HENT: nose and mouth without ulcers or lesions     NECK: no adenopathy, no asymmetry, masses, or scars and thyroid normal to palpation, no carotid bruits or JVD     RESP: lungs clear to auscultation - no " rales, rhonchi or wheezes     CV: regular rates and rhythm, normal S1 S2, no S3 or S4 and no murmur, click or rub      Abdomen: soft, nontender, no HSM or masses and bowel sounds normal     Ext: warm, dry, no edema      Psych: full range affect, normal speech and grooming, judgement and insight intact     Diagnostic Test Results:  Results for orders placed or performed in visit on 05/14/19   TSH with free T4 reflex   Result Value Ref Range    TSH 1.23 0.40 - 4.00 mU/L   Vitamin B12   Result Value Ref Range    Vitamin B12 629 193 - 986 pg/mL   Comprehensive metabolic panel (BMP + Alb, Alk Phos, ALT, AST, Total. Bili, TP)   Result Value Ref Range    Sodium 137 133 - 144 mmol/L    Potassium 4.5 3.4 - 5.3 mmol/L    Chloride 104 94 - 109 mmol/L    Carbon Dioxide 25 20 - 32 mmol/L    Anion Gap 8 3 - 14 mmol/L    Glucose 82 70 - 99 mg/dL    Urea Nitrogen 18 7 - 30 mg/dL    Creatinine 0.80 0.52 - 1.04 mg/dL    GFR Estimate 75 >60 mL/min/[1.73_m2]    GFR Estimate If Black 87 >60 mL/min/[1.73_m2]    Calcium 9.5 8.5 - 10.1 mg/dL    Bilirubin Total 1.0 0.2 - 1.3 mg/dL    Albumin 4.3 3.4 - 5.0 g/dL    Protein Total 7.7 6.8 - 8.8 g/dL    Alkaline Phosphatase 49 40 - 150 U/L    ALT 27 0 - 50 U/L    AST 26 0 - 45 U/L       ASSESSMENT/PLAN:       ICD-10-CM    1. Atypical chest pain R07.89    2. Hypertensive crisis I16.9 order for DME   3. Memory deficit R41.3 TSH with free T4 reflex     Vitamin B12     Comprehensive metabolic panel (BMP + Alb, Alk Phos, ALT, AST, Total. Bili, TP)   4. Essential hypertension with goal blood pressure less than 140/90 I10      Atypical chest pain/HTN crisis f/u ER visit and overnight stay.  Pt with chest pain/cramping, found to have very high BP and elevated troponins.  F/u angio and echo looked good, however, which was reassuring.  Dx with suspicion of MSK pain or muscle spasm as dx for her pain- rx'd ultram, but she hasn't taken much of it and her sx's are minimal now.  Norvasc 2.5mg/d added to her  prinzide 20/12.5 mg/d.    HTN/memory- See last appt note for details, but at last appt, had been worried she had run out of many of her medications as refills would have run out months prior.   Refills sent after last appt, but wonder if she forgot to take her medications the days prior to the ER visit and chest sx's.  Daughter will start paying close attention to her medications daily, and see if she is forgetting to take them or not.    Memory concerns- as above.  She is scoring okay on 6-CIT, but concerned based one conversations with her at clinic.  Daughter has noticed mild sx's, but felt they were stable for past couple yrs. Will try and pay more attention to subtle sx's, and keep close watch on meds.  Will check labs as above.    Return in about 2 months (around 7/14/2019) for BP Recheck.  And memory f/u.  Abbie will plan to come to that appt.    Dee Castillo MD  [facility]

## 2019-05-14 NOTE — LETTER
May 21, 2019      Kimmie Carranza  [address] [facility] [address]        Dear ,    Here are your lab results from your recent visit...   -Your TSH (thyroid stimulating hormone, which is elevated in hypothyroidism, and lowered in hyperthyroidism) looks normal.   -Your CMP (which includes electrolyte levels, blood sugar levels, and kidney and liver function tests) looks normal as well.   -Your Vitamin B12 levels look like they are in a good range.     Resulted Orders   TSH with free T4 reflex   Result Value Ref Range    TSH 1.23 0.40 - 4.00 mU/L   Vitamin B12   Result Value Ref Range    Vitamin B12 629 193 - 986 pg/mL   Comprehensive metabolic panel (BMP + Alb, Alk Phos, ALT, AST, Total. Bili, TP)   Result Value Ref Range    Sodium 137 133 - 144 mmol/L    Potassium 4.5 3.4 - 5.3 mmol/L    Chloride 104 94 - 109 mmol/L    Carbon Dioxide 25 20 - 32 mmol/L    Anion Gap 8 3 - 14 mmol/L    Glucose 82 70 - 99 mg/dL    Urea Nitrogen 18 7 - 30 mg/dL    Creatinine 0.80 0.52 - 1.04 mg/dL    GFR Estimate 75 >60 mL/min/[1.73_m2]      Comment:      Non  GFR Calc  Starting 12/18/2018, serum creatinine based estimated GFR (eGFR) will be   calculated using the Chronic Kidney Disease Epidemiology Collaboration   (CKD-EPI) equation.      GFR Estimate If Black 87 >60 mL/min/[1.73_m2]      Comment:       GFR Calc  Starting 12/18/2018, serum creatinine based estimated GFR (eGFR) will be   calculated using the Chronic Kidney Disease Epidemiology Collaboration   (CKD-EPI) equation.      Calcium 9.5 8.5 - 10.1 mg/dL    Bilirubin Total 1.0 0.2 - 1.3 mg/dL    Albumin 4.3 3.4 - 5.0 g/dL    Protein Total 7.7 6.8 - 8.8 g/dL    Alkaline Phosphatase 49 40 - 150 U/L    ALT 27 0 - 50 U/L    AST 26 0 - 45 U/L       If you have any questions or concerns, please call the clinic at the number listed above.       Sincerely,        Dee Castillo MD/ms

## 2019-05-15 LAB
ALBUMIN SERPL-MCNC: 4.3 G/DL (ref 3.4–5)
ALP SERPL-CCNC: 49 U/L (ref 40–150)
ALT SERPL W P-5'-P-CCNC: 27 U/L (ref 0–50)
ANION GAP SERPL CALCULATED.3IONS-SCNC: 8 MMOL/L (ref 3–14)
AST SERPL W P-5'-P-CCNC: 26 U/L (ref 0–45)
BILIRUB SERPL-MCNC: 1 MG/DL (ref 0.2–1.3)
BUN SERPL-MCNC: 18 MG/DL (ref 7–30)
CALCIUM SERPL-MCNC: 9.5 MG/DL (ref 8.5–10.1)
CHLORIDE SERPL-SCNC: 104 MMOL/L (ref 94–109)
CO2 SERPL-SCNC: 25 MMOL/L (ref 20–32)
CREAT SERPL-MCNC: 0.8 MG/DL (ref 0.52–1.04)
GFR SERPL CREATININE-BSD FRML MDRD: 75 ML/MIN/{1.73_M2}
GLUCOSE SERPL-MCNC: 82 MG/DL (ref 70–99)
POTASSIUM SERPL-SCNC: 4.5 MMOL/L (ref 3.4–5.3)
PROT SERPL-MCNC: 7.7 G/DL (ref 6.8–8.8)
SODIUM SERPL-SCNC: 137 MMOL/L (ref 133–144)
TSH SERPL DL<=0.005 MIU/L-ACNC: 1.23 MU/L (ref 0.4–4)

## 2019-05-20 NOTE — TELEPHONE ENCOUNTER
Called pt - she was seen 5/14/2019  States CW told her to cancel 5/28/2019 appt  Cancelled  Medina SANTOS RN

## 2019-05-21 DIAGNOSIS — F41.1 ANXIETY STATE: ICD-10-CM

## 2019-05-21 DIAGNOSIS — F32.0 MILD MAJOR DEPRESSION (H): ICD-10-CM

## 2019-05-21 DIAGNOSIS — F33.0 MAJOR DEPRESSIVE DISORDER, RECURRENT EPISODE, MILD (H): ICD-10-CM

## 2019-05-21 NOTE — RESULT ENCOUNTER NOTE
Please send letter below with copy of results.  Thanks! CW    Here are your lab results from your recent visit...  -Your TSH (thyroid stimulating hormone, which is elevated in hypothyroidism, and lowered in hyperthyroidism) looks normal.  -Your CMP (which includes electrolyte levels, blood sugar levels, and kidney and liver function tests) looks normal as well.  -Your Vitamin B12 levels look like they are in a good range.      Please let me know if you have any questions.  Best,   Juwan Castillo MD

## 2019-05-22 RX ORDER — SERTRALINE HYDROCHLORIDE 100 MG/1
TABLET, FILM COATED ORAL
Qty: 30 TABLET | Refills: 1 | Status: SHIPPED | OUTPATIENT
Start: 2019-05-22 | End: 2019-09-18

## 2019-05-22 NOTE — TELEPHONE ENCOUNTER
Did not f/u on mood at last visit- focused on hospitalization (htn crisis, chest pain) and memory concerns.   Will send in zoloft at 100mg for another month with refill and f/u at next visit- 7/19 rec.  Thanks- CW

## 2019-05-22 NOTE — TELEPHONE ENCOUNTER
"  Routing refill request to provider for review/approval because:  Last PHQ9 score above goal of 4  Medina SANTOS RN    Last Written Prescription Date:  4/10/2019  Last Fill Quantity: 30,  # refills: 1  Last office visit: 5/14/2019 with prescribing provider:     Future Office Visit:    Requested Prescriptions   Pending Prescriptions Disp Refills     sertraline (ZOLOFT) 100 MG tablet [Pharmacy Med Name: SERTRALINE 100MG TABLETS] 30 tablet 0     Sig: TAKE 1 TABLET(100 MG) BY MOUTH DAILY       SSRIs Protocol Failed - 5/21/2019  4:16 PM        Failed - PHQ-9 score less than 5 in past 6 months     Please review last PHQ-9 score.           Passed - Medication is active on med list        Passed - Patient is age 18 or older        Passed - No active pregnancy on record        Passed - No positive pregnancy test in last 12 months        Passed - Recent (6 mo) or future (30 days) visit within the authorizing provider's specialty     Patient had office visit in the last 6 months or has a visit in the next 30 days with authorizing provider or within the authorizing provider's specialty.  See \"Patient Info\" tab in inbasket, or \"Choose Columns\" in Meds & Orders section of the refill encounter.              "

## 2019-06-28 DIAGNOSIS — I10 ESSENTIAL HYPERTENSION WITH GOAL BLOOD PRESSURE LESS THAN 140/90: ICD-10-CM

## 2019-06-28 DIAGNOSIS — R68.89 ABNORMAL ANKLE BRACHIAL INDEX: ICD-10-CM

## 2019-06-28 DIAGNOSIS — I25.10 MILD CORONARY ARTERY DISEASE: ICD-10-CM

## 2019-06-28 DIAGNOSIS — Z13.6 CARDIOVASCULAR SCREENING; LDL GOAL LESS THAN 160: ICD-10-CM

## 2019-06-30 NOTE — TELEPHONE ENCOUNTER
"Prinzide  Last Written Prescription Date:  04/11/2019  Last Fill Quantity: 30,  # refills: 1   Last office visit: 5/14/2019 with prescribing provider:  Yes    Future Office Visit:      Lipitor       Last Written Prescription Date:  unknown  Last Fill Quantity: ,   # refills:   Last Office Visit: 05/14/2019  Future Office visit:       Routing refill request to provider for review/approval because:  Medication is reported/historical      Requested Prescriptions   Pending Prescriptions Disp Refills     lisinopril-hydrochlorothiazide (PRINZIDE/ZESTORETIC) 20-12.5 MG tablet [Pharmacy Med Name: LISINOPRIL-HCTZ 20/12.5MG TABLETS] 30 tablet 0     Sig: TAKE 1 TABLET BY MOUTH DAILY       Diuretics (Including Combos) Protocol Failed - 6/28/2019  4:43 PM        Failed - Blood pressure under 140/90 in past 12 months     BP Readings from Last 3 Encounters:   05/14/19 145/79   05/06/19 127/58   04/10/19 148/70                 Passed - Recent (12 mo) or future (30 days) visit within the authorizing provider's specialty     Patient had office visit in the last 12 months or has a visit in the next 30 days with authorizing provider or within the authorizing provider's specialty.  See \"Patient Info\" tab in inbasket, or \"Choose Columns\" in Meds & Orders section of the refill encounter.              Passed - Medication is active on med list        Passed - Patient is age 18 or older        Passed - No active pregancy on record        Passed - Normal serum creatinine on file in past 12 months     Recent Labs   Lab Test 05/14/19  1116   CR 0.80              Passed - Normal serum potassium on file in past 12 months     Recent Labs   Lab Test 05/14/19  1116   POTASSIUM 4.5                    Passed - Normal serum sodium on file in past 12 months     Recent Labs   Lab Test 05/14/19  1116                 Passed - No positive pregnancy test in past 12 months        atorvastatin (LIPITOR) 20 MG tablet [Pharmacy Med Name: ATORVASTATIN 20MG " "TABLETS] 30 tablet 0     Sig: TAKE 1 TABLET BY MOUTH DAILY       Statins Protocol Passed - 6/28/2019  4:43 PM        Passed - LDL on file in past 12 months     Recent Labs   Lab Test 04/10/19  1126   *             Passed - No abnormal creatine kinase in past 12 months     No lab results found.             Passed - Recent (12 mo) or future (30 days) visit within the authorizing provider's specialty     Patient had office visit in the last 12 months or has a visit in the next 30 days with authorizing provider or within the authorizing provider's specialty.  See \"Patient Info\" tab in inbasket, or \"Choose Columns\" in Meds & Orders section of the refill encounter.              Passed - Medication is active on med list        Passed - Patient is age 18 or older        Passed - No active pregnancy on record        Passed - No positive pregnancy test in past 12 months          "

## 2019-07-01 PROBLEM — I25.10 MILD CORONARY ARTERY DISEASE: Status: ACTIVE | Noted: 2019-07-01

## 2019-07-01 RX ORDER — LISINOPRIL AND HYDROCHLOROTHIAZIDE 12.5; 2 MG/1; MG/1
1 TABLET ORAL DAILY
Qty: 30 TABLET | Refills: 1 | Status: SHIPPED | OUTPATIENT
Start: 2019-07-01 | End: 2019-09-18

## 2019-07-01 RX ORDER — ATORVASTATIN CALCIUM 20 MG/1
TABLET, FILM COATED ORAL
Qty: 30 TABLET | Refills: 1 | Status: SHIPPED | OUTPATIENT
Start: 2019-07-01 | End: 2019-09-10

## 2019-07-01 NOTE — TELEPHONE ENCOUNTER
Prinzide:  Routing refill request to provider for review/approval because:  Recent B/Ps above 140/90    Lipitor:  Routing refill request to provider for review/approval because:  Medication is reported/historical    Medina SANTOS RN

## 2019-07-01 NOTE — TELEPHONE ENCOUNTER
Chart reviewed-  Cardiac cath with minimal CAD in 5/19, rec continued high-dose statin, and looks like atorvastatin was put on her chart at 20mg/d (from 10mg/d previously).  Will send in lipitor 20mg/d, and send in refills for prinzide at same dose.  Do not see that she has a f/u appt made, though, and wanted to see her back in mid 7/19.  Could you call her and/or her daughter Abbie to schedule f/u appt?  Thanks!  CW

## 2019-07-02 NOTE — TELEPHONE ENCOUNTER
Pt informed    Next 5 appointments (look out 90 days)    Jul 24, 2019 11:30 AM CDT  Office Visit with Dee Castillo MD  [facility] [address] [facility] [address]  [phone]        Medina SANTOS RN

## 2019-07-03 RX ORDER — AMLODIPINE BESYLATE 2.5 MG/1
TABLET ORAL
Qty: 60 TABLET | Refills: 0 | Status: SHIPPED | OUTPATIENT
Start: 2019-07-03 | End: 2019-07-24

## 2019-07-03 NOTE — TELEPHONE ENCOUNTER
(Second request from Christel)    Pending Prescriptions:                       Disp   Refills    amLODIPine (NORVASC) 2.5 MG tablet [Pharm*60 tab*0            Sig: TAKE 1 TABLET(2.5 MG) BY MOUTH AT BEDTIME      Last Visit 5/6/19 Angiogram  Last Filled 5/6/19  Quantity 60  Req. Received 7/3/19  CHERELLE Gomes

## 2019-07-24 ENCOUNTER — OFFICE VISIT (OUTPATIENT)
Dept: FAMILY MEDICINE | Facility: CLINIC | Age: 69
End: 2019-07-24
Payer: COMMERCIAL

## 2019-07-24 VITALS
BODY MASS INDEX: 21.53 KG/M2 | HEIGHT: 66 IN | OXYGEN SATURATION: 98 % | HEART RATE: 58 BPM | DIASTOLIC BLOOD PRESSURE: 80 MMHG | TEMPERATURE: 98.7 F | RESPIRATION RATE: 16 BRPM | WEIGHT: 134 LBS | SYSTOLIC BLOOD PRESSURE: 156 MMHG

## 2019-07-24 DIAGNOSIS — R41.3 MEMORY CHANGE: Primary | ICD-10-CM

## 2019-07-24 DIAGNOSIS — I10 ESSENTIAL HYPERTENSION WITH GOAL BLOOD PRESSURE LESS THAN 140/90: ICD-10-CM

## 2019-07-24 PROCEDURE — 99214 OFFICE O/P EST MOD 30 MIN: CPT | Performed by: FAMILY MEDICINE

## 2019-07-24 RX ORDER — AMLODIPINE BESYLATE 5 MG/1
5 TABLET ORAL AT BEDTIME
Qty: 30 TABLET | Refills: 0 | Status: SHIPPED | OUTPATIENT
Start: 2019-07-24 | End: 2019-09-11

## 2019-07-24 RX ORDER — AMLODIPINE BESYLATE 2.5 MG/1
5 TABLET ORAL AT BEDTIME
Qty: 30 TABLET | Refills: 0 | Status: SHIPPED | OUTPATIENT
Start: 2019-07-24 | End: 2019-07-24

## 2019-07-24 ASSESSMENT — MIFFLIN-ST. JEOR: SCORE: 1149.57

## 2019-07-24 NOTE — PROGRESS NOTES
Subjective     Kimmie Carranza is a 69 year old female who presents to clinic today for the following health issues:    HPI   Hypertension Follow-up    Do you check your blood pressure regularly outside of the clinic? No 133/?? at Y.    Are you following a low salt diet? Yes    Are your blood pressures ever more than 140 on the top number (systolic) OR more   than 90 on the bottom number (diastolic), for example 140/90? Yes    Amount of exercise or physical activity: 1 day/week for an average of 45-60 minutes    Problems taking medications regularly: Yes- sometimes she misses dosage     Medication side effects: none    Diet: regular (no restrictions)    Medication set-up-   Pt thinks she remembers 90% of the time, daughter things 80% of the time.  She's setting up her weekly medication set up.  Lives half/half in two places.    Daughter (youngest) here today-   Losing keys/glasses frequently.  Watched Stranger Things, had forgotten a lot about the previous two seasons- had to re-watch them.  She'll tell her something, a story in the news.  Couple days later, and she'll tell her the same story.  Happens frequently.  Others in family don't remember events as well as other family member (pt and this daughter).  Noticing these things more in the last six months.      Memory loss-has been losing things such as  keys and glasses, forgetting the shows that she has watched, forgetting events and mispronouncing words. Noticed slow changes in memory within the last five years. Father had dementia in his 60s.     Father- dx with dementia in his 60s, lived into his 90s.  Doctor, general practitioner.      Pain in left upper side- pain at night, most nights.  When it's bad, it feels like cramps/pressure.  Getting better over time.  More at night, changes her position, goes to the other bed and sleeps on her other side.  During day, not really aware of it.  No .      Patient Active Problem List   Diagnosis     Anxiety state      Urge incontinence     Hyperlipidemia LDL goal <100     Mild major depression (H)     Osteoarthritis     Essential hypertension with goal blood pressure less than 140/90     Advanced directives, counseling/discussion     Alcohol abuse     Abnormal ankle brachial index     Mild atherosclerosis of carotid artery     Insomnia     Bilateral hip pain     Somatic dysfunction of lumbar region     Chronic bilateral low back pain without sciatica     Nonallopathic lesion of sacroiliac region     Sacroiliac joint pain     Somatic dysfunction of pelvis region     Closed compression fracture of second lumbar vertebra (H)     Memory change     Chest pain     Mild coronary artery disease     Past Surgical History:   Procedure Laterality Date     C NONSPECIFIC PROCEDURE      Tubal ligation     C NONSPECIFIC PROCEDURE      laporoscopy     COLONOSCOPY N/A 10/24/2016    Procedure: COMBINED COLONOSCOPY, SINGLE OR MULTIPLE BIOPSY/POLYPECTOMY BY BIOPSY;  Surgeon: Kwaku Henry MD;  Location:  GI     CV CORONARY ANGIOGRAM N/A 2019    Procedure: Coronary Angiogram;  Surgeon: Viktor Brothers MD;  Location: [facility] CARDIAC CATH LAB       Social History     Tobacco Use     Smoking status: Light Tobacco Smoker     Last attempt to quit: 2000     Years since quittin.7     Smokeless tobacco: Never Used     Tobacco comment: smokes occasionally   Substance Use Topics     Alcohol use: No     Alcohol/week: 0.0 oz     Comment: quit completely      Family History   Problem Relation Age of Onset     Hypertension Mother      Cerebrovascular Disease Mother          of complications of stroke at age 82     Heart Disease Mother         atrial fib     Osteoporosis Mother         two hip fx's     Neurologic Disorder Mother         MS     Hypertension Father      Neurologic Disorder Father         dementia- Alzheimers, dx in his 60s, live to his 90s     Gastrointestinal Disease Father      Depression Father   "    Hypertension Brother      Neurologic Disorder Maternal Grandmother      Neurologic Disorder Sister      LIZZDESHAUNSONIA. Paternal Grandmother 62         at 62, of [place]     GYPSY. Paternal Aunt 62        \"\"     GYPSY. Paternal Aunt 62        \"\"     Colon Cancer Paternal Uncle          Current Outpatient Medications   Medication Sig Dispense Refill     acetaminophen (TYLENOL) 325 MG tablet Take 2 tablets (650 mg) by mouth every 4 hours as needed for mild pain 30 tablet 0     aspirin 81 MG EC tablet Take 1 tablet (81 mg) by mouth daily 90 tablet 3     Blood Pressure Monitor KIT 1 Units daily as needed (blood pressure check) 1 kit 0     Multiple Vitamins-Minerals (MULTIVITAMIN ADULT PO) Take 1 tablet by mouth daily       order for DME Equipment being ordered: Digital home blood pressure monitor kit 1 Units 0     tolterodine (DETROL) 2 MG tablet Take 2 mg by mouth daily as needed for incontinence (Uses rarely)       traZODone (DESYREL) 50 MG tablet Take 50 mg by mouth nightly as needed for sleep (Uses very rarely)       amLODIPine (NORVASC) 5 MG tablet Take 1 tablet (5 mg) by mouth At Bedtime 90 tablet 0     atorvastatin (LIPITOR) 20 MG tablet Take 1 tablet (20 mg) by mouth daily 90 tablet 3     lisinopril-hydrochlorothiazide (PRINZIDE/ZESTORETIC) 20-12.5 MG tablet Take 1 tablet by mouth daily 90 tablet 0     sertraline (ZOLOFT) 100 MG tablet Take 1 tablet (100 mg) by mouth daily 90 tablet 0     Allergies   Allergen Reactions     No Known Drug Allergies      Recent Labs   Lab Test 19  1217 19  1116  19  1230 04/10/19  1126  18  1427  17  1813 10/11/16  1123   LDL  --   --   --   --  120*  --  95  --   --  158*   HDL  --   --   --   --  77  --  82  --   --  74   TRIG  --   --   --   --  54  --  50  --   --  101   ALT  --  27  --  27  --   --   --   --  19 22   CR 0.62 0.80   < > 0.75 0.72   < > 0.71   < > 0.68 0.71   GFRESTIMATED >90 75   < > 81 85   < > 82   < > 86 82   GFRESTBLACK >90 87   < > " ">90 >90   < > >90   < > >90   GFR Calc   >90   GFR Calc     POTASSIUM 3.7 4.5   < > 3.9 3.8   < > 3.9   < > 3.6 4.0   TSH  --  1.23  --   --   --   --   --   --   --   --     < > = values in this interval not displayed.      BP Readings from Last 3 Encounters:   09/18/19 131/82   07/24/19 156/80   05/14/19 145/79    Wt Readings from Last 3 Encounters:   09/18/19 60.9 kg (134 lb 4 oz)   07/24/19 60.8 kg (134 lb)   05/14/19 59.9 kg (132 lb)            Reviewed and updated as needed this visit by Provider  Tobacco  Allergies  Meds  Problems  Med Hx  Surg Hx  Fam Hx         Review of Systems   ROS COMP: Constitutional, HEENT, cardiovascular, pulmonary, gi and gu systems are negative, except as otherwise noted.      Objective    /80 (BP Location: Right arm, Patient Position: Sitting, Cuff Size: Adult Regular)   Pulse 58   Temp 98.7  F (37.1  C) (Oral)   Resp 16   Ht 1.676 m (5' 6\")   Wt 60.8 kg (134 lb)   SpO2 98%   Breastfeeding? No   BMI 21.63 kg/m    Body mass index is 21.63 kg/m .  Physical Exam   GENERAL APPEARANCE: healthy, alert and no distress     EYES: PERRL, sclera clear     HENT: nose and mouth without ulcers or lesions     NECK: no adenopathy, no asymmetry, masses, or scars and thyroid normal to palpation     RESP: lungs clear to auscultation - no rales, rhonchi or wheezes     CV: regular rates and rhythm, normal S1 S2, no S3 or S4 and no murmur, click or rub      Abdomen: soft, nontender, no HSM or masses and bowel sounds normal     Ext: warm, dry, no edema      Neuro: CN 2-12 grossly nl, fundi WNL bilaterally, UE and LE strength 5/5, nl sensation and DTR's.  Normal gait.      Psych: full range affect, normal speech and grooming, judgement and insight intact     Diagnostic Test Results:  Labs reviewed in Epic  Results for orders placed or performed in visit on 05/14/19   TSH with free T4 reflex   Result Value Ref Range    TSH 1.23 0.40 - 4.00 mU/L "   Vitamin B12   Result Value Ref Range    Vitamin B12 629 193 - 986 pg/mL   Comprehensive metabolic panel (BMP + Alb, Alk Phos, ALT, AST, Total. Bili, TP)   Result Value Ref Range    Sodium 137 133 - 144 mmol/L    Potassium 4.5 3.4 - 5.3 mmol/L    Chloride 104 94 - 109 mmol/L    Carbon Dioxide 25 20 - 32 mmol/L    Anion Gap 8 3 - 14 mmol/L    Glucose 82 70 - 99 mg/dL    Urea Nitrogen 18 7 - 30 mg/dL    Creatinine 0.80 0.52 - 1.04 mg/dL    GFR Estimate 75 >60 mL/min/[1.73_m2]    GFR Estimate If Black 87 >60 mL/min/[1.73_m2]    Calcium 9.5 8.5 - 10.1 mg/dL    Bilirubin Total 1.0 0.2 - 1.3 mg/dL    Albumin 4.3 3.4 - 5.0 g/dL    Protein Total 7.7 6.8 - 8.8 g/dL    Alkaline Phosphatase 49 40 - 150 U/L    ALT 27 0 - 50 U/L    AST 26 0 - 45 U/L           Assessment & Plan       ICD-10-CM    1. Memory change R41.3 GERIATRICS REFERRAL     NEUROLOGY ADULT REFERRAL   2. Essential hypertension with goal blood pressure less than 140/90 I10 DISCONTINUED: amLODIPine (NORVASC) 2.5 MG tablet     DISCONTINUED: amLODIPine (NORVASC) 5 MG tablet     Memory change- sx's appear consistent with mild cognitive impairment, and daughter her today does think that she has some mild increasing memory issues.  They are both interested in seeing a specialist and during further testing, likely neuropsych testing.  Referral given to both neurology and Dr. Allen at [facility] if she has openings.    HTN- high today, and she thinks she's been taking her HTN meds 90% of the time (daughter thinks 80% of the time).  Will increase her norvasc to 5mg/d, and cont her prinzide.  RTC in ~3 wks for f/u BP check and BMP.      Tobacco Cessation:   reports that she has been smoking.  She has never used smokeless tobacco.  Tobacco Cessation Action Plan: Information offered: Patient not interested at this time      Return in about 3 weeks (around 8/14/2019) for Blood Pressure Recheck, memory follow-up.    Dee Castillo MD  [facility]  CLINIC

## 2019-07-24 NOTE — NURSING NOTE
"Chief Complaint   Patient presents with     Hypertension     COGNITIVE ASSESSMENT     /80 (BP Location: Right arm, Patient Position: Sitting, Cuff Size: Adult Regular)   Pulse 58   Temp 98.7  F (37.1  C) (Oral)   Resp 16   Ht 1.676 m (5' 6\")   Wt 60.8 kg (134 lb)   SpO2 98%   Breastfeeding? No   BMI 21.63 kg/m   Estimated body mass index is 21.63 kg/m  as calculated from the following:    Height as of this encounter: 1.676 m (5' 6\").    Weight as of this encounter: 60.8 kg (134 lb).  bp completed using cuff size: regular       Health Maintenance addressed:  Mammogram and shingles     Possibly completing today per provider review.    Law Tovar MA     "

## 2019-07-24 NOTE — PATIENT INSTRUCTIONS
PLEASE CALL TO SCHEDULE YOUR MAMMOGRAM  [facility] [phone]  [facility] [phone]  [facility]   [phone]  Central Scheduling (all locations) [phone]    If you are under/uninsured, we recommend you contact the [facility]. They offer mammograms on a sliding fee charge. You can schedule with them at [phone].

## 2019-07-26 ENCOUNTER — HOSPITAL ENCOUNTER (OUTPATIENT)
Dept: MAMMOGRAPHY | Facility: CLINIC | Age: 69
Discharge: HOME | End: 2019-07-26
Attending: FAMILY MEDICINE | Admitting: FAMILY MEDICINE
Payer: COMMERCIAL

## 2019-07-26 DIAGNOSIS — Z12.31 VISIT FOR SCREENING MAMMOGRAM: ICD-10-CM

## 2019-07-26 PROCEDURE — 77063 BREAST TOMOSYNTHESIS BI: CPT

## 2019-09-10 DIAGNOSIS — R68.89 ABNORMAL ANKLE BRACHIAL INDEX: ICD-10-CM

## 2019-09-10 DIAGNOSIS — Z13.6 CARDIOVASCULAR SCREENING; LDL GOAL LESS THAN 160: ICD-10-CM

## 2019-09-11 DIAGNOSIS — I10 ESSENTIAL HYPERTENSION WITH GOAL BLOOD PRESSURE LESS THAN 140/90: ICD-10-CM

## 2019-09-11 NOTE — TELEPHONE ENCOUNTER
"Note from 7/24/19 OV:  Return in about 3 weeks (around 8/14/2019) for Blood Pressure Recheck, memory follow-up.    VM left for patient's daughter Abbie to call back.  Patient needs to schedule f/u with CW.  Vianey Almendarez, BLANK    Requested Prescriptions   Pending Prescriptions Disp Refills     atorvastatin (LIPITOR) 20 MG tablet [Pharmacy Med Name: ATORVASTATIN 20MG TABLETS] 30 tablet 0     Sig: TAKE 1 TABLET BY MOUTH DAILY       Statins Protocol Passed - 9/10/2019  6:40 PM        Passed - LDL on file in past 12 months     Recent Labs   Lab Test 04/10/19  1126   *             Passed - No abnormal creatine kinase in past 12 months     No lab results found.             Passed - Recent (12 mo) or future (30 days) visit within the authorizing provider's specialty     Patient had office visit in the last 12 months or has a visit in the next 30 days with authorizing provider or within the authorizing provider's specialty.  See \"Patient Info\" tab in inbasket, or \"Choose Columns\" in Meds & Orders section of the refill encounter.              Passed - Medication is active on med list        Passed - Patient is age 18 or older        Passed - No active pregnancy on record        Passed - No positive pregnancy test in past 12 months        "

## 2019-09-12 NOTE — TELEPHONE ENCOUNTER
"AMLODIPINE BESYLATE 5MG TABLETS  Last Written Prescription Date:  07/24/2019  Last Fill Quantity: 30,  # refills: 0   Last office visit: No previous visit found with prescribing provider:  SALINAS   Future Office Visit:  Nothing at this time scheduled.    Requested Prescriptions   Pending Prescriptions Disp Refills     amLODIPine (NORVASC) 5 MG tablet [Pharmacy Med Name: AMLODIPINE BESYLATE 5MG TABLETS] 30 tablet 0     Sig: TAKE 1 TABLET BY MOUTH AT BEDTIME       Calcium Channel Blockers Protocol  Failed - 9/11/2019  4:00 PM        Failed - Blood pressure under 140/90 in past 12 months     BP Readings from Last 3 Encounters:   07/24/19 156/80   05/14/19 145/79   05/06/19 127/58                 Passed - Recent (12 mo) or future (30 days) visit within the authorizing provider's specialty     Patient had office visit in the last 12 months or has a visit in the next 30 days with authorizing provider or within the authorizing provider's specialty.  See \"Patient Info\" tab in inbasket, or \"Choose Columns\" in Meds & Orders section of the refill encounter.              Passed - Medication is active on med list        Passed - Patient is age 18 or older        Passed - No active pregnancy on record        Passed - Normal serum creatinine on file in past 12 months     Recent Labs   Lab Test 05/14/19  1116   CR 0.80             Passed - No positive pregnancy test in past 12 months        "

## 2019-09-13 RX ORDER — ATORVASTATIN CALCIUM 20 MG/1
TABLET, FILM COATED ORAL
Qty: 30 TABLET | Refills: 0 | Status: SHIPPED | OUTPATIENT
Start: 2019-09-13 | End: 2019-09-18

## 2019-09-13 RX ORDER — AMLODIPINE BESYLATE 5 MG/1
TABLET ORAL
Qty: 30 TABLET | Refills: 0 | Status: SHIPPED | OUTPATIENT
Start: 2019-09-13 | End: 2019-09-18

## 2019-09-13 NOTE — TELEPHONE ENCOUNTER
Prescription approved per [facility] Refill Protocol.    Next 5 appointments (look out 90 days)    Sep 18, 2019 11:00 AM CDT  Office Visit with Dee Castillo MD  [facility] ([facility]) [address] [address]  [phone]        Medina SANTOS RN

## 2019-09-13 NOTE — TELEPHONE ENCOUNTER
Next 5 appointments (look out 90 days)    Sep 18, 2019 11:00 AM CDT  Office Visit with Dee Castillo MD  [facility] ([facility]) [address] [address]  [phone]        Prescription approved per [facility] Refill Protocol.  Medina SANTOS RN

## 2019-09-17 DIAGNOSIS — Z13.6 CARDIOVASCULAR SCREENING; LDL GOAL LESS THAN 160: ICD-10-CM

## 2019-09-17 DIAGNOSIS — R68.89 ABNORMAL ANKLE BRACHIAL INDEX: ICD-10-CM

## 2019-09-17 RX ORDER — ATORVASTATIN CALCIUM 20 MG/1
20 TABLET, FILM COATED ORAL DAILY
Qty: 30 TABLET | Refills: 0 | Status: CANCELLED | OUTPATIENT
Start: 2019-09-17

## 2019-09-17 NOTE — TELEPHONE ENCOUNTER
"ATORVASTATIN 20MG  Last Written Prescription Date:  09/13/19  Last Fill Quantity:30,  # refills: 0   Last office visit: No previous visit found with prescribing provider:  YES   Future Office Visit:   Next 5 appointments (look out 90 days)    Sep 18, 2019 11:00 AM CDT  Office Visit with Dee Castillo MD  [facility] ([facility]) [address] [address] [phone]         Requested Prescriptions   Pending Prescriptions Disp Refills     atorvastatin (LIPITOR) 20 MG tablet 30 tablet 0     Sig: Take 1 tablet (20 mg) by mouth daily       Statins Protocol Passed - 9/17/2019  3:31 PM        Passed - LDL on file in past 12 months     Recent Labs   Lab Test 04/10/19  1126   *             Passed - No abnormal creatine kinase in past 12 months     No lab results found.             Passed - Recent (12 mo) or future (30 days) visit within the authorizing provider's specialty     Patient had office visit in the last 12 months or has a visit in the next 30 days with authorizing provider or within the authorizing provider's specialty.  See \"Patient Info\" tab in inbasket, or \"Choose Columns\" in Meds & Orders section of the refill encounter.              Passed - Medication is active on med list        Passed - Patient is age 18 or older        Passed - No active pregnancy on record        Passed - No positive pregnancy test in past 12 months        "
Duplicate  Refilled today  Medina SANTOS RN    
Patient

## 2019-09-18 ENCOUNTER — OFFICE VISIT (OUTPATIENT)
Dept: FAMILY MEDICINE | Facility: CLINIC | Age: 69
End: 2019-09-18
Payer: COMMERCIAL

## 2019-09-18 VITALS
SYSTOLIC BLOOD PRESSURE: 131 MMHG | TEMPERATURE: 98.8 F | DIASTOLIC BLOOD PRESSURE: 82 MMHG | RESPIRATION RATE: 14 BRPM | WEIGHT: 134.25 LBS | OXYGEN SATURATION: 100 % | HEIGHT: 66 IN | BODY MASS INDEX: 21.57 KG/M2 | HEART RATE: 57 BPM

## 2019-09-18 DIAGNOSIS — F33.0 MAJOR DEPRESSIVE DISORDER, RECURRENT EPISODE, MILD (H): ICD-10-CM

## 2019-09-18 DIAGNOSIS — F32.0 MILD MAJOR DEPRESSION (H): ICD-10-CM

## 2019-09-18 DIAGNOSIS — Z13.6 CARDIOVASCULAR SCREENING; LDL GOAL LESS THAN 160: ICD-10-CM

## 2019-09-18 DIAGNOSIS — Z23 FLU VACCINE NEED: ICD-10-CM

## 2019-09-18 DIAGNOSIS — R68.89 ABNORMAL ANKLE BRACHIAL INDEX: ICD-10-CM

## 2019-09-18 DIAGNOSIS — I10 ESSENTIAL HYPERTENSION WITH GOAL BLOOD PRESSURE LESS THAN 140/90: Primary | ICD-10-CM

## 2019-09-18 PROCEDURE — G0008 ADMIN INFLUENZA VIRUS VAC: HCPCS | Performed by: FAMILY MEDICINE

## 2019-09-18 PROCEDURE — 36415 COLL VENOUS BLD VENIPUNCTURE: CPT | Performed by: FAMILY MEDICINE

## 2019-09-18 PROCEDURE — 99214 OFFICE O/P EST MOD 30 MIN: CPT | Mod: 25 | Performed by: FAMILY MEDICINE

## 2019-09-18 PROCEDURE — 90662 IIV NO PRSV INCREASED AG IM: CPT | Performed by: FAMILY MEDICINE

## 2019-09-18 PROCEDURE — 80048 BASIC METABOLIC PNL TOTAL CA: CPT | Performed by: FAMILY MEDICINE

## 2019-09-18 RX ORDER — SERTRALINE HYDROCHLORIDE 100 MG/1
100 TABLET, FILM COATED ORAL DAILY
Qty: 90 TABLET | Refills: 0 | Status: SHIPPED | OUTPATIENT
Start: 2019-09-18 | End: 2019-12-18

## 2019-09-18 RX ORDER — AMLODIPINE BESYLATE 5 MG/1
5 TABLET ORAL AT BEDTIME
Qty: 90 TABLET | Refills: 0 | Status: SHIPPED | OUTPATIENT
Start: 2019-09-18 | End: 2019-12-18

## 2019-09-18 RX ORDER — ATORVASTATIN CALCIUM 20 MG/1
20 TABLET, FILM COATED ORAL DAILY
Qty: 90 TABLET | Refills: 3 | Status: SHIPPED | OUTPATIENT
Start: 2019-09-18 | End: 2020-09-30

## 2019-09-18 RX ORDER — LISINOPRIL AND HYDROCHLOROTHIAZIDE 12.5; 2 MG/1; MG/1
1 TABLET ORAL DAILY
Qty: 90 TABLET | Refills: 0 | Status: SHIPPED | OUTPATIENT
Start: 2019-09-18 | End: 2019-12-18

## 2019-09-18 ASSESSMENT — PAIN SCALES - GENERAL: PAINLEVEL: NO PAIN (0)

## 2019-09-18 ASSESSMENT — MIFFLIN-ST. JEOR: SCORE: 1150.7

## 2019-09-18 NOTE — PROGRESS NOTES
Subjective   Kimmie Carranza is a 69 year old female who presents to clinic today for the following health issues:    HPI   Hypertension Follow-up      Do you check your blood pressure regularly outside of the clinic? Yes at the gym    Are you following a low salt diet? Yes    Are your blood pressures ever more than 140 on the top number (systolic) OR more   than 90 on the bottom number (diastolic), for example 140/90? Yes when she doesn't take medications    How many servings of fruits and vegetables do you eat daily?  2-3    On average, how many sweetened beverages do you drink each day (soda, juice, sweet tea, etc)?   1  How many days per week do you miss taking your medication? 1 or 2 times a week    What makes it hard for you to take your medications?   patient usually doesnt eat until the afternoon anad may forget and like to  eat prior to taking medications    HTN- at last visit, increased norvasc to 5mg/d and continued on the prinzide.  Bp better today.    Memory f/u from 7/19 visit.  Daughter was there at that appt.  Referred 10/2/19 appt with PA- [facility] - she thinks?    In [place], where she went before in 9/17.      Other issues- if she sits too long, it takes longer to stand up straight.  More stiffness.  All over, but back and hips and hands and knees.  [facility]  Water aerobics and strength training.  Water aerobics really helps her stiffness- can go 2x/wk, sometimes makes 2x/wk.      Patient Active Problem List   Diagnosis     Anxiety state     Urge incontinence     Hyperlipidemia LDL goal <100     Mild major depression (H)     Osteoarthritis     Essential hypertension with goal blood pressure less than 140/90     Advanced directives, counseling/discussion     Alcohol abuse     Abnormal ankle brachial index     Mild atherosclerosis of carotid artery     Insomnia     Bilateral hip pain     Somatic dysfunction of lumbar region     Chronic bilateral low back pain  "without sciatica     Nonallopathic lesion of sacroiliac region     Sacroiliac joint pain     Somatic dysfunction of pelvis region     Closed compression fracture of second lumbar vertebra (H)     Memory change     Chest pain     Mild coronary artery disease     Past Surgical History:   Procedure Laterality Date     C NONSPECIFIC PROCEDURE      Tubal ligation     C NONSPECIFIC PROCEDURE      laporoscopy     COLONOSCOPY N/A 10/24/2016    Procedure: COMBINED COLONOSCOPY, SINGLE OR MULTIPLE BIOPSY/POLYPECTOMY BY BIOPSY;  Surgeon: Kwaku Henry MD;  Location:  GI     CV CORONARY ANGIOGRAM N/A 2019    Procedure: Coronary Angiogram;  Surgeon: Viktor Brothers MD;  Location:  [facility]       Social History     Tobacco Use     Smoking status: Light Tobacco Smoker     Last attempt to quit: 2000     Years since quittin.7     Smokeless tobacco: Never Used     Tobacco comment: smokes occasionally   Substance Use Topics     Alcohol use: No     Alcohol/week: 0.0 oz     Comment: quit completely -     Family History   Problem Relation Age of Onset     Hypertension Mother      Cerebrovascular Disease Mother          of complications of stroke at age 82     Heart Disease Mother         atrial fib     Osteoporosis Mother         two hip fx's     Neurologic Disorder Mother         MS     Hypertension Father      Neurologic Disorder Father         dementia- Alzheimers, dx in his 60s, live to his 90s     Gastrointestinal Disease Father      Depression Father      Hypertension Brother      Neurologic Disorder Maternal Grandmother      Neurologic Disorder Sister      C.A.D. Paternal Grandmother 62         at 62, of MI     C.A.ALEXX. Paternal Aunt 62        \"\"     C.A.D. Paternal Aunt 62        \"\"     Colon Cancer Paternal Uncle          Current Outpatient Medications   Medication Sig Dispense Refill     acetaminophen (TYLENOL) 325 MG tablet Take 2 tablets (650 mg) by mouth every " 4 hours as needed for mild pain 30 tablet 0     amLODIPine (NORVASC) 5 MG tablet Take 1 tablet (5 mg) by mouth At Bedtime 90 tablet 0     aspirin 81 MG EC tablet Take 1 tablet (81 mg) by mouth daily 90 tablet 3     atorvastatin (LIPITOR) 20 MG tablet Take 1 tablet (20 mg) by mouth daily 90 tablet 3     Blood Pressure Monitor KIT 1 Units daily as needed (blood pressure check) 1 kit 0     lisinopril-hydrochlorothiazide (PRINZIDE/ZESTORETIC) 20-12.5 MG tablet Take 1 tablet by mouth daily 90 tablet 0     Multiple Vitamins-Minerals (MULTIVITAMIN ADULT PO) Take 1 tablet by mouth daily       order for DME Equipment being ordered: Digital home blood pressure monitor kit 1 Units 0     sertraline (ZOLOFT) 100 MG tablet Take 1 tablet (100 mg) by mouth daily 90 tablet 0     tolterodine (DETROL) 2 MG tablet Take 2 mg by mouth daily as needed for incontinence (Uses rarely)       traZODone (DESYREL) 50 MG tablet Take 50 mg by mouth nightly as needed for sleep (Uses very rarely)       Allergies   Allergen Reactions     No Known Drug Allergies      Recent Labs   Lab Test 09/18/19  1217 05/14/19  1116  05/05/19  1230 04/10/19  1126  02/28/18  1427  07/06/17  1813 10/11/16  1123   LDL  --   --   --   --  120*  --  95  --   --  158*   HDL  --   --   --   --  77  --  82  --   --  74   TRIG  --   --   --   --  54  --  50  --   --  101   ALT  --  27  --  27  --   --   --   --  19 22   CR 0.62 0.80   < > 0.75 0.72   < > 0.71   < > 0.68 0.71   GFRESTIMATED >90 75   < > 81 85   < > 82   < > 86 82   GFRESTBLACK >90 87   < > >90 >90   < > >90   < > >90   GFR Calc   >90   GFR Calc     POTASSIUM 3.7 4.5   < > 3.9 3.8   < > 3.9   < > 3.6 4.0   TSH  --  1.23  --   --   --   --   --   --   --   --     < > = values in this interval not displayed.      BP Readings from Last 3 Encounters:   09/18/19 131/82   07/24/19 156/80   05/14/19 145/79    Wt Readings from Last 3 Encounters:   09/18/19 60.9 kg (134 lb 4 oz)  "  07/24/19 60.8 kg (134 lb)   05/14/19 59.9 kg (132 lb)            Reviewed and updated as needed this visit by Provider  Tobacco  Allergies  Meds  Problems  Med Hx  Surg Hx  Fam Hx         Review of Systems   ROS COMP: Constitutional, HEENT, cardiovascular, pulmonary, gi and gu systems are negative, except as otherwise noted.        Objective    /82 (BP Location: Left arm, Patient Position: Sitting, Cuff Size: Adult Regular)   Pulse 57   Temp 98.8  F (37.1  C) (Oral)   Resp 14   Ht 1.676 m (5' 6\")   Wt 60.9 kg (134 lb 4 oz)   LMP  (Approximate)   SpO2 100%   Breastfeeding? No   BMI 21.67 kg/m    Body mass index is 21.67 kg/m .  Physical Exam   GENERAL APPEARANCE: healthy, alert and no distress     EYES: PERRL, sclera clear     HENT: nose and mouth without ulcers or lesions     NECK: no adenopathy, no asymmetry, masses, or scars and thyroid normal to palpation     RESP: lungs clear to auscultation - no rales, rhonchi or wheezes     CV: regular rates and rhythm, normal S1 S2, no S3 or S4 and no murmur, click or rub      Abdomen: soft, nontender, no HSM or masses and bowel sounds normal     Ext: warm, dry, no edema      Psych: full range affect, normal speech and grooming, judgement and insight intact       Diagnostic Test Results:  Labs reviewed in Epic  Results for orders placed or performed in visit on 09/18/19   Basic metabolic panel  (Ca, Cl, CO2, Creat, Gluc, K, Na, BUN)   Result Value Ref Range    Sodium 139 133 - 144 mmol/L    Potassium 3.7 3.4 - 5.3 mmol/L    Chloride 105 94 - 109 mmol/L    Carbon Dioxide 26 20 - 32 mmol/L    Anion Gap 8 3 - 14 mmol/L    Glucose 90 70 - 99 mg/dL    Urea Nitrogen 12 7 - 30 mg/dL    Creatinine 0.62 0.52 - 1.04 mg/dL    GFR Estimate >90 >60 mL/min/[1.73_m2]    GFR Estimate If Black >90 >60 mL/min/[1.73_m2]    Calcium 9.2 8.5 - 10.1 mg/dL           Assessment & Plan       ICD-10-CM    1. Essential hypertension with goal blood pressure less than 140/90 I10 " lisinopril-hydrochlorothiazide (PRINZIDE/ZESTORETIC) 20-12.5 MG tablet     amLODIPine (NORVASC) 5 MG tablet     Basic metabolic panel  (Ca, Cl, CO2, Creat, Gluc, K, Na, BUN)   2. Major depressive disorder, recurrent episode, mild (H) F33.0 sertraline (ZOLOFT) 100 MG tablet   3. Mild major depression (H) F32.0 sertraline (ZOLOFT) 100 MG tablet   4. CARDIOVASCULAR SCREENING; LDL GOAL LESS THAN 160 Z13.6 atorvastatin (LIPITOR) 20 MG tablet   5. Abnormal ankle brachial index R68.89 atorvastatin (LIPITOR) 20 MG tablet   6. Flu vaccine need Z23 FLU VACCINE, INCREASED ANTIGEN, PRESV FREE     HTN- BP improved today on higher norvasc dose of 5mg/d.  Hard to know if she's taking meds more consistently- daughters not here today to weigh in.  Will cont prinzide and norvasc at higher dose.  F/u in 3 months.     Lipids- lipitor refills sent for a year- inadvertently- will be due for fasting lipids in 4/19.  Zoloft refills also sent- discuss and f/u at next visit as well.    Memory- pt does have a consultation coming up at [facility] on 10/2/19.  Urged her to bring family member to appt.        Patient Instructions   Come back in 3 months for blood pressure recheck and neurology/memory consultation discussion.    Try to bring a family member with you to the neurology appointment.        Return in about 3 months (around 12/18/2019) for Blood Pressure Recheck, review neurology/memory consultation.          Tobacco Cessation:   reports that she has been smoking.  She has never used smokeless tobacco.  Tobacco Cessation Action Plan: Information offered: Patient not interested at this time      Return in about 3 months (around 12/18/2019) for Blood Pressure Recheck, review neurology/memory consultation.    Dee Castillo MD  [facility]

## 2019-09-18 NOTE — NURSING NOTE
"Chief Complaint   Patient presents with     Medication Reconciliation     /82 (BP Location: Left arm, Patient Position: Sitting, Cuff Size: Adult Regular)   Pulse 57   Temp 98.8  F (37.1  C) (Oral)   Resp 14   Ht 1.676 m (5' 6\")   Wt 60.9 kg (134 lb 4 oz)   LMP  (Approximate)   SpO2 100%   Breastfeeding? No   BMI 21.67 kg/m   Estimated body mass index is 21.67 kg/m  as calculated from the following:    Height as of this encounter: 1.676 m (5' 6\").    Weight as of this encounter: 60.9 kg (134 lb 4 oz).  bp completed using cuff size: regular      Health Maintenance addressed:  NONE    N/a  Farheen Kelley CMA on 9/18/2019 at 11:23 AM                "

## 2019-09-18 NOTE — LETTER
September 19, 2019    Kimmie Carranza  [address] [address]    Dear ,    Here are your lab results from your recent visit...   -Your basic metabolic panel (which includes electrolyte levels, blood sugar level and kidney function tests) looks normal.     Your test results fall within the expected range(s) or remain unchanged from previous results.  Please continue with current treatment plan.    Resulted Orders   Basic metabolic panel  (Ca, Cl, CO2, Creat, Gluc, K, Na, BUN)   Result Value Ref Range    Sodium 139 133 - 144 mmol/L    Potassium 3.7 3.4 - 5.3 mmol/L    Chloride 105 94 - 109 mmol/L    Carbon Dioxide 26 20 - 32 mmol/L    Anion Gap 8 3 - 14 mmol/L    Glucose 90 70 - 99 mg/dL    Urea Nitrogen 12 7 - 30 mg/dL    Creatinine 0.62 0.52 - 1.04 mg/dL    GFR Estimate >90 >60 mL/min/[1.73_m2]      Comment:      Non  GFR Calc  Starting 12/18/2018, serum creatinine based estimated GFR (eGFR) will be   calculated using the Chronic Kidney Disease Epidemiology Collaboration   (CKD-EPI) equation.      GFR Estimate If Black >90 >60 mL/min/[1.73_m2]      Comment:       GFR Calc  Starting 12/18/2018, serum creatinine based estimated GFR (eGFR) will be   calculated using the Chronic Kidney Disease Epidemiology Collaboration   (CKD-EPI) equation.      Calcium 9.2 8.5 - 10.1 mg/dL     If you have any questions or concerns, please call the clinic at the number listed above.     Sincerely,    Dee Castillo MD/mlb

## 2019-09-18 NOTE — PATIENT INSTRUCTIONS
Come back in 3 months for blood pressure recheck and neurology/memory consultation discussion.    Try to bring a family member with you to the neurology appointment.

## 2019-09-19 LAB
ANION GAP SERPL CALCULATED.3IONS-SCNC: 8 MMOL/L (ref 3–14)
BUN SERPL-MCNC: 12 MG/DL (ref 7–30)
CALCIUM SERPL-MCNC: 9.2 MG/DL (ref 8.5–10.1)
CHLORIDE SERPL-SCNC: 105 MMOL/L (ref 94–109)
CO2 SERPL-SCNC: 26 MMOL/L (ref 20–32)
CREAT SERPL-MCNC: 0.62 MG/DL (ref 0.52–1.04)
GFR SERPL CREATININE-BSD FRML MDRD: >90 ML/MIN/{1.73_M2}
GLUCOSE SERPL-MCNC: 90 MG/DL (ref 70–99)
POTASSIUM SERPL-SCNC: 3.7 MMOL/L (ref 3.4–5.3)
SODIUM SERPL-SCNC: 139 MMOL/L (ref 133–144)

## 2019-09-25 ENCOUNTER — TRANSFERRED RECORDS (OUTPATIENT)
Dept: HEALTH INFORMATION MANAGEMENT | Facility: CLINIC | Age: 69
End: 2019-09-25

## 2019-10-07 ENCOUNTER — TRANSFERRED RECORDS (OUTPATIENT)
Dept: HEALTH INFORMATION MANAGEMENT | Facility: CLINIC | Age: 69
End: 2019-10-07

## 2019-10-14 DIAGNOSIS — N39.41 URGE INCONTINENCE: ICD-10-CM

## 2019-10-15 RX ORDER — TOLTERODINE TARTRATE 2 MG/1
TABLET, EXTENDED RELEASE ORAL
Qty: 30 TABLET | Refills: 0 | Status: SHIPPED | OUTPATIENT
Start: 2019-10-15 | End: 2019-11-13

## 2019-10-15 NOTE — TELEPHONE ENCOUNTER
PN    Please address due to CW's absence.    Routing refill request to provider for review/approval because:  Medication is reported/historical  Thanks,  Vianey Almendarez RN

## 2019-10-15 NOTE — TELEPHONE ENCOUNTER
"Detrol 2MG       Last Written Prescription Date:  05/05/2019 pt reported   Last Fill Quantity: 0,   # refills: 0  Last Office Visit: 09/18/2019  Future Office visit:    Next 5 appointments (look out 90 days)    Dec 18, 2019  2:00 PM CST  Office Visit with Dee Castillo MD  [facility] [address]  [facility] [address] [phone]           Routing refill request to provider for review/approval because:  Medication is reported/historical    Requested Prescriptions   Pending Prescriptions Disp Refills     tolterodine (DETROL) 2 MG tablet [Pharmacy Med Name: TOLTERODINE TARTRATE 2MG TABLETS] 30 tablet 0     Sig: TAKE 1 TABLET BY MOUTH DAILY AS NEEDED       Muscarinic Antagonists (Urinary Incontinence Agents) Passed - 10/14/2019  5:10 PM        Passed - Recent (12 mo) or future (30 days) visit within the authorizing provider's specialty     Patient has had an office visit with the authorizing provider or a provider within the authorizing providers department within the previous 12 mos or has a future within next 30 days. See \"Patient Info\" tab in inbasket, or \"Choose Columns\" in Meds & Orders section of the refill encounter.              Passed - Patient does not have a diagnosis of glaucoma on the problem list     If glaucoma diagnosis is new, refer refill to physician.          Passed - Medication is active on med list        Passed - Patient is 18 years of age or older          "

## 2019-10-18 DIAGNOSIS — I10 ESSENTIAL HYPERTENSION WITH GOAL BLOOD PRESSURE LESS THAN 140/90: ICD-10-CM

## 2019-10-18 RX ORDER — LISINOPRIL AND HYDROCHLOROTHIAZIDE 12.5; 2 MG/1; MG/1
1 TABLET ORAL DAILY
Start: 2019-10-18

## 2019-10-18 NOTE — TELEPHONE ENCOUNTER
"Too soon.  Rx sent 9/18/19 for #90.  Vianey Almendarez, BLANK    Requested Prescriptions   Pending Prescriptions Disp Refills     lisinopril-hydrochlorothiazide (PRINZIDE/ZESTORETIC) 20-12.5 MG tablet [Pharmacy Med Name: LISINOPRIL-HCTZ 20/12.5MG TABLETS] 30 tablet 0     Sig: TAKE 1 TABLET BY MOUTH DAILY       Diuretics (Including Combos) Protocol Passed - 10/18/2019  3:46 AM        Passed - Blood pressure under 140/90 in past 12 months     BP Readings from Last 3 Encounters:   09/18/19 131/82   07/24/19 156/80   05/14/19 145/79                 Passed - Recent (12 mo) or future (30 days) visit within the authorizing provider's specialty     Patient has had an office visit with the authorizing provider or a provider within the authorizing providers department within the previous 12 mos or has a future within next 30 days. See \"Patient Info\" tab in inbasket, or \"Choose Columns\" in Meds & Orders section of the refill encounter.              Passed - Medication is active on med list        Passed - Patient is age 18 or older        Passed - No active pregancy on record        Passed - Normal serum creatinine on file in past 12 months     Recent Labs   Lab Test 09/18/19  1217   CR 0.62              Passed - Normal serum potassium on file in past 12 months     Recent Labs   Lab Test 09/18/19  1217   POTASSIUM 3.7                    Passed - Normal serum sodium on file in past 12 months     Recent Labs   Lab Test 09/18/19  1217                 Passed - No positive pregnancy test in past 12 months        "

## 2019-11-13 DIAGNOSIS — N39.41 URGE INCONTINENCE: ICD-10-CM

## 2019-11-13 RX ORDER — AMLODIPINE BESYLATE 2.5 MG/1
TABLET ORAL
Qty: 60 TABLET | Refills: 0 | OUTPATIENT
Start: 2019-11-13

## 2019-11-13 RX ORDER — TOLTERODINE TARTRATE 2 MG/1
TABLET, EXTENDED RELEASE ORAL
Qty: 30 TABLET | Refills: 0 | Status: SHIPPED | OUTPATIENT
Start: 2019-11-13 | End: 2020-10-14

## 2019-11-13 NOTE — TELEPHONE ENCOUNTER
Prescription approved per [facility] Refill Protocol.  Vianey Almendarez RN

## 2019-11-13 NOTE — TELEPHONE ENCOUNTER
"Detrol 2MG   Last Written Prescription Date:  10/15/2019  Last Fill Quantity: 30,  # refills: 0   Last office visit: 9/18/2019 with prescribing provider:  Yes    Future Office Visit:   Next 5 appointments (look out 90 days)    Dec 18, 2019  2:00 PM CST  Office Visit with Dee Castillo MD  [facility] ([facility]) [address] [facility] [address] [phone]         Requested Prescriptions   Pending Prescriptions Disp Refills     tolterodine (DETROL) 2 MG tablet [Pharmacy Med Name: TOLTERODINE TARTRATE 2MG TABLETS] 30 tablet 0     Sig: TAKE 1 TABLET BY MOUTH DAILY AS NEEDED       Muscarinic Antagonists (Urinary Incontinence Agents) Passed - 11/13/2019  3:22 AM        Passed - Recent (12 mo) or future (30 days) visit within the authorizing provider's specialty     Patient has had an office visit with the authorizing provider or a provider within the authorizing providers department within the previous 12 mos or has a future within next 30 days. See \"Patient Info\" tab in inbasket, or \"Choose Columns\" in Meds & Orders section of the refill encounter.              Passed - Patient does not have a diagnosis of glaucoma on the problem list     If glaucoma diagnosis is new, refer refill to physician.          Passed - Medication is active on med list        Passed - Patient is 18 years of age or older          "

## 2019-11-20 ENCOUNTER — TRANSFERRED RECORDS (OUTPATIENT)
Dept: HEALTH INFORMATION MANAGEMENT | Facility: CLINIC | Age: 69
End: 2019-11-20

## 2019-12-18 ENCOUNTER — OFFICE VISIT (OUTPATIENT)
Dept: FAMILY MEDICINE | Facility: CLINIC | Age: 69
End: 2019-12-18
Payer: COMMERCIAL

## 2019-12-18 VITALS
BODY MASS INDEX: 21.69 KG/M2 | SYSTOLIC BLOOD PRESSURE: 133 MMHG | HEIGHT: 66 IN | HEART RATE: 51 BPM | TEMPERATURE: 98.9 F | WEIGHT: 135 LBS | OXYGEN SATURATION: 95 % | DIASTOLIC BLOOD PRESSURE: 69 MMHG

## 2019-12-18 DIAGNOSIS — R41.3 MEMORY CHANGE: ICD-10-CM

## 2019-12-18 DIAGNOSIS — F33.0 MAJOR DEPRESSIVE DISORDER, RECURRENT EPISODE, MILD (H): ICD-10-CM

## 2019-12-18 DIAGNOSIS — F32.0 MILD MAJOR DEPRESSION (H): ICD-10-CM

## 2019-12-18 DIAGNOSIS — F10.10 ALCOHOL ABUSE: ICD-10-CM

## 2019-12-18 DIAGNOSIS — I10 ESSENTIAL HYPERTENSION WITH GOAL BLOOD PRESSURE LESS THAN 140/90: Primary | ICD-10-CM

## 2019-12-18 DIAGNOSIS — F41.1 ANXIETY STATE: ICD-10-CM

## 2019-12-18 PROCEDURE — 99214 OFFICE O/P EST MOD 30 MIN: CPT | Performed by: FAMILY MEDICINE

## 2019-12-18 PROCEDURE — 96127 BRIEF EMOTIONAL/BEHAV ASSMT: CPT | Performed by: FAMILY MEDICINE

## 2019-12-18 PROCEDURE — 99207 C PAF COMPLETED  NO CHARGE: CPT | Performed by: FAMILY MEDICINE

## 2019-12-18 RX ORDER — SERTRALINE HYDROCHLORIDE 100 MG/1
100 TABLET, FILM COATED ORAL DAILY
Qty: 90 TABLET | Refills: 0 | Status: SHIPPED | OUTPATIENT
Start: 2019-12-18 | End: 2020-03-18

## 2019-12-18 RX ORDER — AMLODIPINE BESYLATE 5 MG/1
5 TABLET ORAL AT BEDTIME
Qty: 90 TABLET | Refills: 0 | Status: SHIPPED | OUTPATIENT
Start: 2019-12-18 | End: 2020-03-18

## 2019-12-18 RX ORDER — LISINOPRIL AND HYDROCHLOROTHIAZIDE 12.5; 2 MG/1; MG/1
1 TABLET ORAL DAILY
Qty: 90 TABLET | Refills: 0 | Status: SHIPPED | OUTPATIENT
Start: 2019-12-18 | End: 2020-03-18

## 2019-12-18 ASSESSMENT — PATIENT HEALTH QUESTIONNAIRE - PHQ9: SUM OF ALL RESPONSES TO PHQ QUESTIONS 1-9: 7

## 2019-12-18 ASSESSMENT — MIFFLIN-ST. JEOR: SCORE: 1154.11

## 2019-12-18 NOTE — PROGRESS NOTES
Subjective     Kimmie Carranza is a 69 year old female who presents to clinic today for the following health issues: here alone today.    HPI   Medication Followup of Prinzide, Norvasc, f/u neurology consult for memory - \[facility]\"" [facility] 10/2/19    Taking Medication as prescribed: yes (was not aware her doses had changed from last visit)    Side Effects:  Unsure if it is medication related, noticing she is moving her toes around involuntarily a lot.     Medication Helping Symptoms:  Yes - [facility], states BP readings have been good.          Neurology- Did see \[facility]\"" [facility] Neu[facility].    Had MRI done, told it was okay.  No 'organic issues/causes' found.  Neuropsych testing 1/31/20.  Also saw OT- did some further testing.  History is somewhat limited today due to her memory, here alone.      HTN-   BPs 130-140  She's written them down, but didn't bring them with her.  Lost but then found the DME order sheet/rx- will bring it in and try to get home monitor.      Anxiety-   Living with her younger daughter, who is bipolar.  Was taking that all in.  She is trying to finish up her college degree.    Can't handle any stressors.  Thinks the zoloft is doing fine at the 100mg/d.    Trip- going to [employer] and then to [employer].  Going with her son, and then will meet up with her other kids.    [facility]-   Has done classes, and [employer]- great resource.    Patient Active Problem List   Diagnosis     Anxiety state     Urge incontinence     Hyperlipidemia LDL goal <100     Mild major depression (H)     Osteoarthritis     Essential hypertension with goal blood pressure less than 140/90     Advanced directives, counseling/discussion     Alcohol abuse     Abnormal ankle brachial index     Mild atherosclerosis of carotid artery     Insomnia     Bilateral hip pain     Somatic dysfunction of lumbar region     Chronic bilateral low back pain without sciatica     Nonallopathic lesion of sacroiliac region      "Sacroiliac joint pain     Somatic dysfunction of pelvis region     Closed compression fracture of second lumbar vertebra (H)     Memory change     Chest pain     Mild coronary artery disease     Past Surgical History:   Procedure Laterality Date     C NONSPECIFIC PROCEDURE      Tubal ligation     C NONSPECIFIC PROCEDURE      laporoscopy     COLONOSCOPY N/A 10/24/2016    Procedure: COMBINED COLONOSCOPY, SINGLE OR MULTIPLE BIOPSY/POLYPECTOMY BY BIOPSY;  Surgeon: Kwaku Henry MD;  Location:  GI     CV CORONARY ANGIOGRAM N/A 2019    Procedure: Coronary Angiogram;  Surgeon: Viktor Brothers MD;  Location:  [facility]       Social History     Tobacco Use     Smoking status: Light Tobacco Smoker     Last attempt to quit: 2000     Years since quittin.9     Smokeless tobacco: Never Used     Tobacco comment: smokes occasionally   Substance Use Topics     Alcohol use: No     Alcohol/week: 0.0 standard drinks     Comment: quit completely      Family History   Problem Relation Age of Onset     Hypertension Mother      Cerebrovascular Disease Mother          of complications of stroke at age 82     Heart Disease Mother         atrial fib     Osteoporosis Mother         two hip fx's     Neurologic Disorder Mother         MS     Hypertension Father      Neurologic Disorder Father         dementia- Alzheimers, dx in his 60s, live to his 90s     Gastrointestinal Disease Father      Depression Father      Hypertension Brother      Neurologic Disorder Maternal Grandmother      Neurologic Disorder Sister      C.A.D. Paternal Grandmother 62         at 62, of MI     C.A.D. Paternal Aunt 62        \"\"     C.A.D. Paternal Aunt 62        \"\"     Colon Cancer Paternal Uncle          Current Outpatient Medications   Medication Sig Dispense Refill     acetaminophen (TYLENOL) 325 MG tablet Take 2 tablets (650 mg) by mouth every 4 hours as needed for mild pain 30 tablet 0     " amLODIPine (NORVASC) 5 MG tablet Take 1 tablet (5 mg) by mouth At Bedtime 90 tablet 0     aspirin 81 MG EC tablet Take 1 tablet (81 mg) by mouth daily 90 tablet 3     atorvastatin (LIPITOR) 20 MG tablet Take 1 tablet (20 mg) by mouth daily 90 tablet 3     Blood Pressure Monitor KIT 1 Units daily as needed (blood pressure check) 1 kit 0     lisinopril-hydrochlorothiazide (PRINZIDE/ZESTORETIC) 20-12.5 MG tablet Take 1 tablet by mouth daily 90 tablet 0     Multiple Vitamins-Minerals (MULTIVITAMIN ADULT PO) Take 1 tablet by mouth daily       order for DME Equipment being ordered: Digital home blood pressure monitor kit 1 Units 0     sertraline (ZOLOFT) 100 MG tablet Take 1 tablet (100 mg) by mouth daily 90 tablet 0     tolterodine (DETROL) 2 MG tablet TAKE 1 TABLET BY MOUTH DAILY AS NEEDED 30 tablet 0     tolterodine (DETROL) 2 MG tablet Take 2 mg by mouth daily as needed for incontinence (Uses rarely)       traZODone (DESYREL) 50 MG tablet Take 50 mg by mouth nightly as needed for sleep (Uses very rarely)       Allergies   Allergen Reactions     No Known Drug Allergies      Recent Labs   Lab Test 09/18/19  1217 05/14/19  1116  05/05/19  1230 04/10/19  1126  02/28/18  1427  07/06/17  1813 10/11/16  1123   LDL  --   --   --   --  120*  --  95  --   --  158*   HDL  --   --   --   --  77  --  82  --   --  74   TRIG  --   --   --   --  54  --  50  --   --  101   ALT  --  27  --  27  --   --   --   --  19 22   CR 0.62 0.80   < > 0.75 0.72   < > 0.71   < > 0.68 0.71   GFRESTIMATED >90 75   < > 81 85   < > 82   < > 86 82   GFRESTBLACK >90 87   < > >90 >90   < > >90   < > >90   GFR Calc   >90   GFR Calc     POTASSIUM 3.7 4.5   < > 3.9 3.8   < > 3.9   < > 3.6 4.0   TSH  --  1.23  --   --   --   --   --   --   --   --     < > = values in this interval not displayed.      BP Readings from Last 3 Encounters:   12/18/19 133/69   09/18/19 131/82   07/24/19 156/80    Wt Readings from Last 3 Encounters:  "  12/18/19 61.2 kg (135 lb)   09/18/19 60.9 kg (134 lb 4 oz)   07/24/19 60.8 kg (134 lb)           Reviewed and updated as needed this visit by Provider  Tobacco  Allergies  Meds  Problems  Med Hx  Surg Hx  Fam Hx         Review of Systems   ROS COMP: Constitutional, HEENT, cardiovascular, pulmonary, gi and gu systems are negative, except as otherwise noted.      Objective    /69 (BP Location: Right arm, Cuff Size: Adult Regular)   Pulse 51   Temp 98.9  F (37.2  C) (Oral)   Ht 1.676 m (5' 6\")   Wt 61.2 kg (135 lb)   SpO2 95%   Breastfeeding No   BMI 21.79 kg/m    Body mass index is 21.79 kg/m .  Physical Exam   GENERAL APPEARANCE: healthy, alert and no distress     EYES: PERRL, sclera clear     HENT: nose and mouth without ulcers or lesions     NECK: no adenopathy, no asymmetry, masses, or scars and thyroid normal to palpation     RESP: lungs clear to auscultation - no rales, rhonchi or wheezes     CV: regular rates and rhythm, normal S1 S2, no S3 or S4 and no murmur, click or rub      Abdomen: soft, nontender, no HSM or masses and bowel sounds normal     Ext: warm, dry, no edema           Assessment & Plan       ICD-10-CM    1. Essential hypertension with goal blood pressure less than 140/90 I10 PAF COMPLETED     lisinopril-hydrochlorothiazide (PRINZIDE/ZESTORETIC) 20-12.5 MG tablet     amLODIPine (NORVASC) 5 MG tablet   2. Mild major depression (H) F32.0 PAF COMPLETED     sertraline (ZOLOFT) 100 MG tablet   3. Memory change R41.3 PAF COMPLETED   4. Anxiety state F41.1 PAF COMPLETED   5. Alcohol abuse F10.10    6. Major depressive disorder, recurrent episode, mild (H) F33.0 sertraline (ZOLOFT) 100 MG tablet     HTN- BP looking pretty good with current meds, will continue with prinzide and addition of amlodipine a couple visits ago.  No known se's.  Cont q6mo f/u.    MDD/anxiety- doing well with zoloft 100mg/d.  No se's.  Cont q6mo f/u.    Etoh- she continues to not have any etoh, though she's " been more lax about getting to AA meetings.  Knows they are important.  Not feeling any urges to drink.    Memory - pt is following up with neurology now, had MRI which was okay, and has done OT.  Requested and received all those notes today to review, and they were sent to abstraction.  She is now scheduled to do neuropsych testing in the next month which should be the most helpful.  F/u in 3 months for recheck.      Return in about 3 months (around 3/18/2020) for Routine Visit/Chronic Cares/Med Check, Depression follow-up, Anxiety follow-up.    Dee Castillo MD  [facility]

## 2020-01-12 DIAGNOSIS — F33.0 MAJOR DEPRESSIVE DISORDER, RECURRENT EPISODE, MILD (H): ICD-10-CM

## 2020-01-12 DIAGNOSIS — F32.0 MILD MAJOR DEPRESSION (H): ICD-10-CM

## 2020-01-12 DIAGNOSIS — I10 ESSENTIAL HYPERTENSION WITH GOAL BLOOD PRESSURE LESS THAN 140/90: ICD-10-CM

## 2020-01-13 RX ORDER — SERTRALINE HYDROCHLORIDE 100 MG/1
TABLET, FILM COATED ORAL
Start: 2020-01-13

## 2020-01-13 RX ORDER — AMLODIPINE BESYLATE 5 MG/1
TABLET ORAL
Start: 2020-01-13

## 2020-01-13 NOTE — TELEPHONE ENCOUNTER
"Too soon  Amlodipine:  Rx sent 9/18/19 for #90 with 3 refills.    Sertraline:  Rx sent 12/18/19 for #90.    Vianey Almendarez RN    Requested Prescriptions   Refused Prescriptions Disp Refills     sertraline (ZOLOFT) 100 MG tablet [Pharmacy Med Name: SERTRALINE 100MG TABLETS]       Sig: TAKE 1 TABLET(100 MG) BY MOUTH DAILY       SSRIs Protocol Failed - 1/12/2020  3:34 AM        Failed - PHQ-9 score less than 5 in past 6 months     Please review last PHQ-9 score.           Passed - Medication is active on med list        Passed - Patient is age 18 or older        Passed - No active pregnancy on record        Passed - No positive pregnancy test in last 12 months        Passed - Recent (6 mo) or future (30 days) visit within the authorizing provider's specialty     Patient had office visit in the last 6 months or has a visit in the next 30 days with authorizing provider or within the authorizing provider's specialty.  See \"Patient Info\" tab in inbasket, or \"Choose Columns\" in Meds & Orders section of the refill encounter.            amLODIPine (NORVASC) 5 MG tablet [Pharmacy Med Name: AMLODIPINE BESYLATE 5MG TABLETS]       Sig: TAKE 1 TABLET(5 MG) BY MOUTH AT BEDTIME       Calcium Channel Blockers Protocol  Passed - 1/12/2020  3:34 AM        Passed - Blood pressure under 140/90 in past 12 months     BP Readings from Last 3 Encounters:   12/18/19 133/69   09/18/19 131/82   07/24/19 156/80                 Passed - Recent (12 mo) or future (30 days) visit within the authorizing provider's specialty     Patient has had an office visit with the authorizing provider or a provider within the authorizing providers department within the previous 12 mos or has a future within next 30 days. See \"Patient Info\" tab in inbasket, or \"Choose Columns\" in Meds & Orders section of the refill encounter.              Passed - Medication is active on med list        Passed - Patient is age 18 or older        Passed - No active pregnancy on " record        Passed - Normal serum creatinine on file in past 12 months     Recent Labs   Lab Test 09/18/19  1217   CR 0.62             Passed - No positive pregnancy test in past 12 months

## 2020-01-30 ENCOUNTER — TRANSFERRED RECORDS (OUTPATIENT)
Dept: HEALTH INFORMATION MANAGEMENT | Facility: CLINIC | Age: 70
End: 2020-01-30

## 2020-02-02 DIAGNOSIS — I10 ESSENTIAL HYPERTENSION WITH GOAL BLOOD PRESSURE LESS THAN 140/90: ICD-10-CM

## 2020-02-03 RX ORDER — LISINOPRIL AND HYDROCHLOROTHIAZIDE 12.5; 2 MG/1; MG/1
1 TABLET ORAL DAILY
Start: 2020-02-03

## 2020-02-03 NOTE — TELEPHONE ENCOUNTER
"Lisinopril-hydrochlorothiazide 20-12.5 MG  Last Written Prescription Date:  12/18/2019  Last Fill Quantity: 90,  # refills: 0   Last office visit: 12/18/2019 with prescribing provider:  Yes    Future Office Visit:   Next 5 appointments (look out 90 days)    Mar 18, 2020  2:00 PM CDT  Office Visit with Dee Castillo MD  [facility] [address] [address] [phone]         Requested Prescriptions   Pending Prescriptions Disp Refills     lisinopril-hydrochlorothiazide (PRINZIDE/ZESTORETIC) 20-12.5 MG tablet [Pharmacy Med Name: LISINOPRIL-HCTZ 20/12.5MG TABLETS] 90 tablet 0     Sig: TAKE 1 TABLET BY MOUTH DAILY       Diuretics (Including Combos) Protocol Passed - 2/2/2020  3:21 AM        Passed - Blood pressure under 140/90 in past 12 months     BP Readings from Last 3 Encounters:   12/18/19 133/69   09/18/19 131/82   07/24/19 156/80                 Passed - Recent (12 mo) or future (30 days) visit within the authorizing provider's specialty     Patient has had an office visit with the authorizing provider or a provider within the authorizing providers department within the previous 12 mos or has a future within next 30 days. See \"Patient Info\" tab in inbasket, or \"Choose Columns\" in Meds & Orders section of the refill encounter.              Passed - Medication is active on med list        Passed - Patient is age 18 or older        Passed - No active pregancy on record        Passed - Normal serum creatinine on file in past 12 months     Recent Labs   Lab Test 09/18/19  1217   CR 0.62              Passed - Normal serum potassium on file in past 12 months     Recent Labs   Lab Test 09/18/19  1217   POTASSIUM 3.7                    Passed - Normal serum sodium on file in past 12 months     Recent Labs   Lab Test 09/18/19  1217                 Passed - No positive pregnancy test in past 12 months          "

## 2020-03-18 ENCOUNTER — VIRTUAL VISIT (OUTPATIENT)
Dept: FAMILY MEDICINE | Facility: CLINIC | Age: 70
End: 2020-03-18
Payer: COMMERCIAL

## 2020-03-18 DIAGNOSIS — F33.0 MAJOR DEPRESSIVE DISORDER, RECURRENT EPISODE, MILD (H): Primary | ICD-10-CM

## 2020-03-18 DIAGNOSIS — E78.5 HYPERLIPIDEMIA LDL GOAL <100: ICD-10-CM

## 2020-03-18 DIAGNOSIS — F41.1 ANXIETY STATE: ICD-10-CM

## 2020-03-18 DIAGNOSIS — I10 ESSENTIAL HYPERTENSION WITH GOAL BLOOD PRESSURE LESS THAN 140/90: ICD-10-CM

## 2020-03-18 DIAGNOSIS — R41.3 MEMORY CHANGE: ICD-10-CM

## 2020-03-18 PROCEDURE — G2012 BRIEF CHECK IN BY MD/QHP: HCPCS | Performed by: FAMILY MEDICINE

## 2020-03-18 RX ORDER — AMLODIPINE BESYLATE 5 MG/1
5 TABLET ORAL AT BEDTIME
Qty: 90 TABLET | Refills: 1 | Status: SHIPPED | OUTPATIENT
Start: 2020-03-18 | End: 2020-10-14

## 2020-03-18 RX ORDER — SERTRALINE HYDROCHLORIDE 100 MG/1
100 TABLET, FILM COATED ORAL DAILY
Qty: 90 TABLET | Refills: 1 | Status: SHIPPED | OUTPATIENT
Start: 2020-03-18 | End: 2020-08-26

## 2020-03-18 RX ORDER — LISINOPRIL AND HYDROCHLOROTHIAZIDE 12.5; 2 MG/1; MG/1
1 TABLET ORAL DAILY
Qty: 90 TABLET | Refills: 1 | Status: SHIPPED | OUTPATIENT
Start: 2020-03-18 | End: 2020-10-14

## 2020-03-18 ASSESSMENT — ANXIETY QUESTIONNAIRES
7. FEELING AFRAID AS IF SOMETHING AWFUL MIGHT HAPPEN: NOT AT ALL
IF YOU CHECKED OFF ANY PROBLEMS ON THIS QUESTIONNAIRE, HOW DIFFICULT HAVE THESE PROBLEMS MADE IT FOR YOU TO DO YOUR WORK, TAKE CARE OF THINGS AT HOME, OR GET ALONG WITH OTHER PEOPLE: NOT DIFFICULT AT ALL
2. NOT BEING ABLE TO STOP OR CONTROL WORRYING: SEVERAL DAYS
3. WORRYING TOO MUCH ABOUT DIFFERENT THINGS: SEVERAL DAYS
1. FEELING NERVOUS, ANXIOUS, OR ON EDGE: SEVERAL DAYS
6. BECOMING EASILY ANNOYED OR IRRITABLE: NOT AT ALL
5. BEING SO RESTLESS THAT IT IS HARD TO SIT STILL: SEVERAL DAYS
GAD7 TOTAL SCORE: 5

## 2020-03-18 ASSESSMENT — PATIENT HEALTH QUESTIONNAIRE - PHQ9: 5. POOR APPETITE OR OVEREATING: SEVERAL DAYS

## 2020-03-18 NOTE — PROGRESS NOTES
"Kimmie Carranza is a 69 year old female who is being evaluated via a telephone visit.      The patient has been notified of following (by M.A) Candace Vail MA  Medical Assistant  [facility]       \"We have found that certain health care needs can be provided without the need for a physical exam.  This service lets us provide the care you need with a short phone conversation.  If a prescription is necessary we can send it directly to your pharmacy.  If lab work is needed we can place an order for that and you can then stop by our lab to have the test done at a later time.    This telephone visit will be conducted via 3 way call with the you (the patient) , the physician/provider, and a me all on the line at the same time.  This allows your physician/provider to have the phone conversation with you while I will be taking notes for your medical record.  We will have full access to your [facility] medical record during this entire phone call.    Since this is like an office visit,  will bill your insurance company for this service.  Please check with your medical insurance if this type of telephone/virtual is covered . You may be responsible for the cost of this service if insurance coverage is denied.  The typical cost is $30 (10min), $59(11-20min) and $85 (21-30min)     If during the course of the call the physician/provider feels a telephone visit is not appropriate, you will not be charged for this service\"    Consent has been obtained for this service by care team member: yes.  See the scanned image in the medical record.    S: The history as provided by the patient to the providerz;    Memory concerns, Neurology f/u- went for series of test, OT, head scan.  Saw MD initially.  She can't recall if the MD wanted to see her back.  Has copies of some of the things- will bring them in.  Psychmetric studies wtihin normal limits, normal cognitive aging in context of ADHD.    BP at dentist last week- 139/??  Doing " "okay on the prinzide and norvasc- no se's.  Lipids- doing fine on the lipitor-     Anxiety- creeping up a little, but okay.  Feels like the zoloft at 100mg is doing okay.    TOMASA-7 SCORE 10/11/2016 4/10/2019 3/18/2020   Total Score 0 6 5     PHQ 8/27/2018 4/10/2019 12/18/2019   PHQ-9 Total Score 0 7 7   Q9: Thoughts of better off dead/self-harm past 2 weeks Not at all Not at all Several days         Assessment/Plan:    ICD-10-CM    1. Major depressive disorder, recurrent episode, mild (H)  F33.0 sertraline (ZOLOFT) 100 MG tablet   2. Anxiety state  F41.1 sertraline (ZOLOFT) 100 MG tablet   3. Memory change  R41.3    4. Essential hypertension with goal blood pressure less than 140/90  I10 amLODIPine (NORVASC) 5 MG tablet     lisinopril-hydrochlorothiazide (ZESTORETIC) 20-12.5 MG tablet   5. Hyperlipidemia LDL goal <100  E78.5       MDD/Anxiety- sx's doing on the zoloft 100mg/d despite stressors from COVID-19.  Will cont on same dose.  Fine for f/u in 3-6 months unless worsening sx's.  Refills sent x 6 months.    Memory change- Pt notes she had w/u done at neurology- but has very few details.  Thinks she met with an MD (once, prior to testing, doesn't recall that she was to see her afterwards or not, 'they didn't make an appt for me\").  She also saw OT and did neuropsych testing, and thinks it 'all came out normal'.  She has the paperwork- will bring it in at her next visit.    HTN- BP borderline at dental visit last week.    Will cont current meds, but she should try and check BPs on her own, and call/come in if worsening BPs.  Refills sent.    Lipids- no se's on lipitor, does not need refills.    Return in about 3 months (around 6/18/2020) for Physical Exam, Routine Visit/Chronic Cares/Med Check (in 3-6 months depending on clinic access).        Total time of call between patient and provider was 15 minutes     Juwan Castillo MD  [facility]  [phone]   (MD " signature)  ===================================================

## 2020-03-19 ASSESSMENT — ANXIETY QUESTIONNAIRES: GAD7 TOTAL SCORE: 5

## 2020-07-30 ENCOUNTER — TELEPHONE (OUTPATIENT)
Dept: FAMILY MEDICINE | Facility: CLINIC | Age: 70
End: 2020-07-30

## 2020-07-30 NOTE — LETTER
July 30, 2020      Kimmie Carranza  [address] [place] [address]        Hello!    We care about your health and are committed to providing quality patient care.   It has come to our attention that you are due for an annual wellness visit.   At this time, during the Covid-19 pandemic, we are able to offer these annual wellness visits by phone/video.   If you would prefer to come into the clinic we can schedule you for that as well.     If you have not already scheduled, please call us at [phone] to setup either your virtual or face to face annual wellness exam.    Thanks and have a great day!  Medina SANTOS RN

## 2020-07-30 NOTE — TELEPHONE ENCOUNTER
Patient Quality Outreach      Summary:    Patient is due/failing the following:   Annual wellness, date due: now    Type of outreach:    Sent letter.    Questions for provider review:    None                                                                                   **Start Working phrase here:**       Patient has the following on her problem list/HM: None                                                Medina SANTOS RN

## 2020-08-11 ENCOUNTER — TELEPHONE (OUTPATIENT)
Dept: FAMILY MEDICINE | Facility: CLINIC | Age: 70
End: 2020-08-11

## 2020-08-11 NOTE — TELEPHONE ENCOUNTER
Called patient to get more information,  Patient said that she lost the information of her eye doctor and wanted to know if we had it  Informed I could not find it , pt will call [facility] as she knows the office is located there  Patient also wanted to know who Dr. Castillo recommends for colonoscopy please advice?    JATIN Lee

## 2020-08-11 NOTE — TELEPHONE ENCOUNTER
It looks like I gave her an ophthalmology referral in '17  --  OPHTHALMOLOGY ADULT REFERRAL      Your provider has referred you to: N: [facility], [place] [phone] [email or web address]    [facility] [place] [facility] [place] [phone]        And in 2/18-   OPHTHALMOLOGY ADULT REFERRAL      Your provider has referred you to: N: [facility], [place] [phone] [email or web address]        It looks like she is UTD on her colonoscopy- she is due in 10/21.    She is due for a 6mo f/u visit here- would rec a clinic visit here so we can check her BP.  Would also try and get fasting labs as she is overdue, so would have her make an appt in the morning and come fasting.  Some dementia concerns, so would have her write this all down.  Thanks!  CW

## 2020-08-11 NOTE — TELEPHONE ENCOUNTER
Reason for Call:  Other call back    Detailed comments: Pt call and was wondering if We are able to find her Eye Doc. If we are able to get the answer. Pt would like a call back.       Phone Number Patient can be reached at: Cell number on file:    Telephone Information:   Mobile [phone]       Best Time: anytime    Can we leave a detailed message on this number? YES    Call taken on 8/11/2020 at 9:00 AM by Hannah Middleton

## 2020-08-11 NOTE — TELEPHONE ENCOUNTER
Called patient and provided information   Patient said that she was able to locate her eye provider in ED but that he does not have any available appointment until September so she will go to [facility] to have her eyes checked,   She also wanted provider to know that she is having a dermatology appointment tomorrow because of a suspicious mole he has but hoping it's a wart   Patient is schedule for a physical on 08/26/2020 at 11:30 and said she will be fasting.

## 2020-08-26 ENCOUNTER — OFFICE VISIT (OUTPATIENT)
Dept: FAMILY MEDICINE | Facility: CLINIC | Age: 70
End: 2020-08-26
Payer: COMMERCIAL

## 2020-08-26 ENCOUNTER — PATIENT OUTREACH (OUTPATIENT)
Dept: CARE COORDINATION | Facility: CLINIC | Age: 70
End: 2020-08-26

## 2020-08-26 VITALS
RESPIRATION RATE: 16 BRPM | HEIGHT: 66 IN | HEART RATE: 54 BPM | BODY MASS INDEX: 20.81 KG/M2 | SYSTOLIC BLOOD PRESSURE: 130 MMHG | DIASTOLIC BLOOD PRESSURE: 69 MMHG | OXYGEN SATURATION: 95 % | WEIGHT: 129.5 LBS | TEMPERATURE: 98.6 F

## 2020-08-26 DIAGNOSIS — Z63.6 CAREGIVER STRESS: ICD-10-CM

## 2020-08-26 DIAGNOSIS — E78.5 HYPERLIPIDEMIA LDL GOAL <100: ICD-10-CM

## 2020-08-26 DIAGNOSIS — I10 ESSENTIAL HYPERTENSION WITH GOAL BLOOD PRESSURE LESS THAN 140/90: ICD-10-CM

## 2020-08-26 DIAGNOSIS — Z00.00 ENCOUNTER FOR MEDICARE ANNUAL WELLNESS EXAM: Primary | ICD-10-CM

## 2020-08-26 DIAGNOSIS — F41.1 ANXIETY STATE: ICD-10-CM

## 2020-08-26 DIAGNOSIS — R41.3 MEMORY CHANGE: ICD-10-CM

## 2020-08-26 DIAGNOSIS — F33.0 MAJOR DEPRESSIVE DISORDER, RECURRENT EPISODE, MILD (H): ICD-10-CM

## 2020-08-26 DIAGNOSIS — F32.0 MILD MAJOR DEPRESSION (H): ICD-10-CM

## 2020-08-26 LAB — VIT B12 SERPL-MCNC: 480 PG/ML (ref 193–986)

## 2020-08-26 PROCEDURE — 99397 PER PM REEVAL EST PAT 65+ YR: CPT | Performed by: FAMILY MEDICINE

## 2020-08-26 PROCEDURE — 82607 VITAMIN B-12: CPT | Performed by: FAMILY MEDICINE

## 2020-08-26 PROCEDURE — 80053 COMPREHEN METABOLIC PANEL: CPT | Performed by: FAMILY MEDICINE

## 2020-08-26 PROCEDURE — 80061 LIPID PANEL: CPT | Performed by: FAMILY MEDICINE

## 2020-08-26 PROCEDURE — 99214 OFFICE O/P EST MOD 30 MIN: CPT | Mod: 25 | Performed by: FAMILY MEDICINE

## 2020-08-26 PROCEDURE — 36415 COLL VENOUS BLD VENIPUNCTURE: CPT | Performed by: FAMILY MEDICINE

## 2020-08-26 PROCEDURE — 84443 ASSAY THYROID STIM HORMONE: CPT | Performed by: FAMILY MEDICINE

## 2020-08-26 RX ORDER — SERTRALINE HYDROCHLORIDE 100 MG/1
150 TABLET, FILM COATED ORAL DAILY
Qty: 90 TABLET | Refills: 0 | Status: SHIPPED | OUTPATIENT
Start: 2020-08-26 | End: 2020-10-14

## 2020-08-26 SDOH — SOCIAL STABILITY - SOCIAL INSECURITY: DEPENDENT RELATIVE NEEDING CARE AT HOME: Z63.6

## 2020-08-26 ASSESSMENT — ACTIVITIES OF DAILY LIVING (ADL): CURRENT_FUNCTION: NO ASSISTANCE NEEDED

## 2020-08-26 ASSESSMENT — PATIENT HEALTH QUESTIONNAIRE - PHQ9
SUM OF ALL RESPONSES TO PHQ QUESTIONS 1-9: 10
10. IF YOU CHECKED OFF ANY PROBLEMS, HOW DIFFICULT HAVE THESE PROBLEMS MADE IT FOR YOU TO DO YOUR WORK, TAKE CARE OF THINGS AT HOME, OR GET ALONG WITH OTHER PEOPLE: SOMEWHAT DIFFICULT
SUM OF ALL RESPONSES TO PHQ QUESTIONS 1-9: 10

## 2020-08-26 ASSESSMENT — MIFFLIN-ST. JEOR: SCORE: 1124.16

## 2020-08-26 NOTE — PATIENT INSTRUCTIONS
6 week follow-up visit to recheck mood/anxiety.  Increase zoloft to 150mg (1 1/2 tab) daily.    Expect calls to help schedule with therapist, and from care coordinator for resources/respite cares, memory concerns.    Memory testing-   Please try and bring in or mail/fax in the testing- you mentioned neuropsych testing, OT testing.        Patient Education   Personalized Prevention Plan  You are due for the preventive services outlined below.  Your care team is available to assist you in scheduling these services.  If you have already completed any of these items, please share that information with your care team to update in your medical record.  Health Maintenance Due   Topic Date Due     Zoster (Shingles) Vaccine (2 of 3) 05/09/2012     Annual Wellness Visit  04/10/2020     FALL RISK ASSESSMENT  04/10/2020     Depression Assessment  06/18/2020     Discuss Advance Care Planning  08/19/2020     Flu Vaccine (1) 09/01/2020

## 2020-08-26 NOTE — Clinical Note
Team-   Please call pt to get an updated TOMASA, and help her schedule a 6 week follow-up visit for follow-up Anxiety (would like skip the wellness visit for now), and would like to get fasting labs at that time (so recommend a morning appointment).  Please also help her get a copy of the neurology consultation and results notes from neurology to our office- either by having her get details of the doctor/office so we can request, or see if she can fax what she has to our office.    Thank you! CW

## 2020-08-26 NOTE — PROGRESS NOTES
"SUBJECTIVE:   Kimmie Carranza is a 70 year old female who presents for Preventive Visit.  Are you in the first 12 months of your Medicare coverage?  No    Healthy Habits:     In general, how would you rate your overall health?  Good    Frequency of exercise:  4-5 days/week    Duration of exercise:  15-30 minutes    Do you usually eat at least 4 servings of fruit and vegetables a day, include whole grains    & fiber and avoid regularly eating high fat or \"junk\" foods?  Yes    Taking medications regularly:  Yes    Barriers to taking medications:  None    Medication side effects:  None    Ability to successfully perform activities of daily living:  No assistance needed    Home Safety:  Lack of grab bars in the bathroom    Hearing Impairment:  Difficulty following a conversation in a noisy restaurant or crowded room, feel that people are mumbling or not speaking clearly, need to ask people to speak up or repeat themselves, difficulty understanding soft or whispered speech and difficulty understanding speech on the telephone    In the past 6 months, have you been bothered by leaking of urine?  No    In general, how would you rate your overall mental or emotional health?  Fair      PHQ-2 Total Score: 4    Additional concerns today:  No    Social stressors- General COVID sheltering issues, but also major family stressors.     dx with amyloidosis of heart - seen at [facility].  EF 40%. Waiting on txt plan.  Gets really tired.  He also has dementia- 'just sits and stairs at the wall'.  Daughter- bipolar disorder, hospitalized twice.  Other daughter - single w/ disabled child (18yrs, in diapers).  Other daughter- in [place].  Son - restaurant supply business- avoids illness.    She and her  got lost walking around [facility] recently.  Very distressing.  She started crying.  One of her nieces/cousins happened to drive by them and helped them home.      Her anxiety due to this has been really high.  Wondering about a prn " 'tranquilizer'.  In general, she's worried about the stress of caregiving for her .  He's had affairs over the years.  Never was a kind , but denies overt abuse, though isn't sure about 'mental abuse'.  States he was quite critical.    Too ill now to have an affair or do much else.  Other support? Her daughter Rosa (who has bipolar has been helpful- despite her bipolar and at odds with her father.    Do you feel safe in your environment? Yes    Have you ever done Advance Care Planning? (For example, a Health Directive, POLST, or a discussion with a medical provider or your loved ones about your wishes): No, advance care planning information given to patient to review.  Patient plans to discuss their wishes with loved ones or provider.        Fall risk  Answers for HPI/ROS submitted by the patient on 8/26/2020   Annual Exam:  If you checked off any problems, how difficult have these problems made it for you to do your work, take care of things at home, or get along with other people?: Somewhat difficult  PHQ9 TOTAL SCORE: 10    click delete button to remove this line now    Cognitive Screening   1) Repeat 3 items (Leader, Season, Table)    2) Clock draw: NORMAL  3) 3 item recall: Recalls 3 objects  Results: 3 items recalled: COGNITIVE IMPAIRMENT LESS LIKELY    Mini-CogTM Copyright MADISON Guerrier. Licensed by the author for use in [facility]; reprinted with permission (ender@.[facility]). All rights reserved.      Six Item Cognitive Impairment Test   (6CIT):      What year is it?                               Correct - 0 points    What month is it?                               Correct - 0 points      Give the patient an address to remember with five components:   Bakari Workman ( first and last name - 2 components)   [address]  (number and name of street - 2 components)   [place] ( city - 1 component)      About what time is it (within the hour)? Correct - 0 points    Count backwards from 20 to  1:   Correct - 0 points    Say the months of the year in reverse: Correct - 0 points    Repeat the address phrase:   1 error - 2 points    Total 6CIT Score:          Interpretation: The 6CIT uses an inverse score and questions are weighted to produce a total out of 28. Scores of 0-7 are considered normal and 8 or more significant.    Advantages The test has high sensitivity without compromising specificity even in mild dementia. It is easy to translate linguistically and culturally.  Disadvantages The main disadvantage is in the scoring and weighting of the test, which is initially confusing, however computer models have simplified this greatly.    Probability Statistics: At the 7/8 cut off: Overall figures sensitivity 90% specificity 100%, in mild dementia sensitivity = 78% , specificity = 100%    Copyright 2000 [facility], [facility]. Courtesy of Dr. Matty Saunders      Do you have sleep apnea, excessive snoring or daytime drowsiness?: no    Reviewed and updated as needed this visit by clinical staff  Tobacco  Allergies  Meds  Problems  Med Hx  Surg Hx  Fam Hx  Soc Hx          Reviewed and updated as needed this visit by Provider  Tobacco  Allergies  Meds  Problems  Med Hx  Surg Hx  Fam Hx        Social History     Tobacco Use     Smoking status: Light Tobacco Smoker     Last attempt to quit: 2000     Years since quittin.6     Smokeless tobacco: Never Used     Tobacco comment: smokes occasionally   Substance Use Topics     Alcohol use: No     Alcohol/week: 0.0 standard drinks     Comment: quit completely      If you drink alcohol do you typically have >3 drinks per day or >7 drinks per week? No    Alcohol Use 2020   Prescreen: >3 drinks/day or >7 drinks/week? Not Applicable   Prescreen: >3 drinks/day or >7 drinks/week? -   No flowsheet data found.            Current providers sharing in care for this patient include:   Patient Care Team:  Dee Castillo  MD Arely as PCP - General  Dee Castillo MD as Assigned PCP    The following health maintenance items are reviewed in Epic and correct as of today:  Health Maintenance   Topic Date Due     ZOSTER IMMUNIZATION (2 of 3) 05/09/2012     FALL RISK ASSESSMENT  04/10/2020     INFLUENZA VACCINE (1) 09/01/2020     PHQ-9  02/26/2021     MAMMO SCREENING  07/26/2021     MEDICARE ANNUAL WELLNESS VISIT  08/26/2021     COLORECTAL CANCER SCREENING  10/24/2021     DTAP/TDAP/TD IMMUNIZATION (3 - Td) 01/18/2023     LIPID  08/26/2025     ADVANCE CARE PLANNING  08/26/2025     DEXA  Completed     HEPATITIS C SCREENING  Completed     DEPRESSION ACTION PLAN  Completed     PNEUMOCOCCAL IMMUNIZATION 65+ LOW/MEDIUM RISK  Completed     IPV IMMUNIZATION  Aged Out     MENINGITIS IMMUNIZATION  Aged Out     HEPATITIS B IMMUNIZATION  Aged Out       Lab work is in process  Labs reviewed in EPIC  BP Readings from Last 3 Encounters:   08/26/20 130/69   12/18/19 133/69   09/18/19 131/82    Wt Readings from Last 3 Encounters:   08/26/20 58.7 kg (129 lb 8 oz)   12/18/19 61.2 kg (135 lb)   09/18/19 60.9 kg (134 lb 4 oz)                  Patient Active Problem List   Diagnosis     Anxiety state     Urge incontinence     Hyperlipidemia LDL goal <100     Mild major depression (H)     Osteoarthritis     Essential hypertension with goal blood pressure less than 140/90     Advanced directives, counseling/discussion     Alcohol abuse     Abnormal ankle brachial index     Mild atherosclerosis of carotid artery     Insomnia     Bilateral hip pain     Somatic dysfunction of lumbar region     Chronic bilateral low back pain without sciatica     Nonallopathic lesion of sacroiliac region     Sacroiliac joint pain     Somatic dysfunction of pelvis region     Closed compression fracture of second lumbar vertebra (H)     Memory change     Chest pain     Mild coronary artery disease     Past Surgical History:   Procedure Laterality Date     C NONSPECIFIC  "PROCEDURE  1985    Tubal ligation     C NONSPECIFIC PROCEDURE      laporoscopy     COLONOSCOPY N/A 10/24/2016    Procedure: COMBINED COLONOSCOPY, SINGLE OR MULTIPLE BIOPSY/POLYPECTOMY BY BIOPSY;  Surgeon: Kwaku Henry MD;  Location:  GI     CV CORONARY ANGIOGRAM N/A 2019    Procedure: Coronary Angiogram;  Surgeon: Viktor Brothers MD;  Location:  HEART CARDIAC CATH LAB       Social History     Tobacco Use     Smoking status: Light Tobacco Smoker     Last attempt to quit: 2000     Years since quittin.6     Smokeless tobacco: Never Used     Tobacco comment: smokes occasionally   Substance Use Topics     Alcohol use: No     Alcohol/week: 0.0 standard drinks     Comment: quit completely      Family History   Problem Relation Age of Onset     Hypertension Mother      Cerebrovascular Disease Mother          of complications of stroke at age 82     Heart Disease Mother         atrial fib     Osteoporosis Mother         two hip fx's     Neurologic Disorder Mother         MS     Hypertension Father      Neurologic Disorder Father         dementia- Alzheimers, dx in his 60s, live to his 90s     Gastrointestinal Disease Father      Depression Father      Hypertension Brother      Neurologic Disorder Maternal Grandmother      Neurologic Disorder Sister      C.A.D. Paternal Grandmother 62         at 62, of MI     C.A.D. Paternal Aunt 62        \"\"     C.A.D. Paternal Aunt 62        \"\"     Colon Cancer Paternal Uncle          Current Outpatient Medications   Medication Sig Dispense Refill     amLODIPine (NORVASC) 5 MG tablet Take 1 tablet (5 mg) by mouth At Bedtime 90 tablet 1     aspirin 81 MG EC tablet Take 1 tablet (81 mg) by mouth daily 90 tablet 3     atorvastatin (LIPITOR) 20 MG tablet Take 1 tablet (20 mg) by mouth daily 90 tablet 3     Blood Pressure Monitor KIT 1 Units daily as needed (blood pressure check) 1 kit 0     lisinopril-hydrochlorothiazide (ZESTORETIC) 20-12.5 " "MG tablet Take 1 tablet by mouth daily 90 tablet 1     Multiple Vitamins-Minerals (MULTIVITAMIN ADULT PO) Take 1 tablet by mouth daily       order for DME Equipment being ordered: Digital home blood pressure monitor kit 1 Units 0     sertraline (ZOLOFT) 100 MG tablet Take 1.5 tablets (150 mg) by mouth daily 90 tablet 0     tolterodine (DETROL) 2 MG tablet TAKE 1 TABLET BY MOUTH DAILY AS NEEDED 30 tablet 0     tolterodine (DETROL) 2 MG tablet Take 2 mg by mouth daily as needed for incontinence (Uses rarely)       traZODone (DESYREL) 50 MG tablet Take 50 mg by mouth nightly as needed for sleep (Uses very rarely)       acetaminophen (TYLENOL) 325 MG tablet Take 2 tablets (650 mg) by mouth every 4 hours as needed for mild pain (Patient not taking: Reported on 8/26/2020) 30 tablet 0     Allergies   Allergen Reactions     No Known Drug Allergies      Recent Labs   Lab Test 09/18/19  1217 05/14/19  1116  05/05/19  1230 04/10/19  1126  02/28/18  1427  07/06/17  1813 10/11/16  1123   LDL  --   --   --   --  120*  --  95  --   --  158*   HDL  --   --   --   --  77  --  82  --   --  74   TRIG  --   --   --   --  54  --  50  --   --  101   ALT  --  27  --  27  --   --   --   --  19 22   CR 0.62 0.80   < > 0.75 0.72   < > 0.71   < > 0.68 0.71   GFRESTIMATED >90 75   < > 81 85   < > 82   < > 86 82   GFRESTBLACK >90 87   < > >90 >90   < > >90   < > >90   GFR Calc   >90   GFR Calc     POTASSIUM 3.7 4.5   < > 3.9 3.8   < > 3.9   < > 3.6 4.0   TSH  --  1.23  --   --   --   --   --   --   --   --     < > = values in this interval not displayed.          Review of Systems  Constitutional, HEENT, cardiovascular, pulmonary, gi and gu systems are negative, except as otherwise noted.    OBJECTIVE:   /69   Pulse 54   Temp 98.6  F (37  C) (Tympanic)   Resp 16   Ht 1.676 m (5' 6\")   Wt 58.7 kg (129 lb 8 oz)   SpO2 95%   BMI 20.90 kg/m   Estimated body mass index is 20.9 kg/m  as calculated from the " "following:    Height as of this encounter: 1.676 m (5' 6\").    Weight as of this encounter: 58.7 kg (129 lb 8 oz).  Physical Exam  GENERAL: healthy, alert and no distress  EYES: Eyes grossly normal to inspection, PERRL and conjunctivae and sclerae normal  HENT: ear canals and TM's normal, nose and mouth without ulcers or lesions  NECK: no adenopathy, no asymmetry, masses, or scars and thyroid normal to palpation  RESP: lungs clear to auscultation - no rales, rhonchi or wheezes  BREAST: normal without masses, tenderness or nipple discharge and no palpable axillary masses or adenopathy  CV: regular rate and rhythm, normal S1 S2, no S3 or S4, no murmur, click or rub, no peripheral edema and peripheral pulses strong  ABDOMEN: soft, nontender, no hepatosplenomegaly, no masses and bowel sounds normal  MS: no gross musculoskeletal defects noted, no edema  SKIN: no suspicious lesions or rashes  NEURO: Normal strength and tone, mentation intact and speech normal  PSYCH: mentation appears normal, affect normal/bright    Diagnostic Test Results:  Labs reviewed in Epic  Results for orders placed or performed in visit on 08/26/20   Vitamin B12     Status: None   Result Value Ref Range    Vitamin B12 480 193 - 986 pg/mL       ASSESSMENT / PLAN:       ICD-10-CM    1. Encounter for Medicare annual wellness exam  Z00.00    2. Major depressive disorder, recurrent episode, mild (H)  F33.0 CARE COORDINATION REFERRAL     MENTAL HEALTH REFERRAL  - Adult; Outpatient Treatment; Individual/Couples/Family/Group Therapy/Health Psychology[place] [facility] [place] [facility] [phone]; We will contact you to schedule the appointment or please call with any questions     sertraline (ZOLOFT) 100 MG tablet   3. Anxiety state  F41.1 CARE COORDINATION REFERRAL     MENTAL HEALTH REFERRAL  - Adult; Outpatient Treatment; Individual/Couples/Family/Group Therapy/Health Psychology[place] [facility] [place] [facility] [phone]; We will contact you to " schedule the appointment or please call with any questions     sertraline (ZOLOFT) 100 MG tablet   4. Essential hypertension with goal blood pressure less than 140/90  I10 Comprehensive metabolic panel (BMP + Alb, Alk Phos, ALT, AST, Total. Bili, TP)     Albumin Random Urine Quantitative with Creat Ratio     CANCELED: Albumin Random Urine Quantitative with Creat Ratio   5. Hyperlipidemia LDL goal <100  E78.5 Lipid panel reflex to direct LDL Fasting   6. Memory change  R41.3 CARE COORDINATION REFERRAL     MENTAL HEALTH REFERRAL  - Adult; Outpatient Treatment; Individual/Couples/Family/Group Therapy/Health Psychology[place] [facility] [place] [facility] [phone]; We will contact you to schedule the appointment or please call with any questions     Comprehensive metabolic panel (BMP + Alb, Alk Phos, ALT, AST, Total. Bili, TP)     TSH with free T4 reflex     Vitamin B12   7. Mild major depression (H)  F32.0    8. Caregiver stress  Z63.6 CARE COORDINATION REFERRAL     MENTAL [facility] REFERRAL  - Adult; Outpatient Treatment; Individual/Couples/Family/Group Therapy/Health Psychology[place] [facility] [place] [facility] [phone]; We will contact you to schedule the appointment or please call with any questions     CPE- Discussed diet, calcium and exercise.  Thin prep pap was not done.  Eye and dental care UTD or recommended f/u.  No immunizations needed today.  See orders below for tests ordered and screening needed.      --MDD/Anxiety/Caregiver stress-  Worsening stress with 's newer dx of amyloidosis of heart, EF issues. He already has had more significant dementia issues.  Caregiving tough due to his past several infidelities, unkind behavior in the past.  She also at times helps care for her daughter with bipolar (though she also helps with her father when doing okay), and do have concerns about pt's memory as well (see below).  Plan- She had wondered about a 'tranquilizer' given the stressors, but agrees  with the plan to increase zoloft to 150mg/d, refer for therapy, and also okay with care coordination call to see if resources/support that could help her.    --Memory concerns- pt scores just 2/28 on 6-CIT today, but has displayed multiple memory issues interacting with us recently, and got lost walking around [facility] with her  (has dx dementia) recently.  She has forgotten to bring neurology consultation notes for memory concerns today despite reminding her on a recent call to the appt.  Also forgets several things when asking history questions.  -Will have team all and see if we can get her to fax consultation/labs/test/letter follow-up to the clinic, and get information about the clinic/doctor so we can request if needed.  -Labs as above.    --HTN/Lipids- BP slightly borderline today.  BMP last done in 9/19- will recheck CMP today and microalbumin.  Lipids last done in 4/19.  She is not fasting today.  Will try to get lipid labs at f/u visit.    --Weight loss- Pt now at 129 lbs.  She had hovered around 165 lbs about 5-10 yrs ago.  Started losing weight in 2014, losing about 20 lbs and hovered around 145 lbs for a bit.  Trended down to ~135 lbs in 2019.  She was at 135 lbs in 12/19, and down to 129 lbs today (could be wearing winter vs summer clothes).  So has been mostly been from 140 lbs to 135 lbs since ~2016, just going down to 129 lbs today.  Discussed she should avoid losing any more weight at this point - she admits to skipping breakfast often lately.  Will monitor closely, and check further labs if she has further weight loss.      Return in about 6 weeks (around 10/7/2020) for Depression follow-up, Anxiety follow-up, memory follow-up, fasting labs.      **Will have team call pt to get an updated TOMASA, and help her schedule a 6 week follow-up visit for follow-up Anxiety, and would like to get fasting labs at that time (so recommend a morning appointment).  Please also help her get a copy of  "the neurology consultation and results notes from neurology to our office- either by having her get details of the doctor/office so we can request, or see if she can fax what she has to our office.          Patient instructions-      Return in about 6 weeks (around 10/7/2020) for Annual Wellness Visit, Depression follow-up, Anxiety follow-up.   6 week follow-up visit to recheck mood/anxiety.  Increase zoloft to 150mg (1 1/2 tab) daily.     Expect calls to help schedule with therapist, and from care coordinator for resources/respite cares, memory concerns.     Memory testing-   Please try and bring in or mail/fax in the testing- you mentioned neuropsych testing, OT testing.               COUNSELING:  Reviewed preventive health counseling, as reflected in patient instructions    Estimated body mass index is 20.9 kg/m  as calculated from the following:    Height as of this encounter: 1.676 m (5' 6\").    Weight as of this encounter: 58.7 kg (129 lb 8 oz).        She reports that she has been smoking. She has never used smokeless tobacco.     Tobacco Cessation Action Plan:   Information offered: Patient not interested at this time      Appropriate preventive services were discussed with this patient, including applicable screening as appropriate for cardiovascular disease, diabetes, osteopenia/osteoporosis, and glaucoma.  As appropriate for age/gender, discussed screening for colorectal cancer, prostate cancer, breast cancer, and cervical cancer. Checklist reviewing preventive services available has been given to the patient.    Reviewed patients plan of care and provided an AVS. The Basic Care Plan (routine screening as documented in Health Maintenance) for Kimmie meets the Care Plan requirement. This Care Plan has been established and reviewed with the Patient.    Counseling Resources:  ATP IV Guidelines  Pooled Cohorts Equation Calculator  Breast Cancer Risk Calculator  Breast Cancer: Medication to Reduce Risk  FRAX " Risk Assessment  ICSI Preventive Guidelines  Dietary Guidelines for Americans, 2010  USDA's MyPlate  ASA Prophylaxis  Lung CA Screening    Dee Castillo MD  [facility]    Identified Health Risks:

## 2020-08-26 NOTE — NURSING NOTE
"Chief Complaint   Patient presents with     Physical     /69   Pulse 54   Temp 98.6  F (37  C) (Tympanic)   Resp 16   Ht 1.676 m (5' 6\")   Wt 58.7 kg (129 lb 8 oz)   SpO2 95%   BMI 20.90 kg/m   Estimated body mass index is 20.9 kg/m  as calculated from the following:    Height as of this encounter: 1.676 m (5' 6\").    Weight as of this encounter: 58.7 kg (129 lb 8 oz).  Medication Reconciliation: complete        Health Maintenance Due Pending Provider Review:  PHQ9    Completed today.    Candace Vail MA  [facility]  [phone]  "

## 2020-08-26 NOTE — PROGRESS NOTES
Clinic Care Coordination Contact  [facility]/Voicemail       Clinical Data: Care Coordinator Outreach  Outreach attempted x 1.  Left message on patient's voicemail with call back information and requested return call.  Plan:. Care Coordinator will try to reach patient again in 1-2 business days.

## 2020-08-26 NOTE — LETTER
September 4, 2020      Kimmie Carranza  [address] [facility] [address] [facility] [address]        Dear ,    We are writing to inform you of your test results.      Here are your lab results from your recent visit...   -Your TSH (thyroid stimulating hormone, which is elevated in hypothyroidism, and lowered in hyperthyroidism) looks good.   -Your cholesterol panel looks very good with a low LDL (the bad cholesterol) and a high HDL (the good cholesterol).   -Your CMP (which includes electrolyte levels, blood sugar levels, and kidney and liver function tests) looks normal.   -Your Vitamin B12 level is in a good range.     Please let me know if you have any questions.   Best,   Juwan Castillo MD    Resulted Orders   Lipid panel reflex to direct LDL Fasting   Result Value Ref Range    Cholesterol 200 (H) <200 mg/dL      Comment:      Desirable:       <200 mg/dl    Triglycerides 72 <150 mg/dL      Comment:      Fasting specimen    HDL Cholesterol 80 >49 mg/dL    LDL Cholesterol Calculated 106 (H) <100 mg/dL      Comment:      Above desirable:  100-129 mg/dl  Borderline High:  130-159 mg/dL  High:             160-189 mg/dL  Very high:       >189 mg/dl      Non HDL Cholesterol 120 <130 mg/dL   Comprehensive metabolic panel (BMP + Alb, Alk Phos, ALT, AST, Total. Bili, TP)   Result Value Ref Range    Sodium 140 133 - 144 mmol/L    Potassium 3.7 3.4 - 5.3 mmol/L    Chloride 106 94 - 109 mmol/L    Carbon Dioxide 27 20 - 32 mmol/L    Anion Gap 7 3 - 14 mmol/L    Glucose 87 70 - 99 mg/dL      Comment:      Fasting specimen    Urea Nitrogen 14 7 - 30 mg/dL    Creatinine 0.72 0.52 - 1.04 mg/dL    GFR Estimate 85 >60 mL/min/[1.73_m2]      Comment:      Non  GFR Calc  Starting 12/18/2018, serum creatinine based estimated GFR (eGFR) will be   calculated using the Chronic Kidney Disease Epidemiology Collaboration   (CKD-EPI) equation.      GFR Estimate If Black >90 >60 mL/min/[1.73_m2]      Comment:       GFR  Calc  Starting 12/18/2018, serum creatinine based estimated GFR (eGFR) will be   calculated using the Chronic Kidney Disease Epidemiology Collaboration   (CKD-EPI) equation.      Calcium 9.6 8.5 - 10.1 mg/dL    Bilirubin Total 1.0 0.2 - 1.3 mg/dL    Albumin 4.1 3.4 - 5.0 g/dL    Protein Total 7.4 6.8 - 8.8 g/dL    Alkaline Phosphatase 44 40 - 150 U/L    ALT 21 0 - 50 U/L    AST 25 0 - 45 U/L   TSH with free T4 reflex   Result Value Ref Range    TSH 1.66 0.40 - 4.00 mU/L   Vitamin B12   Result Value Ref Range    Vitamin B12 480 193 - 986 pg/mL       If you have any questions or concerns, please call the clinic at the number listed above.       Sincerely,        Dee Castillo MD

## 2020-08-27 LAB
ALBUMIN SERPL-MCNC: 4.1 G/DL (ref 3.4–5)
ALP SERPL-CCNC: 44 U/L (ref 40–150)
ALT SERPL W P-5'-P-CCNC: 21 U/L (ref 0–50)
ANION GAP SERPL CALCULATED.3IONS-SCNC: 7 MMOL/L (ref 3–14)
AST SERPL W P-5'-P-CCNC: 25 U/L (ref 0–45)
BILIRUB SERPL-MCNC: 1 MG/DL (ref 0.2–1.3)
BUN SERPL-MCNC: 14 MG/DL (ref 7–30)
CALCIUM SERPL-MCNC: 9.6 MG/DL (ref 8.5–10.1)
CHLORIDE SERPL-SCNC: 106 MMOL/L (ref 94–109)
CHOLEST SERPL-MCNC: 200 MG/DL
CO2 SERPL-SCNC: 27 MMOL/L (ref 20–32)
CREAT SERPL-MCNC: 0.72 MG/DL (ref 0.52–1.04)
GFR SERPL CREATININE-BSD FRML MDRD: 85 ML/MIN/{1.73_M2}
GLUCOSE SERPL-MCNC: 87 MG/DL (ref 70–99)
HDLC SERPL-MCNC: 80 MG/DL
LDLC SERPL CALC-MCNC: 106 MG/DL
NONHDLC SERPL-MCNC: 120 MG/DL
POTASSIUM SERPL-SCNC: 3.7 MMOL/L (ref 3.4–5.3)
PROT SERPL-MCNC: 7.4 G/DL (ref 6.8–8.8)
SODIUM SERPL-SCNC: 140 MMOL/L (ref 133–144)
TRIGL SERPL-MCNC: 72 MG/DL
TSH SERPL DL<=0.005 MIU/L-ACNC: 1.66 MU/L (ref 0.4–4)

## 2020-08-28 NOTE — PROGRESS NOTES
Clinic Care Coordination Contact  Community Health Worker Initial Outreach    CHW Initial Information Gathering:  Referral Source: PCP  Preferred Urgent Care: Other([facility])  Current living arrangement:: I live in a private home with spouse  Informal Support system:: Family       Patient accepts CC: Yes. Patient scheduled for assessment with Arely  on 9/10/2020 at 2pm. Patient noted desire to discuss resources/support that maybe available. .         Reason for Referral: Patient/Caregiver Support: Resources for Support and Respite Care, memory concerns for her and her  has dementia  Additional pertinent details: Concerned for patient's dementia, have tried to remind her to bring in neuropsych testing, or bring in family member to her appts (difficult due to family stressors). Now pt's  has dementia, now with new serious dx. Kids with bipolar, own stressors. Care resting on her, and from this end, concerns her memory is getting much worse with minimal support for her.

## 2020-09-03 NOTE — RESULT ENCOUNTER NOTE
Please send letter below with copy of results.  Thanks! CW    Here are your lab results from your recent visit...  -Your TSH (thyroid stimulating hormone, which is elevated in hypothyroidism, and lowered in hyperthyroidism) looks good.  -Your cholesterol panel looks very good with a low LDL (the bad cholesterol) and a high HDL (the good cholesterol).   -Your CMP (which includes electrolyte levels, blood sugar levels, and kidney and liver function tests) looks normal.  -Your Vitamin B12 level is in a good range.    Please let me know if you have any questions.  Best,   Juwan Castillo MD

## 2020-09-10 ENCOUNTER — PATIENT OUTREACH (OUTPATIENT)
Dept: CARE COORDINATION | Facility: CLINIC | Age: 70
End: 2020-09-10
Attending: FAMILY MEDICINE

## 2020-09-10 NOTE — PROGRESS NOTES
Clinic Care Coordination Contact  [facility]/Voicemail       Clinical Data: Care Coordinator Outreach  Outreach attempted x 1.  Left message on patient's voicemail with call back information and requested return call.  Plan: Delegating to CHW to contact patient to reschedule assessment within 10 business days with [facility].   Arely Cibuzar,   [facility]  [phone]          Clinic Care Coordination Contact    Clinic Care Coordination Contact  OUTREACH      Patient/Caregiver understanding: Called patient for scheduled  CC assessment and she was babysitting her grandson who is disabled but was able to talk for a few minutes.  Explained the care coordination service and she would like to complete assessment but is not able to do it now.  Prefers to have a call on Tuesday, 9-15 at 9 AM.  She is caregiver for her  who is receiving care at the [facility].  He has dementia and also works with urology and cardiology.      Plan: Call patient on 9-15 at 9 AM.      Social Leonarda Odonnell  [facility]  [phone]

## 2020-09-29 ENCOUNTER — TELEPHONE (OUTPATIENT)
Dept: FAMILY MEDICINE | Facility: CLINIC | Age: 70
End: 2020-09-29

## 2020-10-01 ENCOUNTER — PATIENT OUTREACH (OUTPATIENT)
Dept: CARE COORDINATION | Facility: CLINIC | Age: 70
End: 2020-10-01

## 2020-10-01 ASSESSMENT — ACTIVITIES OF DAILY LIVING (ADL): DEPENDENT_IADLS:: INDEPENDENT

## 2020-10-01 NOTE — PROGRESS NOTES
Clinic Care Coordination Contact    Clinic Care Coordination Contact  OUTREACH    Referral Information:  Referral Source: PCP         Chief Complaint   Patient presents with     Clinic Care Coordination - Initial        Universal Utilization:      Utilization    Last refreshed: 10/1/2020 11:10 AM: Hospital Admissions 0           Last refreshed: 10/1/2020 11:10 AM: ED Visits 0           Last refreshed: 10/1/2020 11:10 AM: No Show Count (past year) 0              Current as of: 10/1/2020 11:10 AM              Clinical Concerns:  Current Medical Concerns: hip, back pain, hypertension    Current Behavioral Concerns: mild depression, anxiety      Education Provided to patient [facility]- caregiver support/info     Functional Status:  Dependent ADLs:: Independent  Dependent IADLs:: Independent  Bed or wheelchair confined:: No  Mobility Status: Independent  Fallen 2 or more times in the past year?: No  Any fall with injury in the past year?: No    Living Situation:  Current living arrangement:: I live in a private home with family(Spouse and daughter)  Type of residence:: Private home - stairs    Lifestyle & Psychosocial Needs:    Anglican or spiritual beliefs that impact treatment:: No  Mental health DX:: Yes  Mental health DX how managed:: Medication  Mental health management concern (GOAL):: No  Informal Support system:: Family   Socioeconomic History     Marital status:      Spouse name: Rohith     Number of children: 4     Years of education: 4     Highest education level: Not on file   Occupational History     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     Employer: [employer]     Comment: RN     Tobacco Use     Smoking status: Light Tobacco Smoker     Last attempt to quit: 2000     Years since quittin.7     Smokeless tobacco: Never Used     Tobacco comment: smokes occasionally   Substance and Sexual Activity     Alcohol use: No     Alcohol/week: 0.0 standard  drinks     Comment: quit completely 2-2015     Drug use: No     Sexual activity: Never     Partners: Male     Comment: , but  is impotent, difficult relationship        Resources and Interventions:  Current Resources:        Patient/Caregiver understanding: Pt says spouses condition has improved.  No longer needing respite/caregiver assistance.  Daughter currently living with them and helps out.  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         Future Appointments              In 1 week Dee Castillo MD [facility]    In 3 weeks [address] [place]          Plan: Pt not enrolling in Care Coordination at this time.  She will reach out in future if needed

## 2020-10-01 NOTE — PROGRESS NOTES
Called pt and schedule morning appointment , patient will do TOMASA and bring records from neurology consultation at that time, states she is having a hard time at this time because she is also getting her  care together and is looking for a male provider.   Suggested Dr. Harrison.

## 2020-10-14 ENCOUNTER — OFFICE VISIT (OUTPATIENT)
Dept: FAMILY MEDICINE | Facility: CLINIC | Age: 70
End: 2020-10-14
Payer: COMMERCIAL

## 2020-10-14 VITALS
HEART RATE: 41 BPM | DIASTOLIC BLOOD PRESSURE: 82 MMHG | OXYGEN SATURATION: 96 % | WEIGHT: 128 LBS | SYSTOLIC BLOOD PRESSURE: 178 MMHG | HEIGHT: 66 IN | RESPIRATION RATE: 16 BRPM | TEMPERATURE: 97 F | BODY MASS INDEX: 20.57 KG/M2

## 2020-10-14 DIAGNOSIS — F51.01 PRIMARY INSOMNIA: ICD-10-CM

## 2020-10-14 DIAGNOSIS — F17.200 SMOKER: ICD-10-CM

## 2020-10-14 DIAGNOSIS — F41.1 ANXIETY STATE: ICD-10-CM

## 2020-10-14 DIAGNOSIS — N39.41 URGE INCONTINENCE: ICD-10-CM

## 2020-10-14 DIAGNOSIS — E78.5 HYPERLIPIDEMIA LDL GOAL <100: ICD-10-CM

## 2020-10-14 DIAGNOSIS — F33.0 MAJOR DEPRESSIVE DISORDER, RECURRENT EPISODE, MILD (H): Primary | ICD-10-CM

## 2020-10-14 DIAGNOSIS — I10 ESSENTIAL HYPERTENSION WITH GOAL BLOOD PRESSURE LESS THAN 140/90: ICD-10-CM

## 2020-10-14 DIAGNOSIS — R41.3 MEMORY CHANGE: ICD-10-CM

## 2020-10-14 PROCEDURE — 99214 OFFICE O/P EST MOD 30 MIN: CPT | Performed by: FAMILY MEDICINE

## 2020-10-14 RX ORDER — LISINOPRIL AND HYDROCHLOROTHIAZIDE 12.5; 2 MG/1; MG/1
1 TABLET ORAL DAILY
Qty: 90 TABLET | Refills: 1 | Status: SHIPPED | OUTPATIENT
Start: 2020-10-14 | End: 2021-06-22

## 2020-10-14 RX ORDER — SERTRALINE HYDROCHLORIDE 100 MG/1
150 TABLET, FILM COATED ORAL DAILY
Qty: 135 TABLET | Refills: 1 | Status: SHIPPED | OUTPATIENT
Start: 2020-10-14 | End: 2021-06-22

## 2020-10-14 RX ORDER — TOLTERODINE TARTRATE 2 MG/1
TABLET, EXTENDED RELEASE ORAL
Qty: 30 TABLET | Refills: 0 | Status: SHIPPED | OUTPATIENT
Start: 2020-10-14 | End: 2020-11-12

## 2020-10-14 RX ORDER — TRAZODONE HYDROCHLORIDE 50 MG/1
50 TABLET, FILM COATED ORAL
Qty: 20 TABLET | Refills: 0 | Status: SHIPPED | OUTPATIENT
Start: 2020-10-14 | End: 2020-11-03

## 2020-10-14 RX ORDER — AMLODIPINE BESYLATE 5 MG/1
5 TABLET ORAL AT BEDTIME
Qty: 90 TABLET | Refills: 1 | Status: SHIPPED | OUTPATIENT
Start: 2020-10-14 | End: 2021-03-02

## 2020-10-14 ASSESSMENT — ANXIETY QUESTIONNAIRES
7. FEELING AFRAID AS IF SOMETHING AWFUL MIGHT HAPPEN: NOT AT ALL
1. FEELING NERVOUS, ANXIOUS, OR ON EDGE: SEVERAL DAYS
2. NOT BEING ABLE TO STOP OR CONTROL WORRYING: NOT AT ALL
GAD7 TOTAL SCORE: 3
3. WORRYING TOO MUCH ABOUT DIFFERENT THINGS: NOT AT ALL
6. BECOMING EASILY ANNOYED OR IRRITABLE: NOT AT ALL
5. BEING SO RESTLESS THAT IT IS HARD TO SIT STILL: SEVERAL DAYS

## 2020-10-14 ASSESSMENT — MIFFLIN-ST. JEOR: SCORE: 1117.35

## 2020-10-14 ASSESSMENT — PATIENT HEALTH QUESTIONNAIRE - PHQ9
SUM OF ALL RESPONSES TO PHQ QUESTIONS 1-9: 5
5. POOR APPETITE OR OVEREATING: SEVERAL DAYS

## 2020-10-14 NOTE — PATIENT INSTRUCTIONS
Move your amlodipine to taking it with the rest of your morning meds so you don't forget to take.    Follow-up here in 3-4 weeks for blood pressure recheck appointment.    Make follow-up appointment with Dr. Fabian (neurology) to go over neuropsych results and recommendations.    Check on coverage for shingrix at pharmacy vs clinic.    Call Carlgulshan CANDELARIO to see when you are due for colonoscopy 10/19 vs the 10/21 we had in our records?  [phone]

## 2020-10-14 NOTE — NURSING NOTE
"Chief Complaint   Patient presents with     Anxiety     initial BP (!) 185/86 (BP Location: Right arm)   Pulse (!) 41   Temp 97  F (36.1  C) (Tympanic)   Resp 16   Ht 1.676 m (5' 6\")   Wt 58.1 kg (128 lb)   SpO2 96%   BMI 20.66 kg/m   Estimated body mass index is 20.66 kg/m  as calculated from the following:    Height as of this encounter: 1.676 m (5' 6\").    Weight as of this encounter: 58.1 kg (128 lb).  BP completed using cuff size: regular.   R arm      Health Maintenance that is potentially due pending provider review:  NONE    n/a    Roland Loya ma  "

## 2020-10-14 NOTE — PROGRESS NOTES
Subjective   Kimmie Carranza is a 70 year old female who presents to clinic today for the following health issues:  Anxiety/depression, blood pressure.    HPI         Anxiety Follow-Up- zoloft increased from 100mg/d to 150mg/d at last visit    How are you doing with your anxiety since your last visit? No change    Are you having other symptoms that might be associated with anxiety? No    Have you had a significant life event? No     Are you feeling depressed? No    Do you have any concerns with your use of alcohol or other drugs? No    In -   Anxiety was really hard.  's dx- fungal meningitis, and has cardiac issues with cardiac EF of 50%.  Increased zoloft from 100mg/d, to 150mg/d.  Thinks it may have helped.  No se's.    PHQ-9 is down from 10 to 5.  TOMASA unfortunately didn't get done last time, but anxiety sx's were quite bad.  Now down at 3.    Med/lab review-   BPs very high today, but she missed her amlodipine dose last night.    Smoker- motivation to quit goes in spurts.  Not interested in quitting at this time.    Social History     Tobacco Use     Smoking status: Light Tobacco Smoker     Last attempt to quit: 2000     Years since quittin.8     Smokeless tobacco: Never Used     Tobacco comment: smokes occasionally   Substance Use Topics     Alcohol use: No     Alcohol/week: 0.0 standard drinks     Comment: quit completely      Drug use: No     TOMASA-7 SCORE 4/10/2019 3/18/2020 10/14/2020   Total Score 6 5 3     PHQ 2019 2020 10/14/2020   PHQ-9 Total Score 7 10 5   Q9: Thoughts of better off dead/self-harm past 2 weeks Several days Not at all Not at all         How many servings of fruits and vegetables do you eat daily?  4 or more    On average, how many sweetened beverages do you drink each day (Examples: soda, juice, sweet tea, etc.  Do NOT count diet or artificially sweetened beverages)?   1    How many days per week do you exercise enough to make your heart beat faster?  "5    How many minutes a day do you exercise enough to make your heart beat faster? 30 - 60    How many days per week do you miss taking your medication? 0          Review of Systems   Constitutional, HEENT, cardiovascular, pulmonary, gi and gu systems are negative, except as otherwise noted.      Objective    BP (!) 185/86 (BP Location: Right arm)   Pulse (!) 41   Temp 97  F (36.1  C) (Tympanic)   Resp 16   Ht 1.676 m (5' 6\")   Wt 58.1 kg (128 lb)   SpO2 96%   BMI 20.66 kg/m    Body mass index is 20.66 kg/m .  Physical Exam   GENERAL APPEARANCE: healthy, alert and no distress     EYES: PERRL, sclera clear, EOMI     NECK: no adenopathy, no asymmetry, masses, or scars and thyroid normal to palpation     RESP: lungs clear to auscultation - no rales, rhonchi or wheezes     CV: regular rates and rhythm, normal S1 S2, no S3 or S4 and no murmur, click or rub      Ext: warm, dry, no edema           Assessment & Plan       ICD-10-CM    1. Major depressive disorder, recurrent episode, mild (H)  F33.0 sertraline (ZOLOFT) 100 MG tablet   2. Anxiety state  F41.1 sertraline (ZOLOFT) 100 MG tablet   3. Memory change  R41.3    4. Essential hypertension with goal blood pressure less than 140/90  I10 amLODIPine (NORVASC) 5 MG tablet     lisinopril-hydrochlorothiazide (ZESTORETIC) 20-12.5 MG tablet   5. Hyperlipidemia LDL goal <100  E78.5    6. Primary insomnia  F51.01 traZODone (DESYREL) 50 MG tablet   7. Urge incontinence  N39.41 tolterodine (DETROL) 2 MG tablet   8. Smoker  F17.200         Anxiety/MDD- sx's improved, unsure if 'just used to the situation', or if it's the increase in zoloft from 100mg/d to 150mg/d.     No se's with the increased dose.  PHQ-9 is down from 10 to 5.  TOMASA unfortunately didn't get done last time, but was quite bad. Now down at 3.  Will cont at 150mg/d.  Cont 6mo f/u.    Reviewed neurology neuropsych report which she brought in today- the report did NOT find dementia, which is interesting due to " her trouble remembering things. Does have signs of ADHD.  Rec f/u with neurology to discuss results.    HTN, med/lab review-   BPs very high today (178/82), but she missed her amlodipine dose last night- tends to do that occasionally.  Usually remembers her am meds more reliably.  Rec taking it in am with rest of meds.  HTN labs from 8/20 WNL.  F/u in 3 wks for recheck.    Lipids- Reviewed lab results from 8/20.  in 8/20.  Continues on atorvastatin 20mg/d, no se's.  Will continue.    Insomnia- trazodone helpful, no se's, refills sent.  Urge Incontinence- detrol helpful, no se's, refills sent.    Smoker- motivation to quit goes in spurts.  Not interested in help quitting at this time.          Return in about 3 weeks (around 11/4/2020) for Blood Pressure Recheck.    Dee Castillo MD  [facility]        Return in about 3 weeks (around 11/4/2020) for Blood Pressure Recheck.

## 2020-10-15 ASSESSMENT — ANXIETY QUESTIONNAIRES: GAD7 TOTAL SCORE: 3

## 2020-10-23 ENCOUNTER — HOSPITAL ENCOUNTER (OUTPATIENT)
Dept: MAMMOGRAPHY | Facility: CLINIC | Age: 70
Discharge: HOME | End: 2020-10-23
Attending: FAMILY MEDICINE | Admitting: FAMILY MEDICINE
Payer: COMMERCIAL

## 2020-10-23 DIAGNOSIS — Z12.31 VISIT FOR SCREENING MAMMOGRAM: ICD-10-CM

## 2020-10-23 PROCEDURE — 77067 SCR MAMMO BI INCL CAD: CPT

## 2021-02-15 ENCOUNTER — TELEPHONE (OUTPATIENT)
Dept: PSYCHIATRY | Facility: CLINIC | Age: 71
End: 2021-02-15

## 2021-02-15 NOTE — TELEPHONE ENCOUNTER
"PSYCHIATRY CLINIC PHONE INTAKE     SERVICES REQUESTED / INTERESTED IN          Med Management    Presenting Problem and Brief History                              What would you like to be seen for? (brief description):  Patient has been getting more disorganized and is wondering if her ADD is getting worse. She had a cognitive test which came back \"OK\". She also says she has lost quite a bit of weight and is wondering if it is due to being restless. Patient reports she has now missed 3 dentist appointments because of being disorganized. Currently prescribed sertraline and trazodone PRN which she takes maybe once every 6 months for sleep. Patient stated she is wanting to function a little better.  Have you received a mental health diagnosis? Yes   Which one (s): ADD  Is there any history of developmental delay?  No   Are you currently seeing a mental health provider?  No            Who / month last seen:  Last seen about 20 years ago  Do you have mental health records elsewhere?  Yes  Will you sign a release so we can obtain them?  Yes    Have you ever been hospitalized for psychiatric reasons?  No  Describe:      Do you have current thoughts of self-harm?  No    Do you currently have thoughts of harming others?  No       Substance Use History     Do you have any history of alcohol / illicit drug use?  Yes  Describe: Has not drank alcohol since 2015  Have you ever received treatment for this?  Yes    Describe:  W[facility] to a senior center     Social History     Who is the patient's a guardian?  No    Name / number:   Have you had an ACT team in last 12 months?  No  Describe:    Do you have any current or past legal issues?  No  Describe:    OK to leave a detailed voicemail?  Yes    Medical/ Surgical History                                   Patient Active Problem List   Diagnosis     Anxiety state     Urge incontinence     Hyperlipidemia LDL goal <100     Mild major depression (H)     Osteoarthritis     " Essential hypertension with goal blood pressure less than 140/90     Advanced directives, counseling/discussion     Alcohol abuse     Abnormal ankle brachial index     Mild atherosclerosis of carotid artery     Insomnia     Bilateral hip pain     Chronic bilateral low back pain without sciatica     Nonallopathic lesion of sacroiliac region     Sacroiliac joint pain     Closed compression fracture of second lumbar vertebra (H)     Memory change     Chest pain     Mild coronary artery disease          Medications             Current Outpatient Medications   Medication Sig Dispense Refill     acetaminophen (TYLENOL) 325 MG tablet Take 2 tablets (650 mg) by mouth every 4 hours as needed for mild pain (Patient not taking: Reported on 8/26/2020) 30 tablet 0     amLODIPine (NORVASC) 5 MG tablet Take 1 tablet (5 mg) by mouth At Bedtime 90 tablet 1     aspirin 81 MG EC tablet Take 1 tablet (81 mg) by mouth daily 90 tablet 3     atorvastatin (LIPITOR) 20 MG tablet TAKE 1 TABLET(20 MG) BY MOUTH DAILY 90 tablet 2     Blood Pressure Monitor KIT 1 Units daily as needed (blood pressure check) 1 kit 0     lisinopril-hydrochlorothiazide (ZESTORETIC) 20-12.5 MG tablet Take 1 tablet by mouth daily 90 tablet 1     Multiple Vitamins-Minerals (MULTIVITAMIN ADULT PO) Take 1 tablet by mouth daily       order for DME Equipment being ordered: Digital home blood pressure monitor kit 1 Units 0     sertraline (ZOLOFT) 100 MG tablet Take 1.5 tablets (150 mg) by mouth daily 135 tablet 1     tolterodine (DETROL) 2 MG tablet TAKE 1 TABLET BY MOUTH DAILY AS NEEDED 30 tablet 0     tolterodine (DETROL) 2 MG tablet Take 2 mg by mouth daily as needed for incontinence (Uses rarely)       traZODone (DESYREL) 50 MG tablet TAKE 1 TABLET BY MOUTH AT BEDTIME AS NEEDED FOR SLEEP 90 tablet 0         DISPOSITION      Completed phone screen with patient. Added to ADHD wait list.    Yodit Danielson,

## 2021-02-16 ENCOUNTER — TELEPHONE (OUTPATIENT)
Dept: FAMILY MEDICINE | Facility: CLINIC | Age: 71
End: 2021-02-16

## 2021-02-16 NOTE — LETTER
[facility]  [address]  [place] [address] [phone]       February 16, 2021    Kimmie Carranza  [address]  [place] [address]    Dear Kathryn,    We care about your health and have reviewed your health plan and are making recommendations based on this review, to optimize your health.    You are in particular need of attention regarding:  -High Blood Pressure    We are recommending that you:    -schedule a FOLLOWUP OFFICE APPOINTMENT with Dr. Castillo. If you check your blood pressures regularly at home, this can also be a virtual (telephone or video) visit.      In addition, here is a list of due or overdue Health Maintenance reminders.    Health Maintenance Due   Topic Date Due     Zoster (Shingles) Vaccine (2 of 3) 05/09/2012       To address the above recommendations, we encourage you to contact us at [phone], via [employer] or by contacting Central Scheduling toll free at [phone] 24 hours a day. They will assist you with finding the most convenient time and location.    Thank you for trusting [facility] and we appreciate the opportunity to serve you.  We look forward to supporting your healthcare needs in the future.    Healthy Regards,    Your [facility] Team

## 2021-02-16 NOTE — TELEPHONE ENCOUNTER
Patient Quality Outreach      Summary:    Patient has the following on her problem list/HM:   Depression / Dysthymia review    6 Month Remission: 4-8 month window range:   12 Month Remission: 10-14 month window range:        PHQ-9 SCORE 12/18/2019 8/26/2020 10/14/2020   PHQ-9 Total Score - - -   PHQ-9 Total Score MyChart - 10 (Moderate depression) -   PHQ-9 Total Score 7 10 5       If PHQ-9 recheck is 5 or more, route to provider for next steps.    Hypertension   Last three blood pressure readings:  BP Readings from Last 3 Encounters:   10/14/20 (!) 178/82   08/26/20 130/69   12/18/19 133/69     Blood pressure: Failed    HTN Guidelines:  ? 139/89     Patient is due/failing the following:   BP check    Type of outreach:    Sent letter.    Questions for provider review:    None                                                                                                                                     Kirsten Klein MA on 2/16/2021 at 3:07 PM       Chart routed to .

## 2021-03-02 ENCOUNTER — OFFICE VISIT (OUTPATIENT)
Dept: FAMILY MEDICINE | Facility: CLINIC | Age: 71
End: 2021-03-02
Payer: COMMERCIAL

## 2021-03-02 VITALS
RESPIRATION RATE: 14 BRPM | OXYGEN SATURATION: 98 % | HEIGHT: 66 IN | SYSTOLIC BLOOD PRESSURE: 149 MMHG | HEART RATE: 44 BPM | BODY MASS INDEX: 19.37 KG/M2 | WEIGHT: 120.5 LBS | DIASTOLIC BLOOD PRESSURE: 58 MMHG

## 2021-03-02 DIAGNOSIS — R41.3 MEMORY CHANGE: Primary | ICD-10-CM

## 2021-03-02 DIAGNOSIS — F90.8 ATTENTION DEFICIT HYPERACTIVITY DISORDER (ADHD), OTHER TYPE: ICD-10-CM

## 2021-03-02 DIAGNOSIS — F41.1 ANXIETY STATE: ICD-10-CM

## 2021-03-02 DIAGNOSIS — F33.0 MAJOR DEPRESSIVE DISORDER, RECURRENT EPISODE, MILD (H): ICD-10-CM

## 2021-03-02 DIAGNOSIS — I10 ESSENTIAL HYPERTENSION WITH GOAL BLOOD PRESSURE LESS THAN 140/90: ICD-10-CM

## 2021-03-02 PROCEDURE — 99214 OFFICE O/P EST MOD 30 MIN: CPT | Performed by: FAMILY MEDICINE

## 2021-03-02 RX ORDER — AMLODIPINE BESYLATE 10 MG/1
10 TABLET ORAL AT BEDTIME
Qty: 30 TABLET | Refills: 1 | Status: SHIPPED | OUTPATIENT
Start: 2021-03-02 | End: 2021-05-14

## 2021-03-02 ASSESSMENT — ANXIETY QUESTIONNAIRES
3. WORRYING TOO MUCH ABOUT DIFFERENT THINGS: SEVERAL DAYS
1. FEELING NERVOUS, ANXIOUS, OR ON EDGE: SEVERAL DAYS
7. FEELING AFRAID AS IF SOMETHING AWFUL MIGHT HAPPEN: NOT AT ALL
IF YOU CHECKED OFF ANY PROBLEMS ON THIS QUESTIONNAIRE, HOW DIFFICULT HAVE THESE PROBLEMS MADE IT FOR YOU TO DO YOUR WORK, TAKE CARE OF THINGS AT HOME, OR GET ALONG WITH OTHER PEOPLE: SOMEWHAT DIFFICULT
GAD7 TOTAL SCORE: 5
6. BECOMING EASILY ANNOYED OR IRRITABLE: NOT AT ALL
5. BEING SO RESTLESS THAT IT IS HARD TO SIT STILL: SEVERAL DAYS
2. NOT BEING ABLE TO STOP OR CONTROL WORRYING: SEVERAL DAYS

## 2021-03-02 ASSESSMENT — PATIENT HEALTH QUESTIONNAIRE - PHQ9
5. POOR APPETITE OR OVEREATING: SEVERAL DAYS
SUM OF ALL RESPONSES TO PHQ QUESTIONS 1-9: 7

## 2021-03-02 ASSESSMENT — PAIN SCALES - GENERAL: PAINLEVEL: NO PAIN (0)

## 2021-03-02 ASSESSMENT — MIFFLIN-ST. JEOR: SCORE: 1083.33

## 2021-03-02 NOTE — PROGRESS NOTES
Assessment & Plan       ICD-10-CM    1. Memory change  R41.3 NEUROLOGY ADULT REFERRAL   2. Attention deficit hyperactivity disorder (ADHD), other type  F90.8 NEUROLOGY ADULT REFERRAL   3. Anxiety state  F41.1 NEUROLOGY ADULT REFERRAL   4. Essential hypertension with goal blood pressure less than 140/90  I10 amLODIPine (NORVASC) 10 MG tablet   5. Major depressive disorder, recurrent episode, mild (H)  F33.0       Anxiety/MDD, memory concerns, possible ADHD-   Pt feels her anxiety/mood is okay on zoloft 150mg/d.  Major stressors with 's health issues, main caretaker, especially tough with health issues happening just before she was finally about to leave him.    Neuropsych testing- reviewed neuropsych testing done about a year ago with pt- mostly done for her memory concerns.  Memory testing turned out okay, but they did note ADHD sx's, and stable mood/anxiety sx's.  They had rec f/u with Dr. Fabian (neurology), so rec she schedule a f/u with her to discuss results and recs.  Pt will try and make that appt.    HTN- BP high today again.  Will increase amlodipine to 10mg/d (from 5mg/d).          Return in about 3 weeks (around 3/23/2021) for Blood Pressure Recheck (check on neurology follow-up, ADHD/memory concerns).  Also review lung cancer screening options, and check if she's made an appt with Dr. Fabian.       Dee Castillo MD  [facility]          Swapnil Peralta is a 70 year old who presents for the following health issues - HTN, memory, anxiety    HPI   Hypertension Follow-up    Do you check your blood pressure regularly outside of the clinic? No     Are you following a low salt diet? Yes    Are your blood pressures ever more than 140 on the top number (systolic) OR more   than 90 on the bottom number (diastolic), for example 140/90? No- not checking at home    How many servings of fruits and vegetables do you eat daily?  4 or more    On average, how many  "sweetened beverages do you drink each day (Examples: soda, juice, sweet tea, etc.  Do NOT count diet or artificially sweetened beverages)?   0    How many days per week do you exercise enough to make your heart beat faster? 5    How many minutes a day do you exercise enough to make your heart beat faster? 30 - 60    How many days per week do you miss taking your medication? 0      She is doing okay.  She knows she's losing weight.  Not that hungry, not eating that much.  Cut down on sugar and ice cream.  Does due butter, oil, and will try and increase good fats.    ~1x/wk, misses a bp med dose.    Anxiety- zoloft increased to 150mg/d in late 8/20.  Doing okay, just still in a tough situation with her .  In this situation- taking care of her , who has all these health issues now.  Hard, because she was ready to separate from him before all this happened.  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  Living at their home.  Kids know- have been urging her to leave for yrs.  She's the main caretaker- straight cath, picks up, makes him his meals.  He can dress himself.  Doesn't like to bathe.    PHQ-9 is 7 today.  TOMASA-7 is is 4 today.    Neuropsych testing- Done in 1/20, dx memory okay, normal for age, but ADHD, and stable mdd/anxiety.  Rec f/u with Dr. Fabian.  Pt notes they thought she was fine in terms of her memory, but pt notes she's missed three dental appts.      COVID- scheduled tomorrow!  [employer].          Review of Systems   Constitutional, HEENT, cardiovascular, pulmonary, gi and gu systems are negative, except as otherwise noted.      Objective    BP (!) 149/58 (BP Location: Right arm, Patient Position: Sitting, Cuff Size: Adult Regular)   Pulse (!) 44   Resp 14   Ht 1.676 m (5' 6\")   Wt 54.7 kg (120 lb 8 oz)   SpO2 98%   BMI 19.45 kg/m    Body mass index is 19.45 kg/m .  Physical Exam   GENERAL APPEARANCE: healthy, alert and no distress   "   EYES: PERRL, sclera clear     HENT: nose and mouth without ulcers or lesions     NECK: no adenopathy, no asymmetry, masses, or scars and thyroid normal to palpation     RESP: lungs clear to auscultation - no rales, rhonchi or wheezes     CV: regular rates and rhythm, normal S1 S2, no S3 or S4 and no murmur, click or rub      Abdomen: soft, nontender, no HSM or masses and bowel sounds normal     Ext: warm, dry, no edema      Psych: full range affect, normal speech and grooming, judgement and insight intact     Office Visit on 08/26/2020   Component Date Value Ref Range Status     Cholesterol 08/26/2020 200* <200 mg/dL Final    Desirable:       <200 mg/dl     Triglycerides 08/26/2020 72  <150 mg/dL Final    Fasting specimen     HDL Cholesterol 08/26/2020 80  >49 mg/dL Final     LDL Cholesterol Calculated 08/26/2020 106* <100 mg/dL Final    Comment: Above desirable:  100-129 mg/dl  Borderline High:  130-159 mg/dL  High:             160-189 mg/dL  Very high:       >189 mg/dl       Non HDL Cholesterol 08/26/2020 120  <130 mg/dL Final     Sodium 08/26/2020 140  133 - 144 mmol/L Final     Potassium 08/26/2020 3.7  3.4 - 5.3 mmol/L Final     Chloride 08/26/2020 106  94 - 109 mmol/L Final     Carbon Dioxide 08/26/2020 27  20 - 32 mmol/L Final     Anion Gap 08/26/2020 7  3 - 14 mmol/L Final     Glucose 08/26/2020 87  70 - 99 mg/dL Final    Fasting specimen     Urea Nitrogen 08/26/2020 14  7 - 30 mg/dL Final     Creatinine 08/26/2020 0.72  0.52 - 1.04 mg/dL Final     GFR Estimate 08/26/2020 85  >60 mL/min/[1.73_m2] Final    Comment: Non  GFR Calc  Starting 12/18/2018, serum creatinine based estimated GFR (eGFR) will be   calculated using the Chronic Kidney Disease Epidemiology Collaboration   (CKD-EPI) equation.       GFR Estimate If Black 08/26/2020 >90  >60 mL/min/[1.73_m2] Final    Comment:  GFR Calc  Starting 12/18/2018, serum creatinine based estimated GFR (eGFR) will be   calculated  using the Chronic Kidney Disease Epidemiology Collaboration   (CKD-EPI) equation.       Calcium 08/26/2020 9.6  8.5 - 10.1 mg/dL Final     Bilirubin Total 08/26/2020 1.0  0.2 - 1.3 mg/dL Final     Albumin 08/26/2020 4.1  3.4 - 5.0 g/dL Final     Protein Total 08/26/2020 7.4  6.8 - 8.8 g/dL Final     Alkaline Phosphatase 08/26/2020 44  40 - 150 U/L Final     ALT 08/26/2020 21  0 - 50 U/L Final     AST 08/26/2020 25  0 - 45 U/L Final     TSH 08/26/2020 1.66  0.40 - 4.00 mU/L Final     Vitamin B12 08/26/2020 480  193 - 986 pg/mL Final

## 2021-03-03 ENCOUNTER — IMMUNIZATION (OUTPATIENT)
Dept: NURSING | Facility: CLINIC | Age: 71
End: 2021-03-03
Payer: COMMERCIAL

## 2021-03-03 PROCEDURE — 91301 PR COVID VAC MODERNA 100 MCG/0.5 ML IM: CPT

## 2021-03-03 PROCEDURE — 0011A PR COVID VAC MODERNA 100 MCG/0.5 ML IM: CPT

## 2021-03-03 ASSESSMENT — ANXIETY QUESTIONNAIRES: GAD7 TOTAL SCORE: 5

## 2021-03-09 DIAGNOSIS — F51.01 PRIMARY INSOMNIA: ICD-10-CM

## 2021-03-09 RX ORDER — TRAZODONE HYDROCHLORIDE 50 MG/1
TABLET, FILM COATED ORAL
Qty: 90 TABLET | Refills: 0 | Status: SHIPPED | OUTPATIENT
Start: 2021-03-09 | End: 2021-06-04

## 2021-03-09 NOTE — TELEPHONE ENCOUNTER
Prescription approved per [facility] Refill Protocol.  Medina SANTOS RN

## 2021-03-23 ENCOUNTER — OFFICE VISIT (OUTPATIENT)
Dept: FAMILY MEDICINE | Facility: CLINIC | Age: 71
End: 2021-03-23
Payer: COMMERCIAL

## 2021-03-23 DIAGNOSIS — R41.840 ATTENTION AND CONCENTRATION DEFICIT: ICD-10-CM

## 2021-03-23 DIAGNOSIS — I10 ESSENTIAL HYPERTENSION WITH GOAL BLOOD PRESSURE LESS THAN 140/90: Primary | ICD-10-CM

## 2021-03-23 DIAGNOSIS — Z87.891 PERSONAL HISTORY OF TOBACCO USE: ICD-10-CM

## 2021-03-23 DIAGNOSIS — R41.3 MEMORY CHANGE: ICD-10-CM

## 2021-03-23 LAB
CREAT UR-MCNC: 42 MG/DL
MICROALBUMIN UR-MCNC: 25 MG/L
MICROALBUMIN/CREAT UR: 60.38 MG/G CR (ref 0–25)

## 2021-03-23 PROCEDURE — 99214 OFFICE O/P EST MOD 30 MIN: CPT | Performed by: FAMILY MEDICINE

## 2021-03-23 PROCEDURE — 82043 UR ALBUMIN QUANTITATIVE: CPT | Performed by: FAMILY MEDICINE

## 2021-03-23 RX ORDER — POLYETHYLENE GLYCOL 3350 17 G
2 POWDER IN PACKET (EA) ORAL
Qty: 100 LOZENGE | Refills: 5 | Status: SHIPPED | OUTPATIENT
Start: 2021-03-23

## 2021-03-23 ASSESSMENT — MIFFLIN-ST. JEOR: SCORE: 1103.75

## 2021-03-23 NOTE — PATIENT INSTRUCTIONS
Nicotine Lozenge    Dosing:  >25 cigarettes a day Dose   Weeks 1-6 Use one 4 mg lozenge every 1-2 hours. Maximum 20 lozenges per day.   Weeks 7-9 Use one 4 mg lozenge every 2-4 hours. Maximum 20 lozenges per day.   Weeks 10-12 Use one 4 mg lozenge every 4-8 hours. Maximum 20 lozenges per day.   <25 cigarettes a day    Weeks 1-6 Use one 2 mg lozenge every 1-2 hours. Maximum 20 lozenges per day.   Weeks 7-9 Use one 4 mg lozenge every 2-4 hours. Maximum 20 lozenges per day.   Weeks 10-12 Use one 4 mg lozenge every 4-8 hours. Maximum 20 lozenges per day.     How to use the nicotine lozenge:    Place the lozenge in your mouth and allow it to slowly dissolve. It will be completely dissolved in about 20-30 minutes.     Do not chew or swallow the lozenge. Try to swallow as little as possible. It may help to hold the lozenge in the side of your mouth.     Occasionally move the lozenge from one side of your mouth to another.    You may feel a tingling sensation as you suck on the lozenge.    Especially at the beginning, use the lozenge whenever you would normally smoke a cigarette.    Some Tips:    Do not smoke while you are using the nicotine lozenge.     Do not use the nicotine lozenge like a normal lozenge. If you swallow, you will not absorb the nicotine and you may experience more side effects such as stomach upset.    Do not drink anything, even water, while you are using the lozenge or for 15 minutes before or after.    Do not use more than 1 lozenge at one time.    Do not use more than 5 lozenges in 6 hours.    Using at least 9 lozenges per day during your first 6 weeks will increase your chance of quitting.    As your body becomes less dependent on nicotine, you will need to use less lozenges.    Follow up with your health care professional if you have questions and also to help taper your nicotine lozenge dose.    Side Effects:  Some people may experience some indigestion or nausea. Using the lozenge properly may  help. If you experience any other troublesome or unusual side effects, call your health care professional.    Lung Cancer Screening   Frequently Asked Questions  If you are at high-risk for lung cancer, getting screened with low-dose computed tomography (LDCT) every year can help save your life. This handout offers answers to some of the most common questions about lung cancer screening. If you have other questions, please call [phone] ([phone]).     What is it?  Lung cancer screening uses special X-ray technology to create an image of your lung tissue. The exam is quick and easy and takes less than 10 seconds. We don t give you any medicine or use any needles. You can eat before and after the exam. You don t need to change your clothes as long as the clothing on your chest doesn t contain metal. But, you do need to be able to hold your breath for at least 6 seconds during the exam.    What is the goal of lung cancer screening?  The goal of lung cancer screening is to save lives. Many times, lung cancer is not found until a person starts having physical symptoms. Lung cancer screening can help detect lung cancer in the earliest stages when it may be easier to treat.    Who should be screened for lung cancer?  We suggest lung cancer screening for anyone who is at high-risk for lung cancer. You are in the high-risk group if you:      are between the ages of 55 and 79, and    have smoked at least 1 pack of cigarettes a day for 30 or more years, and    still smoke or have quit within the past 15 years.    However, if you have a new cough or shortness of breath, you should talk to your doctor before being screened.    Some national lung health advocacy groups also recommend screening for people ages 50 to 79 who have smoked an average of 1 pack of cigarettes a day for 20 years. They must also have at least 1 other risk factor for lung cancer, not including exposure to secondhand smoke. Other risk factors  are having had cancer in the past, emphysema, pulmonary fibrosis, COPD, a family history of lung cancer, or exposure to certain materials such as arsenic, asbestos, beryllium, cadmium, chromium, diesel fumes, nickel, radon or silica. Your care team can help you know if you have one of these risk factors.     Why does it matter if I have symptoms?  Certain symptoms can be a sign that you have a condition in your lungs that should be checked and treated by your doctor. These symptoms include fever, chest pain, a new or changing cough, shortness of breath that you have never felt before, coughing up blood or unexplained weight loss. Having any of these symptoms can greatly affect the results of lung cancer screening.       Should all smokers get an LDCT lung cancer screening exam?  It depends. Lung cancer screening is for a very specific group of men and women who have a history of heavy smoking over a long period of time (see  Who should be screened for lung cancer  above).  I am in the high-risk group, but have been diagnosed with cancer in the past. Is LDCT lung cancer screening right for me?  In some cases, you should not have LDCT lung screening, such as when your doctor is already following your cancer with CT scan studies. Your doctor will help you decide if LDCT lung screening is right for you.  Do I need to have a screening exam every year?  Yes. If you are in the high-risk group described earlier, you should get an LDCT lung cancer screening exam every year until you are 79, or are no longer willing or able to undergo screening and possible procedures to diagnose and treat lung cancer.  How effective is LDCT at preventing death from lung cancer?  Studies have shown that LDCT lung cancer screening can lower the risk of death from lung cancer by 20 percent in people who are at high-risk.  What are the risks?  There are some risks and limitations of LDCT lung cancer screening. We want to make sure you understand  the risks and benefits, so please let us know if you have any questions. Your doctor may want to talk with you more about these risks.    Radiation exposure: As with any exam that uses radiation, there is a very small increased risk of cancer. The amount of radiation in LDCT is small--about the same amount a person would get from a mammogram. Your doctor orders the exam when he or she feels the potential benefits outweigh the risks.    False negatives: No test is perfect, including LDCT. It is possible that you may have a medical condition, including lung cancer, that is not found during your exam. This is called a false negative result.    False positives and more testing: LDCT very often finds something in the lung that could be cancer, but in fact is not. This is called a false positive result. False positive tests often cause anxiety. To make sure these findings are not cancer, you may need to have more tests. These tests will be done only if you give us permission. Sometimes patients need a treatment that can have side effects, such as a biopsy. For more information on false positives, see  What can I expect from the results?     Findings not related to lung cancer: Your LDCT exam also takes pictures of areas of your body next to your lungs. In a very small number of cases, the CT scan will show an abnormal finding in one of these areas, such as your kidneys, adrenal glands, liver or thyroid. This finding may not be serious, but you may need more tests. Your doctor can help you decide what other tests you may need, if any.  What can I expect from the results?  About 1 out of 4 LDCT exams will find something that may need more tests. Most of the time, these findings are lung nodules. Lung nodules are very small collections of tissue in the lung. These nodules are very common, and the vast majority--more than 97 percent--are not cancer (benign). Most are normal lymph nodes or small areas of scarring from past  infections.  But, if a small lung nodule is found to be cancer, the cancer can be cured more than 90 percent of the time. To know if the nodule is cancer, we may need to get more images before your next yearly screening exam. If the nodule has suspicious features (for example, it is large, has an odd shape or grows over time), we will refer you to a specialist for further testing.  Will my doctor also get the results?  Yes. Your doctor will get a copy of your results.  Is it okay to keep smoking now that there s a cancer screening exam?  No. Tobacco is one of the strongest cancer-causing agents. It causes not only lung cancer, but other cancers and cardiovascular (heart) diseases as well. The damage caused by smoking builds over time. This means that the longer you smoke, the higher your risk of disease. While it is never too late to quit, the sooner you quit, the better.  Where can I find help to quit smoking?  The best way to prevent lung cancer is to stop smoking. If you have already quit smoking, congratulations and keep it up! For help on quitting smoking, please call [employer] at 2-500-385-VXZV ([phone]) or the American Cancer Society at 1-628.424.9313 to find local resources near you.  One-on-one health coaching:  If you d prefer to work individually with a health care provider on tobacco cessation, we offer:      Medication Therapy Management:  Our specially trained pharmacists work closely with you and your doctor to help you quit smoking.  Call [phone] or [phone] (toll free).     Can Do: Health coaching offered by [facility].  [email or web address]

## 2021-03-23 NOTE — PROGRESS NOTES
Assessment & Plan       ICD-10-CM    1. Essential hypertension with goal blood pressure less than 140/90  I10 Home Blood Pressure Monitor Order for DME - ONLY FOR DME     Albumin Random Urine Quantitative with Creat Ratio   2. Memory change  R41.3    3. Attention and concentration deficit  R41.840    4. Personal history of tobacco use  Z87.891 Prof Fee: Shared Decision Making Visit for Lung Cancer Screening     nicotine (COMMIT) 2 MG lozenge      HTN- difficult to fully assess today as it sounds like she may be taking too high of a dose of the amlodipine (was taking two tabs she thinks, and she should have the 10mg tabs now for a 10mg/d dose intended).  She also adjusted the timing of her doses just in the last day.    Initial BP was very high, but recheck was fine today (but likely on higher than intended dose of amlodipine).  --Rec taking the rx'd doses (one of the 10mg amlodipine tabs nightly), and taking all (both) of her HTN meds at night and take that consistently.    --She'll also try and get the HTN DME machine, and check BP's at home.  Bring BP machine, readings, and all of her med bottles to f/u appt in ~6 wks.    Memory/attention- still a concern, but neuropsych testing more indicative of ADHD sx's than memory issues.  Neurology had rec f/u, but pt had not gone.  Urged her to make appt last visit- she did make an appt...  Neurology f/u- 4/21/20- for f/u memory/ADHD f/u from neuropsych testing.  She also called to make a psychiatry for adhd- rec she wait on that appt until neurology has made more of an assessment, and if ADHD the focus, may be good to do through neurology given my and pt's concerns about her memory.  Pt agrees with f/u with neurology.    Pt would like to hold off on lung cancer screening due to appts needed for her 's care- possibly would do it in 6-12 months.      36 minutes spent on the date of the encounter doing chart review, review of outside records, review of test  results, patient visit and documentation     Return in about 6 weeks (around 5/4/2021) for Blood Pressure Recheck, specialists discussion, bring in all medications/bottles.    Dee Castillo MD  [facility]            Swapnil Peralta is a 70 year old who presents for the following health issues - HTN, memory/attention concerns    HPI     Hypertension Follow-up -- on zestoretic 20mg/12.5mg/d, and increased amlodipine from 5mg/d to 10mg/d at last visit a few wks ago.      Do you check your blood pressure regularly outside of the clinic? No     Are you following a low salt diet? Yes    Are your blood pressures ever more than 140 on the top number (systolic) OR more   than 90 on the bottom number (diastolic), for example 140/90? No    How many servings of fruits and vegetables do you eat daily?  2-3    On average, how many sweetened beverages do you drink each day (Examples: soda, juice, sweet tea, etc.  Do NOT count diet or artificially sweetened beverages)?   0-1 sometimes     How many days per week do you exercise enough to make your heart beat faster? 7    How many minutes a day do you exercise enough to make your heart beat faster? 30 - 60    How many days per week do you miss taking your medication? 0    BP meds- on the three agents- amlodipine 10mg/d, zestoretic 20/12.5mg/d.  She had been taking them in the afternoon.  Adjusted it yesterday- took two of the amlodipine pills last night, and the zetoretic this am.  Hasn't been checking her BP at home, but would if she could get a machine.  BP high today- 174/68.  Recheck ~136/82, but on potentially higher than intended doses, and switched up how she took them just in the last day.      Memory/attention- still a concern, but neuropsych testing more indicative of ADHD sx's than memory issues.  Neurology had rec f/u, but pt had not gone.  Urged her to make appt last visit- she did make an appt...  Neurology f/u- 4/21/20- for f/u  "memory/ADHD f/u from neuropsych testing.    Psychiatry- had called to see them for ADHD, but discussed if she could do it through neurology given our dementia concerns.        Review of Systems   Constitutional, HEENT, cardiovascular, pulmonary, gi and gu systems are negative, except as otherwise noted.      Objective    BP (!) 174/68   Pulse (!) 48   Temp 96.9  F (36.1  C) (Tympanic)   Resp 16   Ht 1.676 m (5' 6\")   Wt 56.7 kg (125 lb)   SpO2 97%   BMI 20.18 kg/m    Body mass index is 20.18 kg/m .   BP recheck 132/82  Physical Exam   GENERAL APPEARANCE: healthy, alert and no distress     EYES: PERRL, sclera clear     HENT: nose and mouth without ulcers or lesions     NECK: no adenopathy, no asymmetry, masses, or scars and thyroid normal to palpation     RESP: lungs clear to auscultation - no rales, rhonchi or wheezes     CV: regular rates and rhythm, normal S1 S2, no S3 or S4 and no murmur, click or rub      Abdomen: soft, nontender, no HSM or masses and bowel sounds normal     Ext: warm, dry, no edema      Psych: full range affect, normal speech and grooming, judgement and insight intact     Results for orders placed or performed in visit on 03/23/21   Albumin Random Urine Quantitative with Creat Ratio     Status: Abnormal   Result Value Ref Range    Creatinine Urine 42 mg/dL    Albumin Urine mg/L 25 mg/L    Albumin Urine mg/g Cr 60.38 (H) 0 - 25 mg/g Cr                   "

## 2021-03-23 NOTE — LETTER
March 29, 2021      Kimmie Carranza  [address] [facility] [address]        Dear ,    Here are your lab results from your recent visit...   -Your urine microalbumin level (which is the urine test that can signal signs of early chronic kidney disease if elevated to >30) is higher than we'd like to see, potentially because your blood pressure hasn't been as well controlled lately.       We'll recheck your blood pressure at your next visit - remember to make an appointment for early May.  Also bring in all of your blood pressure medications to the appointment, and with a home blood pressure monitor if you are able to get one soon (along with the blood pressure readings you've taken at home).     Please let me know if you have any questions, or if you are having trouble getting a home blood pressure machine.   Juwan Carlson MD       Resulted Orders   Albumin Random Urine Quantitative with Creat Ratio   Result Value Ref Range    Creatinine Urine 42 mg/dL    Albumin Urine mg/L 25 mg/L    Albumin Urine mg/g Cr 60.38 (H) 0 - 25 mg/g Cr

## 2021-03-25 VITALS
HEIGHT: 66 IN | BODY MASS INDEX: 20.09 KG/M2 | DIASTOLIC BLOOD PRESSURE: 82 MMHG | OXYGEN SATURATION: 97 % | RESPIRATION RATE: 16 BRPM | TEMPERATURE: 96.9 F | WEIGHT: 125 LBS | SYSTOLIC BLOOD PRESSURE: 136 MMHG | HEART RATE: 48 BPM

## 2021-03-29 NOTE — RESULT ENCOUNTER NOTE
Please send letter below with copy of results.  Thanks! CW    Here are your lab results from your recent visit...  -Your urine microalbumin level (which is the urine test that can signal signs of early chronic kidney disease if elevated to >30) is higher than we'd like to see, potentially because your blood pressure hasn't been as well controlled lately.      We'll recheck your blood pressure at your next visit - remember to make an appointment for early May.  Also bring in all of your blood pressure medications to the appointment, and with a home blood pressure monitor if you are able to get one soon (along with the blood pressure readings you've taken at home).    Please let me know if you have any questions, or if you are having trouble getting a home blood pressure machine.  Juwan Carlson MD

## 2021-03-31 ENCOUNTER — IMMUNIZATION (OUTPATIENT)
Dept: NURSING | Facility: CLINIC | Age: 71
End: 2021-03-31
Attending: INTERNAL MEDICINE
Payer: COMMERCIAL

## 2021-03-31 PROCEDURE — 91301 PR COVID VAC MODERNA 100 MCG/0.5 ML IM: CPT

## 2021-03-31 PROCEDURE — 0012A PR COVID VAC MODERNA 100 MCG/0.5 ML IM: CPT

## 2021-04-29 ENCOUNTER — TRANSFERRED RECORDS (OUTPATIENT)
Dept: HEALTH INFORMATION MANAGEMENT | Facility: CLINIC | Age: 71
End: 2021-04-29

## 2021-06-17 NOTE — PROGRESS NOTES
Assessment & Plan       ICD-10-CM    1. Essential hypertension with goal blood pressure less than 140/90  I10 lisinopril-hydrochlorothiazide (ZESTORETIC) 20-12.5 MG tablet     Basic metabolic panel  (Ca, Cl, CO2, Creat, Gluc, K, Na, BUN)     Albumin Random Urine Quantitative with Creat Ratio     Home Blood Pressure Monitor Order for DME - ONLY FOR DME   2. Major depressive disorder, recurrent episode, mild (H)  F33.0 sertraline (ZOLOFT) 100 MG tablet   3. Anxiety state  F41.1 sertraline (ZOLOFT) 100 MG tablet   4. Hyperlipidemia LDL goal <100  E78.5 Lipid panel reflex to direct LDL Fasting      HTN- pt had multiple bottles of amlodipine, some 5mg, some 10mg, all partially used.  States she only took 5mg of the amlodipine today- 'didn't want BP to go too low'.  BP too high today.  Reports home SBPs 150-160s.  Will increase zestoretic to 2 tabs/day (from 1 tab/day), and cont amlodipine at 10mg/d.    Ordered new home BP monitor- record daily and bring in records and machine to next visit.    MDD/Anxiety- will cont zoloft at 150mg/d.  Discussed why benzo txt is not a good idea for her- recommended she stop using daughter's medications.     Lipids- fasting today- will check labs as above.      Return in about 3 weeks (around 7/13/2021) for Blood Pressure Recheck.        34 minutes spent on the date of the encounter doing chart review, review of outside records, review of test results, interpretation of tests, patient visit and documentation          Return in about 3 weeks (around 7/13/2021) for Blood Pressure Recheck.    Dee Castillo MD  [facility]    Swapnil Peralta is a 71 year old who presents for the following health issues - HTN, anxiety    HPI     Hypertension Follow-up    Do you check your blood pressure regularly outside of the clinic? Yes sometimes, 150-160s systolics     Are you following a low salt diet? Yes    Are your blood pressures ever more than 140 on the top  number (systolic) OR more than 90 on the bottom number (diastolic), for example 140/90? Yes sometimes     How many servings of fruits and vegetables do you eat daily?  2-3    On average, how many sweetened beverages do you drink each day (Examples: soda, juice, sweet tea, etc.  Do NOT count diet or artificially sweetened beverages)?   Sometimes a lemonade     How many days per week do you exercise enough to make your heart beat faster? All day on feet     How many minutes a day do you exercise enough to make your heart beat faster? All day on feet   How many days per week do you miss taking your medication? Occ.     What makes it hard for you to take your medications?  remembering to take    BP meds -   Took 5mg of the amlodipine this am, instead of the 10mg/d.  Did take the full zestoretic 20/12.5mg/d.    BP recheck 172/80        Stressors-   Daughter had a psychotic break in 4/21.  She is bipolar.   She was at [facility] for a month, then [facility] for a few weeks.  She is now back at home with them.  She's stable on medications, but is lowering down the haldol dosing as she's really shaky on it, drooling.  Is seeing psychiatry, but needs a new one.  Daughter got in an accident with her car when she got back home, so now pt doesn't have a car.    Wondering if she could be on a sedative her daughter take?   She's tried one of hers a couple times.  Maybe xanax or ativan?  Took it in one of her manic mode.  Sx's- wakes up really anxious.  Would take it in the morning at times.  Feels like she is less forgetful when she takes it.    Ran out of the sertraline a couple days ago.    TOMASA-7 is 6  PHQ-9 is - 9      Neurology - had consult in 4/20, for memory concerns, but potential ADHD.  Pt does have an appt in 9/21 for ADHD eval.  Has been on wait list since 2/21 call.        Review of Systems   Constitutional, HEENT, cardiovascular, pulmonary, gi and gu systems are negative, except as  "otherwise noted.      Objective    BP (!) 159/72   Pulse 50   Temp 97.1  F (36.2  C) (Skin)   Resp 16   Ht 1.676 m (5' 6\")   Wt 56.7 kg (125 lb)   SpO2 99%   BMI 20.18 kg/m    Body mass index is 20.18 kg/m .   BP recheck 172/80  Physical Exam   GENERAL APPEARANCE: healthy, alert and no distress     EYES: PERRL, sclera clear     HENT: nose and mouth without ulcers or lesions     NECK: no adenopathy, no asymmetry, masses, or scars and thyroid normal to palpation     RESP: lungs clear to auscultation - no rales, rhonchi or wheezes     CV: regular rates and rhythm, normal S1 S2, no S3 or S4 and no murmur, click or rub      Abdomen: soft, nontender, no HSM or masses and bowel sounds normal     Ext: warm, dry, no edema      Psych: full range affect, normal speech and grooming, judgement and insight intact     Office Visit on 03/23/2021   Component Date Value Ref Range Status     Creatinine Urine 03/23/2021 42  mg/dL Final     Albumin Urine mg/L 03/23/2021 25  mg/L Final     Albumin Urine mg/g Cr 03/23/2021 60.38* 0 - 25 mg/g Cr Final     Results for orders placed or performed in visit on 06/22/21   Basic metabolic panel  (Ca, Cl, CO2, Creat, Gluc, K, Na, BUN)     Status: None   Result Value Ref Range    Sodium 138 133 - 144 mmol/L    Potassium 4.3 3.4 - 5.3 mmol/L    Chloride 106 94 - 109 mmol/L    Carbon Dioxide 29 20 - 32 mmol/L    Anion Gap 3 3 - 14 mmol/L    Glucose 89 70 - 99 mg/dL    Urea Nitrogen 15 7 - 30 mg/dL    Creatinine 0.76 0.52 - 1.04 mg/dL    GFR Estimate 78 >60 mL/min/[1.73_m2]    GFR Estimate If Black >90 >60 mL/min/[1.73_m2]    Calcium 9.4 8.5 - 10.1 mg/dL   Albumin Random Urine Quantitative with Creat Ratio     Status: Abnormal   Result Value Ref Range    Creatinine Urine 52 mg/dL    Albumin Urine mg/L 16 mg/L    Albumin Urine mg/g Cr 30.06 (H) 0 - 25 mg/g Cr   Lipid panel reflex to direct LDL Fasting     Status: Abnormal   Result Value Ref Range    Cholesterol 258 (H) <200 mg/dL    " Triglycerides 61 <150 mg/dL    HDL Cholesterol 79 >49 mg/dL    LDL Cholesterol Calculated 167 (H) <100 mg/dL    Non HDL Cholesterol 179 (H) <130 mg/dL

## 2021-06-22 ENCOUNTER — OFFICE VISIT (OUTPATIENT)
Dept: FAMILY MEDICINE | Facility: CLINIC | Age: 71
End: 2021-06-22
Payer: COMMERCIAL

## 2021-06-22 VITALS
BODY MASS INDEX: 20.09 KG/M2 | DIASTOLIC BLOOD PRESSURE: 72 MMHG | SYSTOLIC BLOOD PRESSURE: 159 MMHG | OXYGEN SATURATION: 99 % | HEIGHT: 66 IN | RESPIRATION RATE: 16 BRPM | TEMPERATURE: 97.1 F | HEART RATE: 50 BPM | WEIGHT: 125 LBS

## 2021-06-22 DIAGNOSIS — E78.5 HYPERLIPIDEMIA LDL GOAL <100: ICD-10-CM

## 2021-06-22 DIAGNOSIS — I10 ESSENTIAL HYPERTENSION WITH GOAL BLOOD PRESSURE LESS THAN 140/90: Primary | ICD-10-CM

## 2021-06-22 DIAGNOSIS — F41.1 ANXIETY STATE: ICD-10-CM

## 2021-06-22 DIAGNOSIS — F33.0 MAJOR DEPRESSIVE DISORDER, RECURRENT EPISODE, MILD (H): ICD-10-CM

## 2021-06-22 PROCEDURE — 82043 UR ALBUMIN QUANTITATIVE: CPT | Performed by: FAMILY MEDICINE

## 2021-06-22 PROCEDURE — 80048 BASIC METABOLIC PNL TOTAL CA: CPT | Performed by: FAMILY MEDICINE

## 2021-06-22 PROCEDURE — 99214 OFFICE O/P EST MOD 30 MIN: CPT | Performed by: FAMILY MEDICINE

## 2021-06-22 PROCEDURE — 36415 COLL VENOUS BLD VENIPUNCTURE: CPT | Performed by: FAMILY MEDICINE

## 2021-06-22 PROCEDURE — 80061 LIPID PANEL: CPT | Performed by: FAMILY MEDICINE

## 2021-06-22 RX ORDER — LISINOPRIL AND HYDROCHLOROTHIAZIDE 12.5; 2 MG/1; MG/1
2 TABLET ORAL DAILY
Qty: 60 TABLET | Refills: 1 | Status: SHIPPED | OUTPATIENT
Start: 2021-06-22 | End: 2021-12-15

## 2021-06-22 RX ORDER — SERTRALINE HYDROCHLORIDE 100 MG/1
150 TABLET, FILM COATED ORAL DAILY
Qty: 135 TABLET | Refills: 1 | Status: SHIPPED | OUTPATIENT
Start: 2021-06-22 | End: 2021-12-15

## 2021-06-22 ASSESSMENT — ANXIETY QUESTIONNAIRES
2. NOT BEING ABLE TO STOP OR CONTROL WORRYING: SEVERAL DAYS
IF YOU CHECKED OFF ANY PROBLEMS ON THIS QUESTIONNAIRE, HOW DIFFICULT HAVE THESE PROBLEMS MADE IT FOR YOU TO DO YOUR WORK, TAKE CARE OF THINGS AT HOME, OR GET ALONG WITH OTHER PEOPLE: SOMEWHAT DIFFICULT
3. WORRYING TOO MUCH ABOUT DIFFERENT THINGS: SEVERAL DAYS
1. FEELING NERVOUS, ANXIOUS, OR ON EDGE: SEVERAL DAYS
7. FEELING AFRAID AS IF SOMETHING AWFUL MIGHT HAPPEN: SEVERAL DAYS
GAD7 TOTAL SCORE: 6
5. BEING SO RESTLESS THAT IT IS HARD TO SIT STILL: SEVERAL DAYS
6. BECOMING EASILY ANNOYED OR IRRITABLE: NOT AT ALL

## 2021-06-22 ASSESSMENT — MIFFLIN-ST. JEOR: SCORE: 1098.75

## 2021-06-22 ASSESSMENT — PATIENT HEALTH QUESTIONNAIRE - PHQ9
SUM OF ALL RESPONSES TO PHQ QUESTIONS 1-9: 8
5. POOR APPETITE OR OVEREATING: SEVERAL DAYS

## 2021-06-22 NOTE — PATIENT INSTRUCTIONS
High blood pressure-    Increase lisinopril/hctz to 40/25mg/day (two tabs daily).  Continue amlodipine at 10mg/day.    Check blood pressure daily, and bring in readings to next appointment.    Bring in medication bottles to next appointment as well.

## 2021-06-23 LAB
ANION GAP SERPL CALCULATED.3IONS-SCNC: 3 MMOL/L (ref 3–14)
BUN SERPL-MCNC: 15 MG/DL (ref 7–30)
CALCIUM SERPL-MCNC: 9.4 MG/DL (ref 8.5–10.1)
CHLORIDE SERPL-SCNC: 106 MMOL/L (ref 94–109)
CHOLEST SERPL-MCNC: 258 MG/DL
CO2 SERPL-SCNC: 29 MMOL/L (ref 20–32)
CREAT SERPL-MCNC: 0.76 MG/DL (ref 0.52–1.04)
CREAT UR-MCNC: 52 MG/DL
GFR SERPL CREATININE-BSD FRML MDRD: 78 ML/MIN/{1.73_M2}
GLUCOSE SERPL-MCNC: 89 MG/DL (ref 70–99)
HDLC SERPL-MCNC: 79 MG/DL
LDLC SERPL CALC-MCNC: 167 MG/DL
MICROALBUMIN UR-MCNC: 16 MG/L
MICROALBUMIN/CREAT UR: 30.06 MG/G CR (ref 0–25)
NONHDLC SERPL-MCNC: 179 MG/DL
POTASSIUM SERPL-SCNC: 4.3 MMOL/L (ref 3.4–5.3)
SODIUM SERPL-SCNC: 138 MMOL/L (ref 133–144)
TRIGL SERPL-MCNC: 61 MG/DL

## 2021-06-23 ASSESSMENT — ANXIETY QUESTIONNAIRES: GAD7 TOTAL SCORE: 6

## 2021-06-23 NOTE — RESULT ENCOUNTER NOTE
Please call pt to discuss....    -Her basic metabolic panel (which includes electrolyte levels, blood sugar level and kidney function tests) looks good.  -Her microalbumin level (which is the urine test that can signal signs of early chronic kidney disease if elevated to >30) is a bit elevated, but much improved.    -Her cholesterol panel looks abnormal with a much higher LDL (the bad cholesterol) and a stable HDL (the good cholesterol).  I suspect she hasn't been taking her atorvastatin (lipitor) medication.    Still concerned about her memory.  I'd have her check her medication bottles, and have her bring all of her meds to her appt again next time.  If she's run out of the lipitor, would send in another month.    We had discussed a 3wk f/u appt (f/u HTN, and now f/u lipids)- could you help her make it, and would see if she could bring in a close family member to discuss her memory?    Thank you!  Juwan Castillo MD

## 2021-07-13 ENCOUNTER — OFFICE VISIT (OUTPATIENT)
Dept: FAMILY MEDICINE | Facility: CLINIC | Age: 71
End: 2021-07-13
Payer: COMMERCIAL

## 2021-07-13 VITALS
HEIGHT: 66 IN | OXYGEN SATURATION: 97 % | DIASTOLIC BLOOD PRESSURE: 46 MMHG | TEMPERATURE: 98.1 F | SYSTOLIC BLOOD PRESSURE: 97 MMHG | WEIGHT: 123 LBS | BODY MASS INDEX: 19.77 KG/M2 | HEART RATE: 43 BPM

## 2021-07-13 DIAGNOSIS — I25.10 MILD CORONARY ARTERY DISEASE: ICD-10-CM

## 2021-07-13 DIAGNOSIS — I10 ESSENTIAL HYPERTENSION WITH GOAL BLOOD PRESSURE LESS THAN 140/90: Primary | ICD-10-CM

## 2021-07-13 DIAGNOSIS — R41.3 MEMORY CHANGE: ICD-10-CM

## 2021-07-13 DIAGNOSIS — E78.5 HYPERLIPIDEMIA LDL GOAL <100: ICD-10-CM

## 2021-07-13 PROCEDURE — 99214 OFFICE O/P EST MOD 30 MIN: CPT | Performed by: FAMILY MEDICINE

## 2021-07-13 RX ORDER — ROSUVASTATIN CALCIUM 10 MG/1
10 TABLET, COATED ORAL DAILY
Qty: 60 TABLET | Refills: 0 | Status: SHIPPED | OUTPATIENT
Start: 2021-07-13 | End: 2021-09-29

## 2021-07-13 ASSESSMENT — MIFFLIN-ST. JEOR: SCORE: 1089.67
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
## 2021-07-13 NOTE — PATIENT INSTRUCTIONS
Blood pressure-   Now low...  Lower amlodipine from 10mg/d to 5mg/d (rec taking at night).  Continue zestoretic 40mg/25mg daily (take both tabs of 20/12.5mg at night).    Come back in ~3 weeks for blood pressure recheck.  Bring in blood pressure monitor, and all medication bottles.      Lipids-   Start crestor 10mg daily.  Take with co-q-10 (bottle at home).  Bring in medication bottles at next appointment.  Will plan on rechecking fasting lipid labs in ~6-8 weeks.

## 2021-07-26 ENCOUNTER — VIRTUAL VISIT (OUTPATIENT)
Dept: CARDIOLOGY | Facility: CLINIC | Age: 71
End: 2021-07-26

## 2021-07-26 DIAGNOSIS — Z00.6 EXAMINATION OF PARTICIPANT IN CLINICAL TRIAL: Primary | ICD-10-CM

## 2021-07-26 PROCEDURE — 99207 PR NO CHARGE-RESEARCH SERVICE: CPT

## 2021-08-13 ENCOUNTER — MEDICAL CORRESPONDENCE (OUTPATIENT)
Dept: HEALTH INFORMATION MANAGEMENT | Facility: CLINIC | Age: 71
End: 2021-08-13

## 2021-08-19 ENCOUNTER — MEDICAL CORRESPONDENCE (OUTPATIENT)
Dept: HEALTH INFORMATION MANAGEMENT | Facility: CLINIC | Age: 71
End: 2021-08-19

## 2021-08-20 ENCOUNTER — TELEPHONE (OUTPATIENT)
Dept: PSYCHIATRY | Facility: CLINIC | Age: 71
End: 2021-08-20

## 2021-08-20 NOTE — TELEPHONE ENCOUNTER
On 8/13/2021 the patient signed an RAPHAEL authorizing medical records to be released from Dr. Dee Castillo at  [facility] to [facility] for the purpose of continuing care. Since the provider and clinic are part of the [facility] system, associated records are already accessible through pt's chart. The original RAPHAEL was sent to scanning and is being held until scanning is confirmed.  Samantha Mina, EMT

## 2021-08-31 ENCOUNTER — VIRTUAL VISIT (OUTPATIENT)
Dept: PSYCHIATRY | Facility: CLINIC | Age: 71
End: 2021-08-31
Attending: SOCIAL WORKER
Payer: COMMERCIAL

## 2021-08-31 DIAGNOSIS — F90.2 ATTENTION DEFICIT HYPERACTIVITY DISORDER (ADHD), COMBINED TYPE: Primary | ICD-10-CM

## 2021-08-31 DIAGNOSIS — F41.1 GENERALIZED ANXIETY DISORDER: ICD-10-CM

## 2021-08-31 DIAGNOSIS — F39 MOOD DISORDER (H): ICD-10-CM

## 2021-08-31 PROCEDURE — 90791 PSYCH DIAGNOSTIC EVALUATION: CPT | Mod: 95 | Performed by: SOCIAL WORKER

## 2021-08-31 NOTE — Clinical Note
Hussain Cadena,    Here is the start of the DA for Kathryn, who you'll see today for ADHD eval. She brought the questionnaires to clinic, but I haven't had a chance to see if they are scored yet.    Alisha
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
## 2021-09-01 NOTE — PROGRESS NOTES
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 including changes made today.    WHODAS 2.0  TODAY total score = N/A; [a 12-item WHODAS 2.0 assessment was not completed by the pt today and/or recorded in EPIC].     PROVIDER:  Miguel A Reina MD    Patient was staffed in clinic with Dr. Weaver, who will sign the note.  Supervisor is Dr. Hewitt  I saw the patient with the resident, and participated in key portions of the service, including the mental status examination and developing the plan of care. I reviewed key portions of the history with the resident. I agree with the findings and plan as documented in this note.    Jyothi Weaver MD

## 2021-09-02 ENCOUNTER — OFFICE VISIT (OUTPATIENT)
Dept: PSYCHIATRY | Facility: CLINIC | Age: 71
End: 2021-09-02
Attending: PSYCHIATRY & NEUROLOGY
Payer: COMMERCIAL

## 2021-09-02 VITALS
HEART RATE: 46 BPM | SYSTOLIC BLOOD PRESSURE: 151 MMHG | BODY MASS INDEX: 20.21 KG/M2 | WEIGHT: 125.2 LBS | DIASTOLIC BLOOD PRESSURE: 71 MMHG

## 2021-09-02 DIAGNOSIS — F09 COGNITIVE DYSFUNCTION: Primary | ICD-10-CM

## 2021-09-02 PROCEDURE — 90792 PSYCH DIAG EVAL W/MED SRVCS: CPT | Mod: GC | Performed by: STUDENT IN AN ORGANIZED HEALTH CARE EDUCATION/TRAINING PROGRAM

## 2021-09-02 PROCEDURE — G0463 HOSPITAL OUTPT CLINIC VISIT: HCPCS

## 2021-09-02 ASSESSMENT — PAIN SCALES - GENERAL: PAINLEVEL: NO PAIN (0)

## 2021-09-02 NOTE — PATIENT INSTRUCTIONS
Reminders from this visit:    Please give questionnaires to family members     Next visit, we would like your daughter to join.     Please send us your cognitive testing report.    ========================================      **For crisis resources, please see the information at the end of this document**     Patient Education      Thank you for coming to the [facility] & ADDICTION [place] [facility].    Lab Testing:  If you had lab testing today and your results are reassuring or normal they will be mailed to you or sent through [employer] within 7 days. If the lab tests need quick action we will call you with the results. The phone number we will call with results is # [phone] (home) . If this is not the best number please call our clinic and change the number.    Medication Refills:  If you need any refills please call your pharmacy and they will contact us. Our fax number for refills is [phone]. Please allow three business for refill processing. If you need to  your refill at a new pharmacy, please contact the new pharmacy directly. The new pharmacy will help you get your medications transferred.     Scheduling:  If you have any concerns about today's visit or wish to schedule another appointment please call our office during normal business hours [phone] (8-5:00 M-F)    Contact Us:  Please call [phone] during business hours (8-5:00 M-F).  If after clinic hours, or on the weekend, please call  [phone].    Financial Assistance [phone]  [facility] Billing [phone]  Central Billing Office, [facility]: [phone]  [facility] Billing [phone]  Medical Records [phone]  [facility] Patient Bill of Rights [email or web address]       MENTAL HEALTH CRISIS NUMBERS:  For a medical emergency please call  911 or go to the nearest ER.     [facility]:   [facility] -[phone]    Crisis Residence [facility]idence -[phone]   Walk-In Counseling Center [facility] -[phone]   COPE 24/7 [place] Mobile Team -[phone] (adults)/[phone] (child)  CHILD: [facility] needs assessment team - [phone]      [facility]:   [facility] - [phone]   Walk-in counseling [facility] - [phone]   Walk-in counseling [facility] - [phone]   Crisis Residence [facility] Residence - [phone]  Urgent Care Adult Mental Rttdyr-[phone] mobile unit/ 24/7 crisis line    National Crisis Numbers:   National Suicide Prevention Lifeline: 1-178-548-TALK (170-180-5004)  Poison Control Center - 1-232.465.5577  [employer]/resources for a list of additional resources (SOS)  Trans Lifeline a hotline for transgender people 1-898.110.2802  [employer] a hotline for LGBT youth 1-203-498-4325  Crisis Text Line: For any crisis 24/7   To: 647174  see www.crisistextline.org  - IF MAKING A CALL FEELS TOO HARD, send a text!         Again thank you for choosing [facility] MENTAL HEALTH & ADDICTION [facility] and please let us know how we can best partner with you to improve you and your family's health.    You may be receiving a survey regarding this appointment. We would love to have your feedback, both positive and negative. The survey is done by an external company, so your answers are anonymous.

## 2021-09-03 ASSESSMENT — PATIENT HEALTH QUESTIONNAIRE - PHQ9: SUM OF ALL RESPONSES TO PHQ QUESTIONS 1-9: 4

## 2021-09-28 DIAGNOSIS — I25.10 MILD CORONARY ARTERY DISEASE: ICD-10-CM

## 2021-09-28 DIAGNOSIS — E78.5 HYPERLIPIDEMIA LDL GOAL <100: ICD-10-CM

## 2021-09-29 RX ORDER — ROSUVASTATIN CALCIUM 10 MG/1
TABLET, COATED ORAL
Qty: 30 TABLET | Refills: 0 | Status: SHIPPED | OUTPATIENT
Start: 2021-09-29 | End: 2021-11-30

## 2021-09-29 NOTE — TELEPHONE ENCOUNTER
Crestor:    Medication is being filled for 1 time refill only due to:  Patient needs to be seen because due for f/u.    Note from 7/13 OV:    Return in about 3 weeks (around 8/3/2021) for Blood Pressure Recheck, crestor check in- bring med bottles and BP monitor.    Phonitive - [employer] message sent to patient asking her to schedule office visit and to bring her medication bottles and home BP monitor    Vianey Almendarez RN

## 2021-10-21 ENCOUNTER — VIRTUAL VISIT (OUTPATIENT)
Dept: PSYCHIATRY | Facility: CLINIC | Age: 71
End: 2021-10-21
Attending: PSYCHIATRY & NEUROLOGY
Payer: COMMERCIAL

## 2021-10-21 VITALS
DIASTOLIC BLOOD PRESSURE: 75 MMHG | WEIGHT: 130 LBS | BODY MASS INDEX: 20.98 KG/M2 | SYSTOLIC BLOOD PRESSURE: 121 MMHG | HEART RATE: 45 BPM

## 2021-10-21 DIAGNOSIS — I25.10 MILD CORONARY ARTERY DISEASE: ICD-10-CM

## 2021-10-21 DIAGNOSIS — F90.0 ATTENTION DEFICIT HYPERACTIVITY DISORDER (ADHD), PREDOMINANTLY INATTENTIVE TYPE: Primary | ICD-10-CM

## 2021-10-21 PROCEDURE — 99214 OFFICE O/P EST MOD 30 MIN: CPT | Mod: 95 | Performed by: STUDENT IN AN ORGANIZED HEALTH CARE EDUCATION/TRAINING PROGRAM

## 2021-10-21 PROCEDURE — G0463 HOSPITAL OUTPT CLINIC VISIT: HCPCS | Mod: GT

## 2021-10-21 ASSESSMENT — PAIN SCALES - GENERAL: PAINLEVEL: NO PAIN (0)

## 2021-10-21 NOTE — PATIENT INSTRUCTIONS
**For crisis resources, please see the information at the end of this document**     Patient Education      Thank you for coming to the [facility] & [facility] [place] [facility].    Lab Testing:  If you had lab testing today and your results are reassuring or normal they will be mailed to you or sent through [employer] within 7 days. If the lab tests need quick action we will call you with the results. The phone number we will call with results is # [phone] (home) . If this is not the best number please call our clinic and change the number.    Medication Refills:  If you need any refills please call your pharmacy and they will contact us. Our fax number for refills is [phone]. Please allow three business for refill processing. If you need to  your refill at a new pharmacy, please contact the new pharmacy directly. The new pharmacy will help you get your medications transferred.     Scheduling:  If you have any concerns about today's visit or wish to schedule another appointment please call our office during normal business hours [phone] (8-5:00 M-F)    Contact Us:  Please call [phone] during business hours (8-5:00 M-F).  If after clinic hours, or on the weekend, please call  [phone].    Financial Assistance [phone]  [facility] Billing [phone]  Central Billing Office, [facility]: [phone]  [facility] Billing [phone]  Medical Records [phone]  [facility] Patient Bill of Rights [email or web address]       MENTAL HEALTH CRISIS NUMBERS:  For a medical emergency please call  911 or go to the nearest ER.     [facility]:   [facility] -[phone]   Crisis Residence [facility] Residence -[phone]   Walk-In Counseling Center [facility] -[phone]   COPE 24/7 [place] Mobile Team -[phone] (adults)/[phone] (child)  CHILD: [facility] needs assessment  team - [phone]      [facility]:   [facility] - [phone]   Walk-in counseling [facility] - [phone]   Walk-in counseling [facility] - [phone]   Crisis Residence [facility] Residence - [phone]  Urgent Care Adult Mental Hjwozw-[phone] mobile unit/ 24/7 crisis line    National Crisis Numbers:   National Suicide Prevention Lifeline: 7-799-290-TALK (094-175-1849)  Poison Control Center - 3-615-475-9775  [employer]/resources for a list of additional resources (SOS)  Trans Lifeline a hotline for transgender people 1-465.275.4322  [employer] a hotline for LGBT youth 6-588-932-2654  Crisis Text Line: For any crisis 24/7   To: 494544  see www.crisistextline.org  - IF MAKING A CALL FEELS TOO HARD, send a text!         Again thank you for choosing [facility] MENTAL HEALTH & ADDICTION [facility] and please let us know how we can best partner with you to improve you and your family's health.    You may be receiving a survey regarding this appointment. We would love to have your feedback, both positive and negative. The survey is done by an external company, so your answers are anonymous.
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
## 2021-10-22 ASSESSMENT — PATIENT HEALTH QUESTIONNAIRE - PHQ9: SUM OF ALL RESPONSES TO PHQ QUESTIONS 1-9: 6

## 2021-10-23 ENCOUNTER — HEALTH MAINTENANCE LETTER (OUTPATIENT)
Age: 71
End: 2021-10-23

## 2021-11-23 ENCOUNTER — OFFICE VISIT (OUTPATIENT)
Dept: CARDIOLOGY | Facility: CLINIC | Age: 71
End: 2021-11-23
Attending: STUDENT IN AN ORGANIZED HEALTH CARE EDUCATION/TRAINING PROGRAM
Payer: COMMERCIAL

## 2021-11-23 VITALS
HEART RATE: 43 BPM | OXYGEN SATURATION: 98 % | BODY MASS INDEX: 20.73 KG/M2 | HEIGHT: 66 IN | DIASTOLIC BLOOD PRESSURE: 85 MMHG | WEIGHT: 129 LBS | SYSTOLIC BLOOD PRESSURE: 142 MMHG

## 2021-11-23 DIAGNOSIS — I77.810 AORTIC ROOT DILATATION (H): ICD-10-CM

## 2021-11-23 DIAGNOSIS — I25.10 CORONARY ARTERY DISEASE INVOLVING NATIVE CORONARY ARTERY OF NATIVE HEART WITHOUT ANGINA PECTORIS: Primary | ICD-10-CM

## 2021-11-23 DIAGNOSIS — N18.1 CHRONIC KIDNEY DISEASE, STAGE 1: ICD-10-CM

## 2021-11-23 DIAGNOSIS — I10 BENIGN ESSENTIAL HYPERTENSION: ICD-10-CM

## 2021-11-23 DIAGNOSIS — I25.10 MILD CORONARY ARTERY DISEASE: ICD-10-CM

## 2021-11-23 PROCEDURE — 99215 OFFICE O/P EST HI 40 MIN: CPT | Performed by: STUDENT IN AN ORGANIZED HEALTH CARE EDUCATION/TRAINING PROGRAM

## 2021-11-23 PROCEDURE — 93005 ELECTROCARDIOGRAM TRACING: CPT

## 2021-11-23 PROCEDURE — G0463 HOSPITAL OUTPT CLINIC VISIT: HCPCS | Mod: 25

## 2021-11-23 ASSESSMENT — MIFFLIN-ST. JEOR: SCORE: 1116.89

## 2021-11-23 ASSESSMENT — PAIN SCALES - GENERAL: PAINLEVEL: NO PAIN (0)

## 2021-11-23 NOTE — NURSING NOTE
Chief Complaint   Patient presents with     New Patient     Wondering about the safety of starting either Strattera or a stimulant to treat the patient's ADHD.      Vitals were taken and medications were reconciled. EKG was performed   KELLEY King  8:27 AM
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
## 2021-11-23 NOTE — PATIENT INSTRUCTIONS
"You were seen today in the Cardiovascular Clinic at the [facility].     Cardiology Providers you saw during your visit: Jazmyn Cameron MD      Diagnosis:   Encounter Diagnoses   Name Primary?     Mild coronary artery disease      CAD (coronary artery disease) Yes     Aortic root dilatation (H)      Chronic kidney disease, stage 1         Results: Discussed with you today results of your CT and heart ultrasound    Orders:   Orders Placed This Encounter   Procedures     Lipid panel reflex to direct LDL Fasting     Follow-Up with Cardiologist     EKG 12-lead, tracing only (Same Day)         Medications Discontinued:  There are no discontinued medications.      Recommendations:   1. CHECK your blood pressure- if the top number is more than 130- take TWO tablets of the lisinopril-hydrochlorothiazide   2. Lets do blood work to check the cholesterol  3. Repeat echocardiogram to look at the aorta       Please feel free to call me with any questions or concerns.       Questions: [phone].   First press #1 for the [employer] for \"Medical Questions\" to reach us Cardiology Nurses.     [phone].   First press #1 for the [employer] and then press #1     On Call Cardiologist for after hours or on weekends: [phone]   option #4 and ask to speak to the on-call Cardiologist.          If you need a medication refill please contact your pharmacy.  Please allow 3 business days for your refill to be completed.  ________________________________________________________________________________________________________________________________        "

## 2021-11-26 LAB
ATRIAL RATE - MUSE: 45 BPM
DIASTOLIC BLOOD PRESSURE - MUSE: NORMAL MMHG
INTERPRETATION ECG - MUSE: NORMAL
P AXIS - MUSE: 73 DEGREES
PR INTERVAL - MUSE: 180 MS
QRS DURATION - MUSE: 94 MS
QT - MUSE: 510 MS
QTC - MUSE: 441 MS
R AXIS - MUSE: -33 DEGREES
SYSTOLIC BLOOD PRESSURE - MUSE: NORMAL MMHG
T AXIS - MUSE: 64 DEGREES
VENTRICULAR RATE- MUSE: 45 BPM

## 2021-12-10 NOTE — PROGRESS NOTES
Assessment & Plan       ICD-10-CM    1. Moderate episode of recurrent major depressive disorder (H)  F33.1    2. Mild coronary artery disease  I25.10 Lipid panel reflex to direct LDL Fasting     TSH with free T4 reflex   3. Major depressive disorder, recurrent episode, mild (H)  F33.0 sertraline (ZOLOFT) 100 MG tablet   4. Anxiety state  F41.1 sertraline (ZOLOFT) 100 MG tablet   5. Urge incontinence  N39.41    6. Essential hypertension with goal blood pressure less than 140/90  I10 lisinopril-hydrochlorothiazide (ZESTORETIC) 20-12.5 MG tablet     amLODIPine (NORVASC) 10 MG tablet   7. Coronary artery disease involving native coronary artery of native heart without angina pectoris  I25.10 Lipid panel reflex to direct LDL Fasting     TSH with free T4 reflex   8. Aortic root dilatation (H)  I77.810 Lipid panel reflex to direct LDL Fasting     TSH with free T4 reflex   9. Chronic kidney disease, stage 1  N18.1 Lipid panel reflex to direct LDL Fasting     TSH with free T4 reflex   10. Benign essential hypertension  I10 TSH with free T4 reflex          HTN/CAD- BP very uncontrolled today, suspect medication issues, though pt is certain she took her meds this morning, but notes she's taking 1 1/2 tabs of the zestoretic instead of 2 tabs daily as on her rx as she thought that was what we discussed last time.  Cardiology rec BP at 120/80 before considering adding stimulants to her regimen of ADHD by psychiatry.  F/u in ~6 wks for BP recheck (with medication bottles and daughter and BP monitor/BP records if able).    Memory concerns-  This provider with major concerns with patients memory today.  She did not recall f/u recs from last visit in 7/21 to f/u in 3 and 6 wks, now back in 12/21.  Did not bring medication bottles again.    Most importantly- both HTN prescriptions would have run out several months ago.  Further reason to suspect she hasn't been taking meds again.  With pt's permission, called and spoke to her  daughter Abbie- and she states she will start helping with meds. Will be making sure weekly pill box is filled, daily meds taken, and refills requested.  Will see if she can attend next appt somehow.  Will also put her number for appt reminders and she will see if she can help get into mychart.    Memory- neurology did not think she had dementia, more concerned about ADHD, but it appears much more like a memory issue than distraction on repeated visits with pt.    Cardiology consulted re: ADHD meds, did not rec due to her uncontrolled BP, and this provider is in aggrement with that assessment.    Lipids- does look like crestor refills have been initiated a few times since last visit, so likely taking the crestor- will check lipids today- fasting- first time since starting crestor this summer.  Pt saw cardiology in 11/21- recs increasing crestor to 20mg/d if not at goal.    CAD/Aortic root dilation- pt saw cardiology in 11/21,   For aortic root dilation- rec echo yearly for stability, will cont f/u with cardiology.        45 minutes spent on the date of the encounter doing chart review, review of outside records, review of test results, interpretation of tests, patient visit, documentation and discussion with family     Return in about 6 weeks (around 1/26/2022) for Wellness, BP Recheck (bring medication bottles, & see if your daughter can join you)..  Team made f/u appt prior to pt leaving- AVS printed out with instructions for pt and daughter.    Dee Castillo MD  [facility] InstituteBLANCA Peralta is a 71 year old who presents for the following health issues - HTN, lipids, cardiology f/u    History of Present Illness       Hypertension: She presents for follow up of hypertension.  She does check blood pressure  regularly outside of the clinic. Outside blood pressures have been over 140/90. She does not follow a low salt diet.     She eats 2-3 servings of fruits and  vegetables daily.She consumes 1 sweetened beverage(s) daily.She exercises with enough effort to increase her heart rate 30 to 60 minutes per day.  She exercises with enough effort to increase her heart rate 7 days per week. She is missing 2 dose(s) of medications per week.       Hypertension Follow-up    Do you check your blood pressure regularly outside of the clinic? Yes     Are you following a low salt diet? No    Are your blood pressures ever more than 140 on the top number (systolic) OR more than 90 on the bottom number (diastolic), for example 140/90? Yes    How many servings of fruits and vegetables do you eat daily?  2-3    On average, how many sweetened beverages do you drink each day (Examples: soda, juice, sweet tea, etc.  Do NOT count diet or artificially sweetened beverages)?   2    How many days per week do you exercise enough to make your heart beat faster? 7    How many minutes a day do you exercise enough to make your heart beat faster? 30 - 60  How many days per week do you miss taking your medication? 1    What makes it hard for you to take your medications?  remembering to take        Psychiatry- did assessment, dx with ADHD, but wanted to check with cardiology to see if meds were okay.    Saw cardiology in 11/21- wanted to hold off on meds due BP elevation.  Discussed cardiology recs- see problem list notes.  Asked pt if she remembers recs from 11/21 appt- remembers she needs to do the echo.    Lipids- LDL high at 167 in 6/21, crestor - started in 7/21, no recheck of statins since.  She did not eat today- can check today.  Pt had stopped the lipitor without discussion several months prior to recheck- thought she had muscle aches on it.    Had rec f/u in 3 wks after that appt- not seen again until now.  Had rec f/u fasting lipids 6 wks after appt- not done.    HTN-   Takes norvasc at night.  Lisinopril - taking 1 1/2 most days.  BP recheck 160/75        Review of Systems   Constitutional, HEENT,  cardiovascular, pulmonary, gi and gu systems are negative, except as otherwise noted.      Objective    BP (!) 194/73   Pulse (!) 46   Temp 98.8  F (37.1  C) (Oral)   Wt 56.7 kg (124 lb 14.4 oz)   SpO2 99%   BMI 20.16 kg/m    Body mass index is 20.16 kg/m .   Initial /73, MD recheck 160/75  Physical Exam   GENERAL APPEARANCE: healthy, alert and no distress     EYES: PERRL, sclera clear     HENT: nose and mouth without ulcers or lesions     NECK: no adenopathy, no asymmetry, masses, or scars and thyroid normal to palpation     RESP: lungs clear to auscultation - no rales, rhonchi or wheezes     CV: regular rates and rhythm, normal S1 S2, no S3 or S4 and no murmur, click or rub      Abdomen: soft, nontender, no HSM or masses and bowel sounds normal     Ext: warm, dry, no edema      Psych: full range affect, normal speech and grooming, judgement and insight intact       Results for orders placed or performed in visit on 12/15/21   Lipid panel reflex to direct LDL Fasting     Status: None   Result Value Ref Range    Cholesterol 193 <200 mg/dL    Triglycerides 79 <150 mg/dL    Direct Measure HDL 82 >=50 mg/dL    LDL Cholesterol Calculated 95 <=100 mg/dL    Non HDL Cholesterol 111 <130 mg/dL    Patient Fasting > 8hrs? Yes     Narrative    Cholesterol  Desirable:  <200 mg/dL    Triglycerides  Normal:  Less than 150 mg/dL  Borderline High:  150-199 mg/dL  High:  200-499 mg/dL  Very High:  Greater than or equal to 500 mg/dL    Direct Measure HDL  Female:  Greater than or equal to 50 mg/dL   Male:  Greater than or equal to 40 mg/dL    LDL Cholesterol  Desirable:  <100mg/dL  Above Desirable:  100-129 mg/dL   Borderline High:  130-159 mg/dL   High:  160-189 mg/dL   Very High:  >= 190 mg/dL    Non HDL Cholesterol  Desirable:  130 mg/dL  Above Desirable:  130-159 mg/dL  Borderline High:  160-189 mg/dL  High:  190-219 mg/dL  Very High:  Greater than or equal to 220 mg/dL   TSH with free T4 reflex     Status: Normal    Result Value Ref Range    TSH 1.42 0.40 - 4.00 mU/L

## 2021-12-15 ENCOUNTER — OFFICE VISIT (OUTPATIENT)
Dept: FAMILY MEDICINE | Facility: CLINIC | Age: 71
End: 2021-12-15
Payer: COMMERCIAL

## 2021-12-15 DIAGNOSIS — N39.41 URGE INCONTINENCE: ICD-10-CM

## 2021-12-15 DIAGNOSIS — F33.1 MODERATE EPISODE OF RECURRENT MAJOR DEPRESSIVE DISORDER (H): ICD-10-CM

## 2021-12-15 DIAGNOSIS — F33.0 MAJOR DEPRESSIVE DISORDER, RECURRENT EPISODE, MILD (H): ICD-10-CM

## 2021-12-15 DIAGNOSIS — N18.1 CHRONIC KIDNEY DISEASE, STAGE 1: ICD-10-CM

## 2021-12-15 DIAGNOSIS — F41.1 ANXIETY STATE: ICD-10-CM

## 2021-12-15 DIAGNOSIS — I10 ESSENTIAL HYPERTENSION WITH GOAL BLOOD PRESSURE LESS THAN 140/90: Primary | ICD-10-CM

## 2021-12-15 DIAGNOSIS — I25.10 CORONARY ARTERY DISEASE INVOLVING NATIVE CORONARY ARTERY OF NATIVE HEART WITHOUT ANGINA PECTORIS: ICD-10-CM

## 2021-12-15 DIAGNOSIS — I10 BENIGN ESSENTIAL HYPERTENSION: ICD-10-CM

## 2021-12-15 DIAGNOSIS — I25.10 MILD CORONARY ARTERY DISEASE: ICD-10-CM

## 2021-12-15 DIAGNOSIS — I77.810 AORTIC ROOT DILATATION (H): ICD-10-CM

## 2021-12-15 DIAGNOSIS — R41.3 MEMORY CHANGE: ICD-10-CM

## 2021-12-15 PROCEDURE — 36415 COLL VENOUS BLD VENIPUNCTURE: CPT | Performed by: FAMILY MEDICINE

## 2021-12-15 PROCEDURE — 80061 LIPID PANEL: CPT | Performed by: FAMILY MEDICINE

## 2021-12-15 PROCEDURE — 84443 ASSAY THYROID STIM HORMONE: CPT | Performed by: FAMILY MEDICINE

## 2021-12-15 PROCEDURE — 99215 OFFICE O/P EST HI 40 MIN: CPT | Performed by: FAMILY MEDICINE

## 2021-12-15 RX ORDER — SERTRALINE HYDROCHLORIDE 100 MG/1
150 TABLET, FILM COATED ORAL DAILY
Qty: 135 TABLET | Refills: 1 | Status: SHIPPED | OUTPATIENT
Start: 2021-12-15 | End: 2022-06-29

## 2021-12-15 RX ORDER — AMLODIPINE BESYLATE 10 MG/1
10 TABLET ORAL AT BEDTIME
Qty: 60 TABLET | Refills: 0 | Status: SHIPPED | OUTPATIENT
Start: 2021-12-15 | End: 2022-02-08

## 2021-12-15 RX ORDER — LISINOPRIL AND HYDROCHLOROTHIAZIDE 12.5; 2 MG/1; MG/1
2 TABLET ORAL DAILY
Qty: 60 TABLET | Refills: 0 | Status: SHIPPED | OUTPATIENT
Start: 2021-12-15 | End: 2022-02-01

## 2021-12-15 NOTE — PATIENT INSTRUCTIONS
--Hypertension- message to share with your daughter...  Very high blood pressure today, likely due to not taking medications as prescribed.  Your blood pressure meds should have run out in 8/21 at the latest (because we were going to see you back in 8/21), and we did not get any refill requests so were not aware you would have run out until we looked today.  You were 4+ months short of taking daily medication since your last visit in 7/21.  This is why I am very concerned about your memory, and your increased risk of heart attack and stroke with uncontrolled high blood pressure.    --We'll check your lipids today to see if they are better with the crestor 10mg/day.  We did get a few refill requests for this, so hopefully that means you've been taking this one fairly consistently.    --We changed your contact number to your daughter, Abbie after discussion with you and her today at your appointment.  She will also make sure your medication box is filled correctly, that you are taking your medications daily, and help you realize when you need to request refills.

## 2021-12-16 LAB — TSH SERPL DL<=0.005 MIU/L-ACNC: 1.42 MU/L (ref 0.4–4)

## 2021-12-18 LAB
CHOLEST SERPL-MCNC: 193 MG/DL
FASTING STATUS PATIENT QL REPORTED: YES
HDLC SERPL-MCNC: 82 MG/DL
LDLC SERPL CALC-MCNC: 95 MG/DL
NONHDLC SERPL-MCNC: 111 MG/DL
TRIGL SERPL-MCNC: 79 MG/DL

## 2022-01-03 VITALS
WEIGHT: 124.9 LBS | HEART RATE: 46 BPM | SYSTOLIC BLOOD PRESSURE: 160 MMHG | OXYGEN SATURATION: 99 % | BODY MASS INDEX: 20.16 KG/M2 | DIASTOLIC BLOOD PRESSURE: 75 MMHG | TEMPERATURE: 98.8 F

## 2022-01-03 PROBLEM — I77.810 AORTIC ROOT DILATATION (H): Status: ACTIVE | Noted: 2022-01-03

## 2022-01-18 ENCOUNTER — HOSPITAL ENCOUNTER (OUTPATIENT)
Dept: MAMMOGRAPHY | Facility: CLINIC | Age: 72
Discharge: HOME | End: 2022-01-18
Attending: FAMILY MEDICINE | Admitting: FAMILY MEDICINE
Payer: COMMERCIAL

## 2022-01-18 DIAGNOSIS — Z12.31 BREAST CANCER SCREENING BY MAMMOGRAM: ICD-10-CM

## 2022-01-18 PROCEDURE — 77067 SCR MAMMO BI INCL CAD: CPT

## 2022-01-21 ENCOUNTER — HOSPITAL ENCOUNTER (OUTPATIENT)
Dept: CARDIOLOGY | Facility: CLINIC | Age: 72
Discharge: HOME | End: 2022-01-21
Attending: STUDENT IN AN ORGANIZED HEALTH CARE EDUCATION/TRAINING PROGRAM | Admitting: STUDENT IN AN ORGANIZED HEALTH CARE EDUCATION/TRAINING PROGRAM
Payer: COMMERCIAL

## 2022-01-21 DIAGNOSIS — I77.810 AORTIC ROOT DILATATION (H): ICD-10-CM

## 2022-01-21 DIAGNOSIS — I25.10 MILD CORONARY ARTERY DISEASE: ICD-10-CM

## 2022-01-21 DIAGNOSIS — N18.1 CHRONIC KIDNEY DISEASE, STAGE 1: ICD-10-CM

## 2022-01-21 DIAGNOSIS — I25.10 CORONARY ARTERY DISEASE INVOLVING NATIVE CORONARY ARTERY OF NATIVE HEART WITHOUT ANGINA PECTORIS: ICD-10-CM

## 2022-01-21 DIAGNOSIS — I10 BENIGN ESSENTIAL HYPERTENSION: ICD-10-CM

## 2022-01-21 LAB — LVEF ECHO: NORMAL

## 2022-01-21 PROCEDURE — 93306 TTE W/DOPPLER COMPLETE: CPT

## 2022-01-21 PROCEDURE — 93306 TTE W/DOPPLER COMPLETE: CPT | Mod: 26 | Performed by: INTERNAL MEDICINE

## 2022-01-28 ENCOUNTER — TELEPHONE (OUTPATIENT)
Dept: CARDIOLOGY | Facility: CLINIC | Age: 72
End: 2022-01-28
Payer: COMMERCIAL

## 2022-01-28 NOTE — TELEPHONE ENCOUNTER
Kathryn hasn't reviewed her ECHO report yet. Called and daughter answered, apparently aKthryn has forgotten her password. Directed them to [facility] on line to re establish password.

## 2022-02-03 NOTE — PROGRESS NOTES
"SUBJECTIVE:   Kimmie Carranza is a 71 year old female who presents for Preventive Visit.    Patient has been advised of split billing requirements and indicates understanding: Yes     Are you in the first 12 months of your Medicare coverage?  No    Healthy Habits:     In general, how would you rate your overall health?  Good    Frequency of exercise:  2-3 days/week    Duration of exercise:  30-45 minutes    Do you usually eat at least 4 servings of fruit and vegetables a day, include whole grains    & fiber and avoid regularly eating high fat or \"junk\" foods?  Yes    Taking medications regularly:  No    Barriers to taking medications:  Problems remembering to take them    Medication side effects:  None    Ability to successfully perform activities of daily living:  No assistance needed    Home Safety:  Lack of grab bars in the bathroom    Hearing Impairment:  Difficulty following a conversation in a noisy restaurant or crowded room, need to ask people to speak up or repeat themselves and difficulty understanding soft or whispered speech    In the past 6 months, have you been bothered by leaking of urine? Yes    In general, how would you rate your overall mental or emotional health?  Fair      PHQ-2 Total Score: 1    Additional concerns today:  No    HTN- thinks she forgot her meds yesterday, but otherwise doesn't think she's forgotten them at all.  Brings the whole bag of rx meds back and forth between her house and daughters house (Abbie's house- there 3-4/nights/wk).   [phone].  Daughter reminds her to take her meds, even when she's at her own home.  Doesn't think she's been missing doses.  Will try and help her remember to record BP and bring in records and machine and med bottles to next appt.     Usually takes takes all of her meds around noon. Took Monday's meds earlier today.    Cardiology review- rechecked cardiac ultrasound- aorta a little bigger than 2019, so rec yearly assessments, and 'really need to " get your blood pressure under control'.    Reviewed  labs- LDL much better, from 89 yo 95- likely due to remembering statin medication more.    Concerns-   Patient states she is having vaginal discharge and odor    Do you feel safe in your environment? Yes    Have you ever done Advance Care Planning? (For example, a Health Directive, POLST, or a discussion with a medical provider or your loved ones about your wishes): No, advance care planning information given to patient to review.  Patient declined advance care planning discussion at this time.      Fall risk  Fallen 2 or more times in the past year?: No  Any fall with injury in the past year?: No    Cognitive Screening   1) Repeat 3 items (Leader, Season, Table)    2) Clock draw: NORMAL  3) 3 item recall: Recalls 2 objects   Results: NORMAL clock, 1-2 items recalled: COGNITIVE IMPAIRMENT LESS LIKELY    Mini-CogTM Copyright S [employer]. Licensed by the author for use in [facility]; reprinted with permission (ender@[facility]). All rights reserved.      Do you have sleep apnea, excessive snoring or daytime drowsiness?: yes    Reviewed and updated as needed this visit by clinical staff  Tobacco  Allergies  Meds  Problems  Med Hx  Surg Hx  Fam Hx         Reviewed and updated as needed this visit by Provider  Tobacco  Allergies  Meds  Problems  Med Hx  Surg Hx  Fam Hx        Social History     Tobacco Use     Smoking status: Former Smoker     Packs/day: 0.25     Years: 40.00     Pack years: 10.00     Quit date: 2000     Years since quittin.1     Smokeless tobacco: Never Used     Tobacco comment: smokes occasionally-- smoked ~1ppd from her 20s until her 60s, then ~2 cigs/wk   Substance Use Topics     Alcohol use: No     Alcohol/week: 0.0 standard drinks     Comment: quit completely      If you drink alcohol do you typically have >3 drinks per day or >7 drinks per week? Not applicable    Alcohol Use 2022   Prescreen: >3  drinks/day or >7 drinks/week? Not Applicable   Prescreen: >3 drinks/day or >7 drinks/week? -   No flowsheet data found.      Current providers sharing in care for this patient include:   Patient Care Team:  Dee Castillo MD as PCP - General  Dee Castillo MD as Assigned PCP  Alicia Hewitt MD as MD (Psychiatry)  Miguel A Reina MD as Resident (Psychiatry)  Jazmyn Chaves MD as Assigned Heart and Vascular Provider    The following health maintenance items are reviewed in Epic and correct as of today:  Health Maintenance Due   Topic Date Due     ANNUAL REVIEW OF HM ORDERS  Never done     ZOSTER IMMUNIZATION (2 of 3) 05/09/2012     FALL RISK ASSESSMENT  10/01/2021     COLORECTAL CANCER SCREENING  10/24/2021       Lab work is in process  Labs reviewed in EPIC  BP Readings from Last 3 Encounters:   02/08/22 (!) 165/75   12/15/21 (!) 160/75   11/23/21 (!) 142/85    Wt Readings from Last 3 Encounters:   02/08/22 60 kg (132 lb 3.2 oz)   12/15/21 56.7 kg (124 lb 14.4 oz)   11/23/21 58.5 kg (129 lb)                  Patient Active Problem List   Diagnosis     Anxiety state     Urge incontinence     Hyperlipidemia LDL goal <100     Mild major depression (H)     Osteoarthritis     Essential hypertension with goal blood pressure less than 140/90     Advanced directives, counseling/discussion     Alcohol abuse     Abnormal ankle brachial index     Mild atherosclerosis of carotid artery     Insomnia     Bilateral hip pain     Chronic bilateral low back pain without sciatica     Nonallopathic lesion of sacroiliac region     Sacroiliac joint pain     Closed compression fracture of second lumbar vertebra (H)     Memory change     Chest pain     Mild coronary artery disease     Chronic kidney disease, stage 1     Aortic root dilatation (H)     Major depressive disorder, recurrent episode, mild (H)     Past Surgical History:   Procedure Laterality Date     COLONOSCOPY N/A 10/24/2016    Procedure:  "COMBINED COLONOSCOPY, SINGLE OR MULTIPLE BIOPSY/POLYPECTOMY BY BIOPSY;  Surgeon: Kwaku Henry MD;  Location:  GI     CV CORONARY ANGIOGRAM N/A 2019    Procedure: Coronary Angiogram;  Surgeon: Viktor Brothers MD;  Location:  HEART CARDIAC CATH LAB     [facility] NONSPECIFIC PROCEDURE      Tubal ligation     [facility] NONSPECIFIC PROCEDURE      laporoscopy       Social History     Tobacco Use     Smoking status: Former Smoker     Packs/day: 0.25     Years: 40.00     Pack years: 10.00     Quit date: 2000     Years since quittin.1     Smokeless tobacco: Never Used     Tobacco comment: smokes occasionally-- smoked ~1ppd from her 20s until her 60s, then ~2 cigs/wk   Substance Use Topics     Alcohol use: No     Alcohol/week: 0.0 standard drinks     Comment: quit completely      Family History   Problem Relation Age of Onset     Hypertension Mother      Cerebrovascular Disease Mother          of complications of stroke at age 82     Heart Disease Mother         atrial fib     Osteoporosis Mother         two hip fx's     Neurologic Disorder Mother         MS     Hypertension Father      Neurologic Disorder Father         dementia- Alzheimers, dx in his 60s, live to his 90s     Gastrointestinal Disease Father      Depression Father      Neurologic Disorder Sister      Hypertension Brother      Neurologic Disorder Maternal Grandmother      C.A.D. Paternal Grandmother 62         at 62, of MI     C.A.D. Paternal Aunt 62        \"\"     C.A.D. Paternal Aunt 62        \"\"     Colon Cancer Paternal Uncle      Bipolar Disorder Daughter          Current Outpatient Medications   Medication Sig Dispense Refill     amLODIPine (NORVASC) 10 MG tablet Take 1 tablet (10 mg) by mouth At Bedtime 60 tablet 0     lisinopril-hydrochlorothiazide (ZESTORETIC) 20-12.5 MG tablet Take 2 tablets by mouth daily 60 tablet 0     spironolactone (ALDACTONE) 50 MG tablet Take 1 tablet (50 mg) by mouth daily 30 tablet " 1     acetaminophen (TYLENOL) 325 MG tablet Take 2 tablets (650 mg) by mouth every 4 hours as needed for mild pain 30 tablet 0     aspirin 81 MG EC tablet Take 1 tablet (81 mg) by mouth daily 90 tablet 3     Blood Pressure Monitor KIT 1 Units daily as needed (blood pressure check) 1 kit 0     coenzyme Q-10 (CO-Q10) 50 MG capsule Take 1 capsule (50 mg) by mouth daily       Multiple Vitamins-Minerals (MULTIVITAMIN ADULT PO) Take 1 tablet by mouth daily       nicotine (COMMIT) 2 MG lozenge Place 1 lozenge (2 mg) inside cheek every hour as needed for smoking cessation Place 1 lozenge inside cheek as needed for smoking cessation. 100 lozenge 5     order for DME Equipment being ordered: Digital home blood pressure monitor kit 1 Units 0     rosuvastatin (CRESTOR) 10 MG tablet TAKE 1 TABLET(10 MG) BY MOUTH DAILY 90 tablet 1     sertraline (ZOLOFT) 100 MG tablet Take 1.5 tablets (150 mg) by mouth daily 135 tablet 1     traZODone (DESYREL) 50 MG tablet TAKE 1 TABLET BY MOUTH AT BEDTIME AS NEEDED FOR SLEEP 90 tablet 0     Allergies   Allergen Reactions     No Known Drug Allergies      Recent Labs   Lab Test 12/15/21  1141 06/22/21  1231 08/26/20  1234 09/18/19  1217 05/14/19  1116 05/06/19  0552 05/05/19  1230   LDL 95 167* 106*  --   --   --   --    HDL 82 79 80  --   --   --   --    TRIG 79 61 72  --   --   --   --    ALT  --   --  21  --  27  --  27   CR  --  0.76 0.72   < > 0.80   < > 0.75   GFRESTIMATED  --  78 85   < > 75   < > 81   GFRESTBLACK  --  >90 >90   < > 87   < > >90   POTASSIUM  --  4.3 3.7   < > 4.5   < > 3.9   TSH 1.42  --  1.66  --  1.23   < >  --     < > = values in this interval not displayed.        Review of Systems   Constitutional: Negative for chills and fever.   HENT: Positive for hearing loss. Negative for congestion and ear pain.    Eyes: Negative for visual disturbance.   Respiratory: Negative for cough and shortness of breath.    Cardiovascular: Negative for chest pain, palpitations and  "peripheral edema.   Gastrointestinal: Negative for abdominal pain, constipation, diarrhea, heartburn, hematochezia and nausea.   Breasts:  Negative for tenderness, breast mass and discharge.   Genitourinary: Positive for urgency and vaginal discharge. Negative for dysuria, genital sores, hematuria, pelvic pain and vaginal bleeding.   Musculoskeletal: Positive for arthralgias, joint swelling and myalgias.   Neurological: Negative for dizziness, weakness, headaches and paresthesias.   Psychiatric/Behavioral: Negative for mood changes. The patient is nervous/anxious.        OBJECTIVE:   BP (!) 165/75   Pulse (!) 42   Temp 98  F (36.7  C) (Temporal)   Ht 1.663 m (5' 5.47\")   Wt 60 kg (132 lb 3.2 oz)   SpO2 98%   BMI 21.68 kg/m   Estimated body mass index is 21.68 kg/m  as calculated from the following:    Height as of this encounter: 1.663 m (5' 5.47\").    Weight as of this encounter: 60 kg (132 lb 3.2 oz).  Physical Exam  GENERAL APPEARANCE: healthy, alert and no distress  EYES: Eyes grossly normal to inspection, PERRL and conjunctivae and sclerae normal  HENT: ear canals and TM's normal, nose and mouth without ulcers or lesions, oropharynx clear and oral mucous membranes moist  NECK: no adenopathy, no asymmetry, masses, or scars and thyroid normal to palpation  RESP: lungs clear to auscultation - no rales, rhonchi or wheezes  BREAST: normal without masses, tenderness or nipple discharge and no palpable axillary masses or adenopathy  CV: regular rate and rhythm, normal S1 S2, no S3 or S4, no murmur, click or rub, no peripheral edema and peripheral pulses strong  ABDOMEN: soft, nontender, no hepatosplenomegaly, no masses and bowel sounds normal  MS: no musculoskeletal defects are noted and gait is age appropriate without ataxia  SKIN: no suspicious lesions or rashes  NEURO: Normal strength and tone, sensory exam grossly normal, mentation intact and speech normal  PSYCH: mentation appears normal and affect " normal/bright    Diagnostic Test Results:  Labs reviewed in Epic  Results for orders placed or performed in visit on 02/08/22   Wet prep - Clinic Collect     Status: Abnormal    Specimen: Vagina; Swab   Result Value Ref Range    Trichomonas Absent Absent    Yeast Absent Absent    Clue Cells Absent Absent    WBCs/high power field 3+ (A) None       ASSESSMENT / PLAN:       ICD-10-CM    1. Encounter for Medicare annual wellness exam  Z00.00    2. Essential hypertension with goal blood pressure less than 140/90  I10 spironolactone (ALDACTONE) 50 MG tablet     lisinopril-hydrochlorothiazide (ZESTORETIC) 20-12.5 MG tablet     amLODIPine (NORVASC) 10 MG tablet   3. Chronic kidney disease, stage 1  N18.1 spironolactone (ALDACTONE) 50 MG tablet   4. Aortic root dilatation (H)  I77.810    5. Memory change  R41.3    6. Hyperlipidemia LDL goal <100  E78.5    7. Major depressive disorder, recurrent episode, mild (H)  F33.0    8. Colon cancer screening  Z12.11 Adult Gastro Ref - Procedure Only   9. Vaginal discharge  N89.8 Wet prep - Clinic Collect     Wellness visit- We discussed ways to maintain a healthy lifestyle with sensible eating, regular exercise and self cares. We dicussed calcium and Vitamin D intake, vaccinations and preventive health screens.  See orders above for tests ordered and screening needed.  Thin prep pap was not done. No immunizations needed today.       HTN/CKD/memory changes-  Pt continues to have BP elevations, still difficult to tell if she's remembering her BP meds.  Despite written reminders the last many visits, she still did not bring in BP machine, any home BP readings, her BP medication bottles nor a family member.  At this point, will just trust the BPs here in clinic, which are high, but she does note she forgot yesterday's meds.  Will add spironolactone 50mg/d to her max amlodipine (10mg/d) and zestoretic (40/25mg/d).  Called and discussed with her daughter, who thinks pt has been taking meds  "daily- she tries to remind her/check in daily.  Written reminders sent again re: bringing in BP cuff/machine, BP readings and med bottles to ensure she's filling them (in past should have run out much before requests to refill them).    Aortic root dilation- reviewed Cardiology consultation from 11/21, with f/u testing.  They rechecked cardiac echo and noted the aorta was a little bigger than 2019, so rec yearly assessments, and 'really need to get your blood pressure under control'.    Lipids- Reviewed 12/21 labs- LDL much better, from 89 yo 95- likely due to remembering statin medication more.  Will cont on crestor.    MDD/insomnia- -pt feels sx's are stable on zoloft 150mg/d, and trazodone prn at night for sleep.  Cont q6mo f/u.    Colon cancer screening- referred for colonoscopy- due.    Vaginal discharge- mild sx's, wet prep reasuring, will hold off on txt unless sx's change or worsen.    Patient has been advised of split billing requirements and indicates understanding: Yes    COUNSELING:  Reviewed preventive health counseling, as reflected in patient instructions    Estimated body mass index is 21.68 kg/m  as calculated from the following:    Height as of this encounter: 1.663 m (5' 5.47\").    Weight as of this encounter: 60 kg (132 lb 3.2 oz).    Weight management plan noted, stable and monitoring    She reports that she quit smoking about 22 years ago. She has a 10.00 pack-year smoking history. She has never used smokeless tobacco.      Appropriate preventive services were discussed with this patient, including applicable screening as appropriate for cardiovascular disease, diabetes, osteopenia/osteoporosis, and glaucoma.  As appropriate for age/gender, discussed screening for colorectal cancer, prostate cancer, breast cancer, and cervical cancer. Checklist reviewing preventive services available has been given to the patient.    Reviewed patients plan of care and provided an AVS. The Basic Care Plan " (routine screening as documented in Health Maintenance) for Kimmie meets the Care Plan requirement. This Care Plan has been established and reviewed with the Patient and daughter.    Counseling Resources:  ATP IV Guidelines  Pooled Cohorts Equation Calculator  Breast Cancer Risk Calculator  Breast Cancer: Medication to Reduce Risk  FRAX Risk Assessment  ICSI Preventive Guidelines  Dietary Guidelines for Americans, 2010  [employer]'s MyPlate  ASA Prophylaxis  Lung CA Screening    Dee Castillo MD  [facility]    Identified Health Risks:    The patient was provided with written information regarding signs of hearing loss.  Information on urinary incontinence and treatment options given to patient.  The patient was provided with suggestions to help her develop a healthy emotional lifestyle.

## 2022-02-08 ENCOUNTER — OFFICE VISIT (OUTPATIENT)
Dept: FAMILY MEDICINE | Facility: CLINIC | Age: 72
End: 2022-02-08
Payer: COMMERCIAL

## 2022-02-08 VITALS
WEIGHT: 132.2 LBS | SYSTOLIC BLOOD PRESSURE: 165 MMHG | DIASTOLIC BLOOD PRESSURE: 75 MMHG | HEIGHT: 65 IN | BODY MASS INDEX: 22.02 KG/M2 | TEMPERATURE: 98 F | OXYGEN SATURATION: 98 % | HEART RATE: 42 BPM

## 2022-02-08 DIAGNOSIS — I77.810 AORTIC ROOT DILATATION (H): ICD-10-CM

## 2022-02-08 DIAGNOSIS — E78.5 HYPERLIPIDEMIA LDL GOAL <100: ICD-10-CM

## 2022-02-08 DIAGNOSIS — Z00.00 ENCOUNTER FOR MEDICARE ANNUAL WELLNESS EXAM: Primary | ICD-10-CM

## 2022-02-08 DIAGNOSIS — N89.8 VAGINAL DISCHARGE: ICD-10-CM

## 2022-02-08 DIAGNOSIS — F33.0 MAJOR DEPRESSIVE DISORDER, RECURRENT EPISODE, MILD (H): ICD-10-CM

## 2022-02-08 DIAGNOSIS — R41.3 MEMORY CHANGE: ICD-10-CM

## 2022-02-08 DIAGNOSIS — Z12.11 COLON CANCER SCREENING: ICD-10-CM

## 2022-02-08 DIAGNOSIS — I10 ESSENTIAL HYPERTENSION WITH GOAL BLOOD PRESSURE LESS THAN 140/90: ICD-10-CM

## 2022-02-08 DIAGNOSIS — N18.1 CHRONIC KIDNEY DISEASE, STAGE 1: ICD-10-CM

## 2022-02-08 LAB
CLUE CELLS: ABNORMAL
TRICHOMONAS, WET PREP: ABNORMAL
WBC'S/HIGH POWER FIELD, WET PREP: ABNORMAL
YEAST, WET PREP: ABNORMAL

## 2022-02-08 PROCEDURE — 87210 SMEAR WET MOUNT SALINE/INK: CPT | Performed by: FAMILY MEDICINE

## 2022-02-08 PROCEDURE — 99214 OFFICE O/P EST MOD 30 MIN: CPT | Mod: 25 | Performed by: FAMILY MEDICINE

## 2022-02-08 PROCEDURE — 99397 PER PM REEVAL EST PAT 65+ YR: CPT | Performed by: FAMILY MEDICINE

## 2022-02-08 RX ORDER — LISINOPRIL AND HYDROCHLOROTHIAZIDE 12.5; 2 MG/1; MG/1
2 TABLET ORAL DAILY
Qty: 60 TABLET | Refills: 0 | Status: SHIPPED | OUTPATIENT
Start: 2022-02-08 | End: 2022-04-19

## 2022-02-08 RX ORDER — AMLODIPINE BESYLATE 10 MG/1
10 TABLET ORAL AT BEDTIME
Qty: 60 TABLET | Refills: 0 | Status: SHIPPED | OUTPATIENT
Start: 2022-02-08 | End: 2022-04-19

## 2022-02-08 RX ORDER — SPIRONOLACTONE 50 MG/1
50 TABLET, FILM COATED ORAL DAILY
Qty: 30 TABLET | Refills: 1 | Status: SHIPPED | OUTPATIENT
Start: 2022-02-08 | End: 2022-03-15

## 2022-02-08 ASSESSMENT — ENCOUNTER SYMPTOMS
BREAST MASS: 0
NERVOUS/ANXIOUS: 1
JOINT SWELLING: 1
HEARTBURN: 0
DIZZINESS: 0
CONSTIPATION: 0
HEADACHES: 0
HEMATOCHEZIA: 0
PARESTHESIAS: 0
DIARRHEA: 0
SHORTNESS OF BREATH: 0
PALPITATIONS: 0
ABDOMINAL PAIN: 0
ARTHRALGIAS: 1
HEMATURIA: 0
WEAKNESS: 0
FEVER: 0
CHILLS: 0
COUGH: 0
NAUSEA: 0
DYSURIA: 0
MYALGIAS: 1

## 2022-02-08 ASSESSMENT — ACTIVITIES OF DAILY LIVING (ADL): CURRENT_FUNCTION: NO ASSISTANCE NEEDED

## 2022-02-08 ASSESSMENT — MIFFLIN-ST. JEOR: SCORE: 1123.03

## 2022-02-08 NOTE — PATIENT INSTRUCTIONS
Reminders....  --Will add spironolactone 50mg/day to your blood pressure medication regimen due to your higher readings.  --Check home blood pressure readings on home machine, and record and bring records to next appointment.  -Bring medication bottles to next appointment.  --Pay attention to the fill dates of the prescriptions (prior to 12/21 appointment, they should have run out well before 12/21).  --Make a follow-up appointment in clinic in ~1 month- in clinic.    Patient Education   Personalized Prevention Plan  You are due for the preventive services outlined below.  Your care team is available to assist you in scheduling these services.  If you have already completed any of these items, please share that information with your care team to update in your medical record.  Health Maintenance Due   Topic Date Due     ANNUAL REVIEW OF HM ORDERS  Never done     Zoster (Shingles) Vaccine (2 of 3) 05/09/2012     FALL RISK ASSESSMENT  10/01/2021     Colorectal Cancer Screening  10/24/2021       Signs of Hearing Loss      Hearing much better with one ear can be a sign of hearing loss.   Hearing loss is a problem shared by many people. In fact, it is one of the most common health problems, particularly as people age. Most people age 65 and older have some hearing loss. By age 80, almost everyone does. Hearing loss often occurs slowly over the years. So you may not realize your hearing has gotten worse.  Have your hearing checked  Call your healthcare provider if you:    Have to strain to hear normal conversation    Have to watch other people s faces very carefully to follow what they re saying    Need to ask people to repeat what they ve said    Often misunderstand what people are saying    Turn the volume of the television or radio up so high that others complain    Feel that people are mumbling when they re talking to you    Find that the effort to hear leaves you feeling tired and irritated    Notice, when using the  phone, that you hear better with one ear than the other  [place] last reviewed this educational content on 1/1/2020 2000-2021 [employer]. All rights reserved. This information is not intended as a substitute for professional medical care. Always follow your healthcare professional's instructions.          Urinary Incontinence, Female (Adult)   Urinary incontinence means loss of bladder control. This problem affects many women, especially as they get older. If you have incontinence, you may be embarrassed to ask for help. But know that this problem can be treated.   Types of Incontinence  There are different types of incontinence. Two of the main types are described here. You can have more than one type.     Stress incontinence. With this type, urine leaks when pressure (stress) is put on the bladder. This may happen when you cough, sneeze, or laugh. Stress incontinence most often occurs because the pelvic floor muscles that support the bladder and urethra are weak. This can happen after pregnancy and vaginal childbirth or a hysterectomy. It can also be due to excess body weight or hormone changes.    Urge incontinence (also called overactive bladder). With this type, a sudden urge to urinate is felt often. This may happen even though there may not be much urine in the bladder. The need to urinate often during the night is common. Urge incontinence most often occurs because of bladder spasms. This may be due to bladder irritation or infection. Damage to bladder nerves or pelvic muscles, constipation, and certain medicines can also lead to urge incontinence.  Treatment depends on the cause. Further evaluation is needed to find the type you have. This will likely include an exam and certain tests. Based on the results, you and your healthcare provider can then plan treatment. Until a diagnosis is made, the home care tips below can help ease symptoms.   Home care    Do pelvic floor muscle exercises,  if they are prescribed. The pelvic floor muscles help support the bladder and urethra. Many women find that their symptoms improve when doing special exercises that strengthen these muscles. To do the exercises, contract the muscles you would use to stop your stream of urine. But do this when you re not urinating. Hold for 10 seconds, then relax. Repeat 10 to 20 times in a row, at least 3 times a day. Your healthcare provider may give you other instructions for how to do the exercises and how often.    Keep a bladder diary. This helps track how often and how much you urinate over a set period of time. Bring this diary with you to your next visit with the provider. The information can help your provider learn more about your bladder problem.    Lose weight, if advised to by your provider. Extra weight puts pressure on the bladder. Your provider can help you create a weight-loss plan that s right for you. This may include exercising more and making certain diet changes.    Don't have foods and drinks that may irritate the bladder. These can include alcohol and caffeinated drinks.    Quit smoking. Smoking and other tobacco use can lead to a long-term (chronic) cough that strains the pelvic floor muscles. Smoking may also damage the bladder and urethra. Talk with your provider about treatments or methods you can use to quit smoking.    If drinking large amounts of fluid makes you have symptoms, you may be advised to limit your fluid intake. You may also be advised to drink most of your fluids during the day and to limit fluids at night.    If you re worried about urine leakage or accidents, you may wear absorbent pads to catch urine. Change the pads often. This helps reduce discomfort. It may also reduce the risk of skin or bladder infections.    Follow-up care  Follow up with your healthcare provider, or as directed. It may take some to find the right treatment for your problem. But healthy lifestyle changes can be  made right away. These include such things as exercising on a regular basis, eating a healthy diet, losing weight (if needed), and quitting smoking. Your treatment plan may include special therapies or medicines. Certain procedures or surgery may also be options. Talk about any questions you have with your provider.   When to seek medical advice  Call the healthcare provider right away if any of these occur:    Fever of 100.4 F (38 C) or higher, or as directed by your provider    Bladder pain or fullness    Belly swelling    Nausea or vomiting    Back pain    Weakness, dizziness, or fainting  [employer] last reviewed this educational content on 1/1/2020 2000-2021 [employer]. All rights reserved. This information is not intended as a substitute for professional medical care. Always follow your healthcare professional's instructions.        Your Health Risk Assessment indicates you feel you are not in good emotional health.    Recreation   Recreation is not limited to sports and team events. It includes any activity that provides relaxation, interest, enjoyment, and exercise. Recreation provides an outlet for physical, mental, and social energy. It can give a sense of worth and achievement. It can help you stay healthy.    Mental Exercise and Social Involvement  Mental and emotional health is as important as physical health. Keep in touch with friends and family. Stay as active as possible. Continue to learn and challenge yourself.   Things you can do to stay mentally active are:    Learn something new, like a foreign language or musical instrument.     Play SCRABBLE or do crossword puzzles. If you cannot find people to play these games with you at home, you can play them with others on your computer through the Internet.     Join a games club--anything from card games to chess or checkers or lawn bowling.     Start a new hobby.     Go back to school.     Volunteer.     Read.   Keep up with world  events.

## 2022-02-09 NOTE — RESULT ENCOUNTER NOTE
Your vaginal swab/wet prep was negative for signs of a yeast infection or bacterial vaginosis infection.    Please let me know if you have any questions.  Best,   Juwan Castillo MD

## 2022-02-23 ENCOUNTER — HOSPITAL ENCOUNTER (OUTPATIENT)
Facility: CLINIC | Age: 72
End: 2022-02-23
Attending: INTERNAL MEDICINE | Admitting: INTERNAL MEDICINE
Payer: COMMERCIAL

## 2022-02-23 DIAGNOSIS — Z11.59 ENCOUNTER FOR SCREENING FOR OTHER VIRAL DISEASES: Primary | ICD-10-CM

## 2022-03-10 NOTE — PROGRESS NOTES
Assessment & Plan       ICD-10-CM    1. Essential hypertension with goal blood pressure less than 140/90  I10 Basic metabolic panel  (Ca, Cl, CO2, Creat, Gluc, K, Na, BUN)     Albumin Random Urine Quantitative with Creat Ratio     Basic metabolic panel  (Ca, Cl, CO2, Creat, Gluc, K, Na, BUN)     Albumin Random Urine Quantitative with Creat Ratio   2. Hyperlipidemia LDL goal <100  E78.5    3. Mild coronary artery disease  I25.10    4. Aortic root dilatation (H)  I77.810    5. Memory change  R41.3    6. Major depressive disorder, recurrent episode, mild (H)  F33.0    7. Screen for colon cancer  Z12.11       HTN- BPs have been consistently high the last many visits, but now BP at clinic today is low, home readings have been variable.    Some BPs on home meter very low, some 130-140s systolic, some higher.  BPs low today in clinic (and home meter SBP of 140s- pt states it's a new machine).  Still very concerned she's taking her meds erratically, and still very difficult to know what doses she should be one, or how to get her to consistent use.  --With low BPs today, will have her stop the spironolactone- likely not needed IF she takes the others consistently.  Pt notes she has all her rx and supplements in a weekly organizer EXCEPT her PM HTN meds. Very difficult with her sleeping at two locations each week (her home and daughter's home), and her memory issues. Daughter is aware of concerns, but she is overwhelmed with also caring for her disabled child.  --Along with stopping the spironolactone, will have her try and take all her rx meds in the am and see if this helps.  RTC in ~1 month, and again bring in her med bottles and home meter.    Lipids-   LDL has been variable- was up at 167 in 6/21, then down at 95 in 6/21 with no rx change from here- likely result of pt taking more consistently (though she had insisted she was taking statin consistently before).  Glad to see LDL better, but still concerned about  ensuring safe medication use.          Plan (in AVS)--  --Put all prescription medications in medication organizer, and take all the same time of day (to lessen chance of forgetting).  --STOP the spironolactone due to low Blood pressures today in clinic.  --Return to clinic in 1-2 months WITH new BP cuff (one today did not match BPs taken in clinic, extremely variable BPs- unsure if cuff, or if taking medications erratically).  --Come to appointment again with ALL medication bottles home BP cuff.      42 minutes spent on the date of the encounter doing chart review, review of test results, interpretation of tests, patient visit and documentation     Return in about 1 month (around 4/15/2022) for Blood Pressure Recheck (bring ALL bottles and BP cuff to appointment again).    Dee Castillo MD  [facility]            Swapnil Peralta is a 71 year old who presents for the following health issues - HTN, lipids, other concerns    History of Present Illness       Hypertension: She presents for follow up of hypertension.  She does check blood pressure  regularly outside of the clinic. Outside blood pressures have been over 140/90. She follows a low salt diet.     She eats 2-3 servings of fruits and vegetables daily.She consumes 2 sweetened beverage(s) daily.She exercises with enough effort to increase her heart rate 30 to 60 minutes per day.  She exercises with enough effort to increase her heart rate 6 days per week. She is missing 1 dose(s) of medications per week.     BPs on home monitor- reviewed readings today...  158/90, 125/55,169/106, 180/109, 143/67, 155/63, 54/46, 144/56, 59/37, 146/63, 135/73, 138/76, 145/59, 138/4  Pt thinks she's been checking 2-3x/day, so likely all from the last 1-2 weeks?    Brings in medication bottles...    Amlodipine bottle from 10/19...  Lisinopril bottle from 3/22, #60 in bottle, many left.  Spironolactone     Has medication pill organizer- large, rx  "meds on top row, supplements on bottom. Puts it in her bad and takes that from house to house (from her house to staying at her daughter's house).  She doesn't put the spironolactone or the amlodipine in her medication organizer 'because she takes them at night', so takes them from the bottles.  Has to remember to bring the bottles to her daughter's house.  Will try and take them all at the same time.        Lipids- LDL MUCH improved from 6/21 to 12/21- no med changes, likely remembering to take more consistently.  She had bottle filled from 12/29/21, should only have ~#15 left, has #40 pills left.  Could be some mixing with house switching and     Daughter does help her sometimes set up her meds or reminders her...    BP cuff- 141/45  MD bp recheck 118/60        Review of Systems   Constitutional, HEENT, cardiovascular, pulmonary, gi and gu systems are negative, except as otherwise noted.      Objective    BP 93/58   Pulse 50   Temp 97.3  F (36.3  C)   Ht 1.663 m (5' 5.47\")   Wt 59.3 kg (130 lb 12.8 oz)   SpO2 97%   BMI 21.45 kg/m    Body mass index is 21.45 kg/m .  Physical Exam   GENERAL APPEARANCE: healthy, alert and no distress     EYES: sclera clear, EOMI     RESP: lungs clear to auscultation - no rales, rhonchi or wheezes     CV: regular rates and rhythm, normal S1 S2, no S3 or S4 and no murmur, click or rub      Ext: warm, dry, no edema       Psych: full range affect, normal speech and grooming, judgement and insight intact     Office Visit on 02/08/2022   Component Date Value Ref Range Status     Trichomonas 02/08/2022 Absent  Absent Final     Yeast 02/08/2022 Absent  Absent Final     Clue Cells 02/08/2022 Absent  Absent Final     WBCs/high power field 02/08/2022 3+ (A) None Final     Results for orders placed or performed in visit on 03/15/22   Basic metabolic panel  (Ca, Cl, CO2, Creat, Gluc, K, Na, BUN)     Status: Normal   Result Value Ref Range    Sodium 138 133 - 144 mmol/L    Potassium 4.5 3.4 " - 5.3 mmol/L    Chloride 104 94 - 109 mmol/L    Carbon Dioxide (CO2) 26 20 - 32 mmol/L    Anion Gap 8 3 - 14 mmol/L    Urea Nitrogen 21 7 - 30 mg/dL    Creatinine 0.75 0.52 - 1.04 mg/dL    Calcium 9.8 8.5 - 10.1 mg/dL    Glucose 72 70 - 99 mg/dL    GFR Estimate 85 >60 mL/min/1.73m2   Albumin Random Urine Quantitative with Creat Ratio     Status: None   Result Value Ref Range    Creatinine Urine mg/dL 88 mg/dL    Albumin Urine mg/L 13 mg/L    Albumin Urine mg/g Cr 14.77 0.00 - 25.00 mg/g Cr

## 2022-03-15 ENCOUNTER — OFFICE VISIT (OUTPATIENT)
Dept: FAMILY MEDICINE | Facility: CLINIC | Age: 72
End: 2022-03-15
Payer: COMMERCIAL

## 2022-03-15 VITALS
WEIGHT: 130.8 LBS | OXYGEN SATURATION: 97 % | HEART RATE: 50 BPM | DIASTOLIC BLOOD PRESSURE: 58 MMHG | BODY MASS INDEX: 21.79 KG/M2 | TEMPERATURE: 97.3 F | SYSTOLIC BLOOD PRESSURE: 93 MMHG | HEIGHT: 65 IN

## 2022-03-15 DIAGNOSIS — I10 ESSENTIAL HYPERTENSION WITH GOAL BLOOD PRESSURE LESS THAN 140/90: Primary | ICD-10-CM

## 2022-03-15 DIAGNOSIS — F33.0 MAJOR DEPRESSIVE DISORDER, RECURRENT EPISODE, MILD (H): ICD-10-CM

## 2022-03-15 DIAGNOSIS — R41.3 MEMORY CHANGE: ICD-10-CM

## 2022-03-15 DIAGNOSIS — E78.5 HYPERLIPIDEMIA LDL GOAL <100: ICD-10-CM

## 2022-03-15 DIAGNOSIS — I25.10 MILD CORONARY ARTERY DISEASE: ICD-10-CM

## 2022-03-15 DIAGNOSIS — Z12.11 SCREEN FOR COLON CANCER: ICD-10-CM

## 2022-03-15 DIAGNOSIS — I77.810 AORTIC ROOT DILATATION (H): ICD-10-CM

## 2022-03-15 PROCEDURE — 99215 OFFICE O/P EST HI 40 MIN: CPT | Performed by: FAMILY MEDICINE

## 2022-03-15 PROCEDURE — 36415 COLL VENOUS BLD VENIPUNCTURE: CPT | Performed by: FAMILY MEDICINE

## 2022-03-15 PROCEDURE — 80048 BASIC METABOLIC PNL TOTAL CA: CPT | Performed by: FAMILY MEDICINE

## 2022-03-15 PROCEDURE — 82043 UR ALBUMIN QUANTITATIVE: CPT | Performed by: FAMILY MEDICINE

## 2022-03-15 NOTE — PATIENT INSTRUCTIONS
Plan--    --Put all prescription medications in medication organizer, and take all the same time of day (to lessen chance of forgetting).    --STOP the spironolactone due to low Blood pressures today in clinic.    --Return to clinic in 1-2 months WITH new BP cuff (one today did not match BPs taken in clinic, extremely variable BPs- unsure if cuff, or if taking medications erratically).    --Come to appointment again with ALL medication bottles home BP cuff.

## 2022-03-17 LAB
ANION GAP SERPL CALCULATED.3IONS-SCNC: 8 MMOL/L (ref 3–14)
BUN SERPL-MCNC: 21 MG/DL (ref 7–30)
CALCIUM SERPL-MCNC: 9.8 MG/DL (ref 8.5–10.1)
CHLORIDE BLD-SCNC: 104 MMOL/L (ref 94–109)
CO2 SERPL-SCNC: 26 MMOL/L (ref 20–32)
CREAT SERPL-MCNC: 0.75 MG/DL (ref 0.52–1.04)
GFR SERPL CREATININE-BSD FRML MDRD: 85 ML/MIN/1.73M2
GLUCOSE BLD-MCNC: 72 MG/DL (ref 70–99)
POTASSIUM BLD-SCNC: 4.5 MMOL/L (ref 3.4–5.3)
SODIUM SERPL-SCNC: 138 MMOL/L (ref 133–144)

## 2022-03-18 LAB
CREAT UR-MCNC: 88 MG/DL
MICROALBUMIN UR-MCNC: 13 MG/L
MICROALBUMIN/CREAT UR: 14.77 MG/G CR (ref 0–25)

## 2022-03-28 NOTE — RESULT ENCOUNTER NOTE
Here are your lab results from your recent visit...  -Your basic metabolic panel (which includes electrolyte levels, blood sugar level and kidney function tests) looks normal.  -Your microalbumin level (which is the urine test that can signal signs of early chronic kidney disease if elevated to >30) is back in the low/normal range which is good to see.    Please let me know if you have any questions.  Best,   Juwan Castillo MD

## 2022-04-18 DIAGNOSIS — I10 ESSENTIAL HYPERTENSION WITH GOAL BLOOD PRESSURE LESS THAN 140/90: ICD-10-CM

## 2022-04-18 RX ORDER — LISINOPRIL AND HYDROCHLOROTHIAZIDE 12.5; 2 MG/1; MG/1
2 TABLET ORAL DAILY
Qty: 60 TABLET | Refills: 0 | Status: CANCELLED | OUTPATIENT
Start: 2022-04-18

## 2022-04-18 NOTE — PROGRESS NOTES
Assessment & Plan       ICD-10-CM    1. Essential hypertension with goal blood pressure less than 140/90  I10 lisinopril-hydrochlorothiazide (ZESTORETIC) 20-12.5 MG tablet     amLODIPine (NORVASC) 10 MG tablet   2. Chronic kidney disease, stage 1  N18.1    3. Hyperlipidemia LDL goal <100  E78.5    4. Colon cancer screening  Z12.11 Adult Gastro Ref - Procedure Only      HTN/CKD/memory concerns-   Continue to be very worried about pt's medication use/memory (though 6-CIT today 28/28).  She didn't take either of her meds today (due to issues with her bipolar daughter), but also couldn't remember the name of the drug we stopped last time- spironolactone.  She also didn't think she ran out of BP meds, even though she should have run out of both last month based on refill history.   She did remember to bring in her home BP monitor, but didn't bring in home readings.    Very difficult to know if she's under control on current meds or not.  --Agree with plan for pt to spend most/all nights at her daughter's house- better for her mental health and medication consistency.  --Will have her continue the lisinopril/hctz 20/12.5 mg/d and amlodipine 10mg/d for now, and off the spironolactone.  --Did not get BMP recheck today- will at next visit.  --Will send msg to triage team to reach out to pt at home and see if we can have her check her BP at home in the next week or two when she's taken her medications.  If BP is okay, okay for 3 month follow-up.  If SBP >140-145, would have her come back sooner.  -- Reminded pt to come back with her BP machine and HTN med bottles.    Lipids- reviewed last check much better- likely due to her taking the statin more consistently after repeated reminders.    Colon cancer screening- pt would like to do the colonoscopy- referral placed.    Return in about 3 months (around 7/19/2022) for Blood Pressure Recheck, Med Check (bring machine again, make SURE to Take meds day of visit!).      33 minutes  spent on the date of the encounter doing chart review, review of test results, interpretation of tests, patient visit and documentation       Return in about 3 months (around 7/19/2022) for Blood Pressure Recheck, Med Check (bring machine again, make SURE to Take meds day of visit!).    Dee Castillo MD  [facility]          Swapnil Peralta is a 71 year old who presents for the following health issues - HTN    History of Present Illness       Mental Health Follow-up:                    Today's PHQ-9         PHQ-9 Total Score: 4  PHQ-9 Q9 Thoughts of better off dead/self-harm past 2 weeks :   (P) Not at all    How difficult have these problems made it for you to do your work, take care of things at home, or get along with other people: Somewhat difficult        Hypertension: She presents for follow up of hypertension.  She does not check blood pressure  regularly outside of the clinic. Outside blood pressures have been over 140/90. She follows a low salt diet.     She eats 2-3 servings of fruits and vegetables daily.She consumes 2 sweetened beverage(s) daily.She exercises with enough effort to increase her heart rate 20 to 29 minutes per day.  She exercises with enough effort to increase her heart rate 6 days per week. She is missing 1 dose(s) of medications per week.     At last visit on 3/15/22, stopped spironolactone.  She can't remember the pill, but is sure she stopped it.     BP home monitor recheck-   New machine  150/67 today in room.  MD recheck 152/60.  Consistent.    Their daughter who has bipolar- 'went crazy'- hospitalized now.    Was living with them, let her out too soon.  Now hospitalized at [facility] now- at least went in willingly this time. They said they can't take her back to her house any longer.  She 'went crazy', and threw out her 's and all pt's vitamins away.  Pt had her BP meds out her way.    She is pretty much living at her daughter's house  now.  Marriage has always been pretty tough.  She'll go back to the house a couple nights here and there.  Sure he wants her to sleep there and take care of him, but needs to take care of herself too.      She didn't take her meds today- forgot them at her other house.    She's 'sure' she's taken meds consistently, unsure why she wouldn't have run out early (last refill in 2/22 for a month).  Then states 'well maybe I've just been taking one pill instead of two' for the lisinopril/hctz tabs....    Six Item Cognitive Impairment Test   (6CIT):       What year is it?                               Correct - 0 points    What month is it?                               Correct - 0 points      Give the patient an address to remember with five components:   Bak[place] Workman ( first and last name - 2 components)   [address]  (number and name of street - 2 components)   [place] ( city - 1 component)      About what time is it (within the hour)? Correct - 0 points    Count backwards from 20 to 1:   Correct - 0 points    Say the months of the year in reverse: Correct - 0 points    Repeat the address phrase:   Correct - 0 points    Total 6CIT Score:      28/28    Interpretation: The 6CIT uses an inverse score and questions are weighted to produce a total out of 28. Scores of 0-7 are considered normal and 8 or more significant.    Advantages The test has high sensitivity without compromising specificity even in mild dementia. It is easy to translate linguistically and culturally.  Disadvantages The main disadvantage is in the scoring and weighting of the test, which is initially confusing, however computer models have simplified this greatly.    Probability Statistics: At the 7/8 cut off: Overall figures sensitivity 90% specificity 100%, in mild dementia sensitivity = 78% , specificity = 100%    Copyright 2000 The [facility], [facility], [place]. Courtesy of Dr. Matty Saunders      Review of Systems   Constitutional,  HEENT, cardiovascular, pulmonary, gi and gu systems are negative, except as otherwise noted.        Objective    BP (!) 171/71   Pulse 58   Temp 97.6  F (36.4  C) (Temporal)   Resp 14   Wt 58.1 kg (128 lb)   SpO2 100%   Breastfeeding No   BMI 21.00 kg/m    Body mass index is 21 kg/m .   BP recheck 152/60  Physical Exam   GENERAL APPEARANCE: healthy, alert and no distress     EYES: sclera clear, EOMI     RESP: lungs clear to auscultation - no rales, rhonchi or wheezes     CV: regular rates and rhythm, normal S1 S2, no S3 or S4 and no murmur, click or rub      Ext: warm, dry, no edema      Psych: full range affect, normal speech and grooming, judgement and insight intact     Office Visit on 03/15/2022   Component Date Value Ref Range Status     Sodium 03/15/2022 138  133 - 144 mmol/L Final     Potassium 03/15/2022 4.5  3.4 - 5.3 mmol/L Final     Chloride 03/15/2022 104  94 - 109 mmol/L Final     Carbon Dioxide (CO2) 03/15/2022 26  20 - 32 mmol/L Final     Anion Gap 03/15/2022 8  3 - 14 mmol/L Final     Urea Nitrogen 03/15/2022 21  7 - 30 mg/dL Final     Creatinine 03/15/2022 0.75  0.52 - 1.04 mg/dL Final     Calcium 03/15/2022 9.8  8.5 - 10.1 mg/dL Final     Glucose 03/15/2022 72  70 - 99 mg/dL Final     GFR Estimate 03/15/2022 85  >60 mL/min/1.73m2 Final    Effective December 21, 2021 eGFRcr in adults is calculated using the 2021 CKD-EPI creatinine equation which includes age and gender (Hayder larsen al., NEJM, DOI: 10.1056/MGMVjp0979852)     Creatinine Urine mg/dL 03/15/2022 88  mg/dL Final     Albumin Urine mg/L 03/15/2022 13  mg/L Final     Albumin Urine mg/g Cr 03/15/2022 14.77  0.00 - 25.00 mg/g Cr Final

## 2022-04-19 ENCOUNTER — OFFICE VISIT (OUTPATIENT)
Dept: FAMILY MEDICINE | Facility: CLINIC | Age: 72
End: 2022-04-19
Payer: COMMERCIAL

## 2022-04-19 VITALS
SYSTOLIC BLOOD PRESSURE: 152 MMHG | WEIGHT: 128 LBS | RESPIRATION RATE: 14 BRPM | TEMPERATURE: 97.6 F | HEART RATE: 58 BPM | OXYGEN SATURATION: 100 % | BODY MASS INDEX: 21 KG/M2 | DIASTOLIC BLOOD PRESSURE: 60 MMHG

## 2022-04-19 DIAGNOSIS — E78.5 HYPERLIPIDEMIA LDL GOAL <100: ICD-10-CM

## 2022-04-19 DIAGNOSIS — N18.1 CHRONIC KIDNEY DISEASE, STAGE 1: ICD-10-CM

## 2022-04-19 DIAGNOSIS — I10 ESSENTIAL HYPERTENSION WITH GOAL BLOOD PRESSURE LESS THAN 140/90: Primary | ICD-10-CM

## 2022-04-19 DIAGNOSIS — Z12.11 COLON CANCER SCREENING: ICD-10-CM

## 2022-04-19 PROCEDURE — 99214 OFFICE O/P EST MOD 30 MIN: CPT | Performed by: FAMILY MEDICINE

## 2022-04-19 RX ORDER — LISINOPRIL AND HYDROCHLOROTHIAZIDE 12.5; 2 MG/1; MG/1
2 TABLET ORAL DAILY
Qty: 180 TABLET | Refills: 0 | Status: SHIPPED | OUTPATIENT
Start: 2022-04-19 | End: 2022-07-20

## 2022-04-19 RX ORDER — AMLODIPINE BESYLATE 10 MG/1
10 TABLET ORAL AT BEDTIME
Qty: 90 TABLET | Refills: 0 | Status: SHIPPED | OUTPATIENT
Start: 2022-04-19 | End: 2022-07-20

## 2022-04-19 ASSESSMENT — PATIENT HEALTH QUESTIONNAIRE - PHQ9
SUM OF ALL RESPONSES TO PHQ QUESTIONS 1-9: 4
SUM OF ALL RESPONSES TO PHQ QUESTIONS 1-9: 4
10. IF YOU CHECKED OFF ANY PROBLEMS, HOW DIFFICULT HAVE THESE PROBLEMS MADE IT FOR YOU TO DO YOUR WORK, TAKE CARE OF THINGS AT HOME, OR GET ALONG WITH OTHER PEOPLE: SOMEWHAT DIFFICULT

## 2022-04-19 NOTE — Clinical Note
Hussain Haney, Please see my note... I continue to be worried about her memory/med consistency, but she has very little family support (the one reliable daughter is overwhelmed caring for her own disabled 22yo daughter). Could you call her and see if her BPs are better at home when she's taken her meds?  And ask her if she's doing well taking meds consistently at her daughters house at night (just bring the whole weekly pill box in her purse if she stays at her 's house)? If BPs are still high, should see her sooner than the 3mo follow-up we scheduled. Thank you! Juwan

## 2022-04-26 ENCOUNTER — TELEPHONE (OUTPATIENT)
Dept: FAMILY MEDICINE | Facility: CLINIC | Age: 72
End: 2022-04-26

## 2022-04-26 NOTE — TELEPHONE ENCOUNTER
Dee Castillo MD Koep, Katelyn, RN Hi Katelyn,   Please see my note... I continue to be worried about her memory/med consistency, but she has very little family support (the one reliable daughter is overwhelmed caring for her own disabled 22yo daughter).   Could you call her and see if her BPs are better at home when she's taken her meds?  And ask her if she's doing well taking meds consistently at her daughters house at night (just bring the whole weekly pill box in her purse if she stays at her 's house)?   If BPs are still high, should see her sooner than the 3mo follow-up we scheduled.   Thank you!   Juwan

## 2022-04-26 NOTE — TELEPHONE ENCOUNTER
Left message for patient to call [facility] back  When patient calls back please transfer to an RN  Medina SANTOS RN

## 2022-06-29 ENCOUNTER — TELEPHONE (OUTPATIENT)
Dept: FAMILY MEDICINE | Facility: CLINIC | Age: 72
End: 2022-06-29

## 2022-06-29 DIAGNOSIS — F41.1 ANXIETY STATE: ICD-10-CM

## 2022-06-29 DIAGNOSIS — I25.10 MILD CORONARY ARTERY DISEASE: ICD-10-CM

## 2022-06-29 DIAGNOSIS — E78.5 HYPERLIPIDEMIA LDL GOAL <100: ICD-10-CM

## 2022-06-29 DIAGNOSIS — F33.0 MAJOR DEPRESSIVE DISORDER, RECURRENT EPISODE, MILD (H): ICD-10-CM

## 2022-06-29 RX ORDER — ROSUVASTATIN CALCIUM 10 MG/1
10 TABLET, COATED ORAL DAILY
Qty: 90 TABLET | Refills: 0 | Status: SHIPPED | OUTPATIENT
Start: 2022-06-29 | End: 2022-09-23

## 2022-06-29 RX ORDER — SERTRALINE HYDROCHLORIDE 100 MG/1
150 TABLET, FILM COATED ORAL DAILY
Qty: 135 TABLET | Refills: 0 | Status: SHIPPED | OUTPATIENT
Start: 2022-06-29 | End: 2022-09-23

## 2022-06-29 NOTE — TELEPHONE ENCOUNTER
Medication is being filled for 1 time refill only due to:  Patient needs to be seen because due for follow up.     Note from 4/19/22 OV:  Return in about 3 months (around 7/19/2022) for Blood Pressure Recheck, Med Check (bring machine again, make SURE to Take meds day of visit!).    Future OV 7/19.  Vianey Almendarez RN

## 2022-06-29 NOTE — TELEPHONE ENCOUNTER
Reason for Call:  Other prescription Refills    Detailed comments: patient requesting refills of   rosuvastatin (CRESTOR) 10 MG tablet 90 tablet 1 12/29/2021   And    sertraline (ZOLOFT) 100 MG tablet 135 tablet 1 12/15/2021     Sent to   [facility]/PHARMACY #[id number] - [place], [place] - [address]        Phone Number Patient can be reached at: Home number on file [phone] (home)    Best Time: Today    Can we leave a detailed message on this number? Not Applicable    Call taken on 6/29/2022 at 10:52 AM by Sydnee Khanna

## 2022-07-16 DIAGNOSIS — I10 ESSENTIAL HYPERTENSION WITH GOAL BLOOD PRESSURE LESS THAN 140/90: ICD-10-CM

## 2022-07-20 RX ORDER — LISINOPRIL AND HYDROCHLOROTHIAZIDE 12.5; 2 MG/1; MG/1
2 TABLET ORAL DAILY
Qty: 60 TABLET | Refills: 0 | Status: SHIPPED | OUTPATIENT
Start: 2022-07-20 | End: 2022-08-25

## 2022-07-20 RX ORDER — AMLODIPINE BESYLATE 10 MG/1
TABLET ORAL
Qty: 30 TABLET | Refills: 0 | Status: SHIPPED | OUTPATIENT
Start: 2022-07-20 | End: 2022-08-25

## 2022-07-20 NOTE — TELEPHONE ENCOUNTER
Pt no-showed to appt.  Sent in rx's for 30-day supply with note in sig of one that she needs appt.  Thanks- CW

## 2022-09-23 ENCOUNTER — OFFICE VISIT (OUTPATIENT)
Dept: FAMILY MEDICINE | Facility: CLINIC | Age: 72
End: 2022-09-23
Payer: COMMERCIAL

## 2022-09-23 VITALS
WEIGHT: 127.2 LBS | OXYGEN SATURATION: 100 % | TEMPERATURE: 96.9 F | SYSTOLIC BLOOD PRESSURE: 118 MMHG | BODY MASS INDEX: 20.86 KG/M2 | DIASTOLIC BLOOD PRESSURE: 70 MMHG | HEART RATE: 54 BPM

## 2022-09-23 DIAGNOSIS — F33.0 MAJOR DEPRESSIVE DISORDER, RECURRENT EPISODE, MILD (H): ICD-10-CM

## 2022-09-23 DIAGNOSIS — N95.0 POSTMENOPAUSAL BLEEDING: ICD-10-CM

## 2022-09-23 DIAGNOSIS — I10 ESSENTIAL HYPERTENSION WITH GOAL BLOOD PRESSURE LESS THAN 140/90: Primary | ICD-10-CM

## 2022-09-23 DIAGNOSIS — I77.810 AORTIC ROOT DILATATION (H): ICD-10-CM

## 2022-09-23 DIAGNOSIS — R41.3 MEMORY CHANGE: ICD-10-CM

## 2022-09-23 DIAGNOSIS — Z23 HIGH PRIORITY FOR 2019-NCOV VACCINE: ICD-10-CM

## 2022-09-23 DIAGNOSIS — E78.5 HYPERLIPIDEMIA LDL GOAL <100: ICD-10-CM

## 2022-09-23 DIAGNOSIS — I25.10 MILD CORONARY ARTERY DISEASE: ICD-10-CM

## 2022-09-23 DIAGNOSIS — F41.1 ANXIETY STATE: ICD-10-CM

## 2022-09-23 DIAGNOSIS — Z12.11 SCREEN FOR COLON CANCER: ICD-10-CM

## 2022-09-23 PROCEDURE — 0124A COVID-19,PF,PFIZER BOOSTER BIVALENT: CPT | Performed by: FAMILY MEDICINE

## 2022-09-23 PROCEDURE — 91312 COVID-19,PF,PFIZER BOOSTER BIVALENT: CPT | Performed by: FAMILY MEDICINE

## 2022-09-23 PROCEDURE — 36415 COLL VENOUS BLD VENIPUNCTURE: CPT | Performed by: FAMILY MEDICINE

## 2022-09-23 PROCEDURE — 99214 OFFICE O/P EST MOD 30 MIN: CPT | Performed by: FAMILY MEDICINE

## 2022-09-23 PROCEDURE — 80048 BASIC METABOLIC PNL TOTAL CA: CPT | Performed by: FAMILY MEDICINE

## 2022-09-23 RX ORDER — ROSUVASTATIN CALCIUM 10 MG/1
10 TABLET, COATED ORAL DAILY
Qty: 90 TABLET | Refills: 0 | Status: SHIPPED | OUTPATIENT
Start: 2022-09-23 | End: 2023-01-25

## 2022-09-23 RX ORDER — AMLODIPINE BESYLATE 10 MG/1
10 TABLET ORAL AT BEDTIME
Qty: 90 TABLET | Refills: 0 | Status: SHIPPED | OUTPATIENT
Start: 2022-09-23 | End: 2023-01-25

## 2022-09-23 RX ORDER — LISINOPRIL AND HYDROCHLOROTHIAZIDE 12.5; 2 MG/1; MG/1
2 TABLET ORAL DAILY
Qty: 180 TABLET | Refills: 0 | Status: SHIPPED | OUTPATIENT
Start: 2022-09-23 | End: 2023-01-25

## 2022-09-23 RX ORDER — SERTRALINE HYDROCHLORIDE 100 MG/1
150 TABLET, FILM COATED ORAL DAILY
Qty: 135 TABLET | Refills: 0 | Status: SHIPPED | OUTPATIENT
Start: 2022-09-23 | End: 2023-01-25

## 2022-09-23 NOTE — PROGRESS NOTES
Assessment & Plan     ICD-10-CM    1. Essential hypertension with goal blood pressure less than 140/90  I10 Basic metabolic panel  (Ca, Cl, CO2, Creat, Gluc, K, Na, BUN)     amLODIPine (NORVASC) 10 MG tablet     lisinopril-hydrochlorothiazide (ZESTORETIC) 20-12.5 MG tablet     Basic metabolic panel  (Ca, Cl, CO2, Creat, Gluc, K, Na, BUN)   2. Aortic root dilatation (H)  I77.810 Adult Cardiology Eval  Referral   3. Mild coronary artery disease  I25.10 rosuvastatin (CRESTOR) 10 MG tablet   4. Postmenopausal bleeding  N95.0 Ob/Gyn Referral   5. Memory change  R41.3    6. Major depressive disorder, recurrent episode, mild (H)  F33.0 sertraline (ZOLOFT) 100 MG tablet   7. Anxiety state  F41.1 sertraline (ZOLOFT) 100 MG tablet   8. Hyperlipidemia LDL goal <100  E78.5 rosuvastatin (CRESTOR) 10 MG tablet   9. Screen for colon cancer  Z12.11 Colonoscopy Screening  Referral   10. High priority for 2019-nCoV vaccine  Z23 COVID-19,PF,PFIZER BOOSTER BIVALENT 12+Yrs     Memory change/HTN/Lipids- here late for short appt today.  ---Minimal support due to difficult situation for most of her family members.  ---BP- Does seem like she's taking her meds more consistently with weekly med box, and with her living primarily now at her daughters (not going between houses as much).    ---Lipids- Hopefully she's been taking the crestor consistently as well- last set of lipids looked much better (12/21 LDL 95, down from 167 in 6/21).  ---Memory- She did not recall her cardiology visit from 11/21, nor that she was supposed to follow-up with them with an echo in a year.  No appts in system.  Set up new referral so they can call her to schedule follow-up.  --She did forget her appt here recently- no-show.  Had team make follow-up appt for her before she left today, with AVS print-out reminder.     Vaccines- would like COVID booster- Risks/benefits discussed, given today.     MDD-Anxiety- she reports she'd like to go to '1/2  "tab' of the setraline as she's not sure she needs it. She's supposed to be on 150mg/d.  Asked her how much she was taking- \"I just went down on the dose\", but was difficult to get clear answer on what she was taking.  Rec staying on the 150mg/d for now- unable to go into depth today.     Also mentioned vaginal spotting- bit of dark spotting, then one day of BRB.  Will refer to ob/gyn for further work-up.  Unable to address any further today.    Return in about 4 months (around 1/23/2023) for Routine Visit/Chronic Cares/Med Check, Blood Pressure Recheck, Depression follow-up.                 Dee Castillo MD  [facility]            Swapnil Peralta is a 72 year old, presenting for the following health issues:  Hypertension, Recheck Medication, and Imm/Inj (COVID-19 VACCINE)      History of Present Illness       Hypertension: She presents for follow up of hypertension.  She does check blood pressure  regularly outside of the clinic. Outside blood pressures have been over 140/90. She follows a low salt diet.       Home OMRON BP machine-- quite varied results  152/86, 136/109, 139/69, 114/71, 141/82, 148/72, 115/79,159/90, 138/68, 152/94, 158/77, 134/55, 157/81    Feels she remembers her meds almost every day.  Helps that she's now really only at her daughter's house now.  She has the vitamins that she doesn't 'have' to take on the bottom row.  Meds she has to take on the top row.  Sometimes will not take the vitamins if she hasn't had much to eat to avoid stomach upset, but always takes the top row/Rx meds.          Lipids- these looked good at last check, likely when taking meds more consistently.      Social-  Pretty much staying at her daughter's house now.  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  .    Autistic, blind, epilepsy, incontinent, ~2-3yrs cognitive age.  Allows her daughter to go to " work.  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  Other twin does okay, has ADHD, but didn't find any of the meds helpful.  Was in college, now learning welding.     is doing okay. Amyloidosis.    He is still living in their house- it's a big mess now.  Difficult relationship for many yrs, so good to be out of the house, but stressful due to family health issues.          For past 1-2 months, has had some vaginal discahrge  Possibly like old blood?  Couple days ago, saw BRB.        Review of Systems   Constitutional, HEENT, cardiovascular, pulmonary, gi and gu systems are negative, except as otherwise noted.      Objective    /70   Pulse 54   Temp 96.9  F (36.1  C) (Temporal)   Wt 57.7 kg (127 lb 3.2 oz)   SpO2 100%   BMI 20.86 kg/m    Body mass index is 20.86 kg/m .  Physical Exam   GENERAL APPEARANCE: healthy, alert and no distress     EYES: PERRL, sclera clear     NECK: no adenopathy, no asymmetry, masses, or scars and thyroid normal to palpation     RESP: lungs clear to auscultation - no rales, rhonchi or wheezes     CV: regular rates and rhythm, normal S1 S2, no S3 or S4 and no murmur, click or rub     Ext: warm, dry, no edema     Office Visit on 03/15/2022   Component Date Value Ref Range Status     Sodium 03/15/2022 138  133 - 144 mmol/L Final     Potassium 03/15/2022 4.5  3.4 - 5.3 mmol/L Final     Chloride 03/15/2022 104  94 - 109 mmol/L Final     Carbon Dioxide (CO2) 03/15/2022 26  20 - 32 mmol/L Final     Anion Gap 03/15/2022 8  3 - 14 mmol/L Final     Urea Nitrogen 03/15/2022 21  7 - 30 mg/dL Final     Creatinine 03/15/2022 0.75  0.52 - 1.04 mg/dL Final     Calcium 03/15/2022 9.8  8.5 - 10.1 mg/dL Final     Glucose 03/15/2022 72  70 - 99 mg/dL Final     GFR Estimate 03/15/2022 85  >60 mL/min/1.73m2 Final    Effective December 21, 2021 eGFRcr in adults is calculated using the 2021 CKD-EPI creatinine equation which includes age and gender (Hayder et al., NEJM, DOI:  10.1056/ZBLMnt8624819)     Creatinine Urine mg/dL 03/15/2022 88  mg/dL Final     Albumin Urine mg/L 03/15/2022 13  mg/L Final     Albumin Urine mg/g Cr 03/15/2022 14.77  0.00 - 25.00 mg/g Cr Final     Results for orders placed or performed in visit on 09/23/22   Basic metabolic panel  (Ca, Cl, CO2, Creat, Gluc, K, Na, BUN)     Status: Normal   Result Value Ref Range    Sodium 138 133 - 144 mmol/L    Potassium 4.0 3.4 - 5.3 mmol/L    Chloride 104 94 - 109 mmol/L    Carbon Dioxide (CO2) 29 20 - 32 mmol/L    Anion Gap 5 3 - 14 mmol/L    Urea Nitrogen 17 7 - 30 mg/dL    Creatinine 0.68 0.52 - 1.04 mg/dL    Calcium 8.8 8.5 - 10.1 mg/dL    Glucose 73 70 - 99 mg/dL    GFR Estimate >90 >60 mL/min/1.73m2

## 2022-09-24 LAB
ANION GAP SERPL CALCULATED.3IONS-SCNC: 5 MMOL/L (ref 3–14)
BUN SERPL-MCNC: 17 MG/DL (ref 7–30)
CALCIUM SERPL-MCNC: 8.8 MG/DL (ref 8.5–10.1)
CHLORIDE BLD-SCNC: 104 MMOL/L (ref 94–109)
CO2 SERPL-SCNC: 29 MMOL/L (ref 20–32)
CREAT SERPL-MCNC: 0.68 MG/DL (ref 0.52–1.04)
GFR SERPL CREATININE-BSD FRML MDRD: >90 ML/MIN/1.73M2
GLUCOSE BLD-MCNC: 73 MG/DL (ref 70–99)
POTASSIUM BLD-SCNC: 4 MMOL/L (ref 3.4–5.3)
SODIUM SERPL-SCNC: 138 MMOL/L (ref 133–144)

## 2022-09-25 NOTE — RESULT ENCOUNTER NOTE
-Your basic metabolic panel (which includes electrolyte levels, blood sugar level and kidney function tests) looks normal.    Please let me know if you have any questions.  Best,   Juwan Castillo MD

## 2022-09-26 ENCOUNTER — TELEPHONE (OUTPATIENT)
Dept: GASTROENTEROLOGY | Facility: CLINIC | Age: 72
End: 2022-09-26

## 2022-09-26 NOTE — TELEPHONE ENCOUNTER
Screening Questions  BLUE  KIND OF PREP RED  LOCATION [review exclusion criteria] GREEN  SEDATION TYPE        Y Are you active on mychart?       Dee Castillo MD in [facility] Ordering/Referring Provider?        [facility]/Medicare What type of coverage do you have?      N Have you had a positive covid test in the last 90 days?     1. 20.5 BMI  [BMI 40+ - review exclusion criteria]    2. Y  Are you able to give consent for your medical care? [IF NO,RN REVIEW]        3. N  Are you taking any prescription pain medications on a routine schedule?        3a.  EXTENDED PREP What kind of prescription?   4. N Do you have any chemical dependencies such as alcohol, street drugs, or methadone?    5. N Do you have any history of post-traumatic stress syndrome, severe anxiety or history of psychosis?      **If yes 3- 5 , please schedule with MAC sedation.**          IF YES TO ANY 6 - 10 - HOSPITAL SETTING ONLY.     6.   N Do you need assistance transferring?     7.   N Have you had a heart or lung transplant?    8.   N Are you currently on dialysis?   9.   N Do you use daily home oxygen?   10. N Do you take nitroglycerin?   10a.  If yes, how often?     11. [FEMALES]  N Are you currently pregnant?    11a.  If yes, how many weeks? [ Greater than 12 weeks, OR NEEDED]    12. N Do you have Pulmonary Hypertension? *NEED PAC APPT AT [facility]*     13. N [review exclusion criteria]  Do you have any implantable devices in your body (pacemaker, defib, LVAD)?    14. N In the past 6 months, have you had any heart related issues including cardiomyopathy or heart attack?     14a.  If yes, did it require cardiac stenting if so when?     15. N Have you had a stroke or Transient ischemic attack (TIA - aka  mini stroke ) within 6 months?      16. N Do you have mod to severe Obstructive Sleep Apnea?  [Hospital only - [facility]]    17. N Do you have SEVERE AND UNCONTROLLED asthma? *NEED PAC APPT AT [facility]*     18. N Are you  "currently taking any blood thinners?     18a. If yes, inform patient to \"follow up w/ ordering provider for bridging instructions.\"    19. N Do you take the medication Phentermine?    19a. If yes, \"Hold for 7 days before procedure.  Please consult your prescribing provider if you have questions about holding this medication.\"     20. N  Do you have chronic kidney disease?      21. N  Do you have a diagnosis of diabetes?     22. N  On a regular basis do you go 3-5 days between bowel movements?     23.  Preferred LOCAL Pharmacy for Pre Prescription    [ LIST ONLY ONE PHARMACY]     [facility]/PHARMACY #[id number] - [place], [place] - [address]        - CLOSING REMINDERS -    Informed patient they will need an adult    Cannot take any type of public or medical transportation alone    Conscious Sedation- Needs  for 6 hours after the procedure       MAC/General-Needs  for 24 hours after procedure    Pre-Procedure Covid test to be completed [[facility] PCR Testing Required]    Confirmed Nurse will call to complete assessment       - SCHEDULING DETAILS -     Macy  Surgeon    12/2/22  Date of Procedure  Lower Endoscopy [Colonoscopy]  Type of Procedure Scheduled    Location  [facility] PREP-If you answer yes to questions #8, #20, #21Which Colonoscopy Prep was Sent?     CS Sedation Type     N PAC / Pre-op Required         Additional comments:            "

## 2022-10-09 ENCOUNTER — HEALTH MAINTENANCE LETTER (OUTPATIENT)
Age: 72
End: 2022-10-09

## 2022-10-11 ENCOUNTER — TELEPHONE (OUTPATIENT)
Dept: CARDIOLOGY | Facility: CLINIC | Age: 72
End: 2022-10-11

## 2022-10-11 NOTE — TELEPHONE ENCOUNTER
Called patient to review recent elevated blood pressure reading.  Per [place] Community Me[place] guidelines, patients blood pressure is out of parameters and recheck blood pressure is recommended.    Today's Blood Pressure: 163/61  Location: Home BP    Patient reported blood pressure updated in Epic. Blood pressure continues to fall outside of the [place] Community Measures guidelines.  Message sent to primary cardiology team for further review.

## 2022-11-11 ENCOUNTER — HOSPITAL ENCOUNTER (OUTPATIENT)
Dept: CARDIOLOGY | Facility: CLINIC | Age: 72
Discharge: HOME | End: 2022-11-11
Attending: STUDENT IN AN ORGANIZED HEALTH CARE EDUCATION/TRAINING PROGRAM
Payer: COMMERCIAL

## 2022-11-11 ENCOUNTER — LAB (OUTPATIENT)
Dept: LAB | Facility: CLINIC | Age: 72
End: 2022-11-11
Attending: STUDENT IN AN ORGANIZED HEALTH CARE EDUCATION/TRAINING PROGRAM
Payer: COMMERCIAL

## 2022-11-11 DIAGNOSIS — I77.810 AORTIC ROOT DILATATION (H): ICD-10-CM

## 2022-11-11 DIAGNOSIS — E78.5 HYPERLIPIDEMIA LDL GOAL <100: ICD-10-CM

## 2022-11-11 LAB
CHOLEST SERPL-MCNC: 250 MG/DL
FASTING STATUS PATIENT QL REPORTED: ABNORMAL
HDLC SERPL-MCNC: 76 MG/DL
LDLC SERPL CALC-MCNC: 161 MG/DL
LVEF ECHO: NORMAL
NONHDLC SERPL-MCNC: 174 MG/DL
TRIGL SERPL-MCNC: 65 MG/DL

## 2022-11-11 PROCEDURE — 80061 LIPID PANEL: CPT

## 2022-11-11 PROCEDURE — 93306 TTE W/DOPPLER COMPLETE: CPT

## 2022-11-11 PROCEDURE — 36415 COLL VENOUS BLD VENIPUNCTURE: CPT

## 2022-11-11 PROCEDURE — 93306 TTE W/DOPPLER COMPLETE: CPT | Mod: 26 | Performed by: INTERNAL MEDICINE

## 2022-11-11 RX ORDER — BISACODYL 5 MG
TABLET, DELAYED RELEASE (ENTERIC COATED) ORAL
Qty: 4 TABLET | Refills: 0 | Status: SHIPPED | OUTPATIENT
Start: 2022-11-11 | End: 2023-03-22

## 2022-11-14 ENCOUNTER — OFFICE VISIT (OUTPATIENT)
Dept: CARDIOLOGY | Facility: CLINIC | Age: 72
End: 2022-11-14
Attending: INTERNAL MEDICINE
Payer: COMMERCIAL

## 2022-11-14 VITALS
SYSTOLIC BLOOD PRESSURE: 138 MMHG | WEIGHT: 131.7 LBS | HEIGHT: 65 IN | DIASTOLIC BLOOD PRESSURE: 69 MMHG | OXYGEN SATURATION: 96 % | HEART RATE: 46 BPM | BODY MASS INDEX: 21.94 KG/M2

## 2022-11-14 DIAGNOSIS — I25.10 MILD CORONARY ARTERY DISEASE: ICD-10-CM

## 2022-11-14 DIAGNOSIS — I77.810 AORTIC ROOT DILATATION (H): Primary | ICD-10-CM

## 2022-11-14 DIAGNOSIS — I25.10 CORONARY ARTERY DISEASE INVOLVING NATIVE CORONARY ARTERY OF NATIVE HEART WITHOUT ANGINA PECTORIS: ICD-10-CM

## 2022-11-14 PROCEDURE — G0463 HOSPITAL OUTPT CLINIC VISIT: HCPCS

## 2022-11-14 PROCEDURE — 99215 OFFICE O/P EST HI 40 MIN: CPT | Performed by: INTERNAL MEDICINE

## 2022-11-14 ASSESSMENT — PAIN SCALES - GENERAL: PAINLEVEL: NO PAIN (0)

## 2022-11-14 NOTE — PATIENT INSTRUCTIONS
Dr. Cherry recommends:    CT calcium score for the near future.    Follow up as needed.        Thank you for your visit today.  Please call me with any questions or concerns.   Terry Mckee RN  Cardiology Care Coordinator  [phone]
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
## 2022-11-17 ENCOUNTER — HOSPITAL ENCOUNTER (OUTPATIENT)
Dept: CT IMAGING | Facility: CLINIC | Age: 72
Discharge: HOME | End: 2022-11-17
Attending: INTERNAL MEDICINE | Admitting: INTERNAL MEDICINE

## 2022-11-17 DIAGNOSIS — I25.10 MILD CORONARY ARTERY DISEASE: ICD-10-CM

## 2022-11-17 DIAGNOSIS — I25.10 CORONARY ARTERY DISEASE INVOLVING NATIVE CORONARY ARTERY OF NATIVE HEART WITHOUT ANGINA PECTORIS: ICD-10-CM

## 2022-11-17 DIAGNOSIS — I77.810 AORTIC ROOT DILATATION (H): ICD-10-CM

## 2022-11-17 PROCEDURE — 75571 CT HRT W/O DYE W/CA TEST: CPT

## 2022-11-17 PROCEDURE — 75571 CT HRT W/O DYE W/CA TEST: CPT | Mod: 26 | Performed by: INTERNAL MEDICINE

## 2022-12-02 ENCOUNTER — HOSPITAL ENCOUNTER (OUTPATIENT)
Facility: CLINIC | Age: 72
Discharge: HOME | End: 2022-12-02
Attending: INTERNAL MEDICINE | Admitting: INTERNAL MEDICINE
Payer: COMMERCIAL

## 2022-12-02 VITALS
WEIGHT: 127 LBS | OXYGEN SATURATION: 94 % | HEIGHT: 65 IN | RESPIRATION RATE: 12 BRPM | TEMPERATURE: 97.6 F | BODY MASS INDEX: 21.16 KG/M2 | HEART RATE: 54 BPM | SYSTOLIC BLOOD PRESSURE: 123 MMHG | DIASTOLIC BLOOD PRESSURE: 69 MMHG

## 2022-12-02 DIAGNOSIS — Z12.11 SPECIAL SCREENING FOR MALIGNANT NEOPLASMS, COLON: Primary | ICD-10-CM

## 2022-12-02 LAB — COLONOSCOPY: NORMAL

## 2022-12-02 PROCEDURE — 88305 TISSUE EXAM BY PATHOLOGIST: CPT | Mod: 26 | Performed by: PATHOLOGY

## 2022-12-02 PROCEDURE — 88305 TISSUE EXAM BY PATHOLOGIST: CPT | Mod: TC | Performed by: INTERNAL MEDICINE

## 2022-12-02 PROCEDURE — G0500 MOD SEDAT ENDO SERVICE >5YRS: HCPCS | Performed by: INTERNAL MEDICINE

## 2022-12-02 PROCEDURE — 99153 MOD SED SAME PHYS/QHP EA: CPT | Performed by: INTERNAL MEDICINE

## 2022-12-02 PROCEDURE — 250N000011 HC RX IP 250 OP 636: Performed by: INTERNAL MEDICINE

## 2022-12-02 PROCEDURE — 45385 COLONOSCOPY W/LESION REMOVAL: CPT | Performed by: INTERNAL MEDICINE

## 2022-12-02 RX ORDER — EPINEPHRINE 1 MG/ML
0.1 INJECTION, SOLUTION INTRAMUSCULAR; SUBCUTANEOUS
Status: DISCONTINUED | OUTPATIENT
Start: 2022-12-02 | End: 2022-12-02 | Stop reason: HOSPADM

## 2022-12-02 RX ORDER — ONDANSETRON 2 MG/ML
4 INJECTION INTRAMUSCULAR; INTRAVENOUS
Status: DISCONTINUED | OUTPATIENT
Start: 2022-12-02 | End: 2022-12-02 | Stop reason: HOSPADM

## 2022-12-02 RX ORDER — FLUMAZENIL 0.1 MG/ML
0.2 INJECTION, SOLUTION INTRAVENOUS
Status: DISCONTINUED | OUTPATIENT
Start: 2022-12-02 | End: 2022-12-02 | Stop reason: HOSPADM

## 2022-12-02 RX ORDER — LIDOCAINE 40 MG/G
CREAM TOPICAL
Status: DISCONTINUED | OUTPATIENT
Start: 2022-12-02 | End: 2022-12-02 | Stop reason: HOSPADM

## 2022-12-02 RX ORDER — FENTANYL CITRATE 0.05 MG/ML
50-100 INJECTION, SOLUTION INTRAMUSCULAR; INTRAVENOUS EVERY 5 MIN PRN
Status: DISCONTINUED | OUTPATIENT
Start: 2022-12-02 | End: 2022-12-02 | Stop reason: HOSPADM

## 2022-12-02 RX ORDER — PROCHLORPERAZINE MALEATE 5 MG
5 TABLET ORAL EVERY 6 HOURS PRN
Status: DISCONTINUED | OUTPATIENT
Start: 2022-12-02 | End: 2022-12-02 | Stop reason: HOSPADM

## 2022-12-02 RX ORDER — SIMETHICONE 40MG/0.6ML
133 SUSPENSION, DROPS(FINAL DOSAGE FORM)(ML) ORAL
Status: DISCONTINUED | OUTPATIENT
Start: 2022-12-02 | End: 2022-12-02 | Stop reason: HOSPADM

## 2022-12-02 RX ORDER — ONDANSETRON 4 MG/1
4 TABLET, ORALLY DISINTEGRATING ORAL EVERY 6 HOURS PRN
Status: DISCONTINUED | OUTPATIENT
Start: 2022-12-02 | End: 2022-12-02 | Stop reason: HOSPADM

## 2022-12-02 RX ORDER — ATROPINE SULFATE 0.1 MG/ML
1 INJECTION INTRAVENOUS
Status: DISCONTINUED | OUTPATIENT
Start: 2022-12-02 | End: 2022-12-02 | Stop reason: HOSPADM

## 2022-12-02 RX ORDER — ONDANSETRON 2 MG/ML
4 INJECTION INTRAMUSCULAR; INTRAVENOUS EVERY 6 HOURS PRN
Status: DISCONTINUED | OUTPATIENT
Start: 2022-12-02 | End: 2022-12-02 | Stop reason: HOSPADM

## 2022-12-02 RX ORDER — NALOXONE HYDROCHLORIDE 0.4 MG/ML
0.4 INJECTION, SOLUTION INTRAMUSCULAR; INTRAVENOUS; SUBCUTANEOUS
Status: DISCONTINUED | OUTPATIENT
Start: 2022-12-02 | End: 2022-12-02 | Stop reason: HOSPADM

## 2022-12-02 RX ORDER — NALOXONE HYDROCHLORIDE 0.4 MG/ML
0.2 INJECTION, SOLUTION INTRAMUSCULAR; INTRAVENOUS; SUBCUTANEOUS
Status: DISCONTINUED | OUTPATIENT
Start: 2022-12-02 | End: 2022-12-02 | Stop reason: HOSPADM

## 2022-12-02 RX ORDER — DIPHENHYDRAMINE HYDROCHLORIDE 50 MG/ML
25-50 INJECTION INTRAMUSCULAR; INTRAVENOUS
Status: DISCONTINUED | OUTPATIENT
Start: 2022-12-02 | End: 2022-12-02 | Stop reason: HOSPADM

## 2022-12-02 RX ADMIN — FENTANYL CITRATE 50 MCG: 0.05 INJECTION, SOLUTION INTRAMUSCULAR; INTRAVENOUS at 10:10

## 2022-12-02 RX ADMIN — MIDAZOLAM HYDROCHLORIDE 1 MG: 1 INJECTION, SOLUTION INTRAMUSCULAR; INTRAVENOUS at 10:04

## 2022-12-02 RX ADMIN — MIDAZOLAM HYDROCHLORIDE 1 MG: 1 INJECTION, SOLUTION INTRAMUSCULAR; INTRAVENOUS at 10:10

## 2022-12-02 RX ADMIN — FENTANYL CITRATE 50 MCG: 0.05 INJECTION, SOLUTION INTRAMUSCULAR; INTRAVENOUS at 10:04

## 2022-12-02 ASSESSMENT — ACTIVITIES OF DAILY LIVING (ADL): ADLS_ACUITY_SCORE: 35
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
## 2022-12-02 NOTE — DISCHARGE INSTRUCTIONS
The patient has received a copy of the Provation report the doctor has written and discharge instructions have been discussed with the patient and responsible adult.  All questions were addressed and answered prior to patient discharge.

## 2022-12-05 LAB
PATH REPORT.COMMENTS IMP SPEC: NORMAL
PATH REPORT.COMMENTS IMP SPEC: NORMAL
PATH REPORT.FINAL DX SPEC: NORMAL
PATH REPORT.GROSS SPEC: NORMAL
PATH REPORT.MICROSCOPIC SPEC OTHER STN: NORMAL
PATH REPORT.RELEVANT HX SPEC: NORMAL
PHOTO IMAGE: NORMAL

## 2023-01-01 ENCOUNTER — ONCOLOGY VISIT (OUTPATIENT)
Dept: ONCOLOGY | Facility: CLINIC | Age: 73
End: 2023-01-01
Attending: OBSTETRICS & GYNECOLOGY
Payer: COMMERCIAL

## 2023-01-01 ENCOUNTER — VIRTUAL VISIT (OUTPATIENT)
Dept: FAMILY MEDICINE | Facility: CLINIC | Age: 73
End: 2023-01-01
Payer: COMMERCIAL

## 2023-01-01 VITALS
BODY MASS INDEX: 20.63 KG/M2 | DIASTOLIC BLOOD PRESSURE: 70 MMHG | RESPIRATION RATE: 16 BRPM | HEART RATE: 60 BPM | WEIGHT: 124 LBS | TEMPERATURE: 98.5 F | OXYGEN SATURATION: 99 % | SYSTOLIC BLOOD PRESSURE: 162 MMHG

## 2023-01-01 DIAGNOSIS — E78.5 HYPERLIPIDEMIA LDL GOAL <100: ICD-10-CM

## 2023-01-01 DIAGNOSIS — F41.1 ANXIETY STATE: ICD-10-CM

## 2023-01-01 DIAGNOSIS — I10 ESSENTIAL HYPERTENSION WITH GOAL BLOOD PRESSURE LESS THAN 140/90: ICD-10-CM

## 2023-01-01 DIAGNOSIS — F43.21 GRIEF: ICD-10-CM

## 2023-01-01 DIAGNOSIS — C54.1 ENDOMETRIAL CANCER (H): ICD-10-CM

## 2023-01-01 DIAGNOSIS — R41.3 MEMORY CHANGE: ICD-10-CM

## 2023-01-01 DIAGNOSIS — I25.10 MILD CORONARY ARTERY DISEASE: ICD-10-CM

## 2023-01-01 DIAGNOSIS — C54.1 ENDOMETRIAL CANCER (H): Primary | ICD-10-CM

## 2023-01-01 DIAGNOSIS — F33.1 MODERATE EPISODE OF RECURRENT MAJOR DEPRESSIVE DISORDER (H): Primary | ICD-10-CM

## 2023-01-01 PROCEDURE — 99443 PR PHYSICIAN TELEPHONE EVALUATION 21-30 MIN: CPT | Mod: 95 | Performed by: FAMILY MEDICINE

## 2023-01-01 PROCEDURE — 99213 OFFICE O/P EST LOW 20 MIN: CPT | Performed by: OBSTETRICS & GYNECOLOGY

## 2023-01-01 PROCEDURE — G0463 HOSPITAL OUTPT CLINIC VISIT: HCPCS | Performed by: OBSTETRICS & GYNECOLOGY

## 2023-01-01 RX ORDER — SERTRALINE HYDROCHLORIDE 100 MG/1
100 TABLET, FILM COATED ORAL DAILY
Qty: 90 TABLET | Refills: 0 | Status: SHIPPED | OUTPATIENT
Start: 2023-01-01 | End: 2024-01-01

## 2023-01-01 RX ORDER — ROSUVASTATIN CALCIUM 10 MG/1
10 TABLET, COATED ORAL DAILY
Qty: 90 TABLET | Refills: 0 | Status: SHIPPED | OUTPATIENT
Start: 2023-01-01 | End: 2024-01-01

## 2023-01-01 RX ORDER — AMLODIPINE BESYLATE 10 MG/1
10 TABLET ORAL AT BEDTIME
Qty: 90 TABLET | Refills: 1 | Status: SHIPPED | OUTPATIENT
Start: 2023-01-01 | End: 2024-01-01

## 2023-01-01 RX ORDER — LISINOPRIL AND HYDROCHLOROTHIAZIDE 12.5; 2 MG/1; MG/1
2 TABLET ORAL DAILY
Qty: 180 TABLET | Refills: 1 | Status: SHIPPED | OUTPATIENT
Start: 2023-01-01 | End: 2024-01-01 | Stop reason: ALTCHOICE

## 2023-01-01 ASSESSMENT — ANXIETY QUESTIONNAIRES
6. BECOMING EASILY ANNOYED OR IRRITABLE: NOT AT ALL
5. BEING SO RESTLESS THAT IT IS HARD TO SIT STILL: NEARLY EVERY DAY
2. NOT BEING ABLE TO STOP OR CONTROL WORRYING: NOT AT ALL
7. FEELING AFRAID AS IF SOMETHING AWFUL MIGHT HAPPEN: SEVERAL DAYS
3. WORRYING TOO MUCH ABOUT DIFFERENT THINGS: NOT AT ALL
IF YOU CHECKED OFF ANY PROBLEMS ON THIS QUESTIONNAIRE, HOW DIFFICULT HAVE THESE PROBLEMS MADE IT FOR YOU TO DO YOUR WORK, TAKE CARE OF THINGS AT HOME, OR GET ALONG WITH OTHER PEOPLE: SOMEWHAT DIFFICULT
GAD7 TOTAL SCORE: 9
1. FEELING NERVOUS, ANXIOUS, OR ON EDGE: MORE THAN HALF THE DAYS
GAD7 TOTAL SCORE: 9

## 2023-01-01 ASSESSMENT — PAIN SCALES - GENERAL: PAINLEVEL: NO PAIN (0)

## 2023-01-01 ASSESSMENT — PATIENT HEALTH QUESTIONNAIRE - PHQ9
5. POOR APPETITE OR OVEREATING: NEARLY EVERY DAY
SUM OF ALL RESPONSES TO PHQ QUESTIONS 1-9: 3

## 2023-01-25 ENCOUNTER — OFFICE VISIT (OUTPATIENT)
Dept: FAMILY MEDICINE | Facility: CLINIC | Age: 73
End: 2023-01-25
Payer: COMMERCIAL

## 2023-01-25 VITALS
DIASTOLIC BLOOD PRESSURE: 59 MMHG | BODY MASS INDEX: 21.63 KG/M2 | RESPIRATION RATE: 16 BRPM | WEIGHT: 130 LBS | OXYGEN SATURATION: 97 % | HEART RATE: 46 BPM | SYSTOLIC BLOOD PRESSURE: 143 MMHG | TEMPERATURE: 98.1 F

## 2023-01-25 DIAGNOSIS — N95.0 POSTMENOPAUSAL BLEEDING: ICD-10-CM

## 2023-01-25 DIAGNOSIS — I77.810 AORTIC ROOT DILATATION (H): ICD-10-CM

## 2023-01-25 DIAGNOSIS — E78.5 HYPERLIPIDEMIA LDL GOAL <100: ICD-10-CM

## 2023-01-25 DIAGNOSIS — I25.10 MILD CORONARY ARTERY DISEASE: ICD-10-CM

## 2023-01-25 DIAGNOSIS — F33.1 MODERATE EPISODE OF RECURRENT MAJOR DEPRESSIVE DISORDER (H): ICD-10-CM

## 2023-01-25 DIAGNOSIS — Z71.6 ENCOUNTER FOR SMOKING CESSATION COUNSELING: ICD-10-CM

## 2023-01-25 DIAGNOSIS — F41.1 ANXIETY STATE: ICD-10-CM

## 2023-01-25 DIAGNOSIS — F33.0 MAJOR DEPRESSIVE DISORDER, RECURRENT EPISODE, MILD (H): ICD-10-CM

## 2023-01-25 DIAGNOSIS — Z23 NEED FOR TDAP VACCINATION: ICD-10-CM

## 2023-01-25 DIAGNOSIS — Z78.0 ASYMPTOMATIC MENOPAUSAL STATE: ICD-10-CM

## 2023-01-25 DIAGNOSIS — I10 ESSENTIAL HYPERTENSION WITH GOAL BLOOD PRESSURE LESS THAN 140/90: Primary | ICD-10-CM

## 2023-01-25 LAB
ALBUMIN SERPL BCG-MCNC: 4.1 G/DL (ref 3.5–5.2)
ALP SERPL-CCNC: 51 U/L (ref 35–104)
ALT SERPL W P-5'-P-CCNC: 16 U/L (ref 10–35)
ANION GAP SERPL CALCULATED.3IONS-SCNC: 13 MMOL/L (ref 7–15)
AST SERPL W P-5'-P-CCNC: 28 U/L (ref 10–35)
BILIRUB SERPL-MCNC: 0.3 MG/DL
BUN SERPL-MCNC: 14.9 MG/DL (ref 8–23)
CALCIUM SERPL-MCNC: 9.5 MG/DL (ref 8.8–10.2)
CHLORIDE SERPL-SCNC: 97 MMOL/L (ref 98–107)
CHOLEST SERPL-MCNC: 152 MG/DL
CREAT SERPL-MCNC: 0.76 MG/DL (ref 0.51–0.95)
CREAT UR-MCNC: 34.7 MG/DL
DEPRECATED HCO3 PLAS-SCNC: 25 MMOL/L (ref 22–29)
GFR SERPL CREATININE-BSD FRML MDRD: 83 ML/MIN/1.73M2
GLUCOSE SERPL-MCNC: 89 MG/DL (ref 70–99)
HDLC SERPL-MCNC: 69 MG/DL
LDLC SERPL CALC-MCNC: 75 MG/DL
MICROALBUMIN UR-MCNC: 19.5 MG/L
MICROALBUMIN/CREAT UR: 56.2 MG/G CR (ref 0–25)
NONHDLC SERPL-MCNC: 83 MG/DL
POTASSIUM SERPL-SCNC: 4.5 MMOL/L (ref 3.4–5.3)
PROT SERPL-MCNC: 6.5 G/DL (ref 6.4–8.3)
SODIUM SERPL-SCNC: 135 MMOL/L (ref 136–145)
TRIGL SERPL-MCNC: 41 MG/DL

## 2023-01-25 PROCEDURE — 82570 ASSAY OF URINE CREATININE: CPT | Performed by: FAMILY MEDICINE

## 2023-01-25 PROCEDURE — 90715 TDAP VACCINE 7 YRS/> IM: CPT | Performed by: FAMILY MEDICINE

## 2023-01-25 PROCEDURE — 82043 UR ALBUMIN QUANTITATIVE: CPT | Performed by: FAMILY MEDICINE

## 2023-01-25 PROCEDURE — 90662 IIV NO PRSV INCREASED AG IM: CPT | Performed by: FAMILY MEDICINE

## 2023-01-25 PROCEDURE — 90471 IMMUNIZATION ADMIN: CPT | Performed by: FAMILY MEDICINE

## 2023-01-25 PROCEDURE — 80061 LIPID PANEL: CPT | Performed by: FAMILY MEDICINE

## 2023-01-25 PROCEDURE — G0008 ADMIN INFLUENZA VIRUS VAC: HCPCS | Mod: 59 | Performed by: FAMILY MEDICINE

## 2023-01-25 PROCEDURE — 80053 COMPREHEN METABOLIC PANEL: CPT | Performed by: FAMILY MEDICINE

## 2023-01-25 PROCEDURE — 99215 OFFICE O/P EST HI 40 MIN: CPT | Mod: 25 | Performed by: FAMILY MEDICINE

## 2023-01-25 PROCEDURE — 36415 COLL VENOUS BLD VENIPUNCTURE: CPT | Performed by: FAMILY MEDICINE

## 2023-01-25 RX ORDER — LISINOPRIL AND HYDROCHLOROTHIAZIDE 12.5; 2 MG/1; MG/1
2 TABLET ORAL DAILY
Qty: 180 TABLET | Refills: 0 | Status: SHIPPED | OUTPATIENT
Start: 2023-01-25 | End: 2023-05-26

## 2023-01-25 RX ORDER — SERTRALINE HYDROCHLORIDE 100 MG/1
150 TABLET, FILM COATED ORAL DAILY
Qty: 135 TABLET | Refills: 0 | Status: SHIPPED | OUTPATIENT
Start: 2023-01-25 | End: 2023-04-24

## 2023-01-25 RX ORDER — AMLODIPINE BESYLATE 10 MG/1
10 TABLET ORAL AT BEDTIME
Qty: 90 TABLET | Refills: 0 | Status: SHIPPED | OUTPATIENT
Start: 2023-01-25 | End: 2023-05-10

## 2023-01-25 RX ORDER — ROSUVASTATIN CALCIUM 10 MG/1
10 TABLET, COATED ORAL DAILY
Qty: 90 TABLET | Refills: 0 | Status: SHIPPED | OUTPATIENT
Start: 2023-01-25 | End: 2023-04-24

## 2023-01-25 ASSESSMENT — PATIENT HEALTH QUESTIONNAIRE - PHQ9
SUM OF ALL RESPONSES TO PHQ QUESTIONS 1-9: 6
SUM OF ALL RESPONSES TO PHQ QUESTIONS 1-9: 6
10. IF YOU CHECKED OFF ANY PROBLEMS, HOW DIFFICULT HAVE THESE PROBLEMS MADE IT FOR YOU TO DO YOUR WORK, TAKE CARE OF THINGS AT HOME, OR GET ALONG WITH OTHER PEOPLE: SOMEWHAT DIFFICULT

## 2023-01-25 NOTE — PROGRESS NOTES
Assessment & Plan       ICD-10-CM    1. Essential hypertension with goal blood pressure less than 140/90  I10 Comprehensive metabolic panel (BMP + Alb, Alk Phos, ALT, AST, Total. Bili, TP)     Albumin Random Urine Quantitative with Creat Ratio     amLODIPine (NORVASC) 10 MG tablet     lisinopril-hydrochlorothiazide (ZESTORETIC) 20-12.5 MG tablet     Comprehensive metabolic panel (BMP + Alb, Alk Phos, ALT, AST, Total. Bili, TP)     Albumin Random Urine Quantitative with Creat Ratio      2. Postmenopausal bleeding  N95.0 US Pelvic Complete with Transvaginal      3. Hyperlipidemia LDL goal <100  E78.5 Lipid panel reflex to direct LDL Fasting     Comprehensive metabolic panel (BMP + Alb, Alk Phos, ALT, AST, Total. Bili, TP)     rosuvastatin (CRESTOR) 10 MG tablet     Lipid panel reflex to direct LDL Fasting     Comprehensive metabolic panel (BMP + Alb, Alk Phos, ALT, AST, Total. Bili, TP)      4. Aortic root dilatation (H)  I77.810       5. Mild coronary artery disease  I25.10 rosuvastatin (CRESTOR) 10 MG tablet      6. Moderate episode of recurrent major depressive disorder (H)  F33.1       7. Encounter for smoking cessation counseling  Z71.6 nicotine (NICODERM CQ) 7 MG/24HR 24 hr patch      8. Major depressive disorder, recurrent episode, mild (H)  F33.0 sertraline (ZOLOFT) 100 MG tablet      9. Anxiety state  F41.1 sertraline (ZOLOFT) 100 MG tablet      10. Asymptomatic menopausal state  Z78.0 DX Hip/Pelvis/Spine      11. Need for Tdap vaccination  Z23 TDAP VACCINE (Adacel, Boostrix)     INFLUENZA VACCINE 65+ (FLUZONE HD)         Post-menopausal bleeding-   Pt notes dark red vaginal discharge, now for possibly a few months.  Reviewed last appt notes, discussed then at end of visit, and urged follow-up with ob/gyn, referral placed in 9/22.  Pt forgot about this, and did not schedule appt.  Will order pelvic ultrasound with follow-up OB/Gyn appt now, and they should call her to schedule.    Discussed importance of  this follow-up.    HTN/Lipids/CAD-  --CAD/aortic root dilation-  1yr Cardiology consultation in 11/21 for follow-up mild CAD, aortic root dilation.  Thought aortic root was stable, no further studies needed, prn follow-up with cardiology.  --For lipids, Dr. Cherry noted high LDL,(160s) likely not taking crestor consistently- rec taking daily, and LDL goal <70.  Rec adding asa if coronary calcium score elevated (which it was, though her note did not include asa recs)- should start asa.  Pt feels she's been taking the crestor consistently, fasting today, will recheck.  --HTN - good control at cardiology visit, borderline today.  Will continue current meds.    --Smoking cessation- 0-2/day.  Rec wearing patch when she's staying at daughter's house where she smokes.  Avoids smoking if at other kids houses.  Declines Quit Line referral or other txt options.      Tdap- Risks/benefits discussed, given today.     Dexa- overdue- will order and discuss at follow-up visit.      Return in about 3 months (around 4/25/2023) for Chronic cares/Med check, BP Recheck (bring cuff), Depression.      I spent a total of 44 minutes on the day of the visit.   Time spent doing chart review, history and exam, documentation and further activities per the note       Nicotine/Tobacco Cessation:  She reports that she has been smoking cigarettes. She has a 10.00 pack-year smoking history. She has never used smokeless tobacco.  Nicotine/Tobacco Cessation Plan:   Pharmacotherapies : Nicotine patch    Dee Castillo MD  [facility]              Swapnil Peralta is a 72 year old presenting for the following health issues:  Hypertension      History of Present Illness       Hypertension: She presents for follow up of hypertension.  She does check blood pressure  regularly outside of the clinic. Outside blood pressures have been over 140/90. She follows a low salt diet.      Today's PHQ-9         PHQ-9 Total Score:  6    PHQ-9 Q9 Thoughts of better off dead/self-harm past 2 weeks :   Not at all    How difficult have these problems made it for you to do your work, take care of things at home, or get along with other people: Somewhat difficult     BPs at home- pretty good.  130/70, 133/85, 145/99, 119/77, 129/75, 144/71, 164/80, 133/84, 165/74,132/77, 135/74, 141/72, 107/64, 127/70    Setting up her meds on a tray- weekly tray.  AM meds in tray, and amlodipine is in bottle by bed.      Notes she is getting discharge from her vagina, dark, kind of red.  Six months or so? Using liners.  9/22- mentioned it at her last appt.  Reviewed note, had referred her to ob/gyn, but pt didn't recall that, didn't make appt.      Is interested in stopping smoking.  Currently smoking just a bit maybe one cigarette daily?  Does like to have one in the morning.  Smokes when she's at her daughter's who smokes (then 2x/day), but none when staying at her other kids' houses.        Review of Systems   Constitutional, HEENT, cardiovascular, pulmonary, gi and gu systems are negative, except as otherwise noted.      Objective    BP (!) 143/59 (BP Location: Right arm, Patient Position: Sitting, Cuff Size: Adult Small)   Pulse (!) 46   Temp 98.1  F (36.7  C) (Temporal)   Resp 16   Wt 59 kg (130 lb)   SpO2 97%   BMI 21.63 kg/m    Body mass index is 21.63 kg/m .  Physical Exam   GENERAL APPEARANCE: healthy, alert and no distress     EYES: sclera clear, EOMI     RESP: lungs clear to auscultation - no rales, rhonchi or wheezes     CV: regular rates and rhythm, normal S1 S2, no S3 or S4 and no murmur, click or rub      Ext: warm, dry, no edema       Psych: full range affect, normal speech and grooming, judgement and insight intact         Results for orders placed or performed in visit on 01/25/23   Lipid panel reflex to direct LDL Fasting     Status: Normal   Result Value Ref Range    Cholesterol 152 <200 mg/dL    Triglycerides 41 <150 mg/dL    Direct  Measure HDL 69 >=50 mg/dL    LDL Cholesterol Calculated 75 <=100 mg/dL    Non HDL Cholesterol 83 <130 mg/dL    Narrative    Cholesterol  Desirable:  <200 mg/dL    Triglycerides  Normal:  Less than 150 mg/dL  Borderline High:  150-199 mg/dL  High:  200-499 mg/dL  Very High:  Greater than or equal to 500 mg/dL    Direct Measure HDL  Female:  Greater than or equal to 50 mg/dL   Male:  Greater than or equal to 40 mg/dL    LDL Cholesterol  Desirable:  <100mg/dL  Above Desirable:  100-129 mg/dL   Borderline High:  130-159 mg/dL   High:  160-189 mg/dL   Very High:  >= 190 mg/dL    Non HDL Cholesterol  Desirable:  130 mg/dL  Above Desirable:  130-159 mg/dL  Borderline High:  160-189 mg/dL  High:  190-219 mg/dL  Very High:  Greater than or equal to 220 mg/dL   Comprehensive metabolic panel (BMP + Alb, Alk Phos, ALT, AST, Total. Bili, TP)     Status: Abnormal   Result Value Ref Range    Sodium 135 (L) 136 - 145 mmol/L    Potassium 4.5 3.4 - 5.3 mmol/L    Chloride 97 (L) 98 - 107 mmol/L    Carbon Dioxide (CO2) 25 22 - 29 mmol/L    Anion Gap 13 7 - 15 mmol/L    Urea Nitrogen 14.9 8.0 - 23.0 mg/dL    Creatinine 0.76 0.51 - 0.95 mg/dL    Calcium 9.5 8.8 - 10.2 mg/dL    Glucose 89 70 - 99 mg/dL    Alkaline Phosphatase 51 35 - 104 U/L    AST 28 10 - 35 U/L    ALT 16 10 - 35 U/L    Protein Total 6.5 6.4 - 8.3 g/dL    Albumin 4.1 3.5 - 5.2 g/dL    Bilirubin Total 0.3 <=1.2 mg/dL    GFR Estimate 83 >60 mL/min/1.73m2   Albumin Random Urine Quantitative with Creat Ratio     Status: Abnormal   Result Value Ref Range    Creatinine Urine mg/dL 34.7 mg/dL    Albumin Urine mg/L 19.5 mg/L    Albumin Urine mg/g Cr 56.20 (H) 0.00 - 25.00 mg/g Cr

## 2023-02-08 ENCOUNTER — HOSPITAL ENCOUNTER (OUTPATIENT)
Dept: BONE DENSITY | Facility: CLINIC | Age: 73
Discharge: HOME | End: 2023-02-08
Attending: FAMILY MEDICINE
Payer: COMMERCIAL

## 2023-02-08 ENCOUNTER — ANCILLARY PROCEDURE (OUTPATIENT)
Dept: ULTRASOUND IMAGING | Facility: CLINIC | Age: 73
End: 2023-02-08
Attending: FAMILY MEDICINE
Payer: COMMERCIAL

## 2023-02-08 DIAGNOSIS — N95.0 POSTMENOPAUSAL BLEEDING: ICD-10-CM

## 2023-02-08 DIAGNOSIS — Z78.0 ASYMPTOMATIC MENOPAUSAL STATE: ICD-10-CM

## 2023-02-08 PROCEDURE — 77080 DXA BONE DENSITY AXIAL: CPT

## 2023-02-08 PROCEDURE — 76830 TRANSVAGINAL US NON-OB: CPT

## 2023-02-09 ENCOUNTER — TELEPHONE (OUTPATIENT)
Dept: FAMILY MEDICINE | Facility: CLINIC | Age: 73
End: 2023-02-09
Payer: COMMERCIAL

## 2023-02-09 NOTE — TELEPHONE ENCOUNTER
Please help make sure follow-up is arranged for patient (concerns with her memory -- needs extra help).      Pelvic ultrasound is back with markedly thickened endometrial lining (20mm).    Noted needs gyn follow-up and endometrial sampling.    Ordered ob/gyn referral in 9/22, but pt did not make appt.  Ordered ultrasound on 1/25/23 with request for follow-up ob/gyn appt the same day as appt in ultrasound orders  (see below)- this was not done, and do not see any ob/gyn florina/consult scheduled in her chart.      Triage- could you call pt with results, and offer to help her make the ob/gyn appt?  If not, check back and make sure it's scheduled?  Thank you!!  CW        1/25/23 orders-  Reason for Ultrasound: PELVIC Post-menopausal likely bloody discharge/bleeding/spotting     Time Preference for Scheduling: This Week     Follow Up Appointment Needed? Yes, same day follow-up appointment

## 2023-02-28 NOTE — TELEPHONE ENCOUNTER
Attempt #2 to reach patient.  Was able to reach using mobile number.  Transferred to scheduling number on referral.  Will monitor for appointment.  Yolette DIGGS RN

## 2023-03-10 NOTE — RESULT ENCOUNTER NOTE
Sorry for the delay on my notes to your labs!  -Your CMP (which includes electrolyte levels, blood sugar levels, and kidney and liver function tests) looks good.  We'll want to keep checking to make sure the sodium doesn't get much lower, but it's okay at this level.  -Your microalbumin level (which is the urine test that can signal signs of early chronic kidney disease if elevated to >30) is a bit high this time, so we should make sure your blood pressure is under good control, and keep checking this yearly.  -Your cholesterol panel looks great with a low LDL (the bad cholesterol) and a good/high HDL (the good cholesterol). Looks like you are back taking crestor consistently.      Please let me know if you have any questions.  I'm so glad to see you have your ob/gyn appointment coming up soon, and make sure you make the follow-up appointment to see me in 4/23 as we discussed.    Juwan Carlson MD

## 2023-03-21 NOTE — PROGRESS NOTES
[facility]  OB/GYN Clinic Note    SUBJECTIVE:                                                   Kimmie Carranza is a 72 year old female who presents to clinic today for the following health issue(s):  Patient presents with:  postmenopausal Bleeding      Additional information: Pt was seen with PCP and was instructed to follow up with ob/gyn due to markedly thickened endometrial lining (20mm). Per PCP patient was recommended to have a gyn follow up with EMB.     HPI:  Has noticed light, dark brown vaginal bleeding that first started 8 months. Bleeds every day and goes through about one liner a day. Bleeding is intermittent throughout the day with no clots being passed. TVUS on 2023 significant for endometrial thickness of 20 mm.     Not currently sexually active. No pelvic pain, cramping, fullness, fevers or chills. Hx of tubal ligation. Paternal aunt had breast cancer and uncle had colon cancer. Kathryn's colonoscopies are notable for several polyps, no colon cancer. No use of blood thinners, no history of HRT use. No family hx of uterine cancer.     No LMP recorded. Patient is postmenopausal..   Patient is not sexually active, .  Using menopause for contraception.   STD testing offered?  Declined   reports that she has been smoking cigarettes. She has a 10.00 pack-year smoking history. She has never used smokeless tobacco.  Tobacco Cessation Action Plan: Information offered: Patient not interested at this time      Today's PHQ-2 Score:   PHQ-2 (  [employer]) 3/22/2023   Q1: Little interest or pleasure in doing things 0   Q2: Feeling down, depressed or hopeless 0   PHQ-2 Score 0   PHQ-2 Total Score (12-17 Years)- Positive if 3 or more points; Administer PHQ-A if positive -   Q1: Little interest or pleasure in doing things -   Q2: Feeling down, depressed or hopeless -   PHQ-2 Score -     Today's PHQ-9 Score:   PHQ-9 SCORE 3/22/2023   PHQ-9 Total Score -   PHQ-9 Total Score Eduardo Hemphill  "  PHQ-9 Total Score 0   Some encounter information is confidential and restricted. Go to Review Flowsheets activity to see all data.     Today's TOMASA-7 Score:   TOMASA-7 SCORE 3/22/2023   Total Score 3         OBJECTIVE:     /80   Ht 1.651 m (5' 5\")   Wt 57.7 kg (127 lb 3.2 oz)   Breastfeeding No   BMI 21.17 kg/m    Body mass index is 21.17 kg/m .    Exam:  Constitutional:  Appearance: Well nourished, well developed alert, in no acute distress  Neck:  Lymph Nodes:  No lymphadenopathy present; Thyroid:  Gland size normal, nontender, no nodules or masses present on palpation  Neurologic:  Mental Status: Grossly oriented.  Normal strength and tone, sensory exam grossly normal, mentation intact and speech normal.    Psychiatric:  Mentation appears normal and affect normal/bright.    Pelvic Exam:  External Genitalia:     Normal appearance for age, no discharge present, no tenderness present, no inflammatory lesions present, color normal  Vagina:     Normal vaginal vault without central or paravaginal defects, no discharge present, no inflammatory lesions present, no masses present  Cervix:     Appearance healthy, no lesions present, nontender to palpation, no bleeding present    US Pelvic Complete with Transvaginal 02/08/2023    Narrative  EXAM: US PELVIC TRANSABDOMINAL AND TRANSVAGINAL  LOCATION: [facility]  DATE/TIME: 2/8/2023 12:24 PM    INDICATION: 4 6 months of dark (likely bloody) vaginal discharge  COMPARISON: None.  TECHNIQUE: Transabdominal scans were performed. Endovaginal ultrasound was performed to better visualize the adnexa.    FINDINGS:    UTERUS: 6.2 x 3.4 x 3.1 cm. Normal in size and position with no masses.    ENDOMETRIUM: 20 mm. Abnormal markedly thickened and mildly heterogeneous endometrium.    RIGHT OVARY: 2.3 x 1.3 x 1 .6 cm. Normal.    LEFT OVARY: 1.4 x 1.0 x 0.8 cm. Normal. Small calcification in the ovary, of doubtful clinical significance.    No significant free " fluid.    Multiple prominent parametrial vessels.    Impression  IMPRESSION:  1.  Abnormal heterogeneous and markedly thickened endometrium measuring 20 mm. Recommend gynecological consultation and endometrial sampling.        ASSESSMENT/PLAN:                                                        ICD-10-CM    1. Post-menopausal bleeding  N95.0 Surgical Pathology Exam     ENDOMETRIAL BIOPSY W/O CERVICAL DILATION      2. Thickened endometrium  R93.89 Surgical Pathology Exam     ENDOMETRIAL BIOPSY W/O CERVICAL DILATION        Kimmie Carranza is a 72 year old  with PMB and thickened endometrium.   If her bleeding subsides and the pathology report is benign, no additional follow-up is needed.  If she has ongoing bleeding after biopsy she will need to follow-up.  If any hyperplasia or cancer is noted on the biopsy she will be contacted to discuss the next steps.    -Endometrial biopsy performed today- see procedure note below.   -Follow up pending biopsy results    Yoselin Lao, MS3  Medical Student, [facility]    Endometrial Biopsy                                                                           Indication: Postmenopausal bleeding      Consent: Risks, benefits of treatment, and no treatment were discussed.  Patient's questions were elicited and answered.  and Written consent signed and scanned into medical record.    Using a small  Graves speculum, the cervix was visualized. The cervix was prepped with Betadine. The endometrial pipelle was advanced through the cervix without difficulty and a sample collected. No additional pass was made.      EBL: 1 mL    Complications:  None    Pathology: EMB sample was sent to pathology.  Tolerance of Procedure:  Patient did tolerate the procedure well.     Patient was instructed to call if she experiences any heavy bleeding, severe cramping, or abnormal vaginal discharge.      Will notify patient of results.    I was present with the medical student who  participated in the service and in the documentation of the note. I have verified the history and personally performed the physical exam and medical decision making. I agree with the assessment and plan of care as documented in the note.    I have reviewed and verified the documentation as completed by Yoselin Lao, of the procedure which I personally performed      Claudia Dan MD, S

## 2023-03-22 ENCOUNTER — OFFICE VISIT (OUTPATIENT)
Dept: OBGYN | Facility: CLINIC | Age: 73
End: 2023-03-22
Payer: COMMERCIAL

## 2023-03-22 VITALS
WEIGHT: 127.2 LBS | BODY MASS INDEX: 21.19 KG/M2 | SYSTOLIC BLOOD PRESSURE: 139 MMHG | DIASTOLIC BLOOD PRESSURE: 80 MMHG | HEIGHT: 65 IN

## 2023-03-22 DIAGNOSIS — C54.1 ENDOMETRIAL ADENOCARCINOMA (H): ICD-10-CM

## 2023-03-22 DIAGNOSIS — R93.89 THICKENED ENDOMETRIUM: ICD-10-CM

## 2023-03-22 DIAGNOSIS — N95.0 POST-MENOPAUSAL BLEEDING: Primary | ICD-10-CM

## 2023-03-22 PROCEDURE — 58100 BIOPSY OF UTERUS LINING: CPT | Performed by: STUDENT IN AN ORGANIZED HEALTH CARE EDUCATION/TRAINING PROGRAM

## 2023-03-22 PROCEDURE — 88305 TISSUE EXAM BY PATHOLOGIST: CPT | Performed by: PATHOLOGY

## 2023-03-22 PROCEDURE — 88341 IMHCHEM/IMCYTCHM EA ADD ANTB: CPT | Performed by: PATHOLOGY

## 2023-03-22 PROCEDURE — 99203 OFFICE O/P NEW LOW 30 MIN: CPT | Mod: 25 | Performed by: STUDENT IN AN ORGANIZED HEALTH CARE EDUCATION/TRAINING PROGRAM

## 2023-03-22 PROCEDURE — 88342 IMHCHEM/IMCYTCHM 1ST ANTB: CPT | Performed by: PATHOLOGY

## 2023-03-22 ASSESSMENT — ANXIETY QUESTIONNAIRES
5. BEING SO RESTLESS THAT IT IS HARD TO SIT STILL: SEVERAL DAYS
6. BECOMING EASILY ANNOYED OR IRRITABLE: NOT AT ALL
1. FEELING NERVOUS, ANXIOUS, OR ON EDGE: SEVERAL DAYS
3. WORRYING TOO MUCH ABOUT DIFFERENT THINGS: NOT AT ALL
IF YOU CHECKED OFF ANY PROBLEMS ON THIS QUESTIONNAIRE, HOW DIFFICULT HAVE THESE PROBLEMS MADE IT FOR YOU TO DO YOUR WORK, TAKE CARE OF THINGS AT HOME, OR GET ALONG WITH OTHER PEOPLE: SOMEWHAT DIFFICULT
GAD7 TOTAL SCORE: 3
7. FEELING AFRAID AS IF SOMETHING AWFUL MIGHT HAPPEN: NOT AT ALL
2. NOT BEING ABLE TO STOP OR CONTROL WORRYING: NOT AT ALL
GAD7 TOTAL SCORE: 3

## 2023-03-22 ASSESSMENT — PATIENT HEALTH QUESTIONNAIRE - PHQ9
SUM OF ALL RESPONSES TO PHQ QUESTIONS 1-9: 0
5. POOR APPETITE OR OVEREATING: SEVERAL DAYS

## 2023-03-24 ENCOUNTER — PATIENT OUTREACH (OUTPATIENT)
Dept: ONCOLOGY | Facility: CLINIC | Age: 73
End: 2023-03-24
Payer: COMMERCIAL

## 2023-03-24 NOTE — PROGRESS NOTES
New Patient Hematology / Oncology Nurse Navigator Note     Referral Date: 3/24/23    Referring provider:   Claudia Dan MD   [address] [facility] [address]   [place] [address]   Phone: [phone]   Fax: [phone]       Referring Clinic/Organization: [facility]     Referred to: GynOnc    Requested provider (if applicable): First available - did not specify     Evaluation for : Endometrial adenocarcinoma     Clinical History (per Nurse review of records provided):      3/22/23 Office Visit with OBGYN -- BOOKMARKED    3/22/23 Path:  Final Diagnosis   Endometrium, biopsy:  --Endometrial adenocarcinoma, endometrioid type, FIGO grade 1 (of 3).    -- BOOKMARKED    2/8 pelvic US:  IMPRESSION:  1.  Abnormal heterogeneous and markedly thickened endometrium measuring 20 mm. Recommend gynecological consultation and endometrial sampling. -- BOOKMARKED    Clinical Assessment / Barriers to Care (Per Nurse):  Pt lives in [facility]       Records Location: Epic     Records Needed:   N/A    Additional testing needed prior to consult:   N/A    Referral updates and Plan:   OUTGOING CALL to pt:  Introduced my role as nurse navigator with [facility] Hematology/Oncology dept and that we have recd the referral for dx of Endometrial Cancer from Dr Dan  Pt confirms they are aware of the referral and ready to schedule. [place] is preferred location. Discussed 4/4 with Dr. Jackson which pt is agreeable to.    -Transferred pt to NPS line [phone] to schedule appt per scheduling instructions. She has my call back number if needed.     Ximena Tovar, BSN, RN, PHN, OCN  Hematology/Oncology Nurse Navigator  [facility]  [phone]

## 2023-03-25 ENCOUNTER — HEALTH MAINTENANCE LETTER (OUTPATIENT)
Age: 73
End: 2023-03-25

## 2023-03-27 LAB
PATH REPORT.COMMENTS IMP SPEC: ABNORMAL
PATH REPORT.COMMENTS IMP SPEC: YES
PATH REPORT.FINAL DX SPEC: ABNORMAL
PATH REPORT.GROSS SPEC: ABNORMAL
PATH REPORT.MICROSCOPIC SPEC OTHER STN: ABNORMAL
PATH REPORT.RELEVANT HX SPEC: ABNORMAL
PATHOLOGY SYNOPTIC REPORT: ABNORMAL
PHOTO IMAGE: ABNORMAL

## 2023-04-03 LAB
ABO/RH(D): NORMAL
ANTIBODY SCREEN: NEGATIVE
SPECIMEN EXPIRATION DATE: NORMAL

## 2023-04-03 NOTE — TELEPHONE ENCOUNTER
RECORDS STATUS - ALL OTHER DIAGNOSIS      Endometrial adenocarcinoma (H) [C54.1]    RECORDS RECEIVED FROM: INTERNAL   DATE RECEIVED:    NOTES STATUS DETAILS   OFFICE NOTE from referring provider 3.24.23 Claudia Dan MD   OFFICE NOTE from medical oncologist 3.22.23 OBGYN   OFFICE NOTE from other specialist 1.25.23 11.14.22 [facility] - C Jonathan  CARDIOLOGY - I Can   DISCHARGE SUMMARY from hospital 12.2.22 GASTRO - J Kromsaadt   OPERATIVE REPORT 12.2.22 COLONOSCOPY   MEDICATION LIST Epic 1.25.23 Most Recent   LABS     PATHOLOGY REPORTS 3.22.23 Case: [id number]  Endometrial Carcinoma   ANYTHING RELATED TO DIAGNOSIS 1.25.23 Most recent labs   IMAGING (NEED IMAGES & REPORT) **All in PACS**    XRAYS 2.8.23 DX Hip/Pelvis   ULTRASOUND 2.8.23 Pelvic

## 2023-04-04 ENCOUNTER — LAB (OUTPATIENT)
Dept: ONCOLOGY | Facility: CLINIC | Age: 73
End: 2023-04-04
Attending: STUDENT IN AN ORGANIZED HEALTH CARE EDUCATION/TRAINING PROGRAM
Payer: COMMERCIAL

## 2023-04-04 ENCOUNTER — TELEPHONE (OUTPATIENT)
Dept: ONCOLOGY | Facility: CLINIC | Age: 73
End: 2023-04-04

## 2023-04-04 ENCOUNTER — PRE VISIT (OUTPATIENT)
Dept: ONCOLOGY | Facility: CLINIC | Age: 73
End: 2023-04-04
Payer: COMMERCIAL

## 2023-04-04 VITALS
HEIGHT: 65 IN | DIASTOLIC BLOOD PRESSURE: 76 MMHG | OXYGEN SATURATION: 100 % | RESPIRATION RATE: 16 BRPM | TEMPERATURE: 97.8 F | BODY MASS INDEX: 21.23 KG/M2 | SYSTOLIC BLOOD PRESSURE: 145 MMHG | HEART RATE: 51 BPM | WEIGHT: 127.4 LBS

## 2023-04-04 DIAGNOSIS — C54.1 ENDOMETRIAL ADENOCARCINOMA (H): ICD-10-CM

## 2023-04-04 DIAGNOSIS — C54.1 ENDOMETRIAL ADENOCARCINOMA (H): Primary | ICD-10-CM

## 2023-04-04 LAB
ALBUMIN SERPL BCG-MCNC: 4.4 G/DL (ref 3.5–5.2)
ALP SERPL-CCNC: 53 U/L (ref 35–104)
ALT SERPL W P-5'-P-CCNC: 24 U/L (ref 10–35)
ANION GAP SERPL CALCULATED.3IONS-SCNC: 11 MMOL/L (ref 7–15)
AST SERPL W P-5'-P-CCNC: 35 U/L (ref 10–35)
BILIRUB SERPL-MCNC: 0.8 MG/DL
BUN SERPL-MCNC: 15.7 MG/DL (ref 8–23)
CALCIUM SERPL-MCNC: 9.3 MG/DL (ref 8.8–10.2)
CHLORIDE SERPL-SCNC: 99 MMOL/L (ref 98–107)
CREAT SERPL-MCNC: 0.67 MG/DL (ref 0.51–0.95)
DEPRECATED HCO3 PLAS-SCNC: 27 MMOL/L (ref 22–29)
ERYTHROCYTE [DISTWIDTH] IN BLOOD BY AUTOMATED COUNT: 13.2 % (ref 10–15)
GFR SERPL CREATININE-BSD FRML MDRD: >90 ML/MIN/1.73M2
GLUCOSE SERPL-MCNC: 83 MG/DL (ref 70–99)
HCT VFR BLD AUTO: 40 % (ref 35–47)
HGB BLD-MCNC: 13 G/DL (ref 11.7–15.7)
MCH RBC QN AUTO: 32.8 PG (ref 26.5–33)
MCHC RBC AUTO-ENTMCNC: 32.5 G/DL (ref 31.5–36.5)
MCV RBC AUTO: 101 FL (ref 78–100)
PLATELET # BLD AUTO: 234 10E3/UL (ref 150–450)
POTASSIUM SERPL-SCNC: 4 MMOL/L (ref 3.4–5.3)
PROT SERPL-MCNC: 6.9 G/DL (ref 6.4–8.3)
RBC # BLD AUTO: 3.96 10E6/UL (ref 3.8–5.2)
SODIUM SERPL-SCNC: 137 MMOL/L (ref 136–145)
WBC # BLD AUTO: 7.8 10E3/UL (ref 4–11)

## 2023-04-04 PROCEDURE — 36415 COLL VENOUS BLD VENIPUNCTURE: CPT

## 2023-04-04 PROCEDURE — 99205 OFFICE O/P NEW HI 60 MIN: CPT | Performed by: OBSTETRICS & GYNECOLOGY

## 2023-04-04 PROCEDURE — 85027 COMPLETE CBC AUTOMATED: CPT | Performed by: OBSTETRICS & GYNECOLOGY

## 2023-04-04 PROCEDURE — G0463 HOSPITAL OUTPT CLINIC VISIT: HCPCS | Mod: 25 | Performed by: OBSTETRICS & GYNECOLOGY

## 2023-04-04 PROCEDURE — 86850 RBC ANTIBODY SCREEN: CPT | Performed by: OBSTETRICS & GYNECOLOGY

## 2023-04-04 PROCEDURE — 93005 ELECTROCARDIOGRAM TRACING: CPT | Performed by: OBSTETRICS & GYNECOLOGY

## 2023-04-04 PROCEDURE — 93010 ELECTROCARDIOGRAM REPORT: CPT | Performed by: OBSTETRICS & GYNECOLOGY

## 2023-04-04 PROCEDURE — 80053 COMPREHEN METABOLIC PANEL: CPT | Performed by: OBSTETRICS & GYNECOLOGY

## 2023-04-04 RX ORDER — PHENAZOPYRIDINE HYDROCHLORIDE 100 MG/1
200 TABLET, FILM COATED ORAL ONCE
Status: CANCELLED | OUTPATIENT
Start: 2023-04-04 | End: 2023-04-04

## 2023-04-04 RX ORDER — METRONIDAZOLE 500 MG/100ML
500 INJECTION, SOLUTION INTRAVENOUS
Status: CANCELLED | OUTPATIENT
Start: 2023-04-04

## 2023-04-04 RX ORDER — HEPARIN SODIUM 10000 [USP'U]/ML
5000 INJECTION, SOLUTION INTRAVENOUS; SUBCUTANEOUS
Status: CANCELLED | OUTPATIENT
Start: 2023-04-04

## 2023-04-04 ASSESSMENT — PAIN SCALES - GENERAL: PAINLEVEL: NO PAIN (0)

## 2023-04-04 NOTE — NURSING NOTE
"Oncology Rooming Note    April 4, 2023 1:08 PM   Kimmie Carranza is a 72 year old female who presents for:    Chief Complaint   Patient presents with     Oncology Clinic Visit     New Patient     Initial Vitals: BP (!) 145/76   Pulse 51   Temp 97.8  F (36.6  C) (Tympanic)   Resp 16   Ht 1.657 m (5' 5.25\")   Wt 57.8 kg (127 lb 6.4 oz)   SpO2 100%   BMI 21.04 kg/m   Estimated body mass index is 21.04 kg/m  as calculated from the following:    Height as of this encounter: 1.657 m (5' 5.25\").    Weight as of this encounter: 57.8 kg (127 lb 6.4 oz). Body surface area is 1.63 meters squared.  No Pain (0) Comment: Data Unavailable   No LMP recorded. Patient is postmenopausal.  Allergies reviewed: Yes  Medications reviewed: Yes    Medications: Medication refills not needed today.  Pharmacy name entered into Bubbli: [id number] - [place], [place] - [address]    Clinical concerns: New patient       Bren Gallegos, [facility]              "

## 2023-04-04 NOTE — TELEPHONE ENCOUNTER
RN Care Coordinator: Vannessa Merritt; [phone]    Surgery is scheduled with Dr. Jackson   Date: 4/12   Location: [facility]  Scheduled per: surgeon requested date      H&P already completed by surgeon on 4/4     Post-op: will be scheduled by the clinic    Patient will receive a phone call from pre-admission nurses 1-2 days prior to surgery with arrival and start time.     RNCC to contact patient. RNCC will contact writer if writer needs to reach out to the patient. No further action needed at this time.      Patient questions/concerns: N/A       Surgery packet was provided by the RNCC during appointment    __    Lluvia Pollard, on 4/4/2023 on 2:55 PM  P: [phone]

## 2023-04-04 NOTE — PROGRESS NOTES
Consult Notes on Referred Patient        Dr. Claudia Dan MD  [address] [facility] [address]  [place] [address]       RE: Kimmie Carranza  : 1950  DEANNA: 2023    Dear Dr. Claudia Dan:    I had the pleasure of seeing your patient Kimmie Carranza here at the Gynecologic Cancer Clinic at the [facility] on 2023.  As you know she is a very pleasant 72 year old woman with a diagnosis of grade 1 endometrial adenocarcinoma.  Given these findings she was subsequently sent to the Gynecologic Cancer Clinic for new patient consultation.  She is unaccompanied. First noticed that she had vaginal bleeding about six months ago, would use a liner. Voiding without issue, does have frequency with diuretic. No dysuria or hematuria. Regular bowel movements.     23:  US Pelvis (personally reviewed):  1.  Abnormal heterogeneous and markedly thickened endometrium measuring 20 mm.     3/22/23:  EMB: Grade 1 endometrial adenocarcinoma, intact MMR    Obstetrics and Gynecology History:  , vaginal deliveries x4, no complications  Menopause right around 40 years, took hormones about a year. Took a pill        Past Medical History:  Past Medical History:   Diagnosis Date     Anxiety state, unspecified     on zoloft, better than wellbutrin, s/p traumatic death of daughter's boyfriend in '05     Hypertension      Mild major depression (H)     zoloft (had been on citalopram)     Mixed hyperlipidemia      Urge incontinence     trial of detrol helped, uses for trips, gets thirsty         Past Surgical History:  Past Surgical History:   Procedure Laterality Date     COLONOSCOPY N/A 10/24/2016    Procedure: COMBINED COLONOSCOPY, SINGLE OR MULTIPLE BIOPSY/POLYPECTOMY BY BIOPSY;  Surgeon: Kwaku Henry MD;  Location:  GI     COLONOSCOPY N/A 2022    Procedure: COLONOSCOPY, FLEXIBLE, WITH LESION REMOVAL USING SNARE polypectomies using exacto cold snare;  Surgeon: Zane Cavazos MD;  Location:  GI      "CV CORONARY ANGIOGRAM N/A 2019    Procedure: Coronary Angiogram;  Surgeon: Viktor Brothers MD;  Location:  HEART CARDIAC CATH LAB     [facility] NONSPECIFIC PROCEDURE      Tubal ligation     [facility] NONSPECIFIC PROCEDURE      laporoscopy       Health Maintenance:  Last Pap Smear: 13              Result: Negative, HPV negative  She has not had a history of abnormal Pap smears.    Last Mammogram: 22              Result: normal      She has not had a history of abnormal mammograms.    Last Colonoscopy: 22              Result: Polyps-repeat 5 years       Social History:  Retired at 65, was a . Worked at [employer]. Lives with daughter and two grandsons in [place]. Abbie Carranza is her daughter who she would like to contact should anything happen to her.      Family History:   The patient's family history is significant for.  Family History   Problem Relation Age of Onset     Hypertension Mother      Cerebrovascular Disease Mother          of complications of stroke at age 82     Heart Disease Mother         atrial fib     Osteoporosis Mother         two hip fx's     Neurologic Disorder Mother         MS     Hypertension Father      Neurologic Disorder Father         dementia- Alzheimers, dx in his 60s, live to his 90s     Gastrointestinal Disease Father      Depression Father      Neurologic Disorder Sister      Hypertension Brother      Neurologic Disorder Maternal Grandmother      C.A.D. Paternal Grandmother 62         at 62, of MI     Bipolar Disorder Daughter      C.A.D. Paternal Aunt 62        \"\"     C.A.D. Paternal Aunt 62        \"\"     Colon Cancer Paternal Uncle      Breast Cancer Maternal Aunt        Physical Exam:   BP (!) 145/76   Pulse 51   Temp 97.8  F (36.6  C) (Tympanic)   Resp 16   Ht 1.657 m (5' 5.25\")   Wt 57.8 kg (127 lb 6.4 oz)   SpO2 100%   BMI 21.04 kg/m    Body mass index is 21.04 kg/m .    General Appearance: healthy and " alert, no distress        Cardiovascular: regular rate and rhythm    Respiratory: lungs clear     Gastrointestinal:       abdomen soft, non-tender, non-distended, no organomegaly or masses    Genitourinary: External genitalia and urethral meatus appears normal.  Vagina is smooth without nodularity or masses.  Cervix appears normal and without lesions.  Bimanual exam reveal no masses, nodularity or fullness.  Recto-vaginal exam confirms these findings.    Labs:  WBC 7.8 with ANC -.  Hemoglobin 13.  Platelets 234.  Creatinine 0.6.  Potassium 4.  Magnesium -.  Remainder of electrolytes within normal limits.  AST 35, ALT 24, alkaline phosphatase 53, total bilirubin 0.8.  Albumin 4.4.      Assessment:    Kimmie Carranza is a 72 year old woman with a diagnosis of grade 1 endometrial adenocarcinoma.     A total of 60 minutes was spent on patient care today.       Plan:     1.)    Grade 1 endometrial adenocarcinoma.  Discussed natural history and progression of disease.  Discussed standard surgical staging procedure.  Discussed the role of sentinel lymph node dissection and that if sentinel lymph node could not be identified on one or both sides then we would use frozen section analysis to determine if full lymph node dissection was necessary.  Discussed role of frozen section analysis and that further surgical decisions would be based on these findings.  Discussed the small possibility of adjuvant post-operative therapy.  Risks, benefits, indications and alternatives discussed with the patient.  All her questions were answered and consents were signed for laparoscopic removal of uterus, cervix, both ovaries and fallopian tubes, sentinel lymph node dissection, possible full lymph node dissection, possible open, cystoscopy on 4/12 at [facility].     2.) Genetic risk factors were assessed and the patient does not meet the qualifications for a referral.      3.) Labs and/or tests ordered include:  CBC, CMP, T/S,  EKG.     4.) Health maintenance issues addressed today include pt is up to date.    5.) Pre-op teaching was completed today.        Thank you for allowing us to participate in the care of your patient.         Sincerely,    Nesha Jackson MD  Gynecologic Oncology  [facility]       CC  TIA STONE

## 2023-04-04 NOTE — PATIENT INSTRUCTIONS
You have been scheduled for surgery on: 4/12 at [facility]    Diagnosis:  Endometrial cancer    The surgical procedure is: laparoscopic removal of uterus, cervix, both ovaries and fallopian tubes, sentinel lymph node dissection, possible full lymph node dissection, possible open, cystoscopy    Anticipated length of surgery: 1-2 hrs.  You will be in the recovery room for approximately 2-3 hrs after surgery.  Your family will not be able to see you until after you leave the recovery room.    Length of hospital stay:  This is an outpatient, extended stay surgery.  You will be discharged same day if you meet criteria for discharge.  If you have a larger surgical incision, you will need to be in the hospital 2-3 days.  ______________________________________________________________________    Preparation for Surgery:    To Schedule   1.  Labs:  CBC, CMP, T/S, orders placed, please perform today   2.  EKG:  Order placed, please perform today   3.  Post-operative exam with me 1-2 weeks following surgery      Postoperative Restrictions:  No heavy lifting >20lbs for six weeks, nothing in the vagina (no tampons, no intercourse, no douching) for eight weeks.     Postoperative visit:  Return to clinic 1-2 weeks after surgery for post operative visit.      Please contact the clinic with any questions or concerns.    Nesha Jackson MD  Gynecologic Oncology  [facility]

## 2023-04-04 NOTE — PROGRESS NOTES
Medical Assistant Note:  Kimmie Carranza presents today for blood draw.    Patient seen by provider today: Yes: .   present during visit today: Not Applicable.    Concerns: No Concerns.    EKG performed and called to be read at 2:24 PM on 04/04/23.     Procedure:  Lab draw site: left antecub, Needle type: butterfly, Gauge: 23.    Post Assessment:  Labs drawn without difficulty: Yes.    Discharge Plan:  Departure Mode: Ambulatory.    Face to Face Time: 10.    Bren Gallegos, CMA

## 2023-04-04 NOTE — LETTER
2023         RE: Kimmie Carranza  [address] [facility] [place] [address]        Dear Colleague,    Thank you for referring your patient, Kimmie Carranza, to the [facility]. Please see a copy of my visit note below.    See separate note from today      Consult Notes on Referred Patient        Dr. Claudia Dan MD  [address]  [place] [address]       RE: Kimmie Carranza  : 1950  DEANNA: 2023    Dear Dr. Claudia Dan:    I had the pleasure of seeing your patient Kimmie Carranza here at the Gynecologic Cancer Clinic at the [facility] on 2023.  As you know she is a very pleasant 72 year old woman with a diagnosis of grade 1 endometrial adenocarcinoma.  Given these findings she was subsequently sent to the Gynecologic Cancer Clinic for new patient consultation.  She is unaccompanied. First noticed that she had vaginal bleeding about six months ago, would use a liner. Voiding without issue, does have frequency with diuretic. No dysuria or hematuria. Regular bowel movements.     23:  US Pelvis (personally reviewed):  1.  Abnormal heterogeneous and markedly thickened endometrium measuring 20 mm.     3/22/23:  EMB: Grade 1 endometrial adenocarcinoma, intact MMR    Obstetrics and Gynecology History:  , vaginal deliveries x4, no complications  Menopause right around 40 years, took hormones about a year. Took a pill        Past Medical History:  Past Medical History:   Diagnosis Date     Anxiety state, unspecified     on zoloft, better than wellbutrin, s/p traumatic death of daughter's boyfriend in '05     Hypertension      Mild major depression (H)     zoloft (had been on citalopram)     Mixed hyperlipidemia      Urge incontinence     trial of detrol helped, uses for trips, gets thirsty         Past Surgical History:  Past Surgical History:   Procedure Laterality Date     COLONOSCOPY N/A 10/24/2016    Procedure: COMBINED COLONOSCOPY, SINGLE  "OR MULTIPLE BIOPSY/POLYPECTOMY BY BIOPSY;  Surgeon: Kwaku Henry MD;  Location:  GI     COLONOSCOPY N/A 2022    Procedure: COLONOSCOPY, FLEXIBLE, WITH LESION REMOVAL USING SNARE polypectomies using exacto cold snare;  Surgeon: Zane Cavazos MD;  Location:  GI     CV CORONARY ANGIOGRAM N/A 2019    Procedure: Coronary Angiogram;  Surgeon: Viktor Brothers MD;  Location:  HEART CARDIAC CATH LAB     [facility] NONSPECIFIC PROCEDURE      Tubal ligation     [facility] NONSPECIFIC PROCEDURE      laporoscopy       Health Maintenance:  Last Pap Smear: 13              Result: Negative, HPV negative  She has not had a history of abnormal Pap smears.    Last Mammogram: 22              Result: normal      She has not had a history of abnormal mammograms.    Last Colonoscopy: 22              Result: Polyps-repeat 5 years       Social History:  Retired at 65, was a . Worked at [employer]. Lives with daughter and two grandsons in [place]. Abbie Carranza is her daughter who she would like to contact should anything happen to her.      Family History:   The patient's family history is significant for.  Family History   Problem Relation Age of Onset     Hypertension Mother      Cerebrovascular Disease Mother          of complications of stroke at age 82     Heart Disease Mother         atrial fib     Osteoporosis Mother         two hip fx's     Neurologic Disorder Mother         MS     Hypertension Father      Neurologic Disorder Father         dementia- Alzheimers, dx in his 60s, live to his 90s     Gastrointestinal Disease Father      Depression Father      Neurologic Disorder Sister      Hypertension Brother      Neurologic Disorder Maternal Grandmother      C.A.D. Paternal Grandmother 62         at 62, of MI     Bipolar Disorder Daughter      C.A.D. Paternal Aunt 62        \"\"     C.A.D. Paternal Aunt 62        \"\"     Colon Cancer Paternal Uncle  " "    Breast Cancer Maternal Aunt        Physical Exam:   BP (!) 145/76   Pulse 51   Temp 97.8  F (36.6  C) (Tympanic)   Resp 16   Ht 1.657 m (5' 5.25\")   Wt 57.8 kg (127 lb 6.4 oz)   SpO2 100%   BMI 21.04 kg/m    Body mass index is 21.04 kg/m .    General Appearance: healthy and alert, no distress        Cardiovascular: regular rate and rhythm    Respiratory: lungs clear     Gastrointestinal:       abdomen soft, non-tender, non-distended, no organomegaly or masses    Genitourinary: External genitalia and urethral meatus appears normal.  Vagina is smooth without nodularity or masses.  Cervix appears normal and without lesions.  Bimanual exam reveal no masses, nodularity or fullness.  Recto-vaginal exam confirms these findings.    Labs:  WBC 7.8 with ANC -.  Hemoglobin 13.  Platelets 234.  Creatinine 0.6.  Potassium 4.  Magnesium -.  Remainder of electrolytes within normal limits.  AST 35, ALT 24, alkaline phosphatase 53, total bilirubin 0.8.  Albumin 4.4.      Assessment:    Kimmie Carranza is a 72 year old woman with a diagnosis of grade 1 endometrial adenocarcinoma.     A total of 60 minutes was spent on patient care today.       Plan:     1.)    Grade 1 endometrial adenocarcinoma.  Discussed natural history and progression of disease.  Discussed standard surgical staging procedure.  Discussed the role of sentinel lymph node dissection and that if sentinel lymph node could not be identified on one or both sides then we would use frozen section analysis to determine if full lymph node dissection was necessary.  Discussed role of frozen section analysis and that further surgical decisions would be based on these findings.  Discussed the small possibility of adjuvant post-operative therapy.  Risks, benefits, indications and alternatives discussed with the patient.  All her questions were answered and consents were signed for laparoscopic removal of uterus, cervix, both ovaries and fallopian tubes, sentinel lymph " node dissection, possible full lymph node dissection, possible open, cystoscopy on 4/12 at [facility].     2.) Genetic risk factors were assessed and the patient does not meet the qualifications for a referral.      3.) Labs and/or tests ordered include:  CBC, CMP, T/S, EKG.     4.) Health maintenance issues addressed today include pt is up to date.    5.) Pre-op teaching was completed today.        Thank you for allowing us to participate in the care of your patient.         Sincerely,    Nesha Jackson MD  Gynecologic Oncology  [facility]       TIA POSEY        Again, thank you for allowing me to participate in the care of your patient.        Sincerely,        Cesar Jackson MD

## 2023-04-07 ENCOUNTER — PATIENT OUTREACH (OUTPATIENT)
Dept: ONCOLOGY | Facility: CLINIC | Age: 73
End: 2023-04-07
Payer: COMMERCIAL

## 2023-04-07 NOTE — PROGRESS NOTES
Late entry from 4/4/2023.  Met with patient after clinic visit with Dr Jackson to discuss surgical education. Labs and EKG completed at the visit. Patient aware no Covid testing required prior to surgery unless patient with new symptoms.    .GYN ONC Pre-Op Education - Nursing     Relevant Diagnosis: Endometrial Cancer    Teaching Topic: Laparoscopic removal of uterus, cervix, both ovaries and fallopian tubes, sentinel LND, possible full LND, possible open, cystoscopy with Dr Jackson on 4/12/2023.    Teaching Concerns addressed: Yes    Patient demonstrates understanding of the following:   Reason for the appointment, diagnosis and treatment plan:   Yes   Knowledge of proper use of medications and conditions for which they are ordered (with special attention to potential side effects or drug interactions): Yes   Which situations necessitate calling provider and whom to contact: Yes       Nutritional needs and diet plan:  Yes      Pain management techniques:  Yes  Diet:  Yes     Infection Prevention:  Patient demonstrates understanding of the following:   Pre-Op CHG Bathing Instructions: Yes  Surgical procedure site care taught:   Yes   Signs and symptoms of infection taught: Yes     Instructional Materials Used/Given:  [employer] Preparing for Your Surgery  Showering or Bathing before Surgery Instructions & CHG Product (2 bottles)  Home Care after Major Abdominal or Vaginal Surgery  Map  Tips to Increase the Protein in Your Diet  Phone number for [facility]     Comments:  Reviewed NPO restrictions prior to surgery with pt (No food or milk products 8 hours prior to arrival of surgery; Only clear liquids up to 2 hours prior to arrival of surgery i.e., water, black coffee, tea, apple juice).  Also reviewed medications that must be held for at least 7 days prior to surgery (Aspirin, Ibuprofen, Naproxen, Fish oil/Flax seed oil, Multivitamin/Vit. E, or any other product containing aspirin, or NSAIDs).  Per   Manuel, pt should take her amlodipine and sertraline with a small sip of water on the morning of her surgery. Pt will need to see Dr Jackson, 1-2 weeks after her surgery. Pt will need someone to drive her home after surgery, and someone to stay with her for at least 24 hours after she arrives home. No heavy lifting > 20 pounds for six weeks, nothing in the vagina, no tampons, intercourse, or douching for eight weeks. Reviewed with pt signs and symptoms that would warrant contacting provider immediately.  Pt had the opportunity to ask questions, and all questions were answered to her satisfaction.  Patient verbalized understanding and agreement with plan.  Pt was instructed to call the clinic with any questions, concerns, or worsening symptoms.     Patience Ferguson RN, BSN, OCN

## 2023-04-12 ENCOUNTER — ANESTHESIA EVENT (OUTPATIENT)
Dept: SURGERY | Facility: CLINIC | Age: 73
End: 2023-04-12
Payer: COMMERCIAL

## 2023-04-12 ENCOUNTER — HOSPITAL ENCOUNTER (OUTPATIENT)
Facility: CLINIC | Age: 73
Discharge: HOME | End: 2023-04-12
Attending: OBSTETRICS & GYNECOLOGY | Admitting: OBSTETRICS & GYNECOLOGY
Payer: COMMERCIAL

## 2023-04-12 ENCOUNTER — ANESTHESIA (OUTPATIENT)
Dept: SURGERY | Facility: CLINIC | Age: 73
End: 2023-04-12
Payer: COMMERCIAL

## 2023-04-12 VITALS
SYSTOLIC BLOOD PRESSURE: 120 MMHG | HEART RATE: 53 BPM | WEIGHT: 126.8 LBS | OXYGEN SATURATION: 97 % | DIASTOLIC BLOOD PRESSURE: 76 MMHG | BODY MASS INDEX: 20.38 KG/M2 | HEIGHT: 66 IN | TEMPERATURE: 97.8 F | RESPIRATION RATE: 14 BRPM

## 2023-04-12 DIAGNOSIS — G89.18 POSTOPERATIVE PAIN: Primary | ICD-10-CM

## 2023-04-12 PROCEDURE — 250N000011 HC RX IP 250 OP 636: Performed by: OBSTETRICS & GYNECOLOGY

## 2023-04-12 PROCEDURE — 250N000025 HC SEVOFLURANE, PER MIN: Performed by: OBSTETRICS & GYNECOLOGY

## 2023-04-12 PROCEDURE — 710N000012 HC RECOVERY PHASE 2, PER MINUTE: Performed by: OBSTETRICS & GYNECOLOGY

## 2023-04-12 PROCEDURE — 710N000009 HC RECOVERY PHASE 1, LEVEL 1, PER MIN: Performed by: OBSTETRICS & GYNECOLOGY

## 2023-04-12 PROCEDURE — 360N000077 HC SURGERY LEVEL 4, PER MIN: Performed by: OBSTETRICS & GYNECOLOGY

## 2023-04-12 PROCEDURE — 258N000003 HC RX IP 258 OP 636: Performed by: ANESTHESIOLOGY

## 2023-04-12 PROCEDURE — 250N000013 HC RX MED GY IP 250 OP 250 PS 637: Performed by: STUDENT IN AN ORGANIZED HEALTH CARE EDUCATION/TRAINING PROGRAM

## 2023-04-12 PROCEDURE — 250N000009 HC RX 250

## 2023-04-12 PROCEDURE — 250N000011 HC RX IP 250 OP 636

## 2023-04-12 PROCEDURE — 250N000009 HC RX 250: Performed by: OBSTETRICS & GYNECOLOGY

## 2023-04-12 PROCEDURE — 370N000017 HC ANESTHESIA TECHNICAL FEE, PER MIN: Performed by: OBSTETRICS & GYNECOLOGY

## 2023-04-12 PROCEDURE — 88307 TISSUE EXAM BY PATHOLOGIST: CPT | Mod: TC | Performed by: OBSTETRICS & GYNECOLOGY

## 2023-04-12 PROCEDURE — 272N000001 HC OR GENERAL SUPPLY STERILE: Performed by: OBSTETRICS & GYNECOLOGY

## 2023-04-12 PROCEDURE — 250N000013 HC RX MED GY IP 250 OP 250 PS 637: Performed by: OBSTETRICS & GYNECOLOGY

## 2023-04-12 PROCEDURE — 250N000009 HC RX 250: Performed by: NURSE ANESTHETIST, CERTIFIED REGISTERED

## 2023-04-12 PROCEDURE — 250N000011 HC RX IP 250 OP 636: Performed by: NURSE ANESTHETIST, CERTIFIED REGISTERED

## 2023-04-12 PROCEDURE — 999N000141 HC STATISTIC PRE-PROCEDURE NURSING ASSESSMENT: Performed by: OBSTETRICS & GYNECOLOGY

## 2023-04-12 PROCEDURE — 88112 CYTOPATH CELL ENHANCE TECH: CPT | Mod: TC | Performed by: OBSTETRICS & GYNECOLOGY

## 2023-04-12 PROCEDURE — 258N000003 HC RX IP 258 OP 636

## 2023-04-12 RX ORDER — METRONIDAZOLE 500 MG/100ML
500 INJECTION, SOLUTION INTRAVENOUS
Status: COMPLETED | OUTPATIENT
Start: 2023-04-12 | End: 2023-04-12

## 2023-04-12 RX ORDER — SODIUM CHLORIDE, SODIUM LACTATE, POTASSIUM CHLORIDE, CALCIUM CHLORIDE 600; 310; 30; 20 MG/100ML; MG/100ML; MG/100ML; MG/100ML
INJECTION, SOLUTION INTRAVENOUS CONTINUOUS
Status: DISCONTINUED | OUTPATIENT
Start: 2023-04-12 | End: 2023-04-12 | Stop reason: HOSPADM

## 2023-04-12 RX ORDER — CEFAZOLIN SODIUM/WATER 2 G/20 ML
2 SYRINGE (ML) INTRAVENOUS SEE ADMIN INSTRUCTIONS
Status: DISCONTINUED | OUTPATIENT
Start: 2023-04-12 | End: 2023-04-12 | Stop reason: HOSPADM

## 2023-04-12 RX ORDER — FENTANYL CITRATE 50 UG/ML
50 INJECTION, SOLUTION INTRAMUSCULAR; INTRAVENOUS EVERY 5 MIN PRN
Status: DISCONTINUED | OUTPATIENT
Start: 2023-04-12 | End: 2023-04-12 | Stop reason: HOSPADM

## 2023-04-12 RX ORDER — OXYCODONE HYDROCHLORIDE 5 MG/1
5 TABLET ORAL
Status: COMPLETED | OUTPATIENT
Start: 2023-04-12 | End: 2023-04-12

## 2023-04-12 RX ORDER — HEPARIN SODIUM 5000 [USP'U]/.5ML
5000 INJECTION, SOLUTION INTRAVENOUS; SUBCUTANEOUS
Status: COMPLETED | OUTPATIENT
Start: 2023-04-12 | End: 2023-04-12

## 2023-04-12 RX ORDER — BUPIVACAINE HYDROCHLORIDE 2.5 MG/ML
INJECTION, SOLUTION INFILTRATION; PERINEURAL PRN
Status: DISCONTINUED | OUTPATIENT
Start: 2023-04-12 | End: 2023-04-12 | Stop reason: HOSPADM

## 2023-04-12 RX ORDER — PROPOFOL 10 MG/ML
INJECTION, EMULSION INTRAVENOUS PRN
Status: DISCONTINUED | OUTPATIENT
Start: 2023-04-12 | End: 2023-04-12

## 2023-04-12 RX ORDER — HYDROMORPHONE HCL IN WATER/PF 6 MG/30 ML
0.4 PATIENT CONTROLLED ANALGESIA SYRINGE INTRAVENOUS EVERY 5 MIN PRN
Status: DISCONTINUED | OUTPATIENT
Start: 2023-04-12 | End: 2023-04-12 | Stop reason: HOSPADM

## 2023-04-12 RX ORDER — BUPIVACAINE HYDROCHLORIDE 2.5 MG/ML
INJECTION, SOLUTION EPIDURAL; INFILTRATION; INTRACAUDAL
Status: DISCONTINUED
Start: 2023-04-12 | End: 2023-04-12 | Stop reason: HOSPADM

## 2023-04-12 RX ORDER — FENTANYL CITRATE 50 UG/ML
INJECTION, SOLUTION INTRAMUSCULAR; INTRAVENOUS PRN
Status: DISCONTINUED | OUTPATIENT
Start: 2023-04-12 | End: 2023-04-12

## 2023-04-12 RX ORDER — ONDANSETRON 4 MG/1
4 TABLET, ORALLY DISINTEGRATING ORAL EVERY 30 MIN PRN
Status: DISCONTINUED | OUTPATIENT
Start: 2023-04-12 | End: 2023-04-12 | Stop reason: HOSPADM

## 2023-04-12 RX ORDER — OXYCODONE HYDROCHLORIDE 5 MG/1
5 TABLET ORAL EVERY 6 HOURS PRN
Qty: 6 TABLET | Refills: 0 | Status: SHIPPED | OUTPATIENT
Start: 2023-04-12 | End: 2023-05-02

## 2023-04-12 RX ORDER — ONDANSETRON 2 MG/ML
INJECTION INTRAMUSCULAR; INTRAVENOUS PRN
Status: DISCONTINUED | OUTPATIENT
Start: 2023-04-12 | End: 2023-04-12

## 2023-04-12 RX ORDER — ONDANSETRON 2 MG/ML
4 INJECTION INTRAMUSCULAR; INTRAVENOUS EVERY 30 MIN PRN
Status: DISCONTINUED | OUTPATIENT
Start: 2023-04-12 | End: 2023-04-12 | Stop reason: HOSPADM

## 2023-04-12 RX ORDER — HYDROMORPHONE HCL IN WATER/PF 6 MG/30 ML
0.2 PATIENT CONTROLLED ANALGESIA SYRINGE INTRAVENOUS EVERY 5 MIN PRN
Status: DISCONTINUED | OUTPATIENT
Start: 2023-04-12 | End: 2023-04-12 | Stop reason: HOSPADM

## 2023-04-12 RX ORDER — DEXAMETHASONE SODIUM PHOSPHATE 4 MG/ML
INJECTION, SOLUTION INTRA-ARTICULAR; INTRALESIONAL; INTRAMUSCULAR; INTRAVENOUS; SOFT TISSUE PRN
Status: DISCONTINUED | OUTPATIENT
Start: 2023-04-12 | End: 2023-04-12

## 2023-04-12 RX ORDER — PHENAZOPYRIDINE HYDROCHLORIDE 200 MG/1
200 TABLET, FILM COATED ORAL ONCE
Status: COMPLETED | OUTPATIENT
Start: 2023-04-12 | End: 2023-04-12

## 2023-04-12 RX ORDER — LIDOCAINE HYDROCHLORIDE 20 MG/ML
INJECTION, SOLUTION INFILTRATION; PERINEURAL PRN
Status: DISCONTINUED | OUTPATIENT
Start: 2023-04-12 | End: 2023-04-12

## 2023-04-12 RX ORDER — KETOROLAC TROMETHAMINE 30 MG/ML
INJECTION, SOLUTION INTRAMUSCULAR; INTRAVENOUS PRN
Status: DISCONTINUED | OUTPATIENT
Start: 2023-04-12 | End: 2023-04-12

## 2023-04-12 RX ORDER — INDOCYANINE GREEN AND WATER 25 MG
KIT INJECTION PRN
Status: DISCONTINUED | OUTPATIENT
Start: 2023-04-12 | End: 2023-04-12 | Stop reason: HOSPADM

## 2023-04-12 RX ORDER — ACETAMINOPHEN 325 MG/1
975 TABLET ORAL ONCE
Status: DISCONTINUED | OUTPATIENT
Start: 2023-04-12 | End: 2023-04-12 | Stop reason: HOSPADM

## 2023-04-12 RX ORDER — EPHEDRINE SULFATE 50 MG/ML
INJECTION, SOLUTION INTRAMUSCULAR; INTRAVENOUS; SUBCUTANEOUS PRN
Status: DISCONTINUED | OUTPATIENT
Start: 2023-04-12 | End: 2023-04-12

## 2023-04-12 RX ORDER — GLYCOPYRROLATE 0.2 MG/ML
INJECTION, SOLUTION INTRAMUSCULAR; INTRAVENOUS PRN
Status: DISCONTINUED | OUTPATIENT
Start: 2023-04-12 | End: 2023-04-12

## 2023-04-12 RX ORDER — IBUPROFEN 600 MG/1
600 TABLET, FILM COATED ORAL ONCE
Status: DISCONTINUED | OUTPATIENT
Start: 2023-04-12 | End: 2023-04-12 | Stop reason: HOSPADM

## 2023-04-12 RX ORDER — ACETAMINOPHEN 325 MG/1
650 TABLET ORAL EVERY 6 HOURS PRN
Qty: 24 TABLET | Refills: 0 | Status: SHIPPED | OUTPATIENT
Start: 2023-04-12

## 2023-04-12 RX ORDER — FENTANYL CITRATE 50 UG/ML
25 INJECTION, SOLUTION INTRAMUSCULAR; INTRAVENOUS EVERY 5 MIN PRN
Status: DISCONTINUED | OUTPATIENT
Start: 2023-04-12 | End: 2023-04-12 | Stop reason: HOSPADM

## 2023-04-12 RX ORDER — CEFAZOLIN SODIUM/WATER 2 G/20 ML
2 SYRINGE (ML) INTRAVENOUS
Status: COMPLETED | OUTPATIENT
Start: 2023-04-12 | End: 2023-04-12

## 2023-04-12 RX ORDER — IBUPROFEN 600 MG/1
600 TABLET, FILM COATED ORAL EVERY 6 HOURS PRN
Qty: 12 TABLET | Refills: 0 | Status: SHIPPED | OUTPATIENT
Start: 2023-04-12

## 2023-04-12 RX ADMIN — GLYCOPYRROLATE 0.2 MG: 0.2 INJECTION, SOLUTION INTRAMUSCULAR; INTRAVENOUS at 10:17

## 2023-04-12 RX ADMIN — PHENYLEPHRINE HYDROCHLORIDE 100 MCG: 10 INJECTION INTRAVENOUS at 10:23

## 2023-04-12 RX ADMIN — OXYCODONE HYDROCHLORIDE 5 MG: 5 TABLET ORAL at 12:40

## 2023-04-12 RX ADMIN — SODIUM CHLORIDE, POTASSIUM CHLORIDE, SODIUM LACTATE AND CALCIUM CHLORIDE: 600; 310; 30; 20 INJECTION, SOLUTION INTRAVENOUS at 11:54

## 2023-04-12 RX ADMIN — HEPARIN SODIUM 5000 UNITS: 5000 INJECTION, SOLUTION INTRAVENOUS; SUBCUTANEOUS at 09:43

## 2023-04-12 RX ADMIN — FENTANYL CITRATE 75 MCG: 50 INJECTION, SOLUTION INTRAMUSCULAR; INTRAVENOUS at 10:11

## 2023-04-12 RX ADMIN — PROPOFOL 200 MG: 10 INJECTION, EMULSION INTRAVENOUS at 10:04

## 2023-04-12 RX ADMIN — ONDANSETRON 4 MG: 2 INJECTION INTRAMUSCULAR; INTRAVENOUS at 11:44

## 2023-04-12 RX ADMIN — HYDROMORPHONE HYDROCHLORIDE 0.5 MG: 1 INJECTION, SOLUTION INTRAMUSCULAR; INTRAVENOUS; SUBCUTANEOUS at 10:31

## 2023-04-12 RX ADMIN — ROCURONIUM BROMIDE 50 MG: 50 INJECTION, SOLUTION INTRAVENOUS at 10:04

## 2023-04-12 RX ADMIN — DEXAMETHASONE SODIUM PHOSPHATE 4 MG: 4 INJECTION, SOLUTION INTRA-ARTICULAR; INTRALESIONAL; INTRAMUSCULAR; INTRAVENOUS; SOFT TISSUE at 10:14

## 2023-04-12 RX ADMIN — KETOROLAC TROMETHAMINE 15 MG: 30 INJECTION, SOLUTION INTRAMUSCULAR at 11:44

## 2023-04-12 RX ADMIN — FENTANYL CITRATE 25 MCG: 50 INJECTION, SOLUTION INTRAMUSCULAR; INTRAVENOUS at 10:04

## 2023-04-12 RX ADMIN — ROCURONIUM BROMIDE 10 MG: 50 INJECTION, SOLUTION INTRAVENOUS at 11:28

## 2023-04-12 RX ADMIN — PHENYLEPHRINE HYDROCHLORIDE 100 MCG: 10 INJECTION INTRAVENOUS at 10:17

## 2023-04-12 RX ADMIN — Medication 5 MG: at 10:23

## 2023-04-12 RX ADMIN — METRONIDAZOLE 500 MG: 500 INJECTION, SOLUTION INTRAVENOUS at 09:30

## 2023-04-12 RX ADMIN — SODIUM CHLORIDE, POTASSIUM CHLORIDE, SODIUM LACTATE AND CALCIUM CHLORIDE: 600; 310; 30; 20 INJECTION, SOLUTION INTRAVENOUS at 09:47

## 2023-04-12 RX ADMIN — Medication 5 MG: at 11:10

## 2023-04-12 RX ADMIN — LIDOCAINE HYDROCHLORIDE 100 MG: 20 INJECTION, SOLUTION INFILTRATION; PERINEURAL at 10:04

## 2023-04-12 RX ADMIN — SUGAMMADEX 200 MG: 100 INJECTION, SOLUTION INTRAVENOUS at 11:53

## 2023-04-12 RX ADMIN — Medication 2 G: at 10:03

## 2023-04-12 RX ADMIN — PHENAZOPYRIDINE 200 MG: 200 TABLET ORAL at 09:28

## 2023-04-12 ASSESSMENT — ACTIVITIES OF DAILY LIVING (ADL)
ADLS_ACUITY_SCORE: 35

## 2023-04-12 ASSESSMENT — LIFESTYLE VARIABLES: TOBACCO_USE: 1

## 2023-04-12 ASSESSMENT — COPD QUESTIONNAIRES: COPD: 0

## 2023-04-12 NOTE — DISCHARGE INSTRUCTIONS
Same Day Surgery Discharge Instructions for  Sedation and General Anesthesia     It's not unusual to feel dizzy, light-headed or faint for up to 24 hours after surgery or while taking pain medication.  If you have these symptoms: sit for a few minutes before standing and have someone assist you when you get up to walk or use the bathroom.    You should rest and relax for the next 24 hours. We recommend you make arrangements to have an adult stay with you for at least 24 hours after your discharge.  Avoid hazardous and strenuous activity.    DO NOT DRIVE any vehicle or operate mechanical equipment for 24 hours following the end of your surgery.  Even though you may feel normal, your reactions may be affected by the medication you have received.    Do not drink alcoholic beverages for 24 hours following surgery.     Slowly progress to your regular diet as you feel able. It's not unusual to feel nauseated and/or vomit after receiving anesthesia.  If you develop these symptoms, drink clear liquids (apple juice, ginger ale, broth, 7-up, etc. ) until you feel better.  If your nausea and vomiting persists for 24 hours, please notify your surgeon.      All narcotic pain medications, along with inactivity and anesthesia, can cause constipation. Drinking plenty of liquids and increasing fiber intake will help.    For any questions of a medical nature, call your surgeon.    Do not make important decisions for 24 hours.    If you had general anesthesia, you may have a sore throat for a couple of days related to the breathing tube used during surgery.  You may use Cepacol lozenges to help with this discomfort.  If it worsens or if you develop a fever, contact your surgeon.     If you feel your pain is not well managed with the pain medications prescribed by your surgeon, please contact your surgeon's office to let them know so they can address your concerns.      Today you received Toradol, an antiinflammatory medication similar  to Ibuprofen.  You should not take other antiinflammatory medication, such as Ibuprofen, Motrin, Advil, Aleve, Naprosyn, etc until 5:45 PM.

## 2023-04-12 NOTE — ANESTHESIA PROCEDURE NOTES
Airway       Patient location during procedure: OR       Procedure Start/Stop Times: 4/12/2023 10:07 AM  Staff -        Anesthesiologist:  Wolf Esparza MD       CRNA: Agustin Bond APRN CRNA       Other Anesthesia Staff: Yvonne Esparza       Performed By: SRNA  Consent for Airway        Urgency: elective  Indications and Patient Condition       Indications for airway management: abdiel-procedural       Induction type:intravenous       Mask difficulty assessment: 2 - vent by mask + OA or adjuvant +/- NMBA    Final Airway Details       Final airway type: endotracheal airway       Successful airway: ETT - single and Oral  Endotracheal Airway Details        ETT size (mm): 6.5       Cuffed: yes       Successful intubation technique: direct laryngoscopy       DL Blade Type: MAC 3       Grade View of Cords: 1       Adjucts: stylet       Position: Right       Measured from: gums/teeth       Secured at (cm): 19       Bite block used: None    Post intubation assessment        Placement verified by: capnometry, equal breath sounds and chest rise        Number of attempts at approach: 1       Number of other approaches attempted: 0       Secured with: pink tape       Ease of procedure: easy       Dentition: Intact and Unchanged    Medication(s) Administered   Medication Administration Time: 4/12/2023 10:07 AM

## 2023-04-12 NOTE — ANESTHESIA CARE TRANSFER NOTE
Patient: Kimmie Carranza    Procedure: Procedure(s):  laparoscopic removal of uterus, cervix, both ovaries and fallopian tubes, sentinel lymph node dissection, possible full lymph node dissection, possible open  Cystoscopy       Diagnosis: Endometrial adenocarcinoma (H) [C54.1]  Diagnosis Additional Information: No value filed.    Anesthesia Type:   General     Note:    Oropharynx: oropharynx clear of all foreign objects  Level of Consciousness: awake  Oxygen Supplementation: face mask  Level of Supplemental Oxygen (L/min / FiO2): 10  Independent Airway: airway patency satisfactory and stable  Dentition: dentition unchanged  Vital Signs Stable: post-procedure vital signs reviewed and stable  Report to RN Given: handoff report given  Patient transferred to: PACU    Handoff Report: Identifed the Patient, Identified the Reponsible Provider, Reviewed the pertinent medical history, Discussed the surgical course, Reviewed Intra-OP anesthesia mangement and issues during anesthesia, Set expectations for post-procedure period and Allowed opportunity for questions and acknowledgement of understanding      Vitals:  Vitals Value Taken Time   BP     Temp     Pulse     Resp     SpO2         Electronically Signed By: PRISCILLA Blake CRNA  April 12, 2023  12:03 PM

## 2023-04-12 NOTE — OP NOTE
[facility] - Operative Note    Pre-operative diagnosis:   Endometrial adenocarcinoma (H) [C54.1]    Post-operative diagnosis: Same    Procedure(s):  Total laparoscopic hysterectomy, bilateral salpingo-oophorectomy, sentinel lymph node dissection  Cystoscopy    Surgeon(s) and Role:     * Artis Tse MD - Primary  Alison Ching MD - Fellow, assisting  Elaine Mar MD - Resident, assisting    Anesthesia:   General    EBL: 5ml    Drains: None    Specimen(s):  ID Type Source Tests Collected by Time Destination   1 : Pelvic washings Washings Pelvis NON-GYNECOLOGIC CYTOLOGY Artis Tse MD 4/12/2023 10:36 AM    2 : Right para aortic sentinel lymph node #1 Tissue Lymph Node(s), Para-Aortic SURGICAL PATHOLOGY EXAM Artis Tse MD 4/12/2023 10:50 AM    3 : Right para aortic sentinel lymph node #2 Tissue Lymph Node(s), Para-Aortic SURGICAL PATHOLOGY EXAM Artis Tse MD 4/12/2023 11:00 AM    4 : LEFT OBTURATOR SENTINAL LYMPH NODE Tissue Lymph Node(s), Obturator, Left SURGICAL PATHOLOGY EXAM Artis Tse MD 4/12/2023 11:08 AM    5 : UTERUS, CERVIX, BILATERAL FALLOPIAN TUBES AND BILATERAL OVARIES Tissue Uterus, Cervix, Bilateral Fallopian Tubes & Ovaries SURGICAL PATHOLOGY EXAM Artis Tse MD 4/12/2023 11:29 AM        Complications: None apparent    Implants: None        Findings: EUA with normal cervix, small mobile uterus, no adnexal masses. On laparoscopy, no evidence of injury upon entry. Normal upper abdominal survey. Normal appearing uterus, bilateral fallopian tubes and ovaries. Paraaortic and obturator setinel lymph nodes identified and sent to pathology. Hemostatic surgical sites at the end of the case. Cystoscopy without evidence of injury or foreign objects to bladder, brisk jets noted from bilateral ureteral orifices..  Complications:   None.    Indication: Kimmie Carranza is a 72 year old who presented with postmenopausal bleeding and was found to have FIGO  grade 1 endometrial adenocarcinoma. She was recommended to undergo surgical management with the above listed procedure. Risks and benefits were discussed and she consented to proceed.    Procedure:  Patient was taken to the operating room. General anesthesia was induced. She was prepped and draped in the usual sterile fashion. Surgical time out was performed. A odom catheter was placed on the sterile field. After placement of a speculum, the cervix was grasped with a single toothed tenaculum and 0.5mg/mL ICG dye in sterile water was injected into the cervix at the 3 and 9 o'clock positions, both deep and superficial, injecting a total of 4 mL. The uterus was sounded and a uterine manipulator was placed into the uterus with the cervical cap fitted over the cervix.    After changing gloves, attention was turned to the abdomen where a 5mm umbilical incision was made. The Veress needle was inserted into the abdomen and intra-abdominal placement was confirmed with a low insertion pressure of 5mmHg and saline drop test. The abdomen was insufflated with CO2 to an operating pressure of 15mmHg. A 5mm clear view port was inserted into the abdomen under direct visualization with the laparoscope. A survey of the abdomen revealed the findings noted above. Attention was turned to placement of the accessory ports. A total of 3 accessory ports were place. The far right accessory port was an 8 mm port. The remainder were 5 mm ports. Abdominal survey was performed with findings as above. The patient was placed in steep Trendelenburg and the bowel was swept into the upper abdomen. Pelvic findings as noted above.    The retroperitoneum was opened parallel to the gonadal vessels. The external iliac artery was followed cephalad and the ureter was identified. The para-vesical space was opened to allow visualization of the obturator space. The sentinel lymph nodes were identified bilaterally using the ICG dye, tracing the afferent  lymphatic channels to the sentinel lymph node. The right sentinel node was noted to be in the paraaortic region. There was also a para-aortic sentinel node that was sent as a second right node that was actually found to originate from left channels.     The peritoneum overlying the common iliac artery was elevated and opened. The plan overlying the lymph nodes was developed. The sentinel lymph node was elevated off the IVC and removed using cautery. The second node at the bifurcation was removed in a similar fashion. The left sidewall was then opened and another node was identified in the obturator space. This was removed. Attention was then turned to the hysterectomy.    The ureter was again identified. The ovarian vessels were sealed and transected. The posterior broad ligament was transected toward the uterus to begin skeletonizing the uterine artery. The round ligament was transected and the anterior broad ligament was transected toward the uterus and the uterovesical peritoneum was incised. The bladder was dissected away from the lower uterus and upper vagina. The uterine artery was skeletonized and transected. The same procedure was carried out on the contralateral side.    Straight bites were taken on both sides in order to lateralize the uterine artery pedicle. Monopolar cautery was then used to make the colpotomy, which was carried along the cervical cup to free the specimen.     The specimen was then removed through the vagina, manipulator was confirmed intact, and the specimen was sent to pathology. The cuff suture was introduced into the abdomen and then a balloon was placed in the vagina to retain pneumoperitoneum.    The vaginal cuff was then closed laparoscopically using running sutures of 2-0 V-loc. The cuff was irrigated and found to be hemostatic.     All pedicles were inspected and also found to be hemostatic. The pneumoperitoneum was allowed to escape through the ports which were then removed.  The port sites were closed using subcuticular sutures. The vaginal balloon was removed.     All counts were correct prior to the conclusion of the case. The patient was awakened, extubated, and taken to PACU in stable condition.    Elaine Mar MD  ObGyn, PGY-3  04/12/2023 12:16 PM    Attending Attestation  I was present for and participated in the entire procedure. I agree with the procedure as documented in the note above, which I have edited as necessary.     Artis Tse MD    Gynecologic Oncology

## 2023-04-12 NOTE — ANESTHESIA POSTPROCEDURE EVALUATION
Patient: Kimmie Carranza    Procedure: Procedure(s):  laparoscopic removal of uterus, cervix, both ovaries and fallopian tubes, sentinel lymph node dissection, possible full lymph node dissection, possible open  Cystoscopy       Anesthesia Type:  General    Note:  Disposition: Outpatient   Postop Pain Control: Uneventful            Sign Out: Well controlled pain   PONV:    Neuro/Psych: Uneventful            Sign Out: Acceptable/Baseline neuro status   Airway/Respiratory: Uneventful            Sign Out: Acceptable/Baseline resp. status   CV/Hemodynamics: Uneventful            Sign Out: Acceptable CV status   Other NRE: NONE   DID A NON-ROUTINE EVENT OCCUR? No           Last vitals:  Vitals Value Taken Time   /74 04/12/23 1300   Temp 36.6  C (97.8  F) 04/12/23 1245   Pulse 52 04/12/23 1301   Resp 16 04/12/23 1301   SpO2 99 % 04/12/23 1301   Vitals shown include unvalidated device data.    Electronically Signed By: Wolf Esparza MD  April 12, 2023  2:10 PM

## 2023-04-12 NOTE — BRIEF OP NOTE
[facility]    Brief Operative Note    Pre-operative diagnosis: Endometrial adenocarcinoma (H) [C54.1]  Post-operative diagnosis Same as pre-operative diagnosis    Procedure: Procedure(s):  laparoscopic removal of uterus, cervix, both ovaries and fallopian tubes, sentinel lymph node dissection, possible full lymph node dissection, possible open  Cystoscopy  Surgeon: Surgeon(s) and Role:     * Artis Tse MD - Primary  Anesthesia: General   Estimated Blood Loss: 5 mL from 4/12/2023 10:01 AM to 4/12/2023 12:01 PM      Drains: None  Specimens:   ID Type Source Tests Collected by Time Destination   1 : Pelvic washings, gynecology Washings Pelvis NON-GYNECOLOGIC CYTOLOGY Artis Tse MD 4/12/2023 10:36 AM    2 : Right para aortic sentinel lymph node #1 Tissue Lymph Node(s), Para-Aortic SURGICAL PATHOLOGY EXAM Artis Tse MD 4/12/2023 10:50 AM    3 : Right para aortic sentinel lymph node #2 Tissue Lymph Node(s), Para-Aortic SURGICAL PATHOLOGY EXAM Artis Tse MD 4/12/2023 11:00 AM    4 : LEFT OBTURATOR SENTINAL LYMPH NODE Tissue Lymph Node(s), Obturator, Left SURGICAL PATHOLOGY EXAM Artis Tse MD 4/12/2023 11:08 AM    5 : UTERUS, CERVIX, BILATERAL FALLOPIAN TUBES AND BILATERAL OVARIES Tissue Uterus, Cervix, Bilateral Fallopian Tubes & Ovaries SURGICAL PATHOLOGY EXAM Artis Tse MD 4/12/2023 11:29 AM      Findings: EUA with normal cervix, small mobile uterus, no adnexal masses. On laparoscopy, no evidence of injury upon entry. Normal upper abdominal survey. Normal appearing uterus, bilateral fallopian tubes and ovaries. Paraaortic and obturator setinel lymph nodes identified and sent to pathology. Hemostatic surgical sites at the end of the case. Cystoscopy without evidence of injury or foreign objects to bladder, brisk jets noted from bilateral ureteral orifices..  Complications: None.  Implants: * No implants in log *    Elaine Mar MD  ObGyn, PGY-3  04/12/2023 12:07  PM

## 2023-04-12 NOTE — ANESTHESIA PREPROCEDURE EVALUATION
Anesthesia Pre-Procedure Evaluation    Patient: Kimmie Carranza   MRN: [id number] : 1950        Procedure : Procedure(s):  laparoscopic removal of uterus, cervix, both ovaries and fallopian tubes, sentinel lymph node dissection, possible full lymph node dissection, possible open  Cystoscopy          Past Medical History:   Diagnosis Date     Anxiety state, unspecified     on zoloft, better than wellbutrin, s/p traumatic death of daughter's boyfriend in '05     Hypertension      Mild major depression (H)     zoloft (had been on citalopram)     Mixed hyperlipidemia      Urge incontinence     trial of detrol helped, uses for trips, gets thirsty      Past Surgical History:   Procedure Laterality Date     COLONOSCOPY N/A 10/24/2016    Procedure: COMBINED COLONOSCOPY, SINGLE OR MULTIPLE BIOPSY/POLYPECTOMY BY BIOPSY;  Surgeon: Kwaku Henry MD;  Location:  GI     COLONOSCOPY N/A 2022    Procedure: COLONOSCOPY, FLEXIBLE, WITH LESION REMOVAL USING SNARE polypectomies using exacto cold snare;  Surgeon: Zane Cavazos MD;  Location:  GI     CV CORONARY ANGIOGRAM N/A 2019    Procedure: Coronary Angiogram;  Surgeon: Viktor Brothers MD;  Location:  HEART CARDIAC CATH LAB     [facility] NONSPECIFIC PROCEDURE      Tubal ligation     [facility] NONSPECIFIC PROCEDURE      laporoscopy      Allergies   Allergen Reactions     No Known Drug Allergies       Social History     Tobacco Use     Smoking status: Some Days     Packs/day: 0.25     Years: 40.00     Pack years: 10.00     Types: Cigarettes     Last attempt to quit: 2000     Years since quittin.2     Smokeless tobacco: Current     Tobacco comments:     smokes occasionally-- smoked ~1ppd from her 20s until her 60s, then ~2 cigs/wk   Vaping Use     Vaping status: Not on file   Substance Use Topics     Alcohol use: No     Alcohol/week: 0.0 standard drinks of alcohol     Comment: quit completely -      Wt Readings from Last 1 Encounters:    04/04/23 57.8 kg (127 lb 6.4 oz)        Anesthesia Evaluation   Pt has had prior anesthetic.     No history of anesthetic complications       ROS/MED HX  ENT/Pulmonary:     (+) tobacco use, Current use,  (-) asthma, COPD and sleep apnea   Neurologic:  - neg neurologic ROS     Cardiovascular:     (+) Dyslipidemia hypertension--CAD ---pulmonary hypertension, Previous cardiac testing   Echo: Date: 11/22 Results:  Interpretation Summary     Sinus rhythm was noted. The patient exhibited frequent PVCs.     Left ventricular systolic function is normal.  There is mild to moderate concentric left ventricular hypertrophy. Proximal  septal thickening is noted.  The right ventricle is normal in size and function.  A patent foramen ovale is suspected on color doppler.  Right ventricular systolic pressure is elevated, consistent with mild  pulmonary hypertension. PASP is 38 mm Hg plus RA presesures.  There is mild (1+) aortic regurgitation. No AS.  The inferior vena cava was normal in size with preserved respiratory  variability.  The ascending aorta is mildly dilated. Measures 4 cms.    Stress Test: Date: Results:    ECG Reviewed: Date: Results:    Cath: Date: 2019 Results:  Conclusion    Minimal non obstructive CAD      METS/Exercise Tolerance:     Hematologic:       Musculoskeletal:       GI/Hepatic:    (-) GERD   Renal/Genitourinary:     (+) renal disease, type: CRI,     Endo:  - neg endo ROS     Psychiatric/Substance Use:     (+) psychiatric history anxiety and depression alcohol abuse     Infectious Disease:       Malignancy:   (+) Malignancy, History of Other.Other CA Endometrial status post.    Other:            Physical Exam    Airway        Mallampati: II   TM distance: > 3 FB   Neck ROM: full   Mouth opening: > 3 cm    Respiratory Devices and Support         Dental       (+) Modest Abnormalities - crowns, retainers, 1 or 2 missing teeth    B=Bridge, C=Chipped, L=Loose, M=Missing    Cardiovascular   cardiovascular  exam normal       Rhythm and rate: regular     Pulmonary   pulmonary exam normal        breath sounds clear to auscultation           OUTSIDE LABS:  CBC:   Lab Results   Component Value Date    WBC 7.8 04/04/2023    WBC 7.0 05/06/2019    HGB 13.0 04/04/2023    HGB 13.8 05/06/2019    HCT 40.0 04/04/2023    HCT 42.8 05/06/2019     04/04/2023     05/06/2019     BMP:   Lab Results   Component Value Date     04/04/2023     (L) 01/25/2023    POTASSIUM 4.0 04/04/2023    POTASSIUM 4.5 01/25/2023    CHLORIDE 99 04/04/2023    CHLORIDE 97 (L) 01/25/2023    CO2 27 04/04/2023    CO2 25 01/25/2023    BUN 15.7 04/04/2023    BUN 14.9 01/25/2023    CR 0.67 04/04/2023    CR 0.76 01/25/2023    GLC 83 04/04/2023    GLC 89 01/25/2023     COAGS:   Lab Results   Component Value Date    PTT 34 07/06/2017    INR 1.00 07/06/2017     POC:   Lab Results   Component Value Date    BGM 94 07/06/2017     HEPATIC:   Lab Results   Component Value Date    ALBUMIN 4.4 04/04/2023    PROTTOTAL 6.9 04/04/2023    ALT 24 04/04/2023    AST 35 04/04/2023    ALKPHOS 53 04/04/2023    BILITOTAL 0.8 04/04/2023     OTHER:   Lab Results   Component Value Date    JEANNETTE 9.3 04/04/2023    MAG 2.4 (H) 05/06/2019    LIPASE 288 05/05/2019    TSH 1.42 12/15/2021       Anesthesia Plan    ASA Status:  2   NPO Status:  NPO Appropriate    Anesthesia Type: General.     - Airway: ETT   Induction: Intravenous, Propofol.   Maintenance: Balanced.   Techniques and Equipment:     - Lines/Monitors: 2nd IV     Consents    Anesthesia Plan(s) and associated risks, benefits, and realistic alternatives discussed. Questions answered and patient/representative(s) expressed understanding.    - Discussed:     - Discussed with:  Patient      - Extended Intubation/Ventilatory Support Discussed: No.      - Patient is DNR/DNI Status: No    Use of blood products discussed: Yes.     - Discussed with: Patient.     - Consented: consented to blood products            Reason  for refusal: other.     Postoperative Care    Pain management: Multi-modal analgesia.   PONV prophylaxis: Ondansetron (or other 5HT-3), Dexamethasone or Solumedrol     Comments:    Other Comments: Patient is counseled on the anesthesia plan and relevant anesthesia procedures including all risks and benefits. All patient questions were answered.             Wolf Esparza MD

## 2023-04-13 PROCEDURE — 88342 IMHCHEM/IMCYTCHM 1ST ANTB: CPT | Mod: 26 | Performed by: PATHOLOGY

## 2023-04-13 PROCEDURE — 88341 IMHCHEM/IMCYTCHM EA ADD ANTB: CPT | Mod: 26 | Performed by: PATHOLOGY

## 2023-04-13 PROCEDURE — 88360 TUMOR IMMUNOHISTOCHEM/MANUAL: CPT | Mod: 26 | Performed by: PATHOLOGY

## 2023-04-13 PROCEDURE — 88309 TISSUE EXAM BY PATHOLOGIST: CPT | Mod: 26 | Performed by: PATHOLOGY

## 2023-04-13 PROCEDURE — 88307 TISSUE EXAM BY PATHOLOGIST: CPT | Mod: 26 | Performed by: PATHOLOGY

## 2023-04-14 LAB
PATH REPORT.COMMENTS IMP SPEC: ABNORMAL
PATH REPORT.COMMENTS IMP SPEC: NORMAL
PATH REPORT.COMMENTS IMP SPEC: YES
PATH REPORT.FINAL DX SPEC: ABNORMAL
PATH REPORT.FINAL DX SPEC: NORMAL
PATH REPORT.GROSS SPEC: ABNORMAL
PATH REPORT.GROSS SPEC: NORMAL
PATH REPORT.MICROSCOPIC SPEC OTHER STN: ABNORMAL
PATH REPORT.MICROSCOPIC SPEC OTHER STN: NORMAL
PATH REPORT.RELEVANT HX SPEC: ABNORMAL
PATHOLOGY SYNOPTIC REPORT: ABNORMAL
PHOTO IMAGE: ABNORMAL

## 2023-04-14 PROCEDURE — 88112 CYTOPATH CELL ENHANCE TECH: CPT | Mod: 26 | Performed by: PATHOLOGY

## 2023-04-17 ENCOUNTER — PATIENT OUTREACH (OUTPATIENT)
Dept: ONCOLOGY | Facility: CLINIC | Age: 73
End: 2023-04-17
Payer: COMMERCIAL

## 2023-04-17 NOTE — PROGRESS NOTES
Follow up call to patient, s/p TLH, BSO, [place] [facility] on 4/12/2023 with Dr Tse for Endometrial Cancer.  Left message for patient to call back.    Patience Ferguson RN, BSN, OCN

## 2023-04-20 NOTE — PROGRESS NOTES
Return call from patient who states she is doing well following surgery with Dr Tse on 4/12/2023.  Patient reports minimal abdominal discomfort and taking motrin and tylenol prn, patient not alternating medications and instructed to alternate every 3 hours prn. Patient was taking oxycodone 1 tab daily but no longer requiring. Incisions without s/s infection. Eating and drinking well.  Patient does report intermittent lightheadedness when standing up since surgery but this does not occur daily.  Patient drinks 2 cups of coffee and maybe 6 cups of water daily.  Patient instructed to change positions slowly and increase fluid intake but to call back if this persists or worsens. Patient reports very scant vaginal spotting, using one panti-liner per day and informed this is normal and can last 4-6 weeks following surgery. Patient aware to call with vaginal bleeding soaking one pad/hour. Scheduled for post op appointment on 4/25 although Dr Jackson is out of the clinic on leave.  Rescheduled for 5/2/2023 at 9 am and patient understands to call back sooner with further concerns.  Patient verbalized understanding of all instructions.    Patience Ferguson, RN, BSN, OCN

## 2023-04-26 ENCOUNTER — MYC MEDICAL ADVICE (OUTPATIENT)
Dept: ONCOLOGY | Facility: CLINIC | Age: 73
End: 2023-04-26
Payer: COMMERCIAL

## 2023-04-26 NOTE — PROGRESS NOTES
Consult Notes on Referred Patient        Dr. Claudia Dan MD  [address] [facility] [address]  [place] [address]       RE: Kimmie Carranza  : 1950  DEANNA: May 2, 2023    HPI:  Kimmie Carranza is 72 year old with stage 1B, grade 1, MMR proficient endometrial cancer.  She is accompanied today by her daughter.  She is recovering as expected from surgery.  She is eating and drinking well.  She has no bowel/bladder concerns.  She notes a small amount of brown vaginal discharge but no bleeding.  No incisional concerns.  She notes pain in her right pinky toe which has been present for several weeks and she is set to see her PCP for this.    Cancer Course:  23:  US Pelvis:  1.  Abnormal heterogeneous and markedly thickened endometrium measuring 20 mm.     3/22/23:  EMB: Grade 1 endometrial adenocarcinoma, intact MMR    23:  Total laparoscopic hysterectomy, bilateral salpingo-oophorectomy, bilateral sentinel lymph node dissection, cystoscopy, with Dr Tse in my abscense   Pathology:  Grade 1 endometrial adenocarcinoma, tumor size 3.5 cm, 6/9 mm myometrial invasion, no LVSI, 0/3 sentinel LN, ER/SD positive    Obstetrics and Gynecology History:  , vaginal deliveries x4, no complications  Menopause right around 40 years, took hormones about a year. Took a pill        Past Medical History:  Past Medical History:   Diagnosis Date     Anxiety state, unspecified     on zoloft, better than wellbutrin, s/p traumatic death of daughter's boyfriend in '05     Endometrial cancer (H)      Hypertension      Mild major depression (H)     zoloft (had been on citalopram)     Mixed hyperlipidemia      Urge incontinence     trial of detrol helped, uses for trips, gets thirsty         Past Surgical History:  Past Surgical History:   Procedure Laterality Date     COLONOSCOPY N/A 10/24/2016    Procedure: COMBINED COLONOSCOPY, SINGLE OR MULTIPLE BIOPSY/POLYPECTOMY BY BIOPSY;  Surgeon: Kwaku Henry MD;  Location:  GI      COLONOSCOPY N/A 2022    Procedure: COLONOSCOPY, FLEXIBLE, WITH LESION REMOVAL USING SNARE polypectomies using exacto cold snare;  Surgeon: Zane Cavazos MD;  Location:  GI     CV CORONARY ANGIOGRAM N/A 2019    Procedure: Coronary Angiogram;  Surgeon: Viktor Brothers MD;  Location: [facility] CARDIAC CATH LAB     CYSTOSCOPY N/A 2023    Procedure: Cystoscopy;  Surgeon: Artis Tse MD;  Location: [facility] OR     LAPAROSCOPIC HYSTERECTOMY TOTAL, BILATERAL SALPINGO-OOPHORECTOMY, NODE DISSECTION, COMBINED Bilateral 2023    Procedure: laparoscopic removal of uterus, cervix, both ovaries and fallopian tubes, sentinel lymph node dissection, possible full lymph node dissection, possible open;  Surgeon: Artis Tse MD;  Location:  OR     [facility] NONSPECIFIC PROCEDURE      Tubal ligation     [facility] NONSPECIFIC PROCEDURE      laporoscopy       Health Maintenance:  Last Pap Smear: No need for pap smear exams s/p hysterectomy  She has not had a history of abnormal Pap smears.    Last Mammogram: 22              Result: normal      She has not had a history of abnormal mammograms.    Last Colonoscopy: 22              Result: Polyps-repeat 5 years       Social History:  Retired at 65, was a . Worked at [employer]. Lives with daughter and two grandsons in [place]. Abbie Carranza is her daughter who she would like to contact should anything happen to her.      Family History:   The patient's family history is significant for.  Family History   Problem Relation Age of Onset     Hypertension Mother      Cerebrovascular Disease Mother          of complications of stroke at age 82     Heart Disease Mother         atrial fib     Osteoporosis Mother         two hip fx's     Neurologic Disorder Mother         MS     Hypertension Father      Neurologic Disorder Father         dementia- Alzheimers, dx in his 60s, live to his 90s     Gastrointestinal Disease  "Father      Depression Father      Neurologic Disorder Sister      Hypertension Brother      Neurologic Disorder Maternal Grandmother      C.A.D. Paternal Grandmother 62         at 62, of MI     Bipolar Disorder Daughter      C.A.D. Paternal Aunt 62        \"\"     C.A.D. Paternal Aunt 62        \"\"     Colon Cancer Paternal Uncle      Breast Cancer Maternal Aunt        Physical Exam:   BP (!) 167/73 (Cuff Size: Adult Regular)   Pulse 50   Temp 97  F (36.1  C) (Tympanic)   Resp 16   Ht 1.651 m (5' 5\")   Wt 56.7 kg (125 lb)   SpO2 99%   BMI 20.80 kg/m    Body mass index is 20.8 kg/m .    General Appearance: healthy and alert, no distress     Gastrointestinal:       abdomen soft, non-tender, non-distended, no organomegaly or masses, port sites without erythema/induration or drainage    Genitourinary: External genitalia and urethral meatus appears normal.  Vagina is smooth with a well healing vaginal cuff    Extremities:   Right distal toes all with some redness which is blanching to the level of the mid-toe on all toes.  Not consistent with infection.  There is no swelling or induration of the pinky toe but it is tender to palpation.  No concern for infection    Labs:  None      Assessment:    Kimmie Carranza is a 72 year old woman with a diagnosis of stage 1B, grade 1, MMR proficient endometrial adenocarcinoma.     A total of 30 minutes was spent on patient care today.       Plan:     1.)    Stage 1B, grade 1, MMR proficient endometrial adenocarcinoma.  Pathology was reviewed and she was provided with a copy of her results.  Discussed option of vaginal brachytherapy to decrease the risk of a vaginal recurrence but that this has no effect on the overall survival or recurrence outside the vagina.  After thorough discussion she would like to meet with radiation oncology to discuss further.  Discussed signs and symptoms of recurrence and to call if these should occur.  Discussed role of surveillance with history and " physical exam and that labs/imaging would only be done based on symptoms but not routinely.  Discussed no need for further pap smear testing.  Discussed visits every 6 months for up to 5 years (4/28) then annually thereafter.  Discussed that these visits would be with the NP unless concerns arise. She will return in 6 months.      2.) Genetic risk factors were assessed and the patient does not meet the qualifications for a referral.      3.) Labs and/or tests ordered include:  Mammogram, Radiation oncology     4.) Health maintenance issues addressed today include pt is due for a mammogram which she will schedule          Thank you for allowing us to participate in the care of your patient.         Sincerely,    Nesha Jackson MD  Gynecologic Oncology  [facility]       TIA POSEY

## 2023-04-26 NOTE — PATIENT INSTRUCTIONS
Mammogram, Scheduled 5/10/23    Visit with Radiation Oncology at the [facility], Scheduled 5/17/23    Next visit in person with NP in 6 months, Scheduled 11/7/23  Patience Ferguson RN, BSN, OCN

## 2023-05-02 ENCOUNTER — ONCOLOGY VISIT (OUTPATIENT)
Dept: ONCOLOGY | Facility: CLINIC | Age: 73
End: 2023-05-02
Attending: OBSTETRICS & GYNECOLOGY
Payer: COMMERCIAL

## 2023-05-02 VITALS
RESPIRATION RATE: 16 BRPM | TEMPERATURE: 97 F | DIASTOLIC BLOOD PRESSURE: 73 MMHG | WEIGHT: 125 LBS | HEIGHT: 65 IN | HEART RATE: 50 BPM | SYSTOLIC BLOOD PRESSURE: 167 MMHG | BODY MASS INDEX: 20.83 KG/M2 | OXYGEN SATURATION: 99 %

## 2023-05-02 DIAGNOSIS — C54.1 ENDOMETRIAL CANCER (H): Primary | ICD-10-CM

## 2023-05-02 DIAGNOSIS — Z12.31 VISIT FOR SCREENING MAMMOGRAM: ICD-10-CM

## 2023-05-02 PROCEDURE — 99214 OFFICE O/P EST MOD 30 MIN: CPT | Mod: 24 | Performed by: OBSTETRICS & GYNECOLOGY

## 2023-05-02 PROCEDURE — G0463 HOSPITAL OUTPT CLINIC VISIT: HCPCS | Performed by: OBSTETRICS & GYNECOLOGY

## 2023-05-02 ASSESSMENT — PAIN SCALES - GENERAL: PAINLEVEL: NO PAIN (0)

## 2023-05-02 NOTE — NURSING NOTE
"Oncology Rooming Note    May 2, 2023 9:16 AM   Kimmie Carranza is a 72 year old female who presents for:    Chief Complaint   Patient presents with     Oncology Clinic Visit     Endometrial cancer      Initial Vitals: BP (!) 167/73 (Cuff Size: Adult Regular)   Pulse 50   Temp 97  F (36.1  C) (Tympanic)   Resp 16   Ht 1.651 m (5' 5\")   Wt 56.7 kg (125 lb)   SpO2 99%   BMI 20.80 kg/m   Estimated body mass index is 20.8 kg/m  as calculated from the following:    Height as of this encounter: 1.651 m (5' 5\").    Weight as of this encounter: 56.7 kg (125 lb). Body surface area is 1.61 meters squared.  No Pain (0) Comment: Data Unavailable   No LMP recorded. Patient is postmenopausal.  Allergies reviewed: Yes  Medications reviewed: Yes    Medications: Medication refills not needed today.  Pharmacy name entered into [employer]: [id number] - [place], [place] - [address]    Clinical concerns: post op      Nesha Sullivan CMA              "

## 2023-05-02 NOTE — LETTER
2023         RE: Kimmie Carranza  [address] [facility] [place] [address]        Dear Colleague,    Thank you for referring your patient, Kimmie Carranza, to the [facility] [place]. Please see a copy of my visit note below.    Consult Notes on Referred Patient        Dr. Claudia Dan MD  [address]  [place] [address]       RE: Kimmie Carranza  : 1950  DEANNA: May 2, 2023    HPI:  Kimmie Carranza is 72 year old with stage 1B, grade 1, MMR proficient endometrial cancer.  She is accompanied today by her daughter.  She is recovering as expected from surgery.  She is eating and drinking well.  She has no bowel/bladder concerns.  She notes a small amount of brown vaginal discharge but no bleeding.  No incisional concerns.  She notes pain in her right pinky toe which has been present for several weeks and she is set to see her PCP for this.    Cancer Course:  23:  US Pelvis:  1.  Abnormal heterogeneous and markedly thickened endometrium measuring 20 mm.     3/22/23:  EMB: Grade 1 endometrial adenocarcinoma, intact MMR    23:  Total laparoscopic hysterectomy, bilateral salpingo-oophorectomy, bilateral sentinel lymph node dissection, cystoscopy, with Dr Tse in my abscense   Pathology:  Grade 1 endometrial adenocarcinoma, tumor size 3.5 cm, 6/9 mm myometrial invasion, no LVSI, 0/3 sentinel LN, ER/VT positive    Obstetrics and Gynecology History:  , vaginal deliveries x4, no complications  Menopause right around 40 years, took hormones about a year. Took a pill        Past Medical History:  Past Medical History:   Diagnosis Date     Anxiety state, unspecified     on zoloft, better than wellbutrin, s/p traumatic death of daughter's boyfriend in '05     Endometrial cancer (H)      Hypertension      Mild major depression (H)     zoloft (had been on citalopram)     Mixed hyperlipidemia      Urge incontinence     trial of detrol helped, uses for trips, gets thirsty          Past Surgical History:  Past Surgical History:   Procedure Laterality Date     COLONOSCOPY N/A 10/24/2016    Procedure: COMBINED COLONOSCOPY, SINGLE OR MULTIPLE BIOPSY/POLYPECTOMY BY BIOPSY;  Surgeon: Kwaku Henry MD;  Location:  GI     COLONOSCOPY N/A 2022    Procedure: COLONOSCOPY, FLEXIBLE, WITH LESION REMOVAL USING SNARE polypectomies using exacto cold snare;  Surgeon: Zane Cavazos MD;  Location:  GI     CV CORONARY ANGIOGRAM N/A 2019    Procedure: Coronary Angiogram;  Surgeon: Viktor Brothers MD;  Location: [facility] CARDIAC CATH LAB     CYSTOSCOPY N/A 2023    Procedure: Cystoscopy;  Surgeon: Artis Tse MD;  Location:  OR     LAPAROSCOPIC HYSTERECTOMY TOTAL, BILATERAL SALPINGO-OOPHORECTOMY, NODE DISSECTION, COMBINED Bilateral 2023    Procedure: laparoscopic removal of uterus, cervix, both ovaries and fallopian tubes, sentinel lymph node dissection, possible full lymph node dissection, possible open;  Surgeon: Artis Tse MD;  Location:  OR     ZZC NONSPECIFIC PROCEDURE      Tubal ligation     ZZ NONSPECIFIC PROCEDURE      laporoscopy       Health Maintenance:  Last Pap Smear: No need for pap smear exams s/p hysterectomy  She has not had a history of abnormal Pap smears.    Last Mammogram: 22              Result: normal      She has not had a history of abnormal mammograms.    Last Colonoscopy: 22              Result: Polyps-repeat 5 years       Social History:  Retired at 65, was a . Worked at [employer]. Lives with daughter and two grandsons in [place]. Abbie Carranza is her daughter who she would like to contact should anything happen to her.      Family History:   The patient's family history is significant for.  Family History   Problem Relation Age of Onset     Hypertension Mother      Cerebrovascular Disease Mother          of complications of stroke at age 82     Heart Disease Mother  "        atrial fib     Osteoporosis Mother         two hip fx's     Neurologic Disorder Mother         MS     Hypertension Father      Neurologic Disorder Father         dementia- Alzheimers, dx in his 60s, live to his 90s     Gastrointestinal Disease Father      Depression Father      Neurologic Disorder Sister      Hypertension Brother      Neurologic Disorder Maternal Grandmother      C.A.D. Paternal Grandmother 62         at 62, of MI     Bipolar Disorder Daughter      C.A.D. Paternal Aunt 62        \"\"     C.A.D. Paternal Aunt 62        \"\"     Colon Cancer Paternal Uncle      Breast Cancer Maternal Aunt        Physical Exam:   BP (!) 167/73 (Cuff Size: Adult Regular)   Pulse 50   Temp 97  F (36.1  C) (Tympanic)   Resp 16   Ht 1.651 m (5' 5\")   Wt 56.7 kg (125 lb)   SpO2 99%   BMI 20.80 kg/m    Body mass index is 20.8 kg/m .    General Appearance: healthy and alert, no distress     Gastrointestinal:       abdomen soft, non-tender, non-distended, no organomegaly or masses, port sites without erythema/induration or drainage    Genitourinary: External genitalia and urethral meatus appears normal.  Vagina is smooth with a well healing vaginal cuff    Extremities:   Right distal toes all with some redness which is blanching to the level of the mid-toe on all toes.  Not consistent with infection.  There is no swelling or induration of the pinky toe but it is tender to palpation.  No concern for infection    Labs:  None      Assessment:    Kimmie Carranza is a 72 year old woman with a diagnosis of stage 1B, grade 1, MMR proficient endometrial adenocarcinoma.     A total of 30 minutes was spent on patient care today.       Plan:     1.)    Stage 1B, grade 1, MMR proficient endometrial adenocarcinoma.  Pathology was reviewed and she was provided with a copy of her results.  Discussed option of vaginal brachytherapy to decrease the risk of a vaginal recurrence but that this has no effect on the overall survival or " recurrence outside the vagina.  After thorough discussion she would like to meet with radiation oncology to discuss further.  Discussed signs and symptoms of recurrence and to call if these should occur.  Discussed role of surveillance with history and physical exam and that labs/imaging would only be done based on symptoms but not routinely.  Discussed no need for further pap smear testing.  Discussed visits every 6 months for up to 5 years (4/28) then annually thereafter.  Discussed that these visits would be with the NP unless concerns arise. She will return in 6 months.      2.) Genetic risk factors were assessed and the patient does not meet the qualifications for a referral.      3.) Labs and/or tests ordered include:  Mammogram, Radiation oncology     4.) Health maintenance issues addressed today include pt is due for a mammogram which she will schedule          Thank you for allowing us to participate in the care of your patient.         Sincerely,    Nesha Jackson MD  Gynecologic Oncology  [facility]       TIA POSEY        Again, thank you for allowing me to participate in the care of your patient.        Sincerely,        Cesar Jackson MD

## 2023-05-05 ENCOUNTER — PATIENT OUTREACH (OUTPATIENT)
Dept: ONCOLOGY | Facility: CLINIC | Age: 73
End: 2023-05-05
Payer: COMMERCIAL

## 2023-05-05 NOTE — PROGRESS NOTES
"New Patient Radiation Oncology Nurse Navigator Note     Referral Date: 5/5/23    Referring provider:   Nesha Cook MD   [address] [place] [id number]   Phone: [phone]   Fax: [phone]       Referring Clinic/Organization: [facility]     Referred to: Radiation Oncology    Requested provider (if applicable): First available - did not specify     Evaluation for : Endometrial Cancer- Brachytherapy     Clinical History (per Nurse review of records provided):      4/12 path:  Final Diagnosis   A.  Lymph node, right periaortic, sentinel, excision-  Benign lymph node (1)    B.  Lymph node, right para-aortic, sentinel, excision-  Benign lymph node (1)    C.  Lymph node, left obturator, sentinel, excision-  Benign lymph node (1)    D.  Uterus, bilateral ovaries and fallopian tubes, resection-  Endometrioid adenocarcinoma, FIGO grade 1, myometrial invasive (see synoptic report and description)  Arley tumor involving the left ovary, benign right ovary and bilateral fallopian tubes        5/2 Oncology Visit with Dr. Jackson:  \"Plan:    1.)    Stage 1B, grade 1, MMR proficient endometrial adenocarcinoma.  Pathology was reviewed and she was provided with a copy of her results.  Discussed option of vaginal brachytherapy to decrease the risk of a vaginal recurrence but that this has no effect on the overall survival or recurrence outside the vagina.  After thorough discussion she would like to meet with radiation oncology to discuss further. \"-- BOOKMARKED      Clinical Assessment / Barriers to Care (Per Nurse):  Pt lives in [facility]     Records Location: [facility]     Records Needed:   N/A    Additional testing needed prior to consult:   N/A    Referral updates and Plan:   Chart reviewed and routed to NPS to arrange consult with Dr. Gutierrez/Jose to discuss Brachytherapy as an option. Will follow-up as needed as writer has already spoken with pt and she's established with Dr. Jackson. "     IB FYI to [facility] RN[facility] as FYI for potential Brachtherapy candidate.     Ximena Tovar, BSN, RN, PHN, OCN  Hematology/Oncology Nurse Navigator  [facility]  [phone]

## 2023-05-10 ENCOUNTER — MYC MEDICAL ADVICE (OUTPATIENT)
Dept: FAMILY MEDICINE | Facility: CLINIC | Age: 73
End: 2023-05-10
Payer: COMMERCIAL

## 2023-05-10 ENCOUNTER — HOSPITAL ENCOUNTER (OUTPATIENT)
Dept: MAMMOGRAPHY | Facility: CLINIC | Age: 73
Discharge: HOME | End: 2023-05-10
Attending: OBSTETRICS & GYNECOLOGY | Admitting: OBSTETRICS & GYNECOLOGY
Payer: COMMERCIAL

## 2023-05-10 DIAGNOSIS — Z12.31 VISIT FOR SCREENING MAMMOGRAM: ICD-10-CM

## 2023-05-10 DIAGNOSIS — I10 ESSENTIAL HYPERTENSION WITH GOAL BLOOD PRESSURE LESS THAN 140/90: ICD-10-CM

## 2023-05-10 PROCEDURE — 77067 SCR MAMMO BI INCL CAD: CPT

## 2023-05-12 RX ORDER — AMLODIPINE BESYLATE 10 MG/1
TABLET ORAL
Qty: 30 TABLET | Refills: 0 | Status: SHIPPED | OUTPATIENT
Start: 2023-05-12 | End: 2023-05-26

## 2023-05-12 NOTE — TELEPHONE ENCOUNTER
Medication is being filled for 1 time refill only due to:  Patient needs to be seen because due for physical .   Last 2/8/22    Scheduled patient for 5/26.  Vianey Almendarez RN

## 2023-05-13 NOTE — PROGRESS NOTES
Department of Radiation Oncology                   [facility] [address]  [address] [place]  [address]  Office:  [phone]  Fax:  [phone]   Radiation Oncology Clinic  [address] [place] [address]  Phone:  [phone]  Fax:  [phone]     RE: Kimmie Carranza : 1950   MRN: [id number] DEANNA: 2023     OUTPATIENT VISIT NOTE       PROBLEM: Endometrial adenocarcinoma, FIGO IB, Grade 1     was seen for initial consultation in the Dept of Radiation Oncology on 2023 at the request of Dr. Jackson..    HISTORY OF PRESENT ILLNESS: Ms. Carranza is a 71 yo female with a newly diagnosed endometrial cancer.     She initially noticed vaginal bleeding October of last year. She ultimately had an ultrasound of the pelvis on 2023, which showed abnormal markedly thickened endometrium measuring 20 mm. Endometrial biopsy on 3/22/2023 by Dr. Dan was consistent with endometrioid adenocarcinoma, FIGO grade 1, MMR intact.     Given this finding, she was referred to [facility] and saw Dr. Jackson. She underwent TLH/BSO/SLN dissection by Dr. Tse on 2023 (in Dr. Jackson's absence). Cytology was negative. The endometrioid carcinoma was again FIGO grade 1, measuring 3.5 cm in greatest dimension. Myometrial invasion was 6 mm out of 9 mm (67%). There was no LVSI, no lower uterine segment involvement. A total of 3 SLN were retrieved (2 para-aortic, 1 left obturator), all of which were negative. Tumor was ER 80%, NH 60%. Final pathologic stage [id number], FIGO IB.     Ms. Carranza is referred to us for a discussion of adjuvant radiation therapy. On interview, Ms. Carranza states that she is recovering well from the surgery. She denies vaginal bleeding or discharge. She has no pelvic pain. Her bowel movements and bladder function are at baseline.         PAST MEDICAL HISTORY:   Past Medical History:   Diagnosis Date     Anxiety state, unspecified     on zoloft, better than wellbutrin, s/p  traumatic death of daughter's boyfriend in '05     Endometrial cancer (H)      Hypertension      Mild major depression (H)     zoloft (had been on citalopram)     Mixed hyperlipidemia      Urge incontinence     trial of detrol helped, uses for trips, gets thirsty     Past Surgical History:   Procedure Laterality Date     COLONOSCOPY N/A 10/24/2016    Procedure: COMBINED COLONOSCOPY, SINGLE OR MULTIPLE BIOPSY/POLYPECTOMY BY BIOPSY;  Surgeon: Kwaku Henry MD;  Location:  GI     COLONOSCOPY N/A 12/2/2022    Procedure: COLONOSCOPY, FLEXIBLE, WITH LESION REMOVAL USING SNARE polypectomies using exacto cold snare;  Surgeon: Zane Cavazos MD;  Location:  GI     CV CORONARY ANGIOGRAM N/A 5/6/2019    Procedure: Coronary Angiogram;  Surgeon: Viktor Brothers MD;  Location: [facility] CARDIAC CATH LAB     CYSTOSCOPY N/A 4/12/2023    Procedure: Cystoscopy;  Surgeon: Artis Tse MD;  Location:  OR     LAPAROSCOPIC HYSTERECTOMY TOTAL, BILATERAL SALPINGO-OOPHORECTOMY, NODE DISSECTION, COMBINED Bilateral 4/12/2023    Procedure: laparoscopic removal of uterus, cervix, both ovaries and fallopian tubes, sentinel lymph node dissection, possible full lymph node dissection, possible open;  Surgeon: Artis Tse MD;  Location:  OR     [facility] NONSPECIFIC PROCEDURE  1985    Tubal ligation     [facility] NONSPECIFIC PROCEDURE  1979    laporoscopy       CHEMOTHERAPY HISTORY: None     PAST RADIATION THERAPY HISTORY: None    MEDICATIONS:   Current Outpatient Medications   Medication Sig Dispense Refill     acetaminophen (TYLENOL) 325 MG tablet Take 2 tablets (650 mg) by mouth every 6 hours as needed for mild pain 24 tablet 0     amLODIPine (NORVASC) 10 MG tablet TAKE 1 TABLET BY MOUTH EVERYDAY AT BEDTIME 30 tablet 0     aspirin 81 MG EC tablet Take 1 tablet (81 mg) by mouth daily 90 tablet 3     Blood Pressure Monitor KIT 1 Units daily as needed (blood pressure check) 1 kit 0     coenzyme Q-10 (CO-Q10) 50 MG capsule Take  1 capsule (50 mg) by mouth daily       ibuprofen (ADVIL/MOTRIN) 600 MG tablet Take 1 tablet (600 mg) by mouth every 6 hours as needed for other (mild and/or inflammatory pain) 12 tablet 0     lisinopril-hydrochlorothiazide (ZESTORETIC) 20-12.5 MG tablet Take 2 tablets by mouth daily 180 tablet 0     Multiple Vitamins-Minerals (MULTIVITAMIN ADULT PO) Take 1 tablet by mouth daily       nicotine (COMMIT) 2 MG lozenge Place 1 lozenge (2 mg) inside cheek every hour as needed for smoking cessation Place 1 lozenge inside cheek as needed for smoking cessation. 100 lozenge 5     rosuvastatin (CRESTOR) 10 MG tablet TAKE 1 TABLET (10 MG) BY MOUTH DAILY. 90 tablet 0     sertraline (ZOLOFT) 100 MG tablet TAKE 1.5 TABLETS BY MOUTH DAILY 135 tablet 0     traZODone (DESYREL) 50 MG tablet TAKE 1 TABLET BY MOUTH AT BEDTIME AS NEEDED FOR SLEEP 90 tablet 0       ALLERGIES:  allergic to no known drug allergy.    SOCIAL HISTORY:  10 pack year smoking history     Retired   Worked at [employer].   Lives with daughter and 2 grandsons in [place]    FAMILY HISTORY:   family history includes Bipolar Disorder in her daughter; Breast Cancer in her maternal aunt; C.A.D. (age of onset: 62) in her paternal aunt, paternal aunt, and paternal grandmother; Cerebrovascular Disease in her mother; Colon Cancer in her paternal uncle; Depression in her father; Gastrointestinal Disease in her father; Heart Disease in her mother; Hypertension in her brother, father, and mother; Neurologic Disorder in her father, maternal grandmother, mother, and sister; Osteoporosis in her mother.  Paternal uncle: colon cancer  Maternal aunt: breast cancer        REVIEW OF SYMPTOMS:  A full 14-point review of systems was performed.     Menopause age 40  On HRT for about a year    PHYSICAL EXAMINATION:    /72   Pulse 72   Wt 59.5 kg (131 lb 1.6 oz)   BMI 21.82 kg/m     Gen: Appears well  CV: well pefused  Resp:  breathing comfortably on room air  HEENT: unremarkable  Neuro: grossly intact  Pelvic: deferred      ASSESSMENT AND PLAN: In summary, Ms. Carranza is a 73 yo female with a FIGO IB, Grade 1 endometrioid adenocarcinoma of the endometrium, s/p TLH/BSO/SLN dissection.     We reviewed the indications for adjuvant radiation therapy for early stage endometrial cancer. She has several favorable factors including low grade and lack of LVSI. Her primary risk factors are her age over 70 and deep myometrial invasion of 67%. She meets the high intermediate criteria for both GOG 99 (cut off of outer 1/3 myometrial invasion), and PORTEC 2 (age > 60, IB, grade 1).     I thus recommend consideration of adjuvant brachytherapy. Treatment will not improve her overall survival, but can decrease the risk of cuff recurrence. I described the simulation and treatment in detail. The toxicities are minimal during the treatment, but treatment can cause vaginal stenosis and shortening with follow up.    She is interested in proceeding with the brachytherapy. She will be treated with 5 fractions of 600 cGy each, prescribed to the vaginal surface.     We will reach out to her to schedule the treatment with the goal of starting the treatment about 7-8 weeks post-op.     Thank you for allowing us to participate in this patient's care.  Please feel free to call with any questions or concerns.       Edith Gutierrez M.D./Ph.D.  Radiation Oncologist   Department of Radiation Oncology  [facility]  Phone: [phone]        I reviewed patient's chart, internal/external medical records, imaging studies (including actual images), labs and pathology reports.  I interviewed and counseled the patient face to face.  I additionally discussed the case with patient's care team.          Edith Gutierrez MD

## 2023-05-16 NOTE — TELEPHONE ENCOUNTER
Action May 16, 2023 2:13 PM TJ   Action Taken Internal Referral.  LVM for PT to confirm no previous radiation or outside records needed

## 2023-05-17 ENCOUNTER — OFFICE VISIT (OUTPATIENT)
Dept: RADIATION ONCOLOGY | Facility: CLINIC | Age: 73
End: 2023-05-17
Attending: OBSTETRICS & GYNECOLOGY
Payer: COMMERCIAL

## 2023-05-17 ENCOUNTER — PRE VISIT (OUTPATIENT)
Dept: RADIATION ONCOLOGY | Facility: CLINIC | Age: 73
End: 2023-05-17

## 2023-05-17 VITALS
BODY MASS INDEX: 21.82 KG/M2 | HEART RATE: 72 BPM | WEIGHT: 131.1 LBS | SYSTOLIC BLOOD PRESSURE: 112 MMHG | DIASTOLIC BLOOD PRESSURE: 72 MMHG

## 2023-05-17 DIAGNOSIS — C54.1 ENDOMETRIAL CANCER (H): ICD-10-CM

## 2023-05-17 PROCEDURE — 99204 OFFICE O/P NEW MOD 45 MIN: CPT | Performed by: RADIOLOGY

## 2023-05-17 PROCEDURE — G0463 HOSPITAL OUTPT CLINIC VISIT: HCPCS | Performed by: RADIOLOGY

## 2023-05-17 ASSESSMENT — ENCOUNTER SYMPTOMS
HEADACHES: 0
HEARTBURN: 0
DIARRHEA: 0
BLURRED VISION: 0
NECK PAIN: 0
COUGH: 0
FREQUENCY: 0
DIZZINESS: 0
FALLS: 0
SHORTNESS OF BREATH: 0
FEVER: 0
CONSTIPATION: 0
SORE THROAT: 0
WEIGHT LOSS: 0
DEPRESSION: 0
CHILLS: 0
DOUBLE VISION: 0
WHEEZING: 0
NAUSEA: 0
NERVOUS/ANXIOUS: 0
BACK PAIN: 0
VOMITING: 0

## 2023-05-17 NOTE — LETTER
2023         RE: Kimmie Carranza  [address] [facility] [address]        Dear Colleague,    Thank you for referring your patient, Kimmie Carranza, to the [facility] RADIATION ONCOLOGY. Please see a copy of my visit note below.    Department of Radiation Oncology                   [place] [address] [place]  [address]  Office:  [phone]  Fax:  [phone]   Radiation Oncology Clinic  [address] [place] [address]  Phone:  [phone]  Fax:  [phone]     RE: Kimmie Carranza : 1950   MRN: [id number] DEANNA: 2023     OUTPATIENT VISIT NOTE       PROBLEM: Endometrial adenocarcinoma, FIGO IB, Grade 1     was seen for initial consultation in the Dept of Radiation Oncology on 2023 at the request of Dr. Jackson..    HISTORY OF PRESENT ILLNESS: Ms. Carranza is a 71 yo female with a newly diagnosed endometrial cancer.     She initially noticed vaginal bleeding October of last year. She ultimately had an ultrasound of the pelvis on 2023, which showed abnormal markedly thickened endometrium measuring 20 mm. Endometrial biopsy on 3/22/2023 by Dr. Dan was consistent with endometrioid adenocarcinoma, FIGO grade 1, MMR intact.     Given this finding, she was referred to [facility] and saw Dr. Jackson. She underwent TLH/BSO/SLN dissection by Dr. Tse on 2023 (in Dr. Jackson's absence). Cytology was negative. The endometrioid carcinoma was again FIGO grade 1, measuring 3.5 cm in greatest dimension. Myometrial invasion was 6 mm out of 9 mm (67%). There was no LVSI, no lower uterine segment involvement. A total of 3 SLN were retrieved (2 para-aortic, 1 left obturator), all of which were negative. Tumor was ER 80%, MN 60%. Final pathologic stage [id number], FIGO IB.     Ms. Carranza is referred to us for a discussion of adjuvant radiation therapy. On interview, Ms. Carranza states that she is recovering well from the surgery. She denies vaginal  bleeding or discharge. She has no pelvic pain. Her bowel movements and bladder function are at baseline.         PAST MEDICAL HISTORY:   Past Medical History:   Diagnosis Date    Anxiety state, unspecified     on zoloft, better than wellbutrin, s/p traumatic death of daughter's boyfriend in '05    Endometrial cancer (H)     Hypertension     Mild major depression (H)     zoloft (had been on citalopram)    Mixed hyperlipidemia     Urge incontinence     trial of detrol helped, uses for trips, gets thirsty     Past Surgical History:   Procedure Laterality Date    COLONOSCOPY N/A 10/24/2016    Procedure: COMBINED COLONOSCOPY, SINGLE OR MULTIPLE BIOPSY/POLYPECTOMY BY BIOPSY;  Surgeon: Kwaku Henry MD;  Location:  GI    COLONOSCOPY N/A 12/2/2022    Procedure: COLONOSCOPY, FLEXIBLE, WITH LESION REMOVAL USING SNARE polypectomies using exacto cold snare;  Surgeon: Zane Cavazos MD;  Location:  GI    CV CORONARY ANGIOGRAM N/A 5/6/2019    Procedure: Coronary Angiogram;  Surgeon: Viktor Brothers MD;  Location: [facility] CARDIAC CATH LAB    CYSTOSCOPY N/A 4/12/2023    Procedure: Cystoscopy;  Surgeon: Artis Tse MD;  Location:  OR    LAPAROSCOPIC HYSTERECTOMY TOTAL, BILATERAL SALPINGO-OOPHORECTOMY, NODE DISSECTION, COMBINED Bilateral 4/12/2023    Procedure: laparoscopic removal of uterus, cervix, both ovaries and fallopian tubes, sentinel lymph node dissection, possible full lymph node dissection, possible open;  Surgeon: Artis Tse MD;  Location:  OR    [facility] NONSPECIFIC PROCEDURE  1985    Tubal ligation    [facility] NONSPECIFIC PROCEDURE  1979    laporoscopy       CHEMOTHERAPY HISTORY: None     PAST RADIATION THERAPY HISTORY: None    MEDICATIONS:   Current Outpatient Medications   Medication Sig Dispense Refill    acetaminophen (TYLENOL) 325 MG tablet Take 2 tablets (650 mg) by mouth every 6 hours as needed for mild pain 24 tablet 0    amLODIPine (NORVASC) 10 MG tablet TAKE 1 TABLET BY MOUTH  EVERYDAY AT BEDTIME 30 tablet 0    aspirin 81 MG EC tablet Take 1 tablet (81 mg) by mouth daily 90 tablet 3    Blood Pressure Monitor KIT 1 Units daily as needed (blood pressure check) 1 kit 0    coenzyme Q-10 (CO-Q10) 50 MG capsule Take 1 capsule (50 mg) by mouth daily      ibuprofen (ADVIL/MOTRIN) 600 MG tablet Take 1 tablet (600 mg) by mouth every 6 hours as needed for other (mild and/or inflammatory pain) 12 tablet 0    lisinopril-hydrochlorothiazide (ZESTORETIC) 20-12.5 MG tablet Take 2 tablets by mouth daily 180 tablet 0    Multiple Vitamins-Minerals (MULTIVITAMIN ADULT PO) Take 1 tablet by mouth daily      nicotine (COMMIT) 2 MG lozenge Place 1 lozenge (2 mg) inside cheek every hour as needed for smoking cessation Place 1 lozenge inside cheek as needed for smoking cessation. 100 lozenge 5    rosuvastatin (CRESTOR) 10 MG tablet TAKE 1 TABLET (10 MG) BY MOUTH DAILY. 90 tablet 0    sertraline (ZOLOFT) 100 MG tablet TAKE 1.5 TABLETS BY MOUTH DAILY 135 tablet 0    traZODone (DESYREL) 50 MG tablet TAKE 1 TABLET BY MOUTH AT BEDTIME AS NEEDED FOR SLEEP 90 tablet 0       ALLERGIES:  allergic to no known drug allergy.    SOCIAL HISTORY:  10 pack year smoking history     Retired   Worked at [employer].   Lives with daughter and 2 grandsons in [place]    FAMILY HISTORY:   family history includes Bipolar Disorder in her daughter; Breast Cancer in her maternal aunt; C.A.D. (age of onset: 62) in her paternal aunt, paternal aunt, and paternal grandmother; Cerebrovascular Disease in her mother; Colon Cancer in her paternal uncle; Depression in her father; Gastrointestinal Disease in her father; Heart Disease in her mother; Hypertension in her brother, father, and mother; Neurologic Disorder in her father, maternal grandmother, mother, and sister; Osteoporosis in her mother.  Paternal uncle: colon cancer  Maternal aunt: breast cancer        REVIEW OF SYMPTOMS:  A full 14-point  review of systems was performed.     Menopause age 40  On HRT for about a year    PHYSICAL EXAMINATION:    /72   Pulse 72   Wt 59.5 kg (131 lb 1.6 oz)   BMI 21.82 kg/m     Gen: Appears well  CV: well pefused  Resp: breathing comfortably on room air  HEENT: unremarkable  Neuro: grossly intact  Pelvic: deferred      ASSESSMENT AND PLAN: In summary, Ms. Carranza is a 73 yo female with a FIGO IB, Grade 1 endometrioid adenocarcinoma of the endometrium, s/p TLH/BSO/SLN dissection.     We reviewed the indications for adjuvant radiation therapy for early stage endometrial cancer. She has several favorable factors including low grade and lack of LVSI. Her primary risk factors are her age over 70 and deep myometrial invasion of 67%. She meets the high intermediate criteria for both GOG 99 (cut off of outer 1/3 myometrial invasion), and PORTEC 2 (age > 60, IB, grade 1).     I thus recommend consideration of adjuvant brachytherapy. Treatment will not improve her overall survival, but can decrease the risk of cuff recurrence. I described the simulation and treatment in detail. The toxicities are minimal during the treatment, but treatment can cause vaginal stenosis and shortening with follow up.    She is interested in proceeding with the brachytherapy. She will be treated with 5 fractions of 600 cGy each, prescribed to the vaginal surface.     We will reach out to her to schedule the treatment with the goal of starting the treatment about 7-8 weeks post-op.     Thank you for allowing us to participate in this patient's care.  Please feel free to call with any questions or concerns.       Edith Gutierrez M.D./Ph.D.  Radiation Oncologist   Department of Radiation Oncology  [facility]  Phone: [phone]        I reviewed patient's chart, internal/external medical records, imaging studies (including actual images), labs and pathology reports.  I interviewed and counseled the patient face to  face.  I additionally discussed the case with patient's care team.          Edith Gutierrez MD

## 2023-05-17 NOTE — PROGRESS NOTES
HPI      Review of Systems   Constitutional: Negative for chills, fever, malaise/fatigue and weight loss.   HENT: Negative for congestion, hearing loss, sore throat and tinnitus.    Eyes: Negative for blurred vision and double vision.   Respiratory: Negative for cough, shortness of breath and wheezing.    Cardiovascular: Negative for chest pain and leg swelling.   Gastrointestinal: Negative for constipation, diarrhea, heartburn, nausea and vomiting.   Genitourinary: Negative for frequency.   Musculoskeletal: Negative for back pain, falls and neck pain.   Skin: Negative for itching and rash.   Neurological: Negative for dizziness and headaches.   Endo/Heme/Allergies: Positive for environmental allergies.   Psychiatric/Behavioral: Negative for depression. The patient is not nervous/anxious.

## 2023-05-17 NOTE — PROGRESS NOTES
HPI    INITIAL PATIENT ASSESSMENT    Diagnosis: endometrial cancer    Prior radiation therapy: None    Prior chemotherapy: None    Prior hormonal therapy:Yes: HRT for 2 years in 1990    Pain Eval:  Denies    Psychosocial  Living arrangements: daughter  Fall Risk: independent   referral needs: Not needed    Advanced Directive: No  Implantable Cardiac Device? No    Onset of menarche: 11  LMP: No LMP recorded. Patient has had a hysterectomy.  Onset of menopause: 40  Abnormal vaginal bleeding/discharge: No  Are you pregnant? No  Reproductive note: grown children    Nurse face-to-face time: Level 4:  15 min face to face time  ROS

## 2023-05-24 ASSESSMENT — ENCOUNTER SYMPTOMS
EYE PAIN: 0
JOINT SWELLING: 1
HEADACHES: 0
PALPITATIONS: 0
DIZZINESS: 0
CONSTIPATION: 0
CHILLS: 0
HEMATURIA: 0
DYSURIA: 0
SORE THROAT: 0
BREAST MASS: 0
HEARTBURN: 0
WEAKNESS: 0
PARESTHESIAS: 0
NERVOUS/ANXIOUS: 1
ARTHRALGIAS: 1
COUGH: 0
DIARRHEA: 0
MYALGIAS: 0
FEVER: 0
HEMATOCHEZIA: 0
ABDOMINAL PAIN: 0
SHORTNESS OF BREATH: 0
FREQUENCY: 0
NAUSEA: 0

## 2023-05-24 ASSESSMENT — ACTIVITIES OF DAILY LIVING (ADL): CURRENT_FUNCTION: NO ASSISTANCE NEEDED

## 2023-05-26 ENCOUNTER — OFFICE VISIT (OUTPATIENT)
Dept: FAMILY MEDICINE | Facility: CLINIC | Age: 73
End: 2023-05-26
Payer: COMMERCIAL

## 2023-05-26 VITALS
WEIGHT: 123.9 LBS | HEIGHT: 65 IN | DIASTOLIC BLOOD PRESSURE: 78 MMHG | TEMPERATURE: 97.6 F | RESPIRATION RATE: 18 BRPM | BODY MASS INDEX: 20.64 KG/M2 | SYSTOLIC BLOOD PRESSURE: 129 MMHG | HEART RATE: 67 BPM | OXYGEN SATURATION: 97 %

## 2023-05-26 DIAGNOSIS — F33.1 MODERATE EPISODE OF RECURRENT MAJOR DEPRESSIVE DISORDER (H): ICD-10-CM

## 2023-05-26 DIAGNOSIS — E78.5 HYPERLIPIDEMIA LDL GOAL <100: ICD-10-CM

## 2023-05-26 DIAGNOSIS — Z00.00 ENCOUNTER FOR MEDICARE ANNUAL WELLNESS EXAM: Primary | ICD-10-CM

## 2023-05-26 DIAGNOSIS — Z23 NEED FOR SHINGLES VACCINE: ICD-10-CM

## 2023-05-26 DIAGNOSIS — I10 ESSENTIAL HYPERTENSION WITH GOAL BLOOD PRESSURE LESS THAN 140/90: ICD-10-CM

## 2023-05-26 DIAGNOSIS — C54.1 ENDOMETRIAL CANCER (H): ICD-10-CM

## 2023-05-26 DIAGNOSIS — F41.1 ANXIETY STATE: ICD-10-CM

## 2023-05-26 PROCEDURE — 0124A COVID-19 BIVALENT 12+ (PFIZER): CPT | Performed by: FAMILY MEDICINE

## 2023-05-26 PROCEDURE — 91312 COVID-19 BIVALENT 12+ (PFIZER): CPT | Performed by: FAMILY MEDICINE

## 2023-05-26 PROCEDURE — G0439 PPPS, SUBSEQ VISIT: HCPCS | Performed by: FAMILY MEDICINE

## 2023-05-26 PROCEDURE — 99214 OFFICE O/P EST MOD 30 MIN: CPT | Mod: 25 | Performed by: FAMILY MEDICINE

## 2023-05-26 RX ORDER — AMLODIPINE BESYLATE 10 MG/1
10 TABLET ORAL AT BEDTIME
Qty: 90 TABLET | Refills: 1 | Status: SHIPPED | OUTPATIENT
Start: 2023-05-26 | End: 2023-01-01

## 2023-05-26 RX ORDER — SERTRALINE HYDROCHLORIDE 100 MG/1
100 TABLET, FILM COATED ORAL DAILY
Qty: 90 TABLET | Refills: 1 | Status: SHIPPED | OUTPATIENT
Start: 2023-05-26 | End: 2023-07-28

## 2023-05-26 RX ORDER — LISINOPRIL AND HYDROCHLOROTHIAZIDE 12.5; 2 MG/1; MG/1
2 TABLET ORAL DAILY
Qty: 180 TABLET | Refills: 1 | Status: SHIPPED | OUTPATIENT
Start: 2023-05-26 | End: 2023-01-01

## 2023-05-26 ASSESSMENT — ANXIETY QUESTIONNAIRES
2. NOT BEING ABLE TO STOP OR CONTROL WORRYING: NOT AT ALL
1. FEELING NERVOUS, ANXIOUS, OR ON EDGE: NEARLY EVERY DAY
GAD7 TOTAL SCORE: 7
GAD7 TOTAL SCORE: 7
5. BEING SO RESTLESS THAT IT IS HARD TO SIT STILL: SEVERAL DAYS
7. FEELING AFRAID AS IF SOMETHING AWFUL MIGHT HAPPEN: SEVERAL DAYS
3. WORRYING TOO MUCH ABOUT DIFFERENT THINGS: NOT AT ALL
6. BECOMING EASILY ANNOYED OR IRRITABLE: NOT AT ALL
IF YOU CHECKED OFF ANY PROBLEMS ON THIS QUESTIONNAIRE, HOW DIFFICULT HAVE THESE PROBLEMS MADE IT FOR YOU TO DO YOUR WORK, TAKE CARE OF THINGS AT HOME, OR GET ALONG WITH OTHER PEOPLE: NOT DIFFICULT AT ALL

## 2023-05-26 ASSESSMENT — ENCOUNTER SYMPTOMS
ABDOMINAL PAIN: 0
CONSTIPATION: 0
HEARTBURN: 0
EYE PAIN: 0
SORE THROAT: 0
CHILLS: 0
NAUSEA: 0
PALPITATIONS: 0
NERVOUS/ANXIOUS: 1
COUGH: 0
FEVER: 0
WEAKNESS: 0
HEADACHES: 0
HEMATOCHEZIA: 0
ARTHRALGIAS: 1
SHORTNESS OF BREATH: 0
JOINT SWELLING: 1
HEMATURIA: 0
DYSURIA: 0
BREAST MASS: 0
DIARRHEA: 0
DIZZINESS: 0
MYALGIAS: 0
PARESTHESIAS: 0
FREQUENCY: 0

## 2023-05-26 ASSESSMENT — ACTIVITIES OF DAILY LIVING (ADL): CURRENT_FUNCTION: NO ASSISTANCE NEEDED

## 2023-05-26 ASSESSMENT — PAIN SCALES - GENERAL: PAINLEVEL: NO PAIN (0)

## 2023-05-26 ASSESSMENT — PATIENT HEALTH QUESTIONNAIRE - PHQ9
SUM OF ALL RESPONSES TO PHQ QUESTIONS 1-9: 1
10. IF YOU CHECKED OFF ANY PROBLEMS, HOW DIFFICULT HAVE THESE PROBLEMS MADE IT FOR YOU TO DO YOUR WORK, TAKE CARE OF THINGS AT HOME, OR GET ALONG WITH OTHER PEOPLE: NOT DIFFICULT AT ALL
SUM OF ALL RESPONSES TO PHQ QUESTIONS 1-9: 1
5. POOR APPETITE OR OVEREATING: MORE THAN HALF THE DAYS

## 2023-05-26 NOTE — PROGRESS NOTES
"SUBJECTIVE:   Kathryn is a 73 year old who presents for Preventive Visit.       View : No data to display.            Patient has been advised of split billing requirements and indicates understanding: Yes  Are you in the first 12 months of your Medicare coverage?  No    Healthy Habits:    In general, how would you rate your overall health?  Good    Frequency of exercise:  2-3 days/week    Duration of exercise:  15-30 minutes    Do you usually eat at least 4 servings of fruit and vegetables a day, include whole grains    & fiber and avoid regularly eating high fat or \"junk\" foods?  Yes    Taking medications regularly:  Yes    Medication side effects:  None    Ability to successfully perform activities of daily living:  No assistance needed    Home Safety:  No safety concerns identified    Hearing Impairment:  Difficulty understanding soft or whispered speech    In the past 6 months, have you been bothered by leaking of urine?  No    In general, how would you rate your overall mental or emotional health?  Good      PHQ-2 Total Score:    Additional concerns today:  No    RN Wellness Visit Notes:    -Last mammo done 5/2023, due 5/2024 (impression: Negative)  -Last DEXA done 2/2023, due 2/2026 (impression: Osteopenia)  -Last colon cancer screening done 12/2022, due 12/2027 (impression: - Three 3 to 4 mm polyps in the distal transverse colon, Resected and  retrieved.  - Diverticulosis in the entire examined colon.- The examination was otherwise normal on direct and retroflexion views.)  -Immunizations: Shingles - encouraged patient to receive at a pharmacy. Covid Bivalent Booster. Patient agreeable.  -Lung Cancer Screening: Provider to discuss.     -ACP: Not on File. Blank Copy Given, discussed.   -Hearing: Wears hearing aids.   ------------------------------------------------------------------------    4/23- complete hysterectomy for endometrial cancer.  Met with rad/onc, planning vaginal brachytherapy - 5 doses q2 " days.  Planning to start 7-8 wks post-op.  First dose scheduled for 6/9/23.    Will do q6mo follow-up with Dr. Manuel Guerrero- next appt in 11/23.      R 5th toe pain- hurts when pressure on top of toe, maybe a bit discolored.  Getting better, wondered if it could be gout.      MDD/Anxiety- bit more, most bothered by not being able to sit for long.  Still wonders about ADHD txt, but got assessment, and they said she couldn't due to her HTN.  Reports she went down on the sertraline from 150mg/d to 100mg/d on her own, maybe a few months ago?  Just felt like one pill would be fine.  Doesn't think her sx's gotten worse since then, though maybe the restlessness could be part of it...    She stopped the trazodone at night.  Doesn't miss it.      Social updates--  Staying at one house - in daughter in [place].  Daughter's son has one son who has major disabilities (is actually 22 yrs, autism, mental disability, development of a 1yo).  The other twin is 22 yrs, also lives there, and he is very helpful.  She helps out with laundry and other things around the house which is tiring at times but feels like it's good for her.     is living in main house with their daughter (who is disabled- mental health issues).  Thinks they are doing fine.      Smoking- only occasionally, 2-3/wk.          Six Item Cognitive Impairment Test   (6CIT):      What year is it?                               Correct - 0 points    What month is it?                               Correct - 0 points      Give the patient an address to remember with five components:   Bakari Workman ( first and last name - 2 components)   [address]  (number and name of street - 2 components)   [place] ( city - 1 component)      About what time is it (within the hour)? Correct - 0 points    Count backwards from 20 to 1:   Correct - 0 points    Say the months of the year in reverse: Correct - 0 points    Repeat the address phrase:   1 error - 2 points    Total  6CIT Score:      2/28    Interpretation: The 6CIT uses an inverse score and questions are weighted to produce a total out of 28. Scores of 0-7 are considered normal and 8 or more significant.    Advantages The test has high sensitivity without compromising specificity even in mild dementia. It is easy to translate linguistically and culturally.  Disadvantages The main disadvantage is in the scoring and weighting of the test, which is initially confusing, however computer models have simplified this greatly.    Probability Statistics: At the 7/8 cut off: Overall figures sensitivity 90% specificity 100%, in mild dementia sensitivity = 78% , specificity = 100%    Copyright 2000 [facility], [facility]. Courtesy of Dr. Matty Saunders       Have you ever done Advance Care Planning? (For example, a Health Directive, POLST, or a discussion with a medical provider or your loved ones about your wishes): No, advance care planning information given to patient to review.  Advanced care planning was discussed at today's visit.      Fall risk  Fallen 2 or more times in the past year?: No  Any fall with injury in the past year?: Yes - Sledding with grandson. Evaluated at Urgent Care - no fractures.     Cognitive Screening   1) Repeat 3 items (Leader, Season, Table)    2) Clock draw: NORMAL  3) 3 item recall: Recalls 2 objects   Results: NORMAL clock, 1-2 items recalled: COGNITIVE IMPAIRMENT LESS LIKELY    Mini-CogTM Copyright S [employer]. Licensed by the author for use in [facility]; reprinted with permission (ender@.[facility]). All rights reserved.      Do you have sleep apnea, excessive snoring or daytime drowsiness?: yes - Sleep apnea    Reviewed and updated as needed this visit by clinical staff   Tobacco  Allergies  Meds  Problems  Med Hx  Surg Hx  Fam Hx          Reviewed and updated as needed this visit by Provider   Tobacco  Allergies  Meds  Problems  Med Hx  Surg Hx  Fam Hx          Social History     Tobacco Use     Smoking status: Some Days     Packs/day: 0.25     Years: 40.00     Pack years: 10.00     Types: Cigarettes     Last attempt to quit: 2000     Years since quittin.4     Smokeless tobacco: Current     Tobacco comments:     smokes occasionally-- smoked ~1ppd from her 20s until her 60s, then ~2 cigs/wk   Vaping Use     Vaping status: Never Used   Substance Use Topics     Alcohol use: No     Comment: quit completely -2023    11:49 AM   Alcohol Use   Prescreen: >3 drinks/day or >7 drinks/week? Not Applicable          View : No data to display.              Do you have a current opioid prescription? No  Do you use any other controlled substances or medications that are not prescribed by a provider? None              Current providers sharing in care for this patient include:   Patient Care Team:  Dee Castillo MD as PCP - General  Dee Castillo MD as Assigned PCP  Alicia Hewitt MD as MD (Psychiatry)  Miguel A Reina MD as Resident (Psychiatry)  Nesha Cook MD as MD (Gynecologic Oncology)  Claudia Dan MD as Physician (OB/Gyn)  Claudia Dan MD as Assigned OBGYN Provider  Patience Freguson RN as Specialty Care Coordinator (Gynecologic Oncology)  Nesha Cook MD as Assigned Cancer Care Provider  Laila Cherry MD as Assigned Heart and Vascular Provider  Edith Gutierrez MD as MD (Radiation Oncology)    The following health maintenance items are reviewed in Epic and correct as of today:  Health Maintenance   Topic Date Due     ANNUAL REVIEW OF HM ORDERS  Never done     ZOSTER IMMUNIZATION (2 of 3) 2012     LUNG CANCER SCREENING  2020     PHQ-9  2023     LIPID  2024     MICROALBUMIN  2024     BMP  2024     MAMMO SCREENING  05/10/2024     NICOTINE/TOBACCO CESSATION COUNSELING Q 1 YR  2024     MEDICARE ANNUAL WELLNESS VISIT  2024     FALL RISK ASSESSMENT   05/26/2024     DEXA  02/08/2026     COLORECTAL CANCER SCREENING  12/02/2027     ADVANCE CARE PLANNING  06/02/2028     DTAP/TDAP/TD IMMUNIZATION (4 - Td or Tdap) 01/25/2033     HEPATITIS C SCREENING  Completed     DEPRESSION ACTION PLAN  Completed     INFLUENZA VACCINE  Completed     Pneumococcal Vaccine: 65+ Years  Completed     URINALYSIS  Completed     COVID-19 Vaccine  Completed     IPV IMMUNIZATION  Aged Out     MENINGITIS IMMUNIZATION  Aged Out         Lab work is in process  Labs reviewed in EPIC  BP Readings from Last 3 Encounters:   06/12/23 (!) 148/53   06/09/23 (!) 156/78   05/26/23 129/78    Wt Readings from Last 3 Encounters:   05/26/23 56.2 kg (123 lb 14.4 oz)   05/17/23 59.5 kg (131 lb 1.6 oz)   05/02/23 56.7 kg (125 lb)                  Patient Active Problem List   Diagnosis     Anxiety state     Urge incontinence     Hyperlipidemia LDL goal <100     Mild major depression (H)     Osteoarthritis     Essential hypertension with goal blood pressure less than 140/90     Alcohol abuse     Mild atherosclerosis of carotid artery     Insomnia     Bilateral hip pain     Chronic bilateral low back pain without sciatica     Nonallopathic lesion of sacroiliac region     Sacroiliac joint pain     Closed compression fracture of second lumbar vertebra (H)     Memory change     Mild coronary artery disease     Chronic kidney disease, stage 1     Aortic root dilatation (H)     Postmenopausal bleeding     Endometrial cancer (H)     Past Surgical History:   Procedure Laterality Date     COLONOSCOPY N/A 10/24/2016    Procedure: COMBINED COLONOSCOPY, SINGLE OR MULTIPLE BIOPSY/POLYPECTOMY BY BIOPSY;  Surgeon: Kwaku Henry MD;  Location:  GI     COLONOSCOPY N/A 12/2/2022    Procedure: COLONOSCOPY, FLEXIBLE, WITH LESION REMOVAL USING SNARE polypectomies using exacto cold snare;  Surgeon: Zane Cavazos MD;  Location:  GI     CV CORONARY ANGIOGRAM N/A 5/6/2019    Procedure: Coronary Angiogram;  Surgeon:  "Viktor Brothers MD;  Location: [facility] CARDIAC CATH LAB     CYSTOSCOPY N/A 2023    Procedure: Cystoscopy;  Surgeon: Artis Tse MD;  Location:  OR     LAPAROSCOPIC HYSTERECTOMY TOTAL, BILATERAL SALPINGO-OOPHORECTOMY, NODE DISSECTION, COMBINED Bilateral 2023    Procedure: laparoscopic removal of uterus, cervix, both ovaries and fallopian tubes, sentinel lymph node dissection, possible full lymph node dissection, possible open;  Surgeon: Artis Tse MD;  Location:  OR     [facility] NONSPECIFIC PROCEDURE      Tubal ligation     [facility] NONSPECIFIC PROCEDURE      laporoscopy       Social History     Tobacco Use     Smoking status: Some Days     Packs/day: 0.25     Years: 40.00     Pack years: 10.00     Types: Cigarettes     Last attempt to quit: 2000     Years since quittin.4     Smokeless tobacco: Current     Tobacco comments:     smokes occasionally-- smoked ~1ppd from her 20s until her 60s, then ~2 cigs/wk   Vaping Use     Vaping status: Never Used   Substance Use Topics     Alcohol use: No     Comment: quit completely      Family History   Problem Relation Age of Onset     Hypertension Mother      Cerebrovascular Disease Mother          of complications of stroke at age 82     Heart Disease Mother         atrial fib     Osteoporosis Mother         two hip fx's     Neurologic Disorder Mother         MS     Hypertension Father      Neurologic Disorder Father         dementia- Alzheimers, dx in his 60s, live to his 90s     Gastrointestinal Disease Father      Depression Father      Neurologic Disorder Sister      Hypertension Brother      Neurologic Disorder Maternal Grandmother      C.A.D. Paternal Grandmother 62         at 62, of MI     Bipolar Disorder Daughter      C.A.D. Paternal Aunt 62        \"\"     C.A.D. Paternal Aunt 62        \"\"     Colon Cancer Paternal Uncle      Breast Cancer Maternal Aunt          Current Outpatient Medications   Medication Sig Dispense " Refill     acetaminophen (TYLENOL) 325 MG tablet Take 2 tablets (650 mg) by mouth every 6 hours as needed for mild pain 24 tablet 0     amLODIPine (NORVASC) 10 MG tablet Take 1 tablet (10 mg) by mouth At Bedtime 90 tablet 1     aspirin 81 MG EC tablet Take 1 tablet (81 mg) by mouth daily 90 tablet 3     Blood Pressure Monitor KIT 1 Units daily as needed (blood pressure check) 1 kit 0     coenzyme Q-10 (CO-Q10) 50 MG capsule Take 1 capsule (50 mg) by mouth daily       ibuprofen (ADVIL/MOTRIN) 600 MG tablet Take 1 tablet (600 mg) by mouth every 6 hours as needed for other (mild and/or inflammatory pain) 12 tablet 0     lisinopril-hydrochlorothiazide (ZESTORETIC) 20-12.5 MG tablet Take 2 tablets by mouth daily 180 tablet 1     Multiple Vitamins-Minerals (MULTIVITAMIN ADULT PO) Take 1 tablet by mouth daily       nicotine (COMMIT) 2 MG lozenge Place 1 lozenge (2 mg) inside cheek every hour as needed for smoking cessation Place 1 lozenge inside cheek as needed for smoking cessation. 100 lozenge 5     rosuvastatin (CRESTOR) 10 MG tablet TAKE 1 TABLET (10 MG) BY MOUTH DAILY. 90 tablet 0     sertraline (ZOLOFT) 100 MG tablet Take 1 tablet (100 mg) by mouth daily 90 tablet 1     Allergies   Allergen Reactions     No Known Drug Allergy      Recent Labs   Lab Test 04/04/23  1409 01/25/23  1235 11/11/22  1059 03/15/22  1543 12/15/21  1141 06/22/21  1231 08/26/20  1234   LDL  --  75 161*  --  95 167* 106*   HDL  --  69 76  --  82 79 80   TRIG  --  41 65  --  79 61 72   ALT 24 16  --   --   --   --  21   CR 0.67 0.76  --    < >  --  0.76 0.72   GFRESTIMATED >90 83  --    < >  --  78 85   GFRESTBLACK  --   --   --   --   --  >90 >90   POTASSIUM 4.0 4.5  --    < >  --  4.3 3.7   TSH  --   --   --   --  1.42  --  1.66    < > = values in this interval not displayed.              Review of Systems   Constitutional: Negative for chills and fever.   HENT: Positive for hearing loss. Negative for congestion, ear pain and sore throat.   "  Eyes: Negative for pain and visual disturbance.   Respiratory: Negative for cough and shortness of breath.    Cardiovascular: Negative for chest pain, palpitations and peripheral edema.   Gastrointestinal: Negative for abdominal pain, constipation, diarrhea, heartburn, hematochezia and nausea.   Breasts:  Negative for tenderness, breast mass and discharge.   Genitourinary: Positive for urgency. Negative for dysuria, frequency, genital sores, hematuria, pelvic pain, vaginal bleeding and vaginal discharge.   Musculoskeletal: Positive for arthralgias and joint swelling. Negative for myalgias.   Skin: Negative for rash.   Neurological: Negative for dizziness, weakness, headaches and paresthesias.   Psychiatric/Behavioral: Negative for mood changes. The patient is nervous/anxious.          OBJECTIVE:   /78   Pulse 67   Temp 97.6  F (36.4  C) (Temporal)   Resp 18   Ht 1.651 m (5' 5\")   Wt 56.2 kg (123 lb 14.4 oz)   SpO2 97%   BMI 20.62 kg/m   Estimated body mass index is 20.62 kg/m  as calculated from the following:    Height as of this encounter: 1.651 m (5' 5\").    Weight as of this encounter: 56.2 kg (123 lb 14.4 oz).  Physical Exam  GENERAL APPEARANCE: healthy, alert and no distress  EYES: Eyes grossly normal to inspection, PERRL and conjunctivae and sclerae normal  HENT: ear canals and TM's normal, nose and mouth without ulcers or lesions, oropharynx clear and oral mucous membranes moist  NECK: no adenopathy, no asymmetry, masses, or scars and thyroid normal to palpation  RESP: lungs clear to auscultation - no rales, rhonchi or wheezes  BREAST: normal without masses, tenderness or nipple discharge and no palpable axillary masses or adenopathy  CV: regular rate and rhythm, normal S1 S2, no S3 or S4, no murmur, click or rub, no peripheral edema and peripheral pulses strong  ABDOMEN: soft, nontender, no hepatosplenomegaly, no masses and bowel sounds normal  MS: no musculoskeletal defects are noted and " gait is age appropriate without ataxia  SKIN: no suspicious lesions or rashes  NEURO: Normal strength and tone, sensory exam grossly normal, mentation intact and speech normal  PSYCH: mentation appears normal and affect normal/bright    Diagnostic Test Results:  Labs reviewed in Epic    ASSESSMENT / PLAN:       ICD-10-CM    1. Encounter for Medicare annual wellness exam  Z00.00 COVID-19 BIVALENT 12+ (PFIZER)     PRIMARY CARE FOLLOW-UP SCHEDULING      2. Endometrial cancer (H)  C54.1       3. Essential hypertension with goal blood pressure less than 140/90  I10 PRIMARY CARE FOLLOW-UP SCHEDULING     lisinopril-hydrochlorothiazide (ZESTORETIC) 20-12.5 MG tablet     amLODIPine (NORVASC) 10 MG tablet      4. Anxiety state  F41.1 PRIMARY CARE FOLLOW-UP SCHEDULING     sertraline (ZOLOFT) 100 MG tablet      5. Moderate episode of recurrent major depressive disorder (H)  F33.1 PRIMARY CARE FOLLOW-UP SCHEDULING      6. Need for shingles vaccine  Z23       7. Abnormal ankle brachial index  R68.89       8. Hyperlipidemia LDL goal <100  E78.5         Wellness-   Reviewed chronic medical issues and medications/supplements.   Discussed healthy habits and self cares.    -Last mammo done 5/2023, due 5/2024-25 (impression: Negative)  -Last DEXA done 2/2023, due 2/2026 (impression: Osteopenia)  -Last colon cancer screening done 12/2022, due 12/2027, will discuss further as she gets closer to date (impression: - Three 3 to 4 mm polyps in the distal transverse colon, Resected and  retrieved.  - Diverticulosis in the entire examined colon.- The examination was otherwise normal on direct and retroflexion views.)  -Immunizations: Shingles - encouraged patient to receive at a pharmacy. Covid Bivalent Booster. Patient agreeable.  Risks/benefits discussed, given today.   -Lung Cancer Screening: Provider to discuss. Pt had Lung CT scan in 11/22 (with coronary calcium screening, no concerning nodules seen).  -ACP: Not on File. Blank Copy Given,  discussed.   -Hearing: Wears hearing aids.     Endometrial cancer- follow-up for post-menopausal bleeding completed after last appt, and she was dx with endometrial cancer.  Now s/p total hysterectomy, following with oncology (no paps needed going forwards).  Also following with rad/onc, planning on starting vag brachytherapy in 6/23 (5 doses ~q2 days per pt).  Will cont close follow-up.    HTN- Blood pressure in good control on current medications- lisin/hctz 40/25mg/d and amlodipine 10mg/d.  No side effects. Labs UTD.  Still with some concerns that she is forgetting meds based on refill pattern, but 6CIT is close to normal today.  Will continue current meds, refills sent.  Continue every six month follow-up.      Lipids/CAD- reviewed lipid panel from 1/23, LDL 75 (goal <70) on crestor 10mg/d (likely due to more consistent use with her living mostly at one house, her daughters).  Will continue crestor and asa 81mg/d.    MDD/anxiety- pt notes she went back down to 100mg dose of sertraline a few months ago (from 150mg/d).  Feels fine (though notes her restlessness is bothersome to her). She would like to continue at the 100mg/d dose, which we can do, but would urge increase back to 150mg/d if restlessness is worsening.  Cont q6mo follow-up.    Shingles- pt will try and get at pharmacy.    Smoking- discussed, not interested in quitting completely.  Cont to discuss lung cancer screening options, but she had lung CT scan as part of coronary calcium screening in 11/22 with no concerning nodules.    Patient has been advised of split billing requirements and indicates understanding: Yes      COUNSELING:  Reviewed preventive health counseling, as reflected in patient instructions        She reports that she has been smoking cigarettes. She has a 10.00 pack-year smoking history. She uses smokeless tobacco.  Nicotine/Tobacco Cessation Plan:   Information offered: Patient not interested at this time      Appropriate preventive  services were discussed with this patient, including applicable screening as appropriate for cardiovascular disease, diabetes, osteopenia/osteoporosis, and glaucoma.  As appropriate for age/gender, discussed screening for colorectal cancer, prostate cancer, breast cancer, and cervical cancer. Checklist reviewing preventive services available has been given to the patient.    Reviewed patients plan of care and provided an AVS. The Basic Care Plan (routine screening as documented in Health Maintenance) for Kimmie meets the Care Plan requirement. This Care Plan has been established and reviewed with the Patient.      Dee Castillo MD  [facility]    Identified Health Risks:    I have reviewed Opioid Use Disorder and Substance Use Disorder risk factors and made any needed referrals.       The patient was provided with written information regarding signs of hearing loss.  Answers for HPI/ROS submitted by the patient on 5/26/2023  If you checked off any problems, how difficult have these problems made it for you to do your work, take care of things at home, or get along with other people?: Not difficult at all  PHQ9 TOTAL SCORE: 1

## 2023-05-26 NOTE — PATIENT INSTRUCTIONS
Patient Education   Personalized Prevention Plan  You are due for the preventive services outlined below.  Your care team is available to assist you in scheduling these services.  If you have already completed any of these items, please share that information with your care team to update in your medical record.  Health Maintenance Due   Topic Date Due     ANNUAL REVIEW OF  ORDERS  Never done     Zoster (Shingles) Vaccine (2 of 3) 05/09/2012     LUNG CANCER SCREENING  05/05/2020     COVID-19 Vaccine (5 - Moderna series) 01/23/2023       Signs of Hearing Loss  Hearing loss is a problem shared by many people. In fact, it's one of the most common health problems, particularly as people age. Most people aged 65 and older have some hearing loss. By age 80, almost everyone does. Hearing loss often occurs slowly over the years. So, you may not realize your hearing has gotten worse.   When sudden hearing loss occurs, it's important to contact your healthcare provider right away. Your provider will do a medical exam and a hearing exam as soon as possible. This is to help find the cause and type of your sudden hearing loss. Based on your diagnosis, your healthcare provider will discuss possible treatments.      Hearing much better with one ear can be a sign of hearing loss.     Have your hearing checked  Call your healthcare provider if you:     Have to strain to hear normal conversation    Have to watch other people s faces very carefully to follow what they re saying    Need to ask people to repeat what they ve said    Often misunderstand what people are saying    Turn the volume of the television or radio up so high that others complain    Feel that people are mumbling when they re talking to you    Find that the effort to hear leaves you feeling tired and irritated    Notice, when using the phone, that you hear better with one ear than the other  Julissa last reviewed this educational content on 6/1/2022 2000-2022  [employer]. All rights reserved. This information is not intended as a substitute for professional medical care. Always follow your healthcare professional's instructions.

## 2023-06-02 ENCOUNTER — DOCUMENTATION ONLY (OUTPATIENT)
Dept: OTHER | Facility: CLINIC | Age: 73
End: 2023-06-02
Payer: COMMERCIAL

## 2023-06-09 ENCOUNTER — OFFICE VISIT (OUTPATIENT)
Dept: RADIATION ONCOLOGY | Facility: CLINIC | Age: 73
End: 2023-06-09
Attending: RADIOLOGY
Payer: COMMERCIAL

## 2023-06-09 VITALS — OXYGEN SATURATION: 98 % | SYSTOLIC BLOOD PRESSURE: 156 MMHG | DIASTOLIC BLOOD PRESSURE: 78 MMHG | HEART RATE: 61 BPM

## 2023-06-09 DIAGNOSIS — C54.1 ENDOMETRIAL CANCER (H): Primary | ICD-10-CM

## 2023-06-09 PROCEDURE — 77770 HDR RDNCL NTRSTL/ICAV BRCHTX: CPT | Mod: 26 | Performed by: RADIOLOGY

## 2023-06-09 PROCEDURE — 77295 3-D RADIOTHERAPY PLAN: CPT | Performed by: RADIOLOGY

## 2023-06-09 PROCEDURE — 77295 3-D RADIOTHERAPY PLAN: CPT | Mod: 26 | Performed by: RADIOLOGY

## 2023-06-09 PROCEDURE — C1717 BRACHYTX, NON-STR,HDR IR-192: HCPCS | Performed by: RADIOLOGY

## 2023-06-09 PROCEDURE — 77263 THER RADIOLOGY TX PLNG CPLX: CPT | Performed by: RADIOLOGY

## 2023-06-09 PROCEDURE — 57156 INS VAG BRACHYTX DEVICE: CPT | Performed by: RADIOLOGY

## 2023-06-09 PROCEDURE — 77332 RADIATION TREATMENT AID(S): CPT | Performed by: RADIOLOGY

## 2023-06-09 PROCEDURE — 77470 SPECIAL RADIATION TREATMENT: CPT | Mod: 26 | Performed by: RADIOLOGY

## 2023-06-09 PROCEDURE — 77332 RADIATION TREATMENT AID(S): CPT | Mod: 26 | Performed by: RADIOLOGY

## 2023-06-09 PROCEDURE — 271N000005 HC IMPLANT RADIATION BRIEF: Performed by: RADIOLOGY

## 2023-06-09 PROCEDURE — 77470 SPECIAL RADIATION TREATMENT: CPT | Performed by: RADIOLOGY

## 2023-06-09 PROCEDURE — 77770 HDR RDNCL NTRSTL/ICAV BRCHTX: CPT | Performed by: RADIOLOGY

## 2023-06-09 NOTE — PROGRESS NOTES
Kimmie Carranza is here for scheduled HDR brachytherapy    HDR Single Channel Cylinder  1    Pre-procedure-  Patient identified, arm band applied, signed consent available   Medications taken by patient prior to procedure, none.  Pain assessment: 0/10  BP (!) 156/78   Pulse 61   SpO2 98%     Post-procedure-  Pain assessment: 0/10  Device removed without difficulty, Education for possible side effects and management and Discharged to home.

## 2023-06-09 NOTE — LETTER
6/9/2023         RE: Kimmie Carranza  [address] [address] [facility] [address]        Dear Colleague,    Thank you for referring your patient, Kimmie Carranza, to the [facility] RADIATION ONCOLOGY. Please see a copy of my visit note below.    Kimmie Carranza is here for scheduled HDR brachytherapy    HDR Single Channel Cylinder  1    Pre-procedure-  Patient identified, arm band applied, signed consent available   Medications taken by patient prior to procedure, none.  Pain assessment: 0/10  BP (!) 156/78   Pulse 61   SpO2 98%     Post-procedure-  Pain assessment: 0/10  Device removed without difficulty, Education for possible side effects and management and Discharged to home.          Again, thank you for allowing me to participate in the care of your patient.        Sincerely,        Edith Gutierrez MD

## 2023-06-12 ENCOUNTER — OFFICE VISIT (OUTPATIENT)
Dept: RADIATION ONCOLOGY | Facility: CLINIC | Age: 73
End: 2023-06-12
Attending: RADIOLOGY
Payer: COMMERCIAL

## 2023-06-12 VITALS — HEART RATE: 50 BPM | DIASTOLIC BLOOD PRESSURE: 53 MMHG | SYSTOLIC BLOOD PRESSURE: 148 MMHG | OXYGEN SATURATION: 99 %

## 2023-06-12 DIAGNOSIS — C54.1 ENDOMETRIAL CANCER (H): Primary | ICD-10-CM

## 2023-06-12 PROCEDURE — 77280 THER RAD SIMULAJ FIELD SMPL: CPT | Performed by: RADIOLOGY

## 2023-06-12 PROCEDURE — 77280 THER RAD SIMULAJ FIELD SMPL: CPT | Mod: 26 | Performed by: RADIOLOGY

## 2023-06-12 PROCEDURE — C1717 BRACHYTX, NON-STR,HDR IR-192: HCPCS | Performed by: RADIOLOGY

## 2023-06-12 PROCEDURE — 57156 INS VAG BRACHYTX DEVICE: CPT | Performed by: RADIOLOGY

## 2023-06-12 PROCEDURE — 77770 HDR RDNCL NTRSTL/ICAV BRCHTX: CPT | Performed by: RADIOLOGY

## 2023-06-12 PROCEDURE — 271N000005 HC IMPLANT RADIATION BRIEF: Performed by: RADIOLOGY

## 2023-06-12 PROCEDURE — 77770 HDR RDNCL NTRSTL/ICAV BRCHTX: CPT | Mod: 26 | Performed by: RADIOLOGY

## 2023-06-12 NOTE — LETTER
6/12/2023         RE: Kimmie Carranza  [address] [address] [facility] [address]        Dear Colleague,    Thank you for referring your patient, Kimmie Carranza, to the [facility] RADIATION ONCOLOGY. Please see a copy of my visit note below.    Kimmie Carranza is here for scheduled HDR brachytherapy    HDR Single Channel Cylinder  2    Pre-procedure-  Patient identified, arm band applied, signed consent available   Medications taken by patient prior to procedure, none.  Pain assessment: 0/10  BP (!) 148/53   Pulse 50   SpO2 99%     Post-procedure-  Pain assessment: 0/10  Device removed without difficulty. Side effects reviewed, pt discharged home per self.         A radiation therapy treatment planning simulation was performed.  HDR brachytherapy vaginal cylinder 2 of 5.  Please see the [facility] record for documentation.    Jaycee Garcia MD  Radiation Oncology      Again, thank you for allowing me to participate in the care of your patient.        Sincerely,        Jaycee Garcia MD

## 2023-06-12 NOTE — PROGRESS NOTES
Kimmie Carranza is here for scheduled HDR brachytherapy    HDR Single Channel Cylinder  2    Pre-procedure-  Patient identified, arm band applied, signed consent available   Medications taken by patient prior to procedure, none.  Pain assessment: 0/10  BP (!) 148/53   Pulse 50   SpO2 99%     Post-procedure-  Pain assessment: 0/10  Device removed without difficulty. Side effects reviewed, pt discharged home per self.

## 2023-06-12 NOTE — PROGRESS NOTES
A radiation therapy treatment planning simulation was performed.  HDR brachytherapy vaginal cylinder 2 of 5.  Please see the [facility] record for documentation.    Jaycee Garcia MD  Radiation Oncology

## 2023-06-13 NOTE — RESULT ENCOUNTER NOTE
Discussed dexa results at 5/26/23 wellness visit (osteopenia but FRAX at point we can consider txt) but significant other topics covered as well.  Currently in txt for endometrial cancer.  Will hold off on txt for now, plan for dexa recheck in 3 yrs.  Juwan Castillo MD

## 2023-06-14 ENCOUNTER — OFFICE VISIT (OUTPATIENT)
Dept: RADIATION ONCOLOGY | Facility: CLINIC | Age: 73
End: 2023-06-14
Attending: RADIOLOGY
Payer: COMMERCIAL

## 2023-06-14 VITALS — DIASTOLIC BLOOD PRESSURE: 74 MMHG | SYSTOLIC BLOOD PRESSURE: 150 MMHG | HEART RATE: 66 BPM | OXYGEN SATURATION: 98 %

## 2023-06-14 DIAGNOSIS — C54.1 ENDOMETRIAL CANCER (H): Primary | ICD-10-CM

## 2023-06-14 PROCEDURE — 77770 HDR RDNCL NTRSTL/ICAV BRCHTX: CPT | Performed by: RADIOLOGY

## 2023-06-14 PROCEDURE — 77280 THER RAD SIMULAJ FIELD SMPL: CPT | Performed by: RADIOLOGY

## 2023-06-14 PROCEDURE — 271N000005 HC IMPLANT RADIATION BRIEF: Performed by: RADIOLOGY

## 2023-06-14 PROCEDURE — 57156 INS VAG BRACHYTX DEVICE: CPT | Performed by: RADIOLOGY

## 2023-06-14 PROCEDURE — 77770 HDR RDNCL NTRSTL/ICAV BRCHTX: CPT | Mod: 26 | Performed by: RADIOLOGY

## 2023-06-14 PROCEDURE — 77280 THER RAD SIMULAJ FIELD SMPL: CPT | Mod: 26 | Performed by: RADIOLOGY

## 2023-06-14 PROCEDURE — C1717 BRACHYTX, NON-STR,HDR IR-192: HCPCS | Performed by: RADIOLOGY

## 2023-06-14 NOTE — PROGRESS NOTES
Kimmie Carranza is here for scheduled HDR brachytherapy    HDR Single Channel Cylinder  3    Pre-procedure-  Patient identified, arm band applied, signed consent available   Medications taken by patient prior to procedure, none  Pain assessment: 0/10  BP (!) 150/74   Pulse 66   SpO2 98%     Post-procedure-  Pain assessment: 0/10  Device removed without difficulty, Education for possible side effects and management and Discharged to home.  Pt aware of time change for Friday 6/16/23 10:45.

## 2023-06-14 NOTE — LETTER
6/14/2023         RE: Kimmie Carranza  [address] [facility] [address]        Dear Colleague,    Thank you for referring your patient, Kimmie Carranza, to the [facility] RADIATION ONCOLOGY. Please see a copy of my visit note below.    Kimmie Carranza is here for scheduled HDR brachytherapy    HDR Single Channel Cylinder  3    Pre-procedure-  Patient identified, arm band applied, signed consent available   Medications taken by patient prior to procedure, none  Pain assessment: 0/10  BP (!) 150/74   Pulse 66   SpO2 98%     Post-procedure-  Pain assessment: 0/10  Device removed without difficulty, Education for possible side effects and management and Discharged to home.  Pt aware of time change for Friday 6/16/23 10:45.          Again, thank you for allowing me to participate in the care of your patient.        Sincerely,        Edith Gutierrez MD

## 2023-06-16 ENCOUNTER — OFFICE VISIT (OUTPATIENT)
Dept: RADIATION ONCOLOGY | Facility: CLINIC | Age: 73
End: 2023-06-16
Attending: RADIOLOGY
Payer: COMMERCIAL

## 2023-06-16 DIAGNOSIS — C54.1 ENDOMETRIAL CANCER (H): Primary | ICD-10-CM

## 2023-06-16 PROCEDURE — 57156 INS VAG BRACHYTX DEVICE: CPT | Performed by: RADIOLOGY

## 2023-06-16 PROCEDURE — 77280 THER RAD SIMULAJ FIELD SMPL: CPT | Mod: 26 | Performed by: RADIOLOGY

## 2023-06-16 PROCEDURE — 77280 THER RAD SIMULAJ FIELD SMPL: CPT | Performed by: RADIOLOGY

## 2023-06-16 PROCEDURE — 77770 HDR RDNCL NTRSTL/ICAV BRCHTX: CPT | Mod: 26 | Performed by: RADIOLOGY

## 2023-06-16 PROCEDURE — 271N000005 HC IMPLANT RADIATION BRIEF: Performed by: RADIOLOGY

## 2023-06-16 PROCEDURE — 77770 HDR RDNCL NTRSTL/ICAV BRCHTX: CPT | Performed by: RADIOLOGY

## 2023-06-16 PROCEDURE — C1717 BRACHYTX, NON-STR,HDR IR-192: HCPCS | Performed by: RADIOLOGY

## 2023-06-16 NOTE — PROGRESS NOTES
Kimmie Carranza is here for scheduled HDR brachytherapy    HDR Single Channel Cylinder  4    Pre-procedure-  Time out done by Ena Castillo and Jaycee Garcia.   Patient identified, arm band applied, signed consent available   Medications taken by patient prior to procedure NA  Pain assessment: Denies  There were no vitals taken for this visit.    Post-procedure-  Pain assessment: Denies   Device removed without difficulty, Education for possible side effects and management, Follow-up scheduled and Discharged to home

## 2023-06-16 NOTE — PROGRESS NOTES
A radiation therapy treatment planning simulation was performed.  HDR brachytherapy vaginal cylinder 4 of 5.  Please see the [facility] record for documentation.    Jaycee Garcia MD  Radiation Oncology

## 2023-06-19 ENCOUNTER — OFFICE VISIT (OUTPATIENT)
Dept: RADIATION ONCOLOGY | Facility: CLINIC | Age: 73
End: 2023-06-19
Attending: RADIOLOGY
Payer: COMMERCIAL

## 2023-06-19 DIAGNOSIS — C54.1 ENDOMETRIAL CANCER (H): Primary | ICD-10-CM

## 2023-06-19 PROCEDURE — C1717 BRACHYTX, NON-STR,HDR IR-192: HCPCS | Performed by: RADIOLOGY

## 2023-06-19 PROCEDURE — 57156 INS VAG BRACHYTX DEVICE: CPT | Performed by: RADIOLOGY

## 2023-06-19 PROCEDURE — 77280 THER RAD SIMULAJ FIELD SMPL: CPT | Performed by: RADIOLOGY

## 2023-06-19 PROCEDURE — 77770 HDR RDNCL NTRSTL/ICAV BRCHTX: CPT | Performed by: RADIOLOGY

## 2023-06-19 PROCEDURE — 77280 THER RAD SIMULAJ FIELD SMPL: CPT | Mod: 26 | Performed by: RADIOLOGY

## 2023-06-19 PROCEDURE — 77770 HDR RDNCL NTRSTL/ICAV BRCHTX: CPT | Mod: 26 | Performed by: RADIOLOGY

## 2023-06-19 PROCEDURE — 77336 RADIATION PHYSICS CONSULT: CPT | Performed by: RADIOLOGY

## 2023-06-19 PROCEDURE — 271N000005 HC IMPLANT RADIATION BRIEF: Performed by: RADIOLOGY

## 2023-06-19 NOTE — PROGRESS NOTES
Kimmie Carranza is here for scheduled HDR brachytherapy    HDR Single Channel Cylinder  5    Pre-procedure-  Time out done by Aleida Rees RN and Dr. Gutierrez.   Patient identified, arm band applied, signed consent available   Medications taken by patient prior to procedure none  Pain assessment: 0/10  There were no vitals taken for this visit.    Post-procedure-  Pain assessment: 0/10   Device removed without difficulty, Education for possible side effects and management, Discharge teaching reviewed, questions answered, Vaginal dilator use reviewed, Follow-up scheduled and Discharged to home

## 2023-06-19 NOTE — LETTER
6/19/2023         RE: Kim[place] Carranza  [address] [address] [facility] [address]        Dear Colleague,    Thank you for referring your patient, Kimmie Carranza, to the [facility] RADIATION ONCOLOGY. Please see a copy of my visit note below.    Kimmie Carranza is here for scheduled HDR brachytherapy    HDR Single Channel Cylinder  5    Pre-procedure-  Time out done by Aleida Rees RN and Dr. Gutierrez.   Patient identified, arm band applied, signed consent available   Medications taken by patient prior to procedure none  Pain assessment: 0/10  There were no vitals taken for this visit.    Post-procedure-  Pain assessment: 0/10   Device removed without difficulty, Education for possible side effects and management, Discharge teaching reviewed, questions answered, Vaginal dilator use reviewed, Follow-up scheduled and Discharged to home          Again, thank you for allowing me to participate in the care of your patient.        Sincerely,        dEith Gutierrez MD

## 2023-06-22 ENCOUNTER — ONCOLOGY VISIT (OUTPATIENT)
Dept: RADIATION ONCOLOGY | Facility: CLINIC | Age: 73
End: 2023-06-22
Payer: COMMERCIAL

## 2023-06-22 NOTE — LETTER
6/22/2023         RE: Kimmie Carranza  [address] [address] [facility] [address]        Dear Colleague,    Thank you for referring your patient, Kimmie Carranza, to the [facility] RADIATION ONCOLOGY. Please see a copy of my visit note below.    No notes on file    Again, thank you for allowing me to participate in the care of your patient.        Sincerely,        Edith Gutierrez MD

## 2023-07-02 NOTE — PROCEDURES
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     Treatment Summary:  Treatment Site  Dose  Modality From  To  Elapsed Days Fx.  1 Vaginal Cuff   3,000 cGy   2023  10   5          Dose per Fraction: 600 cGy     COMMENTS:   Ms. Buckley is a 72yo lady with an endometrial adenocarcinoma, FIGO IB, grade 1. She presented initially with   post-menopausal bleeding confirmed to be related to endometrioid adenocarcinoma on biopsy. She underwent   total laparoscopic hysterectomy, bilateral salpingo-oophorectomy and sentinel lymph node dissection. Cytology   was negative. Pathology showed FIGO grade 1 endometrioid adenocarcinoma, measuring 3.5 cm in greatest   dimension. Myometrial invasion was 6 mm out of 9 mm (67%). There was no LVSI, no lower uterine segment   involvement. A total of 3 SLN were retrieved (2 para-aortic, 1 left obturator), all of which were negative.   Tumor was ER 80%, SD 60%. Final pathologic stage pT1bN0, FIGO IB.      She met criteria for high intermediate risk and thus vaginal cuff brachytherapy was recommended to reduce risk   of local recurrence. The vaginal surface was prescribed 600 cGy in 5 every other day fractions with    gamma rays via cylinder brachytherapy technique. Ms. Buckley tolerated radiotherapy well without toxicities.      ED visits/hospitalizations: none  Missed treatments: none  Acute Toxicity Profile by CTC v5.0: none     PAIN MANAGEMENT: Tylenol as needed  FOLLOW UP PLAN: See Dr. Manuel Guerrero on 23; Follow up with our  clinic in 1 month.      Resident Physician: Shantelle Ferguson M.D.   Staff Physician: Edith Gutierrez M.D.  CC: Nesha Guerrero MD                                  Radiation Oncology:  [facility] [address], [address] [place] [phone]

## 2023-07-17 ENCOUNTER — TELEPHONE (OUTPATIENT)
Dept: RADIATION ONCOLOGY | Facility: CLINIC | Age: 73
End: 2023-07-17
Payer: COMMERCIAL

## 2023-07-17 DIAGNOSIS — C54.1 ENDOMETRIAL CANCER (H): Primary | ICD-10-CM

## 2023-07-26 NOTE — TELEPHONE ENCOUNTER
Pt called to after missing Follow-up appointment with VILLA Barger. Spoke with patient and confirmed that skin has healed well and she has no pain. No complaints, concerns, or questions at this time. Has a follow up with Dr. Manuel Guerrero in November 2023.

## 2023-07-28 DIAGNOSIS — I25.10 MILD CORONARY ARTERY DISEASE: ICD-10-CM

## 2023-07-28 DIAGNOSIS — E78.5 HYPERLIPIDEMIA LDL GOAL <100: ICD-10-CM

## 2023-07-28 DIAGNOSIS — F41.1 ANXIETY STATE: ICD-10-CM

## 2023-07-28 RX ORDER — SERTRALINE HYDROCHLORIDE 100 MG/1
TABLET, FILM COATED ORAL
Qty: 135 TABLET | Refills: 1 | Status: SHIPPED | OUTPATIENT
Start: 2023-07-28 | End: 2023-01-01

## 2023-07-28 RX ORDER — ROSUVASTATIN CALCIUM 10 MG/1
10 TABLET, COATED ORAL DAILY
Qty: 90 TABLET | Refills: 0 | Status: SHIPPED | OUTPATIENT
Start: 2023-07-28 | End: 2023-01-01

## 2023-07-28 NOTE — TELEPHONE ENCOUNTER
"Requested Prescriptions   Pending Prescriptions Disp Refills    sertraline (ZOLOFT) 100 MG tablet [Pharmacy Med Name: SERTRALINE  MG TABLET] 135 tablet      Sig: TAKE 1 AND 1/2 TABLETS BY MOUTH EVERY DAY       SSRIs Protocol Passed - 7/28/2023 12:16 AM        Passed - Recent (12 mo) or future (30 days) visit within the authorizing provider's specialty     Patient has had an office visit with the authorizing provider or a provider within the authorizing providers department within the previous 12 mos or has a future within next 30 days. See \"Patient Info\" tab in inbasket, or \"Choose Columns\" in Meds & Orders section of the refill encounter.              Passed - Medication is active on med list        Passed - Patient is age 18 or older        Passed - No active pregnancy on record        Passed - No positive pregnancy test in last 12 months          rosuvastatin (CRESTOR) 10 MG tablet [Pharmacy Med Name: ROSUVASTATIN CALCIUM 10 MG TAB] 90 tablet 0     Sig: TAKE 1 TABLET (10 MG) BY MOUTH DAILY.       Statins Protocol Passed - 7/28/2023 12:16 AM        Passed - LDL on file in past 12 months     Recent Labs   Lab Test 01/25/23  1235   LDL 75             Passed - No abnormal creatine kinase in past 12 months     No lab results found.             Passed - Recent (12 mo) or future (30 days) visit within the authorizing provider's specialty     Patient has had an office visit with the authorizing provider or a provider within the authorizing providers department within the previous 12 mos or has a future within next 30 days. See \"Patient Info\" tab in inbasket, or \"Choose Columns\" in Meds & Orders section of the refill encounter.              Passed - Medication is active on med list        Passed - Patient is age 18 or older        Passed - No active pregnancy on record        Passed - No positive pregnancy test in past 12 months          Prescription approved per [facility] Refill Protocol.     Pt was last seen on " 05/26/23    Michela Wilson RN  [facility]

## 2023-09-12 ENCOUNTER — TELEPHONE (OUTPATIENT)
Dept: FAMILY MEDICINE | Facility: CLINIC | Age: 73
End: 2023-09-12
Payer: COMMERCIAL

## 2023-09-13 ENCOUNTER — VIRTUAL VISIT (OUTPATIENT)
Dept: FAMILY MEDICINE | Facility: CLINIC | Age: 73
End: 2023-09-13
Payer: COMMERCIAL

## 2023-09-13 DIAGNOSIS — F33.1 MODERATE EPISODE OF RECURRENT MAJOR DEPRESSIVE DISORDER (H): ICD-10-CM

## 2023-09-13 DIAGNOSIS — F43.21 GRIEF: ICD-10-CM

## 2023-09-13 DIAGNOSIS — F41.1 ANXIETY STATE: Primary | ICD-10-CM

## 2023-09-13 PROCEDURE — 99213 OFFICE O/P EST LOW 20 MIN: CPT | Mod: 95 | Performed by: FAMILY MEDICINE

## 2023-09-13 RX ORDER — LORAZEPAM 0.5 MG/1
0.5 TABLET ORAL EVERY 6 HOURS PRN
Qty: 10 TABLET | Refills: 0 | Status: SHIPPED | OUTPATIENT
Start: 2023-09-13 | End: 2024-01-01

## 2023-09-13 RX ORDER — HYDROXYZINE HYDROCHLORIDE 25 MG/1
25 TABLET, FILM COATED ORAL DAILY PRN
Qty: 10 TABLET | Refills: 1 | Status: SHIPPED | OUTPATIENT
Start: 2023-09-13 | End: 2023-01-01

## 2023-09-13 ASSESSMENT — ANXIETY QUESTIONNAIRES
GAD7 TOTAL SCORE: 15
6. BECOMING EASILY ANNOYED OR IRRITABLE: NOT AT ALL
GAD7 TOTAL SCORE: 15
3. WORRYING TOO MUCH ABOUT DIFFERENT THINGS: MORE THAN HALF THE DAYS
2. NOT BEING ABLE TO STOP OR CONTROL WORRYING: MORE THAN HALF THE DAYS
5. BEING SO RESTLESS THAT IT IS HARD TO SIT STILL: NEARLY EVERY DAY
IF YOU CHECKED OFF ANY PROBLEMS ON THIS QUESTIONNAIRE, HOW DIFFICULT HAVE THESE PROBLEMS MADE IT FOR YOU TO DO YOUR WORK, TAKE CARE OF THINGS AT HOME, OR GET ALONG WITH OTHER PEOPLE: VERY DIFFICULT
7. FEELING AFRAID AS IF SOMETHING AWFUL MIGHT HAPPEN: MORE THAN HALF THE DAYS
1. FEELING NERVOUS, ANXIOUS, OR ON EDGE: NEARLY EVERY DAY

## 2023-09-13 ASSESSMENT — PATIENT HEALTH QUESTIONNAIRE - PHQ9
SUM OF ALL RESPONSES TO PHQ QUESTIONS 1-9: 16
5. POOR APPETITE OR OVEREATING: NEARLY EVERY DAY
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
## 2023-09-13 NOTE — Clinical Note
Could you schedule pt for a virtual appt on 11/8/23 at 12noon- follow-up anxiety/grief med follow-up and 6mo HTN chronic cares. Thanks- CW

## 2023-11-07 NOTE — PROGRESS NOTES
Progress Note        Dr. Claudia Dan MD  [address]  [place] [address]       RE: Kimmie Carranza  : 1950  DEANNA: 2023    HPI:  Kimmie Carranza is 73 year old with stage 1B, grade 1, MMR proficient endometrial cancer.  She is not accompanied today. She has no concerns today.  She denies any vaginal bleeding, abdominal/pelvic pain or changes to her bowel/bladder function.    Cancer Course:  23:  US Pelvis:  1.  Abnormal heterogeneous and markedly thickened endometrium measuring 20 mm.     3/22/23:  EMB: Grade 1 endometrial adenocarcinoma, intact MMR    23:  Total laparoscopic hysterectomy, bilateral salpingo-oophorectomy, bilateral sentinel lymph node dissection, cystoscopy, with Dr Tse in my abscense   Pathology:  Grade 1 endometrial adenocarcinoma, tumor size 3.5 cm, 6/9 mm myometrial invasion, no LVSI, 0/3 sentinel LN, ER/MA positive    23:  Completed brachytherapy of 30Gy in 5 Fx    Obstetrics and Gynecology History:  , vaginal deliveries x4, no complications  Menopause right around 40 years, took hormones about a year. Took a pill        Past Medical History:  Past Medical History:   Diagnosis Date    Anxiety state, unspecified     on zoloft, better than wellbutrin, s/p traumatic death of daughter's boyfriend in '05    Endometrial cancer (H)     Hypertension     Mild major depression (H)     zoloft (had been on citalopram)    Mixed hyperlipidemia     Urge incontinence     trial of detrol helped, uses for trips, gets thirsty         Past Surgical History:  Past Surgical History:   Procedure Laterality Date    COLONOSCOPY N/A 10/24/2016    Procedure: COMBINED COLONOSCOPY, SINGLE OR MULTIPLE BIOPSY/POLYPECTOMY BY BIOPSY;  Surgeon: Kwaku Henry MD;  Location: [facility]    COLONOSCOPY N/A 2022    Procedure: COLONOSCOPY, FLEXIBLE, WITH LESION REMOVAL USING SNARE polypectomies using exacto cold snare;  Surgeon: Zane Cavazos MD;  Location: [facility]    CV  CORONARY ANGIOGRAM N/A 2019    Procedure: Coronary Angiogram;  Surgeon: Viktor Brothers MD;  Location: [facility] CARDIAC CATH LAB    CYSTOSCOPY N/A 2023    Procedure: Cystoscopy;  Surgeon: Artis Tse MD;  Location: [facility] OR    LAPAROSCOPIC HYSTERECTOMY TOTAL, BILATERAL SALPINGO-OOPHORECTOMY, NODE DISSECTION, COMBINED Bilateral 2023    Procedure: laparoscopic removal of uterus, cervix, both ovaries and fallopian tubes, sentinel lymph node dissection, possible full lymph node dissection, possible open;  Surgeon: Artis Tse MD;  Location:  OR    ZZC NONSPECIFIC PROCEDURE      Tubal ligation    Z NONSPECIFIC PROCEDURE      laporoscopy       Health Maintenance:  Last Pap Smear: No need for pap smear exams s/p hysterectomy  She has not had a history of abnormal Pap smears.    Last Mammogram: 5/10/23             Result: normal      She has not had a history of abnormal mammograms.    Last Colonoscopy: 22              Result: Polyps-repeat 5 years       Social History:  Retired at 65, was a . Worked at [employer]. Lives with daughter and two grandsons in [place]. Abbie Carranza is her daughter who she would like to contact should anything happen to her.      Family History:   The patient's family history is significant for.  Family History   Problem Relation Age of Onset    Hypertension Mother     Cerebrovascular Disease Mother          of complications of stroke at age 82    Heart Disease Mother         atrial fib    Osteoporosis Mother         two hip fx's    Neurologic Disorder Mother         MS    Hypertension Father     Neurologic Disorder Father         dementia- Alzheimers, dx in his 60s, live to his 90s    Gastrointestinal Disease Father     Depression Father     Neurologic Disorder Sister     Hypertension Brother     Neurologic Disorder Maternal Grandmother     KAYLIE.A.D. Paternal Grandmother 62         at 62, of MI    Bipolar  "Disorder Daughter     C.A.D. Paternal Aunt 62        \"\"    C.A.D. Paternal Aunt 62        \"\"    Colon Cancer Paternal Uncle     Breast Cancer Maternal Aunt        Physical Exam:   BP (!) 162/70 (Cuff Size: Adult Regular)   Pulse 60   Temp 98.5  F (36.9  C) (Oral)   Resp 16   Wt 56.2 kg (124 lb)   LMP  (LMP Unknown)   SpO2 99%   BMI 20.63 kg/m      General Appearance: healthy and alert, no distress     Gastrointestinal:       abdomen soft, non-tender, non-distended, no organomegaly or masses, port sites well healed     Genitourinary: External genitalia and urethral meatus appears normal.  Vagina is smooth without masses or abnormalities noted. Cuff intact without abnormalities. Bimanual without masses, tenderness, fullness.    Labs:  None      Assessment:    Kimmie Carranza is a 73 year old woman with a diagnosis of stage 1B, grade 1, MMR proficient endometrial adenocarcinoma.     A total of 15 minutes was spent on patient care today.       Plan:     1.)    Stage 1B, grade 1, MMR proficient endometrial adenocarcinoma.  Reviewed signs and symptoms of recurrence and to call if these should occur.  Reinforced surveillance visits every 6 months for up to 5 years (4/28) then annually thereafter.  Discussed that these visits would be with the NP unless concerns arise. She will return in 6 months.      2.) Genetic risk factors were assessed and the patient does not meet the qualifications for a referral.      3.) Labs and/or tests ordered include:  None     4.) Health maintenance issues addressed today include pt is up to date          Thank you for allowing us to participate in the care of your patient.         Sincerely,    Nesha Jackson MD  Gynecologic Oncology  [facility]       TIA POSEY    "

## 2023-11-07 NOTE — LETTER
2023         RE: Kimmie Carranza  [address] [facility] [place] [address]        Dear Colleague,    Thank you for referring your patient, Kimmie Carranza, to the [facility] [place]. Please see a copy of my visit note below.    Progress Note        Dr. Claudia Dan MD  [address]  [place] [address]       RE: Kimmie Carranza  : 1950  DEANNA: 2023    HPI:  Kimmie Carranza is 73 year old with stage 1B, grade 1, MMR proficient endometrial cancer.  She is not accompanied today. She has no concerns today.  She denies any vaginal bleeding, abdominal/pelvic pain or changes to her bowel/bladder function.    Cancer Course:  23:  US Pelvis:  1.  Abnormal heterogeneous and markedly thickened endometrium measuring 20 mm.     3/22/23:  EMB: Grade 1 endometrial adenocarcinoma, intact MMR    23:  Total laparoscopic hysterectomy, bilateral salpingo-oophorectomy, bilateral sentinel lymph node dissection, cystoscopy, with Dr Tse in my abscense   Pathology:  Grade 1 endometrial adenocarcinoma, tumor size 3.5 cm, 6/9 mm myometrial invasion, no LVSI, 0/3 sentinel LN, ER/NV positive    23:  Completed brachytherapy of 30Gy in 5 Fx    Obstetrics and Gynecology History:  , vaginal deliveries x4, no complications  Menopause right around 40 years, took hormones about a year. Took a pill        Past Medical History:  Past Medical History:   Diagnosis Date     Anxiety state, unspecified     on zoloft, better than wellbutrin, s/p traumatic death of daughter's boyfriend in '05     Endometrial cancer (H)      Hypertension      Mild major depression (H)     zoloft (had been on citalopram)     Mixed hyperlipidemia      Urge incontinence     trial of detrol helped, uses for trips, gets thirsty         Past Surgical History:  Past Surgical History:   Procedure Laterality Date     COLONOSCOPY N/A 10/24/2016    Procedure: COMBINED COLONOSCOPY, SINGLE OR MULTIPLE BIOPSY/POLYPECTOMY  BY BIOPSY;  Surgeon: Kwaku Henry MD;  Location:  GI     COLONOSCOPY N/A 2022    Procedure: COLONOSCOPY, FLEXIBLE, WITH LESION REMOVAL USING SNARE polypectomies using exacto cold snare;  Surgeon: Zane Cavazos MD;  Location:  GI     CV CORONARY ANGIOGRAM N/A 2019    Procedure: Coronary Angiogram;  Surgeon: Viktor Brothers MD;  Location:  HEART CARDIAC CATH LAB     CYSTOSCOPY N/A 2023    Procedure: Cystoscopy;  Surgeon: Artis Tse MD;  Location: [facility] OR     LAPAROSCOPIC HYSTERECTOMY TOTAL, BILATERAL SALPINGO-OOPHORECTOMY, NODE DISSECTION, COMBINED Bilateral 2023    Procedure: laparoscopic removal of uterus, cervix, both ovaries and fallopian tubes, sentinel lymph node dissection, possible full lymph node dissection, possible open;  Surgeon: Artis Tse MD;  Location:  OR     ZZC NONSPECIFIC PROCEDURE      Tubal ligation     ZZC NONSPECIFIC PROCEDURE      laporoscopy       Health Maintenance:  Last Pap Smear: No need for pap smear exams s/p hysterectomy  She has not had a history of abnormal Pap smears.    Last Mammogram: 5/10/23             Result: normal      She has not had a history of abnormal mammograms.    Last Colonoscopy: 22              Result: Polyps-repeat 5 years       Social History:  Retired at 65, was a . Worked at [employer]. Lives with daughter and two grandsons in [place]. Abbie Carranza is her daughter who she would like to contact should anything happen to her.      Family History:   The patient's family history is significant for.  Family History   Problem Relation Age of Onset     Hypertension Mother      Cerebrovascular Disease Mother          of complications of stroke at age 82     Heart Disease Mother         atrial fib     Osteoporosis Mother         two hip fx's     Neurologic Disorder Mother         MS     Hypertension Father      Neurologic Disorder Father         dementia-  "Alzheimers, dx in his 60s, live to his 90s     Gastrointestinal Disease Father      Depression Father      Neurologic Disorder Sister      Hypertension Brother      Neurologic Disorder Maternal Grandmother      C.A.D. Paternal Grandmother 62         at 62, of MI     Bipolar Disorder Daughter      C.A.D. Paternal Aunt 62        \"\"     C.A.D. Paternal Aunt 62        \"\"     Colon Cancer Paternal Uncle      Breast Cancer Maternal Aunt        Physical Exam:   BP (!) 162/70 (Cuff Size: Adult Regular)   Pulse 60   Temp 98.5  F (36.9  C) (Oral)   Resp 16   Wt 56.2 kg (124 lb)   LMP  (LMP Unknown)   SpO2 99%   BMI 20.63 kg/m      General Appearance: healthy and alert, no distress     Gastrointestinal:       abdomen soft, non-tender, non-distended, no organomegaly or masses, port sites well healed     Genitourinary: External genitalia and urethral meatus appears normal.  Vagina is smooth without masses or abnormalities noted. Cuff intact without abnormalities. Bimanual without masses, tenderness, fullness.    Labs:  None      Assessment:    Kimmie Carranza is a 73 year old woman with a diagnosis of stage 1B, grade 1, MMR proficient endometrial adenocarcinoma.     A total of 15 minutes was spent on patient care today.       Plan:     1.)    Stage 1B, grade 1, MMR proficient endometrial adenocarcinoma.  Reviewed signs and symptoms of recurrence and to call if these should occur.  Reinforced surveillance visits every 6 months for up to 5 years () then annually thereafter.  Discussed that these visits would be with the NP unless concerns arise. She will return in 6 months.      2.) Genetic risk factors were assessed and the patient does not meet the qualifications for a referral.      3.) Labs and/or tests ordered include:  None     4.) Health maintenance issues addressed today include pt is up to date          Thank you for allowing us to participate in the care of your patient.         Sincerely,    Nesha Jackson, " MD  Gynecologic Oncology  [facility]       TIA POSEY      Again, thank you for allowing me to participate in the care of your patient.        Sincerely,        Cesar Jackson MD

## 2023-11-07 NOTE — NURSING NOTE
"Oncology Rooming Note    November 7, 2023 3:08 PM   Kimmie Carranza is a 73 year old female who presents for:    Chief Complaint   Patient presents with    Oncology Clinic Visit     Endometrial cancer     Initial Vitals: BP (!) 162/70 (Cuff Size: Adult Regular)   Pulse 60   Temp 98.5  F (36.9  C) (Oral)   Resp 16   Wt 56.2 kg (124 lb)   LMP  (LMP Unknown)   SpO2 99%   BMI 20.63 kg/m   Estimated body mass index is 20.63 kg/m  as calculated from the following:    Height as of 5/26/23: 1.651 m (5' 5\").    Weight as of this encounter: 56.2 kg (124 lb). Body surface area is 1.61 meters squared.  No Pain (0) Comment: Data Unavailable   No LMP recorded (lmp unknown). Patient has had a hysterectomy.  Allergies reviewed: Yes  Medications reviewed: Yes    Medications: Medication refills not needed today.  Pharmacy name entered into [employer]: [id number] - [place], [place] - [address]    Clinical concerns: f/u       Nesha Sullivan, PARVIN              "

## 2023-11-08 NOTE — Clinical Note
Hi TC team-   I had phone visit with pt today, so could you cancel her other 11/23 appt? Could you also reach out to her and try and schedule a follow-up appt in 2/24 (HTN, Lipids, MDD/grief, and fasting labs at appt - so am appt)?  Thank you! CW
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
## 2024-01-01 ENCOUNTER — THERAPY VISIT (OUTPATIENT)
Dept: PHYSICAL THERAPY | Facility: CLINIC | Age: 74
End: 2024-01-01
Attending: FAMILY MEDICINE
Payer: COMMERCIAL

## 2024-01-01 ENCOUNTER — OFFICE VISIT (OUTPATIENT)
Dept: FAMILY MEDICINE | Facility: CLINIC | Age: 74
End: 2024-01-01
Payer: COMMERCIAL

## 2024-01-01 ENCOUNTER — PATIENT OUTREACH (OUTPATIENT)
Dept: CARE COORDINATION | Facility: CLINIC | Age: 74
End: 2024-01-01
Payer: COMMERCIAL

## 2024-01-01 ENCOUNTER — OFFICE VISIT (OUTPATIENT)
Dept: FAMILY MEDICINE | Facility: CLINIC | Age: 74
End: 2024-01-01
Attending: FAMILY MEDICINE
Payer: COMMERCIAL

## 2024-01-01 ENCOUNTER — ONCOLOGY VISIT (OUTPATIENT)
Dept: ONCOLOGY | Facility: CLINIC | Age: 74
End: 2024-01-01
Attending: OBSTETRICS & GYNECOLOGY
Payer: COMMERCIAL

## 2024-01-01 ENCOUNTER — HEALTH MAINTENANCE LETTER (OUTPATIENT)
Age: 74
End: 2024-01-01

## 2024-01-01 VITALS
BODY MASS INDEX: 21.67 KG/M2 | TEMPERATURE: 97.3 F | DIASTOLIC BLOOD PRESSURE: 63 MMHG | RESPIRATION RATE: 18 BRPM | HEIGHT: 65 IN | WEIGHT: 130.1 LBS | SYSTOLIC BLOOD PRESSURE: 131 MMHG | HEART RATE: 52 BPM | OXYGEN SATURATION: 97 %

## 2024-01-01 VITALS
WEIGHT: 131.5 LBS | HEART RATE: 57 BPM | BODY MASS INDEX: 21.91 KG/M2 | TEMPERATURE: 97.1 F | SYSTOLIC BLOOD PRESSURE: 113 MMHG | RESPIRATION RATE: 18 BRPM | OXYGEN SATURATION: 100 % | DIASTOLIC BLOOD PRESSURE: 66 MMHG | HEIGHT: 65 IN

## 2024-01-01 VITALS
SYSTOLIC BLOOD PRESSURE: 160 MMHG | DIASTOLIC BLOOD PRESSURE: 80 MMHG | HEIGHT: 65 IN | RESPIRATION RATE: 16 BRPM | WEIGHT: 129.9 LBS | BODY MASS INDEX: 21.64 KG/M2 | HEART RATE: 51 BPM | OXYGEN SATURATION: 97 % | TEMPERATURE: 97.6 F

## 2024-01-01 VITALS
HEART RATE: 53 BPM | BODY MASS INDEX: 21.72 KG/M2 | DIASTOLIC BLOOD PRESSURE: 68 MMHG | WEIGHT: 130.5 LBS | TEMPERATURE: 97.8 F | RESPIRATION RATE: 18 BRPM | OXYGEN SATURATION: 100 % | SYSTOLIC BLOOD PRESSURE: 139 MMHG

## 2024-01-01 VITALS
WEIGHT: 126 LBS | HEIGHT: 65 IN | OXYGEN SATURATION: 98 % | RESPIRATION RATE: 19 BRPM | SYSTOLIC BLOOD PRESSURE: 138 MMHG | DIASTOLIC BLOOD PRESSURE: 78 MMHG | HEART RATE: 55 BPM | TEMPERATURE: 97.1 F | BODY MASS INDEX: 20.99 KG/M2

## 2024-01-01 VITALS
DIASTOLIC BLOOD PRESSURE: 60 MMHG | BODY MASS INDEX: 21.66 KG/M2 | HEART RATE: 74 BPM | SYSTOLIC BLOOD PRESSURE: 122 MMHG | WEIGHT: 130 LBS | TEMPERATURE: 96.9 F | HEIGHT: 65 IN | OXYGEN SATURATION: 98 % | RESPIRATION RATE: 19 BRPM

## 2024-01-01 DIAGNOSIS — F41.1 ANXIETY STATE: ICD-10-CM

## 2024-01-01 DIAGNOSIS — N39.46 MIXED INCONTINENCE URGE AND STRESS (MALE)(FEMALE): ICD-10-CM

## 2024-01-01 DIAGNOSIS — C54.1 ENDOMETRIAL CANCER (H): Primary | ICD-10-CM

## 2024-01-01 DIAGNOSIS — I10 ESSENTIAL HYPERTENSION WITH GOAL BLOOD PRESSURE LESS THAN 140/90: ICD-10-CM

## 2024-01-01 DIAGNOSIS — R35.1 NOCTURIA: ICD-10-CM

## 2024-01-01 DIAGNOSIS — R00.1 BRADYCARDIA: ICD-10-CM

## 2024-01-01 DIAGNOSIS — E78.5 HYPERLIPIDEMIA LDL GOAL <100: ICD-10-CM

## 2024-01-01 DIAGNOSIS — Z23 NEED FOR SHINGLES VACCINE: ICD-10-CM

## 2024-01-01 DIAGNOSIS — I77.810 AORTIC ROOT DILATATION (H): ICD-10-CM

## 2024-01-01 DIAGNOSIS — C54.1 ENDOMETRIAL CANCER (H): ICD-10-CM

## 2024-01-01 DIAGNOSIS — F33.1 MODERATE EPISODE OF RECURRENT MAJOR DEPRESSIVE DISORDER (H): ICD-10-CM

## 2024-01-01 DIAGNOSIS — F17.200 NICOTINE DEPENDENCE, UNCOMPLICATED, UNSPECIFIED NICOTINE PRODUCT TYPE: ICD-10-CM

## 2024-01-01 DIAGNOSIS — I25.10 MILD CORONARY ARTERY DISEASE: ICD-10-CM

## 2024-01-01 DIAGNOSIS — Z00.00 ENCOUNTER FOR MEDICARE ANNUAL WELLNESS EXAM: Primary | ICD-10-CM

## 2024-01-01 DIAGNOSIS — N18.1 CHRONIC KIDNEY DISEASE, STAGE 1: ICD-10-CM

## 2024-01-01 DIAGNOSIS — F43.21 GRIEF: ICD-10-CM

## 2024-01-01 DIAGNOSIS — I10 ESSENTIAL HYPERTENSION WITH GOAL BLOOD PRESSURE LESS THAN 140/90: Primary | ICD-10-CM

## 2024-01-01 DIAGNOSIS — Z29.11 NEED FOR VACCINATION AGAINST RESPIRATORY SYNCYTIAL VIRUS: ICD-10-CM

## 2024-01-01 DIAGNOSIS — H25.013 CORTICAL AGE-RELATED CATARACT OF BOTH EYES: ICD-10-CM

## 2024-01-01 DIAGNOSIS — I1A.0 RESISTANT HYPERTENSION: Primary | ICD-10-CM

## 2024-01-01 DIAGNOSIS — Z01.818 PREOP GENERAL PHYSICAL EXAM: Primary | ICD-10-CM

## 2024-01-01 DIAGNOSIS — Z71.6 ENCOUNTER FOR SMOKING CESSATION COUNSELING: ICD-10-CM

## 2024-01-01 DIAGNOSIS — Z12.31 VISIT FOR SCREENING MAMMOGRAM: ICD-10-CM

## 2024-01-01 LAB
ALBUMIN UR-MCNC: NEGATIVE MG/DL
ALDOST SERPL-MCNC: 4.1 NG/DL (ref 0–31)
ALDOST/RENIN PLAS-RTO: 2.7 {RATIO} (ref 0–25)
ANION GAP SERPL CALCULATED.3IONS-SCNC: 10 MMOL/L (ref 7–15)
ANION GAP SERPL CALCULATED.3IONS-SCNC: 11 MMOL/L (ref 7–15)
ANNOTATION COMMENT IMP: NORMAL
APPEARANCE UR: CLEAR
BACTERIA #/AREA URNS HPF: NORMAL /HPF
BILIRUB UR QL STRIP: NEGATIVE
BUN SERPL-MCNC: 16 MG/DL (ref 8–23)
BUN SERPL-MCNC: 17.1 MG/DL (ref 8–23)
CALCIUM SERPL-MCNC: 9.5 MG/DL (ref 8.8–10.4)
CALCIUM SERPL-MCNC: 9.9 MG/DL (ref 8.8–10.2)
CHLORIDE SERPL-SCNC: 100 MMOL/L (ref 98–107)
CHLORIDE SERPL-SCNC: 101 MMOL/L (ref 98–107)
COLOR UR AUTO: YELLOW
CREAT SERPL-MCNC: 0.75 MG/DL (ref 0.51–0.95)
CREAT SERPL-MCNC: 0.78 MG/DL (ref 0.51–0.95)
DEPRECATED HCO3 PLAS-SCNC: 27 MMOL/L (ref 22–29)
EGFRCR SERPLBLD CKD-EPI 2021: 79 ML/MIN/1.73M2
EGFRCR SERPLBLD CKD-EPI 2021: 83 ML/MIN/1.73M2
GLUCOSE SERPL-MCNC: 78 MG/DL (ref 70–99)
GLUCOSE SERPL-MCNC: 92 MG/DL (ref 70–99)
GLUCOSE UR STRIP-MCNC: NEGATIVE MG/DL
HCO3 SERPL-SCNC: 27 MMOL/L (ref 22–29)
HGB UR QL STRIP: ABNORMAL
KETONES UR STRIP-MCNC: NEGATIVE MG/DL
LEUKOCYTE ESTERASE UR QL STRIP: NEGATIVE
METANEPHS SERPL-SCNC: 0.29 NMOL/L
NITRATE UR QL: NEGATIVE
NORMETANEPHRINE SERPL-SCNC: 0.8 NMOL/L
PH UR STRIP: 5.5 [PH] (ref 5–7)
POTASSIUM SERPL-SCNC: 3.9 MMOL/L (ref 3.4–5.3)
POTASSIUM SERPL-SCNC: 4.8 MMOL/L (ref 3.4–5.3)
PTH-INTACT SERPL-MCNC: 31 PG/ML (ref 15–65)
RBC #/AREA URNS AUTO: NORMAL /HPF
RENIN PLAS-CCNC: 1.5 NG/ML/HR
SODIUM SERPL-SCNC: 138 MMOL/L (ref 135–145)
SODIUM SERPL-SCNC: 138 MMOL/L (ref 135–145)
SP GR UR STRIP: 1.01 (ref 1–1.03)
TSH SERPL DL<=0.005 MIU/L-ACNC: 1.37 UIU/ML (ref 0.3–4.2)
UROBILINOGEN UR STRIP-ACNC: 0.2 E.U./DL
WBC #/AREA URNS AUTO: NORMAL /HPF

## 2024-01-01 PROCEDURE — 84443 ASSAY THYROID STIM HORMONE: CPT | Performed by: FAMILY MEDICINE

## 2024-01-01 PROCEDURE — 83835 ASSAY OF METANEPHRINES: CPT | Mod: 90 | Performed by: FAMILY MEDICINE

## 2024-01-01 PROCEDURE — 99215 OFFICE O/P EST HI 40 MIN: CPT | Performed by: FAMILY MEDICINE

## 2024-01-01 PROCEDURE — 99214 OFFICE O/P EST MOD 30 MIN: CPT | Performed by: FAMILY MEDICINE

## 2024-01-01 PROCEDURE — 36415 COLL VENOUS BLD VENIPUNCTURE: CPT | Performed by: FAMILY MEDICINE

## 2024-01-01 PROCEDURE — 97161 PT EVAL LOW COMPLEX 20 MIN: CPT | Mod: GP | Performed by: PHYSICAL THERAPIST

## 2024-01-01 PROCEDURE — 99214 OFFICE O/P EST MOD 30 MIN: CPT | Mod: 25 | Performed by: FAMILY MEDICINE

## 2024-01-01 PROCEDURE — 99214 OFFICE O/P EST MOD 30 MIN: CPT | Performed by: NURSE PRACTITIONER

## 2024-01-01 PROCEDURE — G0439 PPPS, SUBSEQ VISIT: HCPCS | Performed by: FAMILY MEDICINE

## 2024-01-01 PROCEDURE — 99000 SPECIMEN HANDLING OFFICE-LAB: CPT | Performed by: FAMILY MEDICINE

## 2024-01-01 PROCEDURE — 91320 SARSCV2 VAC 30MCG TRS-SUC IM: CPT | Performed by: FAMILY MEDICINE

## 2024-01-01 PROCEDURE — 81001 URINALYSIS AUTO W/SCOPE: CPT | Performed by: FAMILY MEDICINE

## 2024-01-01 PROCEDURE — 84244 ASSAY OF RENIN: CPT | Mod: 90 | Performed by: FAMILY MEDICINE

## 2024-01-01 PROCEDURE — 97535 SELF CARE MNGMENT TRAINING: CPT | Mod: GP | Performed by: PHYSICAL THERAPIST

## 2024-01-01 PROCEDURE — 82088 ASSAY OF ALDOSTERONE: CPT | Performed by: FAMILY MEDICINE

## 2024-01-01 PROCEDURE — G2211 COMPLEX E/M VISIT ADD ON: HCPCS | Performed by: FAMILY MEDICINE

## 2024-01-01 PROCEDURE — 80048 BASIC METABOLIC PNL TOTAL CA: CPT | Performed by: FAMILY MEDICINE

## 2024-01-01 PROCEDURE — 83970 ASSAY OF PARATHORMONE: CPT | Performed by: FAMILY MEDICINE

## 2024-01-01 PROCEDURE — G0463 HOSPITAL OUTPT CLINIC VISIT: HCPCS | Performed by: NURSE PRACTITIONER

## 2024-01-01 PROCEDURE — 90480 ADMN SARSCOV2 VAC 1/ONLY CMP: CPT | Performed by: FAMILY MEDICINE

## 2024-01-01 PROCEDURE — 96127 BRIEF EMOTIONAL/BEHAV ASSMT: CPT | Performed by: FAMILY MEDICINE

## 2024-01-01 RX ORDER — SPIRONOLACTONE AND HYDROCHLOROTHIAZIDE 25; 25 MG/1; MG/1
1 TABLET ORAL DAILY
Qty: 90 TABLET | Refills: 1 | Status: SHIPPED | OUTPATIENT
Start: 2024-01-01

## 2024-01-01 RX ORDER — LISINOPRIL 40 MG/1
40 TABLET ORAL DAILY
Qty: 90 TABLET | Refills: 1 | Status: SHIPPED | OUTPATIENT
Start: 2024-01-01

## 2024-01-01 RX ORDER — SERTRALINE HYDROCHLORIDE 100 MG/1
100 TABLET, FILM COATED ORAL DAILY
Qty: 90 TABLET | Refills: 0 | Status: SHIPPED | OUTPATIENT
Start: 2024-01-01 | End: 2024-01-01

## 2024-01-01 RX ORDER — LISINOPRIL 40 MG/1
40 TABLET ORAL DAILY
Qty: 90 TABLET | Refills: 0 | Status: SHIPPED | OUTPATIENT
Start: 2024-01-01 | End: 2024-01-01

## 2024-01-01 RX ORDER — LISINOPRIL 40 MG/1
40 TABLET ORAL DAILY
Qty: 30 TABLET | Refills: 1 | Status: SHIPPED | OUTPATIENT
Start: 2024-01-01 | End: 2024-01-01

## 2024-01-01 RX ORDER — AMLODIPINE BESYLATE 10 MG/1
10 TABLET ORAL AT BEDTIME
Qty: 90 TABLET | Refills: 1 | Status: SHIPPED | OUTPATIENT
Start: 2024-01-01 | End: 2024-01-01

## 2024-01-01 RX ORDER — AMLODIPINE BESYLATE 10 MG/1
10 TABLET ORAL AT BEDTIME
Qty: 90 TABLET | Refills: 1 | Status: SHIPPED | OUTPATIENT
Start: 2024-01-01

## 2024-01-01 RX ORDER — SPIRONOLACTONE AND HYDROCHLOROTHIAZIDE 25; 25 MG/1; MG/1
1 TABLET ORAL DAILY
Qty: 30 TABLET | Refills: 1 | Status: SHIPPED | OUTPATIENT
Start: 2024-01-01 | End: 2024-01-01

## 2024-01-01 RX ORDER — CHLORTHALIDONE 25 MG/1
25 TABLET ORAL DAILY
Qty: 90 TABLET | Refills: 0 | OUTPATIENT
Start: 2024-01-01

## 2024-01-01 RX ORDER — CHLORTHALIDONE 25 MG/1
25 TABLET ORAL DAILY
Qty: 90 TABLET | Refills: 0 | Status: SHIPPED | OUTPATIENT
Start: 2024-01-01 | End: 2024-01-01 | Stop reason: SINTOL

## 2024-01-01 RX ORDER — ROSUVASTATIN CALCIUM 10 MG/1
10 TABLET, COATED ORAL DAILY
Qty: 90 TABLET | Refills: 3 | Status: SHIPPED | OUTPATIENT
Start: 2024-01-01

## 2024-01-01 RX ORDER — CHLORTHALIDONE 25 MG/1
25 TABLET ORAL DAILY
Qty: 30 TABLET | Refills: 1 | Status: SHIPPED | OUTPATIENT
Start: 2024-01-01 | End: 2024-01-01

## 2024-01-01 RX ORDER — RESPIRATORY SYNCYTIAL VIRUS VACCINE 120MCG/0.5
0.5 KIT INTRAMUSCULAR ONCE
Qty: 1 EACH | Refills: 0 | Status: CANCELLED | OUTPATIENT
Start: 2024-01-01 | End: 2024-01-01

## 2024-01-01 SDOH — HEALTH STABILITY: PHYSICAL HEALTH: ON AVERAGE, HOW MANY MINUTES DO YOU ENGAGE IN EXERCISE AT THIS LEVEL?: 30 MIN

## 2024-01-01 SDOH — HEALTH STABILITY: PHYSICAL HEALTH: ON AVERAGE, HOW MANY DAYS PER WEEK DO YOU ENGAGE IN MODERATE TO STRENUOUS EXERCISE (LIKE A BRISK WALK)?: 6 DAYS

## 2024-01-01 ASSESSMENT — ANXIETY QUESTIONNAIRES
4. TROUBLE RELAXING: SEVERAL DAYS
1. FEELING NERVOUS, ANXIOUS, OR ON EDGE: SEVERAL DAYS
IF YOU CHECKED OFF ANY PROBLEMS ON THIS QUESTIONNAIRE, HOW DIFFICULT HAVE THESE PROBLEMS MADE IT FOR YOU TO DO YOUR WORK, TAKE CARE OF THINGS AT HOME, OR GET ALONG WITH OTHER PEOPLE: SOMEWHAT DIFFICULT
2. NOT BEING ABLE TO STOP OR CONTROL WORRYING: NOT AT ALL
6. BECOMING EASILY ANNOYED OR IRRITABLE: NOT AT ALL
5. BEING SO RESTLESS THAT IT IS HARD TO SIT STILL: SEVERAL DAYS
IF YOU CHECKED OFF ANY PROBLEMS ON THIS QUESTIONNAIRE, HOW DIFFICULT HAVE THESE PROBLEMS MADE IT FOR YOU TO DO YOUR WORK, TAKE CARE OF THINGS AT HOME, OR GET ALONG WITH OTHER PEOPLE: SOMEWHAT DIFFICULT
GAD7 TOTAL SCORE: 2
4. TROUBLE RELAXING: SEVERAL DAYS
2. NOT BEING ABLE TO STOP OR CONTROL WORRYING: NOT AT ALL
GAD7 TOTAL SCORE: 3
GAD7 TOTAL SCORE: 2
GAD7 TOTAL SCORE: 6
GAD7 TOTAL SCORE: 6
7. FEELING AFRAID AS IF SOMETHING AWFUL MIGHT HAPPEN: SEVERAL DAYS
6. BECOMING EASILY ANNOYED OR IRRITABLE: NOT AT ALL
IF YOU CHECKED OFF ANY PROBLEMS ON THIS QUESTIONNAIRE, HOW DIFFICULT HAVE THESE PROBLEMS MADE IT FOR YOU TO DO YOUR WORK, TAKE CARE OF THINGS AT HOME, OR GET ALONG WITH OTHER PEOPLE: SOMEWHAT DIFFICULT
7. FEELING AFRAID AS IF SOMETHING AWFUL MIGHT HAPPEN: NOT AT ALL
2. NOT BEING ABLE TO STOP OR CONTROL WORRYING: SEVERAL DAYS
GAD7 TOTAL SCORE: 6
5. BEING SO RESTLESS THAT IT IS HARD TO SIT STILL: SEVERAL DAYS
GAD7 TOTAL SCORE: 2
1. FEELING NERVOUS, ANXIOUS, OR ON EDGE: SEVERAL DAYS
3. WORRYING TOO MUCH ABOUT DIFFERENT THINGS: SEVERAL DAYS
8. IF YOU CHECKED OFF ANY PROBLEMS, HOW DIFFICULT HAVE THESE MADE IT FOR YOU TO DO YOUR WORK, TAKE CARE OF THINGS AT HOME, OR GET ALONG WITH OTHER PEOPLE?: SOMEWHAT DIFFICULT
7. FEELING AFRAID AS IF SOMETHING AWFUL MIGHT HAPPEN: SEVERAL DAYS
5. BEING SO RESTLESS THAT IT IS HARD TO SIT STILL: NOT AT ALL
4. TROUBLE RELAXING: SEVERAL DAYS
4. TROUBLE RELAXING: SEVERAL DAYS
3. WORRYING TOO MUCH ABOUT DIFFERENT THINGS: NOT AT ALL
IF YOU CHECKED OFF ANY PROBLEMS ON THIS QUESTIONNAIRE, HOW DIFFICULT HAVE THESE PROBLEMS MADE IT FOR YOU TO DO YOUR WORK, TAKE CARE OF THINGS AT HOME, OR GET ALONG WITH OTHER PEOPLE: NOT DIFFICULT AT ALL
2. NOT BEING ABLE TO STOP OR CONTROL WORRYING: NOT AT ALL
5. BEING SO RESTLESS THAT IT IS HARD TO SIT STILL: SEVERAL DAYS
7. FEELING AFRAID AS IF SOMETHING AWFUL MIGHT HAPPEN: NOT AT ALL
7. FEELING AFRAID AS IF SOMETHING AWFUL MIGHT HAPPEN: SEVERAL DAYS
8. IF YOU CHECKED OFF ANY PROBLEMS, HOW DIFFICULT HAVE THESE MADE IT FOR YOU TO DO YOUR WORK, TAKE CARE OF THINGS AT HOME, OR GET ALONG WITH OTHER PEOPLE?: SOMEWHAT DIFFICULT
GAD7 TOTAL SCORE: 3
7. FEELING AFRAID AS IF SOMETHING AWFUL MIGHT HAPPEN: NOT AT ALL
GAD7 TOTAL SCORE: 6
1. FEELING NERVOUS, ANXIOUS, OR ON EDGE: SEVERAL DAYS
3. WORRYING TOO MUCH ABOUT DIFFERENT THINGS: NOT AT ALL
7. FEELING AFRAID AS IF SOMETHING AWFUL MIGHT HAPPEN: NOT AT ALL
6. BECOMING EASILY ANNOYED OR IRRITABLE: SEVERAL DAYS
GAD7 TOTAL SCORE: 3
8. IF YOU CHECKED OFF ANY PROBLEMS, HOW DIFFICULT HAVE THESE MADE IT FOR YOU TO DO YOUR WORK, TAKE CARE OF THINGS AT HOME, OR GET ALONG WITH OTHER PEOPLE?: NOT DIFFICULT AT ALL
3. WORRYING TOO MUCH ABOUT DIFFERENT THINGS: SEVERAL DAYS
7. FEELING AFRAID AS IF SOMETHING AWFUL MIGHT HAPPEN: SEVERAL DAYS
8. IF YOU CHECKED OFF ANY PROBLEMS, HOW DIFFICULT HAVE THESE MADE IT FOR YOU TO DO YOUR WORK, TAKE CARE OF THINGS AT HOME, OR GET ALONG WITH OTHER PEOPLE?: SOMEWHAT DIFFICULT
6. BECOMING EASILY ANNOYED OR IRRITABLE: NOT AT ALL
1. FEELING NERVOUS, ANXIOUS, OR ON EDGE: SEVERAL DAYS

## 2024-01-01 ASSESSMENT — PATIENT HEALTH QUESTIONNAIRE - PHQ9
10. IF YOU CHECKED OFF ANY PROBLEMS, HOW DIFFICULT HAVE THESE PROBLEMS MADE IT FOR YOU TO DO YOUR WORK, TAKE CARE OF THINGS AT HOME, OR GET ALONG WITH OTHER PEOPLE: NOT DIFFICULT AT ALL
SUM OF ALL RESPONSES TO PHQ QUESTIONS 1-9: 5
SUM OF ALL RESPONSES TO PHQ QUESTIONS 1-9: 5
SUM OF ALL RESPONSES TO PHQ QUESTIONS 1-9: 1
SUM OF ALL RESPONSES TO PHQ QUESTIONS 1-9: 1
10. IF YOU CHECKED OFF ANY PROBLEMS, HOW DIFFICULT HAVE THESE PROBLEMS MADE IT FOR YOU TO DO YOUR WORK, TAKE CARE OF THINGS AT HOME, OR GET ALONG WITH OTHER PEOPLE: NOT DIFFICULT AT ALL
SUM OF ALL RESPONSES TO PHQ QUESTIONS 1-9: 1
10. IF YOU CHECKED OFF ANY PROBLEMS, HOW DIFFICULT HAVE THESE PROBLEMS MADE IT FOR YOU TO DO YOUR WORK, TAKE CARE OF THINGS AT HOME, OR GET ALONG WITH OTHER PEOPLE: NOT DIFFICULT AT ALL
10. IF YOU CHECKED OFF ANY PROBLEMS, HOW DIFFICULT HAVE THESE PROBLEMS MADE IT FOR YOU TO DO YOUR WORK, TAKE CARE OF THINGS AT HOME, OR GET ALONG WITH OTHER PEOPLE: SOMEWHAT DIFFICULT
SUM OF ALL RESPONSES TO PHQ QUESTIONS 1-9: 2
SUM OF ALL RESPONSES TO PHQ QUESTIONS 1-9: 2
SUM OF ALL RESPONSES TO PHQ QUESTIONS 1-9: 1

## 2024-01-01 ASSESSMENT — PAIN SCALES - GENERAL
PAINLEVEL: NO PAIN (0)

## 2024-01-01 ASSESSMENT — SOCIAL DETERMINANTS OF HEALTH (SDOH): HOW OFTEN DO YOU GET TOGETHER WITH FRIENDS OR RELATIVES?: TWICE A WEEK
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
## 2024-02-14 NOTE — PATIENT INSTRUCTIONS
Nicotine Transdermal System   Habitrol, Nicoderm C-Q    Uses  For quitting smoking.    Instructions  DO NOT take this medicine by mouth.    Avoid placing the patch near the breast.    Remove the patch after 24 hours.    Keep the medicine at room temperature. Avoid heat and direct light.    This patch should not be cut.    Wash your hands before and after handling this medicine.    Remove old patch before applying new one. Change the location of the new patch.    If you have vivid dreams or trouble sleeping, you may remove the patch before going to sleep.    Ask your doctor or pharmacist about locations on your body where this patch can be used.    Remove the plastic liner that protects the sticky side of the patch before applying to the skin.    Be sure the area of skin is clean and dry before putting on a new patch.    Apply the patch to a clean, dry, hairless area.    Press the patch firmly for a few seconds to make sure it stays in place.    After removing the patch, fold it together and discard it out of reach of children and pets.    Please ask your doctor or pharmacist how you can safely dispose of used patches.    If the skin under the patch becomes irritated, remove the patch. Do not apply a new patch to the area until the skin feels better.    To avoid irritating your skin, use a different location for a new patch.    Apply the patch only to normal looking skin. Avoid areas of the skin that are red, have scrapes, or damaged.    If the patch falls off, apply a new a patch on a different location of the body.    Please tell your doctor and pharmacist about all the medicines you take. Include both prescription and over-the-counter medicines. Also tell them about any vitamins, herbal medicines, or anything else you take for your health.    If you need to stop this medicine, your doctor may wish to gradually reduce the dosage before stopping.    Do not use more than 1 patch at any one time.    Cautions  Tell  your doctor and pharmacist if you ever had an allergic reaction to a medicine. Symptoms of an allergic reaction can include trouble breathing, skin rash, itching, swelling, or severe dizziness.    Do not use the medication any more than instructed.    Avoid smoking while on this medicine. Smoking may increase your risk for stroke, heart attack, blood clots, high blood pressure, and other diseases of the heart and blood vessels.    Tell the doctor or pharmacist if you are pregnant, planning to be pregnant, or breastfeeding.    Ask your pharmacist if this medicine can interact with any of your other medicines. Be sure to tell them about all the medicines you take.    Please tell all your doctors and dentists that you are on this medicine before they provide care.    Side Effects  The following is a list of some common side effects from this medicine. Please speak with your doctor about what you should do if you experience these or other side effects.    skin irritation where medicine is applied    If you have any of the following side effects, you may be getting too much medicine. Please contact your doctor to let them know about these side effects.    diarrhea  dizziness  nausea  rapid heartbeat  vomiting    A few people may have an allergic reaction to this medicine. Symptoms can include difficulty breathing, skin rash, itching, swelling, or severe dizziness. If you notice any of these symptoms, seek medical help quickly.    Extra  Please speak with your doctor, nurse, or pharmacist if you have any questions about this medicine.      [email or web address]  IMPORTANT NOTE: This document tells you briefly how to take your medicine, but it does not tell you all there is to know about it. Your doctor or pharmacist may give you other documents about your medicine. Please talk to them if you have any questions. Always follow their advice. There is a more complete description of this medicine  available in English. Scan this code on your smartphone or tablet or use the web address below. You can also ask your pharmacist for a printout. If you have any questions, please ask your pharmacist.   2021 [employer]      8251-9573 [employer]. All rights reserved. This information is not intended as a substitute for professional medical care. Always follow your healthcare professional's instructions.    Quitting Tobacco: Care Instructions  Quitting tobacco is much harder than simply changing a habit. Nicotine cravings make it hard to quit, but you can do it. Your doctor will help you set up the plan that best meets your needs.    You will miss the nicotine at first. You may feel short-tempered and grumpy. You may have trouble sleeping or thinking clearly. The urge to use tobacco may continue for a time.   Combining tools can raise your chances of success. You can use medicine along with counseling. And you can join a quit-tobacco program, such as the American Lung Association's Freedom from Smoking program.     Get support.  Reach out to family and friends, and find a counselor to help you quit. Join a support group, such as Nicotine Anonymous. Go to www.smokefree.gov to sign up for text messaging support.     Talk to your doctor or pharmacist about medicines that can help you quit.  Medicines can help with cravings and withdrawal symptoms. There are several over-the-counter choices, such as nicotine patches, gum, and lozenges.     After you quit, do not use tobacco again, not even once.  Get rid of all tobacco products and anything that reminds you of tobacco, such as ashtrays.     Avoid things that make you reach for tobacco.  Change your daily routine. Take a different route to work, or eat a meal in a different place.     Try to cut down on stress.  Find ways to calm yourself, such as taking a hot bath or doing deep breathing exercises.     Eat a healthy diet, and get regular exercise.   "Having healthy habits may help you quit using tobacco.     Don't give up on quitting if you use tobacco again.  Most people quit and restart a few times before they quit for good.   Follow-up care is a key part of your treatment and safety. Be sure to make and go to all appointments, and call your doctor if you are having problems. It's also a good idea to know your test results and keep a list of the medicines you take.  Where can you learn more?  Go to https://www.OrderingOnlineSystem.com.net/patiented  Enter Y522 in the search box to learn more about \"Quitting Tobacco: Care Instructions.\"  Current as of: March 21, 2023               Content Version: 13.8    6422-1423 [employer].   Care instructions adapted under license by your healthcare professional. If you have questions about a medical condition or this instruction, always ask your healthcare professional. [employer] disclaims any warranty or liability for your use of this information.      "

## 2024-02-14 NOTE — LETTER
My Nicotine/Tobacco Cessation Action Plan    Name: Kimmie Carranza   Date of Birth 1950  Date: 2/14/2024    My provider: Dee Castillo   [facility]: [address] [facility] [address]  [facility] [address] [phone]            GREEN    ZONE   Quit for Good    You have quit smoking!    You can quit for good, even if you've tried before Ways to help you stay tobacco free:  When are you still having cravings?  How can you distract yourself from cravings or triggers?   What healthy ways can you cope? For example: Deep breathing, managing stressors, focus on benefits of being a non-smoker, create a cravings kit (gum, water)           YELLOW         ZONE Readiness to Quit    You are taking steps to quit tobacco    You know which quit methods or medications you plan to use Ways to get ready to quit:    Use a smoking log to track your use, cravings, and triggers (like walking, drinking, or being with smokers)    Set a quit date    Tell family, friends, coworkers that you plan to quit    Anticipate and plan for challenges    Remove tobacco products from your spaces    Talk to your provider or [facility] pharmacist about getting help to quit             RED    ZONE Pre-Contemplation    You are not ready to set a quit date    What would your future look like without smoking or using tobacco?    It is never too late to quit   Reasons why you might quit:    Why do you use tobacco? There are social, physical, and emotional reasons  What are the rewards of quitting? Money and time saved, improvement of health (lowered blood pressure, reduced risk for heart attack, stroke, cancer, and lung disease)            General Resources    Quit Partner* - Offers free online and phone support, including personalized coaching, email and text support, educational materials, and quit medication delivered by mail (nicotine patches, gum or lozenges). Provides help building a personal quit plan, tools  to track progress and social network for quitters. Call 1-800-QUIT-NOW or visit www.quitpartVigilant Solutions.Stickybits     American Lung Association - Freedom from Smoking is an online course for quitting. Includes relaxation exercises, social networking with fellow quitters and access to experts. Cost associated with online program, available at freedomfromsmoDeep Fiber Solutions.org      Centers for Disease Control and Prevention* - Free tools and resources, including free coaching, quit plans, educational materials and referrals. Go to www.cdc.gov/tobacco or call support line at 1-800-QUIT-NOW    National [place] for Tobacco Cessation* - Ex Program is a free online program designed to  re-learn life without cigarettes  while creating a personal plan and tracking progress. Also gives free access to other smokers trying to quit and [facility] experts. Visit www.[employer]o.org     Nicotine Anonymous* - Free program for abstaining from tobacco use, adapted from the 12 steps of Alcoholics Anonymous. Meetings consist of two or more people sharing their experiences, struggles and successes. Online and telephone meetings as well as e-mail and pen pal networking. To find a meeting near you, visit www.nicotineHadrian Electrical Engineering.PrismaStar    [facility]  - Jose Antonio to Quit Smoking is a mobile iPhone florina  QuitNow! Quit Smoking is a mobile florina for Android and iPhone smartphone    Smokefree.gov* - Offers a step-by-step quitting guide and access to National Cancer [place] experts via instant messaging and telephone at 2-686-89J-QUIT. Experts communicate via free online  LiveHelp  chat Monday through Friday, 9 a.m. to 9 p.m. EST. Visit online at www.smokefree.gov     Pregnancy Resources    March [employer] Foundation* - Free information about tobacco and alcohol use during and after pregnancy. Get answers to your questions for free by e-mailing experts. Go to https://www.marchofdimes.org/pregnancy/smoking-during-pregnancy.aspx    American Pregnancy Association* -  "Educational information and step-by-step recommendations and tips for quitting success. Visit: www.americanpregnancy.org/pregnancy-health/smoking-during-pregnancy    Adolescent Resources    American Legacy Foundation -  Truth  campaign is the largest smoking prevention program for youth in the [place] Visit the website at www.Adreal for educational information about tobacco, addiction, and empowering teens to make informed decisions about tobacco.    American Cancer Society* - Information about smokeless tobacco, addiction, drug and non-drug options for quitting. Go to www.cancer.org and search for  smokeless tobacco\".     Resources    Centers for Disease Control and Prevention - Pathways to [place] is a free booklet with information, advice and tips on quitting and ways to take action in your community. Download this free booklet at www.cdc.gov/tobacco/quit_smoking/how_to_quit/pathways/index.htm    LGBTQ Resources    National LGBT Tobacco Control Network - Free online educational material on providing support to others, developing a quit plan and resources on variation in tobacco use among different populations. Visit www.lgbttobacco.org    * Available in Yi  "

## 2024-03-06 PROBLEM — M85.852 OSTEOPENIA OF BOTH HIPS: Status: ACTIVE | Noted: 2024-01-01

## 2024-03-06 PROBLEM — M85.851 OSTEOPENIA OF BOTH HIPS: Status: ACTIVE | Noted: 2024-01-01

## 2024-03-06 NOTE — PROGRESS NOTES
Assessment & Plan       ICD-10-CM    1. Resistant hypertension  I1A.0 Aldosterone     Renin activity     Aldosterone Renin Ratio     TSH with free T4 reflex     UA with Microscopic reflex to Culture - lab collect     Parathyroid Hormone Intact     Metanephrines Plasma Free     Aldosterone Renin Ratio     TSH with free T4 reflex     UA with Microscopic reflex to Culture - lab collect     Parathyroid Hormone Intact     Metanephrines Plasma Free     UA Microscopic with Reflex to Culture     CANCELED: Comprehensive metabolic panel (BMP + Alb, Alk Phos, ALT, AST, Total. Bili, TP)     CANCELED: Aldosterone     CANCELED: Renin activity      2. Essential hypertension with goal blood pressure less than 140/90  I10 Aldosterone     Renin activity     Aldosterone Renin Ratio     TSH with free T4 reflex     UA with Microscopic reflex to Culture - lab collect     Parathyroid Hormone Intact     Metanephrines Plasma Free     lisinopril (ZESTRIL) 40 MG tablet     chlorthalidone (HYGROTON) 25 MG tablet     Aldosterone Renin Ratio     TSH with free T4 reflex     UA with Microscopic reflex to Culture - lab collect     Parathyroid Hormone Intact     Metanephrines Plasma Free     UA Microscopic with Reflex to Culture     CANCELED: Comprehensive metabolic panel (BMP + Alb, Alk Phos, ALT, AST, Total. Bili, TP)     CANCELED: Aldosterone     CANCELED: Renin activity      3. Encounter for smoking cessation counseling  Z71.6       4. Endometrial cancer (H)  C54.1       5. Hyperlipidemia LDL goal <100  E78.5 rosuvastatin (CRESTOR) 10 MG tablet      6. Anxiety state  F41.1 sertraline (ZOLOFT) 100 MG tablet      7. Mild coronary artery disease  I25.10 rosuvastatin (CRESTOR) 10 MG tablet      8. Aortic root dilatation (H24)  I77.810          HTN/Resistant HTN- at last visit on 2/14/24, hydrochlorothiazide switched to chlorthalidone, and continues on lisinopril 40mg/d and amlodipine 10mg/d.  BP better on MD recheck x 2 today, but still high at  home and quite high on first check here.  Reviewed labs from 3/6/24 w/ normal CMP and improved microalbumin.  --Will check labs for resistant HTN as she is on mostly maxed meds in 3 categories with borderline control.  With improved BP recheck, though, will have her continue on current HTN med regimen for now.  Refills sent, follow-up in 3 months unless lab/other concerns    Smoking cessation- still not smoking!  Quit when in [place] with daughter and new grandchild.  Continues prn nicotine lozenges.    Chronic cares review...  --Endometrial cancer- 11/23 consult with blas Bacon, discussed q6mo follow-up x 5 yrs, can be with NPs.    --MDD/insomnia- stable on current meds (sertraline).    --Lipids/CAD - stable on statins and asa.      Follow-up appt scheduled on 6/4/24, wellness visit, w/ follow-up for HTN, MDD/anxiety.                  Swapnil Peralta is a 73 year old, presenting for the following health issues:  RECHECK        3/6/2024    12:12 PM   Additional Questions   Roomed by lawrence khan   Accompanied by jr         3/6/2024    12:12 PM   Patient Reported Additional Medications   Patient reports taking the following new medications n/a     History of Present Illness       Hypertension: She presents for follow up of hypertension.  She does check blood pressure  regularly outside of the clinic. Outside blood pressures have been over 140/90. She follows a low salt diet.     She eats 2-3 servings of fruits and vegetables daily.She consumes 1 sweetened beverage(s) daily.She exercises with enough effort to increase her heart rate 20 to 29 minutes per day.  She exercises with enough effort to increase her heart rate 6 days per week.   She is taking medications regularly.     At last visit, BP very high, changed hydrochlorothiazide to chlorthalidone 25mg/d. Still on lisinopril 40mg/d and amlodipine 10mg/d.  Se's- bit more urination.  No increase in nocturia.    Stopped smoking while in [place] a few months ago  "with daughter (and new grandchild).  Still doing well with it, using nicotine replacement at times as needed.    BP at home today was 145, but not checking much otherwise.  Higher here.  Living at her brother's condo the last couple weeks (he's in [place]) since daughter (and her 3mo grandchild!) from [place] has been staying here in [facility]  Has her meds there, but not her BP cuff, so hasn't checked her BPs the last couple weeks.        Review of Systems  Constitutional, neuro, ENT, endocrine, pulmonary, cardiac, gastrointestinal, genitourinary, musculoskeletal, integument and psychiatric systems are negative, except as otherwise noted.      Objective    /78   Pulse 55   Temp 97.1  F (36.2  C) (Temporal)   Resp 19   Ht 1.651 m (5' 5\")   Wt 57.2 kg (126 lb)   LMP  (LMP Unknown)   SpO2 98%   BMI 20.97 kg/m    Body mass index is 20.97 kg/m .  Physical Exam   GENERAL: alert and no distress  NECK: no adenopathy, no asymmetry, masses, or scars  RESP: lungs clear to auscultation - no rales, rhonchi or wheezes  CV: regular rate and rhythm, normal S1 S2, no S3 or S4, no murmur, click or rub, no peripheral edema  ABDOMEN: soft, nontender, no hepatosplenomegaly, no masses and bowel sounds normal  MS: no gross musculoskeletal defects noted, no edema  NEURO: Normal strength and tone, mentation intact and speech normal    Lab on 02/29/2024   Component Date Value Ref Range Status    Cholesterol 02/29/2024 192  <200 mg/dL Final    Triglycerides 02/29/2024 78  <150 mg/dL Final    Direct Measure HDL 02/29/2024 77  >=50 mg/dL Final    LDL Cholesterol Calculated 02/29/2024 99  <=100 mg/dL Final    Non HDL Cholesterol 02/29/2024 115  <130 mg/dL Final    Patient Fasting > 8hrs? 02/29/2024 Yes   Final    Creatinine Urine mg/dL 02/29/2024 78.1  mg/dL Final    The reference ranges have not been established in urine creatinine. The results should be integrated into the clinical context for interpretation.    Albumin Urine mg/L " 02/29/2024 13.6  mg/L Final    The reference ranges have not been established in urine albumin. The results should be integrated into the clinical context for interpretation.    Albumin Urine mg/g Cr 02/29/2024 17.41  0.00 - 25.00 mg/g Cr Final    Microalbuminuria is defined as an albumin:creatinine ratio of 17 to 299 for males and 25 to 299 for females. A ratio of albumin:creatinine of 300 or higher is indicative of overt proteinuria.  Due to biologic variability, positive results should be confirmed by a second, first-morning random or 24-hour timed urine specimen. If there is discrepancy, a third specimen is recommended. When 2 out of 3 results are in the microalbuminuria range, this is evidence for incipient nephropathy and warrants increased efforts at glucose control, blood pressure control, and institution of therapy with an angiotensin-converting-enzyme (ACE) inhibitor (if the patient can tolerate it).      Sodium 02/29/2024 136  135 - 145 mmol/L Final    Reference intervals for this test were updated on 09/26/2023 to more accurately reflect our healthy population. There may be differences in the flagging of prior results with similar values performed with this method. Interpretation of those prior results can be made in the context of the updated reference intervals.     Potassium 02/29/2024 3.5  3.4 - 5.3 mmol/L Final    Carbon Dioxide (CO2) 02/29/2024 27  22 - 29 mmol/L Final    Anion Gap 02/29/2024 12  7 - 15 mmol/L Final    Urea Nitrogen 02/29/2024 12.5  8.0 - 23.0 mg/dL Final    Creatinine 02/29/2024 0.76  0.51 - 0.95 mg/dL Final    GFR Estimate 02/29/2024 82  >60 mL/min/1.73m2 Final    Calcium 02/29/2024 9.4  8.8 - 10.2 mg/dL Final    Chloride 02/29/2024 97 (L)  98 - 107 mmol/L Final    Glucose 02/29/2024 93  70 - 99 mg/dL Final    Alkaline Phosphatase 02/29/2024 49  40 - 150 U/L Final    Reference intervals for this test were updated on 11/14/2023 to more accurately reflect our healthy population.  There may be differences in the flagging of prior results with similar values performed with this method. Interpretation of those prior results can be made in the context of the updated reference intervals.    AST 02/29/2024 33  0 - 45 U/L Final    Reference intervals for this test were updated on 6/12/2023 to more accurately reflect our healthy population. There may be differences in the flagging of prior results with similar values performed with this method. Interpretation of those prior results can be made in the context of the updated reference intervals.    ALT 02/29/2024 20  0 - 50 U/L Final    Reference intervals for this test were updated on 6/12/2023 to more accurately reflect our healthy population. There may be differences in the flagging of prior results with similar values performed with this method. Interpretation of those prior results can be made in the context of the updated reference intervals.      Protein Total 02/29/2024 7.2  6.4 - 8.3 g/dL Final    Albumin 02/29/2024 4.6  3.5 - 5.2 g/dL Final    Bilirubin Total 02/29/2024 0.8  <=1.2 mg/dL Final     Results for orders placed or performed in visit on 03/06/24   UA with Microscopic reflex to Culture - lab collect     Status: Abnormal    Specimen: Urine, Midstream   Result Value Ref Range    Color Urine Yellow Colorless, Straw, Light Yellow, Yellow    Appearance Urine Clear Clear    Glucose Urine Negative Negative mg/dL    Bilirubin Urine Negative Negative    Ketones Urine Negative Negative mg/dL    Specific Gravity Urine 1.015 1.003 - 1.035    Blood Urine Trace (A) Negative    pH Urine 5.5 5.0 - 7.0    Protein Albumin Urine Negative Negative mg/dL    Urobilinogen Urine 0.2 0.2, 1.0 E.U./dL    Nitrite Urine Negative Negative    Leukocyte Esterase Urine Negative Negative   UA Microscopic with Reflex to Culture     Status: Normal   Result Value Ref Range    Bacteria Urine None Seen None Seen /HPF    RBC Urine 0-2 0-2 /HPF /HPF    WBC Urine 0-5 0-5  /HPF /HPF    Narrative    Urine Culture not indicated   Aldosterone Renin Ratio     Status: None (In process)    Narrative    The following orders were created for panel order Aldosterone Renin Ratio.  Procedure                               Abnormality         Status                     ---------                               -----------         ------                     Aldosterone[[id number]]                                      In process                 Renin activity[[id number]]                                   In process                 Aldosterone Renin Ratio[[id number]]                          In process                   Please view results for these tests on the individual orders.     Results for orders placed or performed in visit on 03/06/24 (from the past 24 hour(s))   Aldosterone Renin Ratio    Narrative    The following orders were created for panel order Aldosterone Renin Ratio.  Procedure                               Abnormality         Status                     ---------                               -----------         ------                     Aldosterone[[id number]]                                      In process                 Renin activity[[id number]]                                   In process                 Aldosterone Renin Ratio[[id number]]                          In process                   Please view results for these tests on the individual orders.   UA with Microscopic reflex to Culture - lab collect    Specimen: Urine, Midstream   Result Value Ref Range    Color Urine Yellow Colorless, Straw, Light Yellow, Yellow    Appearance Urine Clear Clear    Glucose Urine Negative Negative mg/dL    Bilirubin Urine Negative Negative    Ketones Urine Negative Negative mg/dL    Specific Gravity Urine 1.015 1.003 - 1.035    Blood Urine Trace (A) Negative    pH Urine 5.5 5.0 - 7.0    Protein Albumin Urine Negative Negative mg/dL    Urobilinogen Urine 0.2 0.2, 1.0 E.U./dL    Nitrite Urine  Negative Negative    Leukocyte Esterase Urine Negative Negative   UA Microscopic with Reflex to Culture   Result Value Ref Range    Bacteria Urine None Seen None Seen /HPF    RBC Urine 0-2 0-2 /HPF /HPF    WBC Urine 0-5 0-5 /HPF /HPF    Narrative    Urine Culture not indicated           Signed Electronically by: Dee Castillo MD

## 2024-03-06 NOTE — Clinical Note
Pt has a 6/4/24 follow-up appt- can you change it to a wellness & HTN follow-up ?  Currently just HTN. Thanks!

## 2024-03-07 PROBLEM — C54.1 ENDOMETRIAL CANCER (H): Status: ACTIVE | Noted: 2023-05-18

## 2024-05-30 NOTE — NURSING NOTE
"Oncology Rooming Note    May 30, 2024 10:08 AM   Kimmie Carranza is a 74 year old female who presents for:    Chief Complaint   Patient presents with    Oncology Clinic Visit     Initial Vitals: /68   Pulse 53   Temp 97.8  F (36.6  C) (Oral)   Resp 18   Wt 59.2 kg (130 lb 8 oz)   LMP  (LMP Unknown)   SpO2 100%   BMI 21.72 kg/m   Estimated body mass index is 21.72 kg/m  as calculated from the following:    Height as of 3/6/24: 1.651 m (5' 5\").    Weight as of this encounter: 59.2 kg (130 lb 8 oz). Body surface area is 1.65 meters squared.  No Pain (0) Comment: Data Unavailable   No LMP recorded (lmp unknown). Patient has had a hysterectomy.  Allergies reviewed: Yes  Medications reviewed: Yes    Medications: Medication refills not needed today.  Pharmacy name entered into [employer]: [id number] - [place], [place] - [address]    Frailty Screening:   Is the patient here for a new oncology consult visit in cancer care? 2. No      Clinical concerns: \f/u       Bren Gallegos CMA              "

## 2024-05-30 NOTE — PROGRESS NOTES
Follow Up Notes on Referred Patient    Date: 2024        RE: Kimmie Carranza  : 1950  DEANNA: 2024      Kimmie Carranza is a 74 year old woman with a diagnosis of  stage 1B, grade 1, MMR proficient endometrial cancer.  She completed brachytherapy 2023. She is here today for a surveillance visit.     Oncology history:   23:  US Pelvis:  1.  Abnormal heterogeneous and markedly thickened endometrium measuring 20 mm.      3/22/23:  EMB: Grade 1 endometrial adenocarcinoma, intact MMR     23:  Total laparoscopic hysterectomy, bilateral salpingo-oophorectomy, bilateral sentinel lymph node dissection, cystoscopy, with Dr Tse in my abscense                 Pathology:  Grade 1 endometrial adenocarcinoma, tumor size 3.5 cm, 6/9 mm myometrial invasion, no LVSI, 0/3 sentinel LN, ER/IL positive     23:  Completed brachytherapy of 30Gy in 5 Fx          Today she comes to clinic and denies any issues. She denies any vaginal bleeding, no changes in her bowel or bladder habits, no nausea/emesis, no lower extremity edema, and no difficulties eating or sleeping. She denies any abdominal discomfort/bloating, no fevers or chills, and no chest pain or shortness of breath. She has never used a dilator and is not sexually active. She had been checking her BP at home but not over the past  month as she was out of town (in [place] as her daughter had her first child).       Annual exam with PCP:   Mammogram:   DEXA:   Colonoscopy: ; due 27      Review of Systems  See HPI      Past Medical History:    Past Medical History:   Diagnosis Date    Anxiety state, unspecified     on zoloft, better than wellbutrin, s/p traumatic death of daughter's boyfriend in '05    Endometrial cancer (H)     Hypertension     Mild major depression (H24)     zoloft (had been on citalopram)    Mixed hyperlipidemia     Urge incontinence     trial of detrol helped, uses for trips, gets thirsty         Past Surgical  History:    Past Surgical History:   Procedure Laterality Date    COLONOSCOPY N/A 10/24/2016    Procedure: COMBINED COLONOSCOPY, SINGLE OR MULTIPLE BIOPSY/POLYPECTOMY BY BIOPSY;  Surgeon: Kwaku Henry MD;  Location:  GI    COLONOSCOPY N/A 12/2/2022    Procedure: COLONOSCOPY, FLEXIBLE, WITH LESION REMOVAL USING SNARE polypectomies using exacto cold snare;  Surgeon: Zane Cavazos MD;  Location:  GI    CV CORONARY ANGIOGRAM N/A 5/6/2019    Procedure: Coronary Angiogram;  Surgeon: Viktor Brothers MD;  Location: [facility] CARDIAC CATH LAB    CYSTOSCOPY N/A 4/12/2023    Procedure: Cystoscopy;  Surgeon: Artis Tse MD;  Location:  OR    LAPAROSCOPIC HYSTERECTOMY TOTAL, BILATERAL SALPINGO-OOPHORECTOMY, NODE DISSECTION, COMBINED Bilateral 4/12/2023    Procedure: laparoscopic removal of uterus, cervix, both ovaries and fallopian tubes, sentinel lymph node dissection, possible full lymph node dissection, possible open;  Surgeon: Artis Tse MD;  Location:  OR    ZZC NONSPECIFIC PROCEDURE  1985    Tubal ligation    ZZC NONSPECIFIC PROCEDURE  1979    laporoscopy       Current Medications:     Current Outpatient Medications   Medication Sig Dispense Refill    acetaminophen (TYLENOL) 325 MG tablet Take 2 tablets (650 mg) by mouth every 6 hours as needed for mild pain 24 tablet 0    amLODIPine (NORVASC) 10 MG tablet Take 1 tablet (10 mg) by mouth at bedtime 90 tablet 1    aspirin 81 MG EC tablet Take 1 tablet (81 mg) by mouth daily 90 tablet 3    Blood Pressure Monitor KIT 1 Units daily as needed (blood pressure check) 1 kit 0    chlorthalidone (HYGROTON) 25 MG tablet Take 1 tablet (25 mg) by mouth daily 90 tablet 0    coenzyme Q-10 (CO-Q10) 50 MG capsule Take 1 capsule (50 mg) by mouth daily      ibuprofen (ADVIL/MOTRIN) 600 MG tablet Take 1 tablet (600 mg) by mouth every 6 hours as needed for other (mild and/or inflammatory pain) 12 tablet 0    lisinopril (ZESTRIL) 40 MG tablet Take 1 tablet  "(40 mg) by mouth daily 90 tablet 0    Multiple Vitamins-Minerals (MULTIVITAMIN ADULT PO) Take 1 tablet by mouth daily      nicotine (COMMIT) 2 MG lozenge Place 1 lozenge (2 mg) inside cheek every hour as needed for smoking cessation Place 1 lozenge inside cheek as needed for smoking cessation. 100 lozenge 5    rosuvastatin (CRESTOR) 10 MG tablet Take 1 tablet (10 mg) by mouth daily 90 tablet 3    sertraline (ZOLOFT) 100 MG tablet Take 1 tablet (100 mg) by mouth daily 90 tablet 0         Allergies:        Allergies   Allergen Reactions    No Known Drug Allergy         Social History:     Social History     Tobacco Use    Smoking status: Former     Current packs/day: 0.00     Average packs/day: 0.3 packs/day for 40.0 years (10.0 ttl pk-yrs)     Types: Cigarettes     Start date: 1984     Quit date: 2024     Years since quittin.4    Smokeless tobacco: Current    Tobacco comments:     Only uses nicotine gum now.    Substance Use Topics    Alcohol use: No     Comment: quit completely        History   Drug Use No         Family History:     The patient's family history is notable for     Family History   Problem Relation Age of Onset    Hypertension Mother     Cerebrovascular Disease Mother          of complications of stroke at age 82    Heart Disease Mother         atrial fib    Osteoporosis Mother         two hip fx's    Neurologic Disorder Mother         MS    Hypertension Father     Neurologic Disorder Father         dementia- Alzheimers, dx in his 60s, live to his 90s    Gastrointestinal Disease Father     Depression Father     Neurologic Disorder Sister     Hypertension Brother     Neurologic Disorder Maternal Grandmother     LIZZASONIA. Paternal Grandmother 62         at 62, of MI    Bipolar Disorder Daughter     C.A.ALEXX. Paternal Aunt 62        \"\"    C.A.D. Paternal Aunt 62        \"\"    Colon Cancer Paternal Uncle     Breast Cancer Maternal Aunt          Physical Exam:     /68   Pulse 53  "  Temp 97.8  F (36.6  C) (Oral)   Resp 18   Wt 59.2 kg (130 lb 8 oz)   LMP  (LMP Unknown)   SpO2 100%   BMI 21.72 kg/m    Body mass index is 21.72 kg/m .    General Appearance: healthy and alert, no distress     HEENT: no thyromegaly, no palpable nodules or masses        Cardiovascular: regular rate and rhythm, no gallops, rubs or murmurs     Respiratory: lungs clear, no rales, rhonchi or wheezes, normal diaphragmatic excursion    Musculoskeletal: extremities non tender and without edema    Skin: no lesions or rashes     Neurological: normal gait, no gross defects     Psychiatric: appropriate mood and affect                               Hematological: normal cervical, supraclavicular and inguinal lymph nodes     Gastrointestinal:       abdomen soft, non-tender, non-distended, no organomegaly or masses    Genitourinary: External genitalia and urethral meatus appears normal.  Vagina is smooth without nodularity or masses.  Cervix surgically absent.  Bimanual exam reveal no masses, nodularity or fullness.  Recto-vaginal exam confirms these findings.      Assessment:    Kimmie Carranza is a 74 year old woman with a diagnosis of stage 1B, grade 1, MMR proficient endometrial cancer.  She completed brachytherapy 6/2023. She is here today for a surveillance visit.     31 minutes spent on the date of the encounter doing chart review, history and exam, documentation and further activities as noted above      Plan:     1.)      Patient to RTC in 6 months for her next surveillance visit. Reviewed recommendations from [facility] not to perform surveillance pap smears in women diagnosed with endometrial cancer as this does not improve detection of local recurrence. Reviewed signs and symptoms for when she should contact the clinic or seek additional care. Patient to contact the clinic with any questions or concerns in the interim.  Answered all of her questions to the best of my ability.    -discussed rationale for dilator use; she   does not need to use a dilator given she is a year post treatment and able to do exam easily      2.) Genetic risk factors were assessed and her MMR proteins were  intact.    3.) Labs and/or tests ordered include:  none.     4.) Health maintenance issues addressed today include annual health maintenance and non-gynecologic issues with PCP.    5.)       HTN: Encouraged to check her BP at home and follow up with her PCP if her readings remain >140/90.    Talia Reyes APRN, WHNP-BC, ANP-BC, AOCNP  Women's Health Nurse Practitioner  Adult Nurse Practitioner  Division of Gynecologic Oncology'      CC  Patient Care Team:  Dee Castillo MD as PCP - General  Dee Castillo MD as Assigned PCP  Alicia Hewitt MD as MD (Psychiatry)  Miguel A Reina MD as Resident (Psychiatry)  Nesha Cook MD as MD (Gynecologic Oncology)  Claudia Dan MD as Assigned OBGYN Provider  Patience Ferguson, RN as Specialty Care Coordinator (Gynecologic Oncology)  Edith Gutierrez MD as Assigned Cancer Care Provider

## 2024-05-30 NOTE — LETTER
2024         RE: Kimmie Carranza  [id number] [address] [facility] [address]        Dear Colleague,    Thank you for referring your patient, Kimmie Carranza, to the [facility] [place]. Please see a copy of my visit note below.                Follow Up Notes on Referred Patient    Date: 2024        RE: Kimmie Carranza  : 1950  DEANNA: 2024      Kimmie Carranza is a 74 year old woman with a diagnosis of  stage 1B, grade 1, MMR proficient endometrial cancer.  She completed brachytherapy 2023. She is here today for a surveillance visit.     Oncology history:   23:  US Pelvis:  1.  Abnormal heterogeneous and markedly thickened endometrium measuring 20 mm.      3/22/23:  EMB: Grade 1 endometrial adenocarcinoma, intact MMR     23:  Total laparoscopic hysterectomy, bilateral salpingo-oophorectomy, bilateral sentinel lymph node dissection, cystoscopy, with Dr Tse in my abscense                 Pathology:  Grade 1 endometrial adenocarcinoma, tumor size 3.5 cm, 6/9 mm myometrial invasion, no LVSI, 0/3 sentinel LN, ER/MA positive     23:  Completed brachytherapy of 30Gy in 5 Fx          Today she comes to clinic and denies any issues. She denies any vaginal bleeding, no changes in her bowel or bladder habits, no nausea/emesis, no lower extremity edema, and no difficulties eating or sleeping. She denies any abdominal discomfort/bloating, no fevers or chills, and no chest pain or shortness of breath. She has never used a dilator and is not sexually active. She had been checking her BP at home but not over the past  month as she was out of town (in [place] as her daughter had her first child).       Annual exam with PCP:   Mammogram:   DEXA:   Colonoscopy: ; due 27      Review of Systems  See HPI      Past Medical History:    Past Medical History:   Diagnosis Date     Anxiety state, unspecified     on zoloft, better than wellbutrin, s/p traumatic death of daughter's  boyfriend in '05     Endometrial cancer (H)      Hypertension      Mild major depression (H24)     zoloft (had been on citalopram)     Mixed hyperlipidemia      Urge incontinence     trial of detrol helped, uses for trips, gets thirsty         Past Surgical History:    Past Surgical History:   Procedure Laterality Date     COLONOSCOPY N/A 10/24/2016    Procedure: COMBINED COLONOSCOPY, SINGLE OR MULTIPLE BIOPSY/POLYPECTOMY BY BIOPSY;  Surgeon: Kwaku Henry MD;  Location:  GI     COLONOSCOPY N/A 12/2/2022    Procedure: COLONOSCOPY, FLEXIBLE, WITH LESION REMOVAL USING SNARE polypectomies using exacto cold snare;  Surgeon: Zane Cavazos MD;  Location:  GI     CV CORONARY ANGIOGRAM N/A 5/6/2019    Procedure: Coronary Angiogram;  Surgeon: Viktor Brothers MD;  Location: [facility] CARDIAC CATH LAB     CYSTOSCOPY N/A 4/12/2023    Procedure: Cystoscopy;  Surgeon: Artis Tse MD;  Location:  OR     LAPAROSCOPIC HYSTERECTOMY TOTAL, BILATERAL SALPINGO-OOPHORECTOMY, NODE DISSECTION, COMBINED Bilateral 4/12/2023    Procedure: laparoscopic removal of uterus, cervix, both ovaries and fallopian tubes, sentinel lymph node dissection, possible full lymph node dissection, possible open;  Surgeon: Artis Tse MD;  Location:  OR     [facility] NONSPECIFIC PROCEDURE  1985    Tubal ligation     Z NONSPECIFIC PROCEDURE  1979    laporoscopy       Current Medications:     Current Outpatient Medications   Medication Sig Dispense Refill     acetaminophen (TYLENOL) 325 MG tablet Take 2 tablets (650 mg) by mouth every 6 hours as needed for mild pain 24 tablet 0     amLODIPine (NORVASC) 10 MG tablet Take 1 tablet (10 mg) by mouth at bedtime 90 tablet 1     aspirin 81 MG EC tablet Take 1 tablet (81 mg) by mouth daily 90 tablet 3     Blood Pressure Monitor KIT 1 Units daily as needed (blood pressure check) 1 kit 0     chlorthalidone (HYGROTON) 25 MG tablet Take 1 tablet (25 mg) by mouth daily 90 tablet 0     coenzyme  Q-10 (CO-Q10) 50 MG capsule Take 1 capsule (50 mg) by mouth daily       ibuprofen (ADVIL/MOTRIN) 600 MG tablet Take 1 tablet (600 mg) by mouth every 6 hours as needed for other (mild and/or inflammatory pain) 12 tablet 0     lisinopril (ZESTRIL) 40 MG tablet Take 1 tablet (40 mg) by mouth daily 90 tablet 0     Multiple Vitamins-Minerals (MULTIVITAMIN ADULT PO) Take 1 tablet by mouth daily       nicotine (COMMIT) 2 MG lozenge Place 1 lozenge (2 mg) inside cheek every hour as needed for smoking cessation Place 1 lozenge inside cheek as needed for smoking cessation. 100 lozenge 5     rosuvastatin (CRESTOR) 10 MG tablet Take 1 tablet (10 mg) by mouth daily 90 tablet 3     sertraline (ZOLOFT) 100 MG tablet Take 1 tablet (100 mg) by mouth daily 90 tablet 0         Allergies:        Allergies   Allergen Reactions     No Known Drug Allergy         Social History:     Social History     Tobacco Use     Smoking status: Former     Current packs/day: 0.00     Average packs/day: 0.3 packs/day for 40.0 years (10.0 ttl pk-yrs)     Types: Cigarettes     Start date: 1984     Quit date: 2024     Years since quittin.4     Smokeless tobacco: Current     Tobacco comments:     Only uses nicotine gum now.    Substance Use Topics     Alcohol use: No     Comment: quit completely        History   Drug Use No         Family History:     The patient's family history is notable for     Family History   Problem Relation Age of Onset     Hypertension Mother      Cerebrovascular Disease Mother          of complications of stroke at age 82     Heart Disease Mother         atrial fib     Osteoporosis Mother         two hip fx's     Neurologic Disorder Mother         MS     Hypertension Father      Neurologic Disorder Father         dementia- Alzheimers, dx in his 60s, live to his 90s     Gastrointestinal Disease Father      Depression Father      Neurologic Disorder Sister      Hypertension Brother      Neurologic Disorder  "Maternal Grandmother      C.A.D. Paternal Grandmother 62         at 62, of MI     Bipolar Disorder Daughter      C.A.D. Paternal Aunt 62        \"\"     C.A.D. Paternal Aunt 62        \"\"     Colon Cancer Paternal Uncle      Breast Cancer Maternal Aunt          Physical Exam:     /68   Pulse 53   Temp 97.8  F (36.6  C) (Oral)   Resp 18   Wt 59.2 kg (130 lb 8 oz)   LMP  (LMP Unknown)   SpO2 100%   BMI 21.72 kg/m    Body mass index is 21.72 kg/m .    General Appearance: healthy and alert, no distress     HEENT: no thyromegaly, no palpable nodules or masses        Cardiovascular: regular rate and rhythm, no gallops, rubs or murmurs     Respiratory: lungs clear, no rales, rhonchi or wheezes, normal diaphragmatic excursion    Musculoskeletal: extremities non tender and without edema    Skin: no lesions or rashes     Neurological: normal gait, no gross defects     Psychiatric: appropriate mood and affect                               Hematological: normal cervical, supraclavicular and inguinal lymph nodes     Gastrointestinal:       abdomen soft, non-tender, non-distended, no organomegaly or masses    Genitourinary: External genitalia and urethral meatus appears normal.  Vagina is smooth without nodularity or masses.  Cervix surgically absent.  Bimanual exam reveal no masses, nodularity or fullness.  Recto-vaginal exam confirms these findings.      Assessment:    Kimmie Carranza is a 74 year old woman with a diagnosis of stage 1B, grade 1, MMR proficient endometrial cancer.  She completed brachytherapy 2023. She is here today for a surveillance visit.     31 minutes spent on the date of the encounter doing chart review, history and exam, documentation and further activities as noted above      Plan:     1.)      Patient to RTC in 6 months for her next surveillance visit. Reviewed recommendations from SGO not to perform surveillance pap smears in women diagnosed with endometrial cancer as this does not improve " detection of local recurrence. Reviewed signs and symptoms for when she should contact the clinic or seek additional care. Patient to contact the clinic with any questions or concerns in the interim.  Answered all of her questions to the best of my ability.    -discussed rationale for dilator use; she  does not need to use a dilator given she is a year post treatment and able to do exam easily      2.) Genetic risk factors were assessed and her MMR proteins were  intact.    3.) Labs and/or tests ordered include:  none.     4.) Health maintenance issues addressed today include annual health maintenance and non-gynecologic issues with PCP.    5.)       HTN: Encouraged to check her BP at home and follow up with her PCP if her readings remain >140/90.    PRISCILLA Jacobson, WHNP-BC, ANP-BC, AOCNP  Women's Health Nurse Practitioner  Adult Nurse Practitioner  Division of Gynecologic Oncology'      CC  Patient Care Team:  Dee Castillo MD as PCP - General  Dee Castillo MD as Assigned PCP  Alicia Hewitt MD as MD (Psychiatry)  Miguel A Reina MD as Resident (Psychiatry)  Nesha Cook MD as MD (Gynecologic Oncology)  Claudia Dan MD as Assigned OBGYN Provider  Patience Ferguson RN as Specialty Care Coordinator (Gynecologic Oncology)  Edith Gutierrez MD as Assigned Cancer Care Provider      Again, thank you for allowing me to participate in the care of your patient.        Sincerely,        PRISCILLA Plata CNP

## 2024-06-04 NOTE — PROGRESS NOTES
Preventive Care Visit  [facility]  Dee Castillo MD, Family Medicine  Jun 4, 2024  {Provider  Link to [facility] :338230}    {PROVIDER CHARTING PREFERENCE:770743}    Swapnil Peralta is a 74 year old, presenting for the following:  No chief complaint on file.      Health Care Directive  Patient has a Health Care Directive on file  Advance care planning document is on file and is current.    HPI    Wellness Visit Notes:    -Mammo done 5/10/2023 (impression: negative).   -DEXA done *** (impression: {dexa result:610757}; most negative T-score of *** at ***).  ***  -Colon cancer screening done via {colon screening options:153505} on *** (impression: ***). ***  -Immunizations: Pt is due for COVID vaccine and due for RSV vaccine, however advised pt to complete RSV vaccine at local pharmacy given Medicare insurance. ***  -Education: Pt education provided on {Education List:359369}  -Derm: Does patient regularly see dermatologist? ***  -Refills pended for requested medications      ***  {MA/LPN/RN Pre-Provider Visit Orders- hCG/UA/Strep (Optional):549588}  {SUPERLIST (Optional):705146}  {additonal problems for provider to add (Optional):775394}      6/3/2024   General Health   How would you rate your overall physical health? Good   Feel stress (tense, anxious, or unable to sleep) Only a little   (!) STRESS CONCERN      6/3/2024   Nutrition   Diet: Low salt         6/3/2024   Exercise   Days per week of moderate/strenous exercise 6 days   Average minutes spent exercising at this level 30 min         6/3/2024   Social Factors   Frequency of gathering with friends or relatives Twice a week   Worry food won't last until get money to buy more No   Food not last or not have enough money for food? No   Do you have housing?  Yes   Are you worried about losing your housing? No   Lack of transportation? No   Unable to get utilities (heat,electricity)? No         6/3/2024   Fall Risk   Fallen 2 or  more times in the past year? No    No   Trouble with walking or balance? Yes    Yes     {Positive Fall Risk- Gait Speed Test is required and was not documented before note was started.  If results do not appear, ask staff to complete.  Once completed, refresh note to pull in results Click here to link Gait Speed Test  :611470}      6/3/2024   Activities of Daily Living- Home Safety   Needs help with the following daily activites None of the above   Safety concerns in the home None of the above         6/3/2024   Dental   Dentist two times every year? Yes         6/3/2024   Hearing Screening   Hearing concerns? (!) I NEED TO ASK PEOPLE TO SPEAK UP OR REPEAT THEMSELVES.    (!) IT'S HARD TO FOLLOW A CONVERSATION IN A NOISY RESTAURANT OR CROWDED ROOM.    (!) TROUBLE FOLLOWING DIALOGUE IN THE THEATHER.         6/3/2024   Driving Risk Screening   Patient/family members have concerns about driving No         6/3/2024   General Alertness/Fatigue Screening   Have you been more tired than usual lately? No         6/3/2024   Urinary Incontinence Screening   Bothered by leaking urine in past 6 months Yes         6/3/2024   TB Screening   Were you born outside of the [place]? No       Today's PHQ-9 Score:       6/3/2024     8:31 PM   PHQ-9 SCORE   PHQ-9 Total Score MyChart 2 (Minimal depression)   PHQ-9 Total Score 2         6/3/2024   Substance Use   If I could quit smoking, I would Completely agree   I want to quit somking, worry about health affects Completely agree   Willing to make a plan to quit smoking Completely agree   Willing to cut down before quitting Completely agree   Alcohol more than 3/day or more than 7/wk No   Do you have a current opioid prescription? No   How severe/bad is pain from 1 to 10? 5/10   Do you use any other substances recreationally? (!) CANNABIS PRODUCTS     Social History     Tobacco Use    Smoking status: Former     Current packs/day: 0.00     Average packs/day: 0.3 packs/day for 40.0 years (10.0  ttl pk-yrs)     Types: Cigarettes     Start date: 1984     Quit date: 2024     Years since quittin.4    Smokeless tobacco: Current    Tobacco comments:     Only uses nicotine gum now.    Vaping Use    Vaping status: Never Used   Substance Use Topics    Alcohol use: No     Comment: quit completely     Drug use: No     {Provider  If there are gaps in the social history shown above, please follow the link to update and then refresh the note Link to Social and Substance History :745520}      5/10/2023   LAST FHS-7 RESULTS   1st degree relative breast or ovarian cancer No   Any relative bilateral breast cancer No   Any male have breast cancer No   Any ONE woman have BOTH breast AND ovarian cancer No   Any woman with breast cancer before 50yrs No   2 or more relatives with breast AND/OR ovarian cancer No   2 or more relatives with breast AND/OR bowel cancer Yes     {If any of the questions to the FHS7 are answered yes, consider referral for genetic counseling.    Additional indications for genetic referral include personal history of breast or ovarian cancer, genetic mutation in 1st degree relative which increases risk of breast cancer including BRCA1, BRCA2, BRODERICK, PALB 2, TP53, CHEK2, PTEN, CDH1, STK11 (per ACS) and/or 1st degree relative with history of pancreatic or high-risk prostate cancer (per NCCN):253014}   {Mammogram Decision Support (Optional):398155}    ASCVD Risk   The ASCVD Risk score (Opal ADAM, et al., 2019) failed to calculate for the following reasons:    The patient has a prior MI or stroke diagnosis    {Link to Fracture Risk Assessment Tool (Optional):470109}    {Provider  Use the storyboard to review patient history, after sections have been marked as reviewed, refresh note to capture documentation:242336}  {Provider   REQUIRED AWV use this link to review and update sexual activity history  after section has been marked as reviewed, refresh note to capture  "documentation:507595}  Reviewed and updated as needed this visit by Provider                    {HISTORY OPTIONS (Optional):876213}  Current providers sharing in care for this patient include:  Patient Care Team:  Dee Castillo MD as PCP - General  Dee Castillo MD as Assigned PCP  Alicia Hewitt MD as MD (Psychiatry)  Miguel A Reina MD as Resident (Psychiatry)  Nesha Cook MD as MD (Gynecologic Oncology)  Claudia Dan MD as Assigned OBGYN Provider  Patience Ferguson RN as Specialty Care Coordinator (Gynecologic Oncology)  Edith Gutierrez MD as Assigned Cancer Care Provider    The following health maintenance items are reviewed in Epic and correct as of today:  Health Maintenance   Topic Date Due    RSV VACCINE (Pregnancy & 60+) (1 - 1-dose 60+ series) Never done    ZOSTER IMMUNIZATION (1 of 2) 05/09/2012    LUNG CANCER SCREENING  05/05/2020    COVID-19 Vaccine (7 - 2023-24 season) 01/30/2024    MAMMO SCREENING  05/10/2024    MEDICARE ANNUAL WELLNESS VISIT  05/26/2024    PHQ-9  12/04/2024    BMP  02/28/2025    LIPID  02/28/2025    MICROALBUMIN  02/28/2025    ANNUAL REVIEW OF HM ORDERS  02/28/2025    FALL RISK ASSESSMENT  06/04/2025    DEXA  02/08/2026    GLUCOSE  02/28/2027    COLORECTAL CANCER SCREENING  12/02/2027    ADVANCE CARE PLANNING  06/12/2028    DTAP/TDAP/TD IMMUNIZATION (4 - Td or Tdap) 01/25/2033    HEPATITIS C SCREENING  Completed    DEPRESSION ACTION PLAN  Completed    INFLUENZA VACCINE  Completed    Pneumococcal Vaccine: 65+ Years  Completed    URINALYSIS  Completed    IPV IMMUNIZATION  Aged Out    HPV IMMUNIZATION  Aged Out    MENINGITIS IMMUNIZATION  Aged Out    RSV MONOCLONAL ANTIBODY  Aged Out       {ROS Picklists (Optional):646798}     Objective    Exam  LMP  (LMP Unknown)    Estimated body mass index is 21.72 kg/m  as calculated from the following:    Height as of 3/6/24: 1.651 m (5' 5\").    Weight as of 5/30/24: 59.2 kg (130 lb 8 oz).    Physical " Exam  {Exam Choices (Optional):966171}  {Provider  The Mini-Cog is incomplete, use link to complete and refresh note Link to Mini-Cog :411592}       No data to display              {A Mini-Cog total score of 0-2 suggests the possibility of dementia, score of 3-5 suggests no dementia:592731}         Signed Electronically by: Dee Castillo MD  {Email feedback regarding this note to [email or web address]   :483503}  Answers submitted by the patient for this visit:  Patient Health Questionnaire (Submitted on 6/3/2024)  If you checked off any problems, how difficult have these problems made it for you to do your work, take care of things at home, or get along with other people?: Not difficult at all  PHQ9 TOTAL SCORE: 2  TOMASA-7 (Submitted on 6/3/2024)  TOMASA 7 TOTAL SCORE: 3

## 2024-06-04 NOTE — PATIENT INSTRUCTIONS
"Preventive Care Advice   This is general advice we often give to help people stay healthy. Your care team may have specific advice just for you. Please talk to your care team about your own preventive care needs.  Lifestyle  Exercise at least 150 minutes each week (30 minutes a day, 5 days a week).  Do muscle strengthening activities 2 days a week. These help control your weight and prevent disease.  No smoking.  Wear sunscreen to prevent skin cancer.  Have your home tested for radon every 2 to 5 years. Radon is a colorless, odorless gas that can harm your lungs. To learn more, go to www.health.[email or web address].mn. and search for \"Radon in Homes.\"  Keep guns unloaded and locked up in a safe place like a safe or gun vault, or, use a gun lock and hide the keys. Always lock away bullets separately. To learn more, visit [employer].[place].gov and search for \"safe gun storage.\"  Nutrition  Eat 5 or more servings of fruits and vegetables each day.  Try wheat bread, brown rice and whole grain pasta (instead of white bread, rice, and pasta).  Get enough calcium and vitamin D. Check the label on foods and aim for 100% of the RDA (recommended daily allowance).  Regular exams  Have a dental exam and cleaning every 6 months.  See your health care team every year to talk about:  Any changes in your health.  Any medicines your care team has prescribed.  Preventive care, family planning, and ways to prevent chronic diseases.  Shots (vaccines)   HPV shots (up to age 26), if you've never had them before.  Hepatitis B shots (up to age 59), if you've never had them before.  COVID-19 shot: Get this shot when it's due.  Flu shot: Get a flu shot every year.  Tetanus shot: Get a tetanus shot every 10 years.  Pneumococcal, hepatitis A, and RSV shots: Ask your care team if you need these based on your risk.  Shingles shot (for age 50 and up).  General health tests  Diabetes screening:  Starting at age 35, Get screened for diabetes at least every 3 years.  If " you are younger than age 35, ask your care team if you should be screened for diabetes.  Cholesterol test: At age 39, start having a cholesterol test every 5 years, or more often if advised.  Bone density scan (DEXA): At age 50, ask your care team if you should have this scan for osteoporosis (brittle bones).  Hepatitis C: Get tested at least once in your life.  Abdominal aortic aneurysm screening: Talk to your doctor about having this screening if you:  Have ever smoked; and  Are biologically male; and  Are between the ages of 65 and 75.  STIs (sexually transmitted infections)  Before age 24: Ask your care team if you should be screened for STIs.  After age 24: Get screened for STIs if you're at risk. You are at risk for STIs (including HIV) if:  You are sexually active with more than one person.  You don't use condoms every time.  You or a partner was diagnosed with a sexually transmitted infection.  If you are at risk for HIV, ask about PrEP medicine to prevent HIV.  Get tested for HIV at least once in your life, whether you are at risk for HIV or not.  Cancer screening tests  Cervical cancer screening: If you have a cervix, begin getting regular cervical cancer screening tests at age 21. Most people who have regular screenings with normal results can stop after age 65. Talk about this with your provider.  Breast cancer scan (mammogram): If you've ever had breasts, begin having regular mammograms starting at age 40. This is a scan to check for breast cancer.  Colon cancer screening: It is important to start screening for colon cancer at age 45.  Have a colonoscopy test every 10 years (or more often if you're at risk) Or, ask your provider about stool tests like a FIT test every year or Cologuard test every 3 years.  To learn more about your testing options, visit: [email or web address].pdf.  For help making a decision, visit: [email or web address].  Prostate cancer screening test: If you have a prostate and are age 55  to 69, ask your provider if you would benefit from a yearly prostate cancer screening test.  Lung cancer screening: If you are a current or former smoker age 50 to 80, ask your care team if ongoing lung cancer screenings are right for you.  For informational purposes only. Not to replace the advice of your health care provider. Copyright   2023 [employer]. All rights reserved. Clinically reviewed by the [facility]itions [facility]. Cloudadmin 745125 - REV 04/24.    Preventing Falls: Care Instructions  Injuries and health problems such as trouble walking or poor eyesight can increase your risk of falling. So can some medicines. But there are things you can do to help prevent falls. You can exercise to get stronger. You can also arrange your home to make it safer.    Talk to your doctor about the medicines you take. Ask if any of them increase the risk of falls and whether they can be changed or stopped.   Try to exercise regularly. It can help improve your strength and balance. This can help lower your risk of falling.     Practice fall safety and prevention.    Wear low-heeled shoes that fit well and give your feet good support. Talk to your doctor if you have foot problems that make this hard.  Carry a cellphone or wear a medical alert device that you can use to call for help.  Use stepladders instead of chairs to reach high objects. Don't climb if you're at risk for falls. Ask for help, if needed.  Wear the correct eyeglasses, if you need them.    Make your home safer.    Remove rugs, cords, clutter, and furniture from walkways.  Keep your house well lit. Use night-lights in hallways and bathrooms.  Install and use sturdy handrails on stairways.  Wear nonskid footwear, even inside. Don't walk barefoot or in socks without shoes.    Be safe outside.    Use handrails, curb cuts, and ramps whenever possible.  Keep your hands free by using a shoulder bag or backpack.  Try to walk in well-lit  "areas. Watch out for uneven ground, changes in pavement, and debris.  Be careful in the winter. Walk on the grass or gravel when sidewalks are slippery. Use de-icer on steps and walkways. Add non-slip devices to shoes.    Put grab bars and nonskid mats in your shower or tub and near the toilet. Try to use a shower chair or bath bench when bathing.   Get into a tub or shower by putting in your weaker leg first. Get out with your strong side first. Have a phone or medical alert device in the bathroom with you.   Where can you learn more?  Go to https://www.[employer].net/patiented  Enter G117 in the search box to learn more about \"Preventing Falls: Care Instructions.\"  Current as of: July 17, 2023               Content Version: 14.0    8507-7334 [employer].   Care instructions adapted under license by your healthcare professional. If you have questions about a medical condition or this instruction, always ask your healthcare professional. [employer] disclaims any warranty or liability for your use of this information.      Hearing Loss: Care Instructions  Overview     Hearing loss is a sudden or slow decrease in how well you hear. It can range from slight to profound. Permanent hearing loss can occur with aging. It also can happen when you are exposed long-term to loud noise. Examples include listening to loud music, riding motorcycles, or being around other loud machines.  Hearing loss can affect your work and home life. It can make you feel lonely or depressed. You may feel that you have lost your independence. But hearing aids and other devices can help you hear better and feel connected to others.  Follow-up care is a key part of your treatment and safety. Be sure to make and go to all appointments, and call your doctor if you are having problems. It's also a good idea to know your test results and keep a list of the medicines you take.  How can you care for yourself at home?  Avoid " loud noises whenever possible. This helps keep your hearing from getting worse.  Always wear hearing protection around loud noises.  Wear a hearing aid as directed.  A professional can help you pick a hearing aid that will work best for you.  You can also get hearing aids over the counter for mild to moderate hearing loss.  Have hearing tests as your doctor suggests. They can show whether your hearing has changed. Your hearing aid may need to be adjusted.  Use other devices as needed. These may include:  Telephone amplifiers and hearing aids that can connect to a television, stereo, radio, or microphone.  Devices that use lights or vibrations. These alert you to the doorbell, a ringing telephone, or a baby monitor.  Television closed-captioning. This shows the words at the bottom of the screen. Most new TVs can do this.  TTY (text telephone). This lets you type messages back and forth on the telephone instead of talking or listening. These devices are also called TDD. When messages are typed on the keyboard, they are sent over the phone line to a receiving TTY. The message is shown on a monitor.  Use text messaging, social media, and email if it is hard for you to communicate by telephone.  Try to learn a listening technique called speechreading. It is not lipreading. You pay attention to people's gestures, expressions, posture, and tone of voice. These clues can help you understand what a person is saying. Face the person you are talking to, and have them face you. Make sure the lighting is good. You need to see the other person's face clearly.  Think about counseling if you need help to adjust to your hearing loss.  When should you call for help?  Watch closely for changes in your health, and be sure to contact your doctor if:    You think your hearing is getting worse.     You have new symptoms, such as dizziness or nausea.   Where can you learn more?  Go to https://www.healthwise.net/patiented  Enter R798 in the  "search box to learn more about \"Hearing Loss: Care Instructions.\"  Current as of: September 27, 2023               Content Version: 14.0    2049-4058 [employer].   Care instructions adapted under license by your healthcare professional. If you have questions about a medical condition or this instruction, always ask your healthcare professional. [employer] disclaims any warranty or liability for your use of this information.      Learning About Stress  What is stress?     Stress is your body's response to a hard situation. Your body can have a physical, emotional, or mental response. Stress is a fact of life for most people, and it affects everyone differently. What causes stress for you may not be stressful for someone else.  A lot of things can cause stress. You may feel stress when you go on a job interview, take a test, or run a race. This kind of short-term stress is normal and even useful. It can help you if you need to work hard or react quickly. For example, stress can help you finish an important job on time.  Long-term stress is caused by ongoing stressful situations or events. Examples of long-term stress include long-term health problems, ongoing problems at work, or conflicts in your family. Long-term stress can harm your health.  How does stress affect your health?  When you are stressed, your body responds as though you are in danger. It makes hormones that speed up your heart, make you breathe faster, and give you a burst of energy. This is called the fight-or-flight stress response. If the stress is over quickly, your body goes back to normal and no harm is done.  But if stress happens too often or lasts too long, it can have bad effects. Long-term stress can make you more likely to get sick, and it can make symptoms of some diseases worse. If you tense up when you are stressed, you may develop neck, shoulder, or low back pain. Stress is linked to high blood pressure and " heart disease.  Stress also harms your emotional health. It can make you duarte, tense, or depressed. Your relationships may suffer, and you may not do well at work or school.  What can you do to manage stress?  You can try these things to help manage stress:   Do something active. Exercise or activity can help reduce stress. Walking is a great way to get started. Even everyday activities such as housecleaning or yard work can help.  Try yoga or lilian chi. These techniques combine exercise and meditation. You may need some training at first to learn them.  Do something you enjoy. For example, listen to music or go to a movie. Practice your hobby or do volunteer work.  Meditate. This can help you relax, because you are not worrying about what happened before or what may happen in the future.  Do guided imagery. Imagine yourself in any setting that helps you feel calm. You can use online videos, books, or a teacher to guide you.  Do breathing exercises. For example:  From a standing position, bend forward from the waist with your knees slightly bent. Let your arms dangle close to the floor.  Breathe in slowly and deeply as you return to a standing position. Roll up slowly and lift your head last.  Hold your breath for just a few seconds in the standing position.  Breathe out slowly and bend forward from the waist.  Let your feelings out. Talk, laugh, cry, and express anger when you need to. Talking with supportive friends or family, a counselor, or a ashwini leader about your feelings is a healthy way to relieve stress. Avoid discussing your feelings with people who make you feel worse.  Write. It may help to write about things that are bothering you. This helps you find out how much stress you feel and what is causing it. When you know this, you can find better ways to cope.  What can you do to prevent stress?  You might try some of these things to help prevent stress:  Manage your time. This helps you find time to do the  "things you want and need to do.  Get enough sleep. Your body recovers from the stresses of the day while you are sleeping.  Get support. Your family, friends, and community can make a difference in how you experience stress.  Limit your news feed. Avoid or limit time on social media or news that may make you feel stressed.  Do something active. Exercise or activity can help reduce stress. Walking is a great way to get started.  Where can you learn more?  Go to https://www.[employer].net/patiented  Enter N032 in the search box to learn more about \"Learning About Stress.\"  Current as of: October 24, 2023               Content Version: 14.0    8950-9142 [employer].   Care instructions adapted under license by your healthcare professional. If you have questions about a medical condition or this instruction, always ask your healthcare professional. [employer] disclaims any warranty or liability for your use of this information.      Bladder Training: Care Instructions  Your Care Instructions     Bladder training is used to treat urge incontinence and stress incontinence. Urge incontinence means that the need to urinate comes on so fast that you can't get to a toilet in time. Stress incontinence means that you leak urine because of pressure on your bladder. For example, it may happen when you laugh, cough, or lift something heavy.  Bladder training can increase how long you can wait before you have to urinate. It can also help your bladder hold more urine. And it can give you better control over the urge to urinate.  It is important to remember that bladder training takes a few weeks to a few months to make a difference. You may not see results right away, but don't give up.  Follow-up care is a key part of your treatment and safety. Be sure to make and go to all appointments, and call your doctor if you are having problems. It's also a good idea to know your test results and keep a list of the " medicines you take.  How can you care for yourself at home?  Work with your doctor to come up with a bladder training program that is right for you. You may use one or more of the following methods.  Delayed urination  In the beginning, try to keep from urinating for 5 minutes after you first feel the need to go.  While you wait, take deep, slow breaths to relax. Kegel exercises can also help you delay the need to go to the bathroom.  After some practice, when you can easily wait 5 minutes to urinate, try to wait 10 minutes before you urinate.  Slowly increase the waiting period until you are able to control when you have to urinate.  Scheduled urination  Empty your bladder when you first wake up in the morning.  Schedule times throughout the day when you will urinate.  Start by going to the bathroom every hour, even if you don't need to go.  Slowly increase the time between trips to the bathroom.  When you have found a schedule that works well for you, keep doing it.  If you wake up during the night and have to urinate, do it. Apply your schedule to waking hours only.  Kegel exercises  These tighten and strengthen pelvic muscles, which can help you control the flow of urine. (If doing these exercises causes pain, stop doing them and talk with your doctor.) To do Kegel exercises:  Squeeze your muscles as if you were trying not to pass gas. Or squeeze your muscles as if you were stopping the flow of urine. Your belly, legs, and buttocks shouldn't move.  Hold the squeeze for 3 seconds, then relax for 5 to 10 seconds.  Start with 3 seconds, then add 1 second each week until you are able to squeeze for 10 seconds.  Repeat the exercise 10 times a session. Do 3 to 8 sessions a day.  When should you call for help?  Watch closely for changes in your health, and be sure to contact your doctor if:    Your incontinence is getting worse.     You do not get better as expected.   Where can you learn more?  Go to  "https://www.healthFlowgear.net/patiented  Enter V684 in the search box to learn more about \"Bladder Training: Care Instructions.\"  Current as of: November 15, 2023               Content Version: 14.0    7536-2758 [employer].   Care instructions adapted under license by your healthcare professional. If you have questions about a medical condition or this instruction, always ask your healthcare professional. [employer] disclaims any warranty or liability for your use of this information.      Substance Use Disorder: Care Instructions  Overview     You can improve your life and health by stopping your use of alcohol or drugs. When you don't drink or use drugs, you may feel and sleep better. You may get along better with your family, friends, and coworkers. There are medicines and programs that can help with substance use disorder.  How can you care for yourself at home?  Here are some ways to help you stay sober and prevent relapse.  If you have been given medicine to help keep you sober or reduce your cravings, be sure to take it exactly as prescribed.  Talk to your doctor about programs that can help you stop using drugs or drinking alcohol.  Do not keep alcohol or drugs in your home.  Plan ahead. Think about what you'll say if other people ask you to drink or use drugs. Try not to spend time with people who drink or use drugs.  Use the time and money spent on drinking or drugs to do something that's important to you.  Preventing a relapse  Have a plan to deal with relapse. Learn to recognize changes in your thinking that lead you to drink or use drugs. Get help before you start to drink or use drugs again.  Try to stay away from situations, friends, or places that may lead you to drink or use drugs.  If you feel the need to drink alcohol or use drugs again, seek help right away. Call a trusted friend or family member. Some people get support from organizations such as Narcotics Anonymous or " [facility] or from treatment facilities.  If you relapse, get help as soon as you can. Some people make a plan with another person that outlines what they want that person to do for them if they relapse. The plan usually includes how to handle the relapse and who to notify in case of relapse.  Don't give up. Remember that a relapse doesn't mean that you have failed. Use the experience to learn the triggers that lead you to drink or use drugs. Then quit again. Recovery is a lifelong process. Many people have several relapses before they are able to quit for good.  Follow-up care is a key part of your treatment and safety. Be sure to make and go to all appointments, and call your doctor if you are having problems. It's also a good idea to know your test results and keep a list of the medicines you take.  When should you call for help?   Call 911  anytime you think you may need emergency care. For example, call if you or someone else:    Has overdosed or has withdrawal signs. Be sure to tell the emergency workers that you are or someone else is using or trying to quit using drugs. Overdose or withdrawal signs may include:  Losing consciousness.  Seizure.  Seeing or hearing things that aren't there (hallucinations).     Is thinking or talking about suicide or harming others.   Where to get help 24 hours a day, 7 days a week   If you or someone you know talks about suicide, self-harm, a mental health crisis, a substance use crisis, or any other kind of emotional distress, get help right away. You can:    Call the Suicide and Crisis Lifeline at 988.     Call 4-759-758-ZNTQ (1-240.966.7824).     Text HOME to 192511 to access the Crisis Text Line.   Consider saving these numbers in your phone.  Go to [email or web address] for more information or to chat online.  Call your doctor now or seek immediate medical care if:    You are having withdrawal symptoms. These may include nausea or vomiting, sweating, shakiness, and anxiety.  "  Watch closely for changes in your health, and be sure to contact your doctor if:    You have a relapse.     You need more help or support to stop.   Where can you learn more?  Go to https://www.[employer].net/patiented  Enter H573 in the search box to learn more about \"Substance Use Disorder: Care Instructions.\"  Current as of: November 15, 2023               Content Version: 14.0    0600-8931 [employer].   Care instructions adapted under license by your healthcare professional. If you have questions about a medical condition or this instruction, always ask your healthcare professional. [employer] disclaims any warranty or liability for your use of this information.      Preventive Care Advice   This is general advice we often give to help people stay healthy. Your care team may have specific advice just for you. Please talk to your care team about your own preventive care needs.  Lifestyle  Exercise at least 150 minutes each week (30 minutes a day, 5 days a week).  Do muscle strengthening activities 2 days a week. These help control your weight and prevent disease.  No smoking.  Wear sunscreen to prevent skin cancer.  Have your home tested for radon every 2 to 5 years. Radon is a colorless, odorless gas that can harm your lungs. To learn more, go to www.health.[facility].mn.us and search for \"Radon in Homes.\"  Keep guns unloaded and locked up in a safe place like a safe or gun vault, or, use a gun lock and hide the keys. Always lock away bullets separately. To learn more, visit [employer].mn.gov and search for \"safe gun storage.\"  Nutrition  Eat 5 or more servings of fruits and vegetables each day.  Try wheat bread, brown rice and whole grain pasta (instead of white bread, rice, and pasta).  Get enough calcium and vitamin D. Check the label on foods and aim for 100% of the RDA (recommended daily allowance).  Regular exams  Have a dental exam and cleaning every 6 months.  See your health care team " every year to talk about:  Any changes in your health.  Any medicines your care team has prescribed.  Preventive care, family planning, and ways to prevent chronic diseases.  Shots (vaccines)   HPV shots (up to age 26), if you've never had them before.  Hepatitis B shots (up to age 59), if you've never had them before.  COVID-19 shot: Get this shot when it's due.  Flu shot: Get a flu shot every year.  Tetanus shot: Get a tetanus shot every 10 years.  Pneumococcal, hepatitis A, and RSV shots: Ask your care team if you need these based on your risk.  Shingles shot (for age 50 and up).  General health tests  Diabetes screening:  Starting at age 35, Get screened for diabetes at least every 3 years.  If you are younger than age 35, ask your care team if you should be screened for diabetes.  Cholesterol test: At age 39, start having a cholesterol test every 5 years, or more often if advised.  Bone density scan (DEXA): At age 50, ask your care team if you should have this scan for osteoporosis (brittle bones).  Hepatitis C: Get tested at least once in your life.  Abdominal aortic aneurysm screening: Talk to your doctor about having this screening if you:  Have ever smoked; and  Are biologically male; and  Are between the ages of 65 and 75.  STIs (sexually transmitted infections)  Before age 24: Ask your care team if you should be screened for STIs.  After age 24: Get screened for STIs if you're at risk. You are at risk for STIs (including HIV) if:  You are sexually active with more than one person.  You don't use condoms every time.  You or a partner was diagnosed with a sexually transmitted infection.  If you are at risk for HIV, ask about PrEP medicine to prevent HIV.  Get tested for HIV at least once in your life, whether you are at risk for HIV or not.  Cancer screening tests  Cervical cancer screening: If you have a cervix, begin getting regular cervical cancer screening tests at age 21. Most people who have regular  screenings with normal results can stop after age 65. Talk about this with your provider.  Breast cancer scan (mammogram): If you've ever had breasts, begin having regular mammograms starting at age 40. This is a scan to check for breast cancer.  Colon cancer screening: It is important to start screening for colon cancer at age 45.  Have a colonoscopy test every 10 years (or more often if you're at risk) Or, ask your provider about stool tests like a FIT test every year or Cologuard test every 3 years.  To learn more about your testing options, visit: [email or web address].  For help making a decision, visit: [email or web address].  Prostate cancer screening test: If you have a prostate and are age 55 to 69, ask your provider if you would benefit from a yearly prostate cancer screening test.  Lung cancer screening: If you are a current or former smoker age 50 to 80, ask your care team if ongoing lung cancer screenings are right for you.  For informational purposes only. Not to replace the advice of your health care provider. Copyright   2023 [employer]. All rights reserved. Clinically reviewed by the [facility] Transitions Program. [employer] [id number] - REV 04/24.    Preventing Falls: Care Instructions  Injuries and health problems such as trouble walking or poor eyesight can increase your risk of falling. So can some medicines. But there are things you can do to help prevent falls. You can exercise to get stronger. You can also arrange your home to make it safer.    Talk to your doctor about the medicines you take. Ask if any of them increase the risk of falls and whether they can be changed or stopped.   Try to exercise regularly. It can help improve your strength and balance. This can help lower your risk of falling.     Practice fall safety and prevention.    Wear low-heeled shoes that fit well and give your feet good support. Talk to your doctor if you have foot problems that make this  "hard.  Carry a cellphone or wear a medical alert device that you can use to call for help.  Use stepladders instead of chairs to reach high objects. Don't climb if you're at risk for falls. Ask for help, if needed.  Wear the correct eyeglasses, if you need them.    Make your home safer.    Remove rugs, cords, clutter, and furniture from walkways.  Keep your house well lit. Use night-lights in hallways and bathrooms.  Install and use sturdy handrails on stairways.  Wear nonskid footwear, even inside. Don't walk barefoot or in socks without shoes.    Be safe outside.    Use handrails, curb cuts, and ramps whenever possible.  Keep your hands free by using a shoulder bag or backpack.  Try to walk in well-lit areas. Watch out for uneven ground, changes in pavement, and debris.  Be careful in the winter. Walk on the grass or gravel when sidewalks are slippery. Use de-icer on steps and walkways. Add non-slip devices to shoes.    Put grab bars and nonskid mats in your shower or tub and near the toilet. Try to use a shower chair or bath bench when bathing.   Get into a tub or shower by putting in your weaker leg first. Get out with your strong side first. Have a phone or medical alert device in the bathroom with you.   Where can you learn more?  Go to https://www.[employer].net/patiented  Enter G117 in the search box to learn more about \"Preventing Falls: Care Instructions.\"  Current as of: July 17, 2023               Content Version: 14.0    5960-4610 [employer].   Care instructions adapted under license by your healthcare professional. If you have questions about a medical condition or this instruction, always ask your healthcare professional. [employer] disclaims any warranty or liability for your use of this information.      Hearing Loss: Care Instructions  Overview     Hearing loss is a sudden or slow decrease in how well you hear. It can range from slight to profound. Permanent hearing loss " can occur with aging. It also can happen when you are exposed long-term to loud noise. Examples include listening to loud music, riding motorcycles, or being around other loud machines.  Hearing loss can affect your work and home life. It can make you feel lonely or depressed. You may feel that you have lost your independence. But hearing aids and other devices can help you hear better and feel connected to others.  Follow-up care is a key part of your treatment and safety. Be sure to make and go to all appointments, and call your doctor if you are having problems. It's also a good idea to know your test results and keep a list of the medicines you take.  How can you care for yourself at home?  Avoid loud noises whenever possible. This helps keep your hearing from getting worse.  Always wear hearing protection around loud noises.  Wear a hearing aid as directed.  A professional can help you pick a hearing aid that will work best for you.  You can also get hearing aids over the counter for mild to moderate hearing loss.  Have hearing tests as your doctor suggests. They can show whether your hearing has changed. Your hearing aid may need to be adjusted.  Use other devices as needed. These may include:  Telephone amplifiers and hearing aids that can connect to a television, stereo, radio, or microphone.  Devices that use lights or vibrations. These alert you to the doorbell, a ringing telephone, or a baby monitor.  Television closed-captioning. This shows the words at the bottom of the screen. Most new TVs can do this.  TTY (text telephone). This lets you type messages back and forth on the telephone instead of talking or listening. These devices are also called TDD. When messages are typed on the keyboard, they are sent over the phone line to a receiving TTY. The message is shown on a monitor.  Use text messaging, social media, and email if it is hard for you to communicate by telephone.  Try to learn a listening  "technique called speechreading. It is not lipreading. You pay attention to people's gestures, expressions, posture, and tone of voice. These clues can help you understand what a person is saying. Face the person you are talking to, and have them face you. Make sure the lighting is good. You need to see the other person's face clearly.  Think about counseling if you need help to adjust to your hearing loss.  When should you call for help?  Watch closely for changes in your health, and be sure to contact your doctor if:    You think your hearing is getting worse.     You have new symptoms, such as dizziness or nausea.   Where can you learn more?  Go to https://www.[employer].net/patiented  Enter R798 in the search box to learn more about \"Hearing Loss: Care Instructions.\"  Current as of: September 27, 2023               Content Version: 14.0    6024-1492 [employer].   Care instructions adapted under license by your healthcare professional. If you have questions about a medical condition or this instruction, always ask your healthcare professional. [employer] disclaims any warranty or liability for your use of this information.      Learning About Stress  What is stress?     Stress is your body's response to a hard situation. Your body can have a physical, emotional, or mental response. Stress is a fact of life for most people, and it affects everyone differently. What causes stress for you may not be stressful for someone else.  A lot of things can cause stress. You may feel stress when you go on a job interview, take a test, or run a race. This kind of short-term stress is normal and even useful. It can help you if you need to work hard or react quickly. For example, stress can help you finish an important job on time.  Long-term stress is caused by ongoing stressful situations or events. Examples of long-term stress include long-term health problems, ongoing problems at work, or conflicts " in your family. Long-term stress can harm your health.  How does stress affect your health?  When you are stressed, your body responds as though you are in danger. It makes hormones that speed up your heart, make you breathe faster, and give you a burst of energy. This is called the fight-or-flight stress response. If the stress is over quickly, your body goes back to normal and no harm is done.  But if stress happens too often or lasts too long, it can have bad effects. Long-term stress can make you more likely to get sick, and it can make symptoms of some diseases worse. If you tense up when you are stressed, you may develop neck, shoulder, or low back pain. Stress is linked to high blood pressure and heart disease.  Stress also harms your emotional health. It can make you duarte, tense, or depressed. Your relationships may suffer, and you may not do well at work or school.  What can you do to manage stress?  You can try these things to help manage stress:   Do something active. Exercise or activity can help reduce stress. Walking is a great way to get started. Even everyday activities such as housecleaning or yard work can help.  Try yoga or lilian chi. These techniques combine exercise and meditation. You may need some training at first to learn them.  Do something you enjoy. For example, listen to music or go to a movie. Practice your hobby or do volunteer work.  Meditate. This can help you relax, because you are not worrying about what happened before or what may happen in the future.  Do guided imagery. Imagine yourself in any setting that helps you feel calm. You can use online videos, books, or a teacher to guide you.  Do breathing exercises. For example:  From a standing position, bend forward from the waist with your knees slightly bent. Let your arms dangle close to the floor.  Breathe in slowly and deeply as you return to a standing position. Roll up slowly and lift your head last.  Hold your breath for  "just a few seconds in the standing position.  Breathe out slowly and bend forward from the waist.  Let your feelings out. Talk, laugh, cry, and express anger when you need to. Talking with supportive friends or family, a counselor, or a ashwini leader about your feelings is a healthy way to relieve stress. Avoid discussing your feelings with people who make you feel worse.  Write. It may help to write about things that are bothering you. This helps you find out how much stress you feel and what is causing it. When you know this, you can find better ways to cope.  What can you do to prevent stress?  You might try some of these things to help prevent stress:  Manage your time. This helps you find time to do the things you want and need to do.  Get enough sleep. Your body recovers from the stresses of the day while you are sleeping.  Get support. Your family, friends, and community can make a difference in how you experience stress.  Limit your news feed. Avoid or limit time on social media or news that may make you feel stressed.  Do something active. Exercise or activity can help reduce stress. Walking is a great way to get started.  Where can you learn more?  Go to https://www.[employer].net/patiented  Enter N032 in the search box to learn more about \"Learning About Stress.\"  Current as of: October 24, 2023               Content Version: 14.0    4126-5920 [employer].   Care instructions adapted under license by your healthcare professional. If you have questions about a medical condition or this instruction, always ask your healthcare professional. [employer] disclaims any warranty or liability for your use of this information.      Bladder Training: Care Instructions  Your Care Instructions     Bladder training is used to treat urge incontinence and stress incontinence. Urge incontinence means that the need to urinate comes on so fast that you can't get to a toilet in time. Stress " incontinence means that you leak urine because of pressure on your bladder. For example, it may happen when you laugh, cough, or lift something heavy.  Bladder training can increase how long you can wait before you have to urinate. It can also help your bladder hold more urine. And it can give you better control over the urge to urinate.  It is important to remember that bladder training takes a few weeks to a few months to make a difference. You may not see results right away, but don't give up.  Follow-up care is a key part of your treatment and safety. Be sure to make and go to all appointments, and call your doctor if you are having problems. It's also a good idea to know your test results and keep a list of the medicines you take.  How can you care for yourself at home?  Work with your doctor to come up with a bladder training program that is right for you. You may use one or more of the following methods.  Delayed urination  In the beginning, try to keep from urinating for 5 minutes after you first feel the need to go.  While you wait, take deep, slow breaths to relax. Kegel exercises can also help you delay the need to go to the bathroom.  After some practice, when you can easily wait 5 minutes to urinate, try to wait 10 minutes before you urinate.  Slowly increase the waiting period until you are able to control when you have to urinate.  Scheduled urination  Empty your bladder when you first wake up in the morning.  Schedule times throughout the day when you will urinate.  Start by going to the bathroom every hour, even if you don't need to go.  Slowly increase the time between trips to the bathroom.  When you have found a schedule that works well for you, keep doing it.  If you wake up during the night and have to urinate, do it. Apply your schedule to waking hours only.  Kegel exercises  These tighten and strengthen pelvic muscles, which can help you control the flow of urine. (If doing these exercises  "causes pain, stop doing them and talk with your doctor.) To do Kegel exercises:  Squeeze your muscles as if you were trying not to pass gas. Or squeeze your muscles as if you were stopping the flow of urine. Your belly, legs, and buttocks shouldn't move.  Hold the squeeze for 3 seconds, then relax for 5 to 10 seconds.  Start with 3 seconds, then add 1 second each week until you are able to squeeze for 10 seconds.  Repeat the exercise 10 times a session. Do 3 to 8 sessions a day.  When should you call for help?  Watch closely for changes in your health, and be sure to contact your doctor if:    Your incontinence is getting worse.     You do not get better as expected.   Where can you learn more?  Go to https://www.[employer].net/patiented  Enter V684 in the search box to learn more about \"Bladder Training: Care Instructions.\"  Current as of: November 15, 2023               Content Version: 14.0    1234-8123 [employer].   Care instructions adapted under license by your healthcare professional. If you have questions about a medical condition or this instruction, always ask your healthcare professional. [employer] disclaims any warranty or liability for your use of this information.      Substance Use Disorder: Care Instructions  Overview     You can improve your life and health by stopping your use of alcohol or drugs. When you don't drink or use drugs, you may feel and sleep better. You may get along better with your family, friends, and coworkers. There are medicines and programs that can help with substance use disorder.  How can you care for yourself at home?  Here are some ways to help you stay sober and prevent relapse.  If you have been given medicine to help keep you sober or reduce your cravings, be sure to take it exactly as prescribed.  Talk to your doctor about programs that can help you stop using drugs or drinking alcohol.  Do not keep alcohol or drugs in your home.  Plan " ahead. Think about what you'll say if other people ask you to drink or use drugs. Try not to spend time with people who drink or use drugs.  Use the time and money spent on drinking or drugs to do something that's important to you.  Preventing a relapse  Have a plan to deal with relapse. Learn to recognize changes in your thinking that lead you to drink or use drugs. Get help before you start to drink or use drugs again.  Try to stay away from situations, friends, or places that may lead you to drink or use drugs.  If you feel the need to drink alcohol or use drugs again, seek help right away. Call a trusted friend or family member. Some people get support from organizations such as Narcotics Anonymous or [employer] or from treatment facilities.  If you relapse, get help as soon as you can. Some people make a plan with another person that outlines what they want that person to do for them if they relapse. The plan usually includes how to handle the relapse and who to notify in case of relapse.  Don't give up. Remember that a relapse doesn't mean that you have failed. Use the experience to learn the triggers that lead you to drink or use drugs. Then quit again. Recovery is a lifelong process. Many people have several relapses before they are able to quit for good.  Follow-up care is a key part of your treatment and safety. Be sure to make and go to all appointments, and call your doctor if you are having problems. It's also a good idea to know your test results and keep a list of the medicines you take.  When should you call for help?   Call 911  anytime you think you may need emergency care. For example, call if you or someone else:    Has overdosed or has withdrawal signs. Be sure to tell the emergency workers that you are or someone else is using or trying to quit using drugs. Overdose or withdrawal signs may include:  Losing consciousness.  Seizure.  Seeing or hearing things that aren't there  "(hallucinations).     Is thinking or talking about suicide or harming others.   Where to get help 24 hours a day, 7 days a week   If you or someone you know talks about suicide, self-harm, a mental health crisis, a substance use crisis, or any other kind of emotional distress, get help right away. You can:    Call the Suicide and Crisis Lifeline at 988.     Call 8-383-270-TALK (1-871.883.9577).     Text HOME to 568429 to access the Crisis Text Line.   Consider saving these numbers in your phone.  Go to [employer] for more information or to chat online.  Call your doctor now or seek immediate medical care if:    You are having withdrawal symptoms. These may include nausea or vomiting, sweating, shakiness, and anxiety.   Watch closely for changes in your health, and be sure to contact your doctor if:    You have a relapse.     You need more help or support to stop.   Where can you learn more?  Go to https://www.[employer].net/patiented  Enter H573 in the search box to learn more about \"Substance Use Disorder: Care Instructions.\"  Current as of: November 15, 2023               Content Version: 14.0    1856-9317 [employer].   Care instructions adapted under license by your healthcare professional. If you have questions about a medical condition or this instruction, always ask your healthcare professional. [employer] disclaims any warranty or liability for your use of this information.      "

## 2024-06-04 NOTE — PROGRESS NOTES
Answers submitted by the patient for this visit:  Patient Health Questionnaire (Submitted on 6/3/2024)  If you checked off any problems, how difficult have these problems made it for you to do your work, take care of things at home, or get along with other people?: Not difficult at all  PHQ9 TOTAL SCORE: 2  TOMASA-7 (Submitted on 6/3/2024)  TOMASA 7 TOTAL SCORE: 3          Preventive Care Visit  [facility] Arely Castillo MD, Family Medicine  Jun 4, 2024      Assessment & Plan     Encounter for Medicare annual wellness exam  Reviewed chronic medical issues and medications/supplements.   Discussed healthy habits and self cares.    Appropriate preventive services were discussed, including applicable screening as appropriate for cardiovascular disease, hypertension, diabetes, osteopenia/osteoporosis, and glaucoma.  As appropriate for age, discussed screenings for colorectal cancer, breast cancer and cervical cancer. Immunizations discussed.  See orders for tests and screening needed.    Patient instructions and review of preventive services available has been given to the patient.    Essential hypertension with goal blood pressure less than 140/90  Pt notes increased urination with the chlorthalidone, would like to switch back to hydrochlorothiazide, though BPs were often too high on the hydrochlorothiazide.  Rec trial of switch from chlorthalidone to combination of spironolactone/hydrochlorothiazide and see that is more tolerated.  She continues on amlodipine 10mg/d and lisinopril 40mg/d.  Aldosterone/renin testing in 3/24 okay.  Will also refer to cardiology- mostly for bradycardia/AAA, but will also see if they have other thoughts on her HTN/meds.  - PRIMARY CARE FOLLOW-UP SCHEDULING  - spironolactone-HCTZ (ALDACTAZIDE) 25-25 MG tablet; Take 1 tablet by mouth daily  - lisinopril (ZESTRIL) 40 MG tablet; Take 1 tablet (40 mg) by mouth daily  - amLODIPine (NORVASC) 10 MG tablet; Take 1 tablet (10  mg) by mouth at bedtime  - Adult Cardiology Eval  Referral; Future  - Basic metabolic panel  (Ca, Cl, CO2, Creat, Gluc, K, Na, BUN); Future  - Basic metabolic panel  (Ca, Cl, CO2, Creat, Gluc, K, Na, BUN)    Bradycardia/  Aortic root dilatation (H24)  Pt notes bradycardia noted on her phone, sometimes down to 30s.  Also has aortic root dilation with 2021 cardiology consult recommending yrly follow-up 2022 cardiology consult recommending prn follow-up.  Will send back for these issues, and HTN medication concerns as above.  - Adult Cardiology Eval  Referral; Future  - Basic metabolic panel  (Ca, Cl, CO2, Creat, Gluc, K, Na, BUN); Future  - Basic metabolic panel  (Ca, Cl, CO2, Creat, Gluc, K, Na, BUN)    Anxiety state  Moderate episode of recurrent major depressive disorder (H)  Pt notes that she weaned down and off the sertraline. Off completely for about two weeks, was taking 100mg for awhile (down from 150mg/d).  GAD7 looks good at '3', and PHQ9 is good at '2'.  She does have less stressors than she did previously.  Discussed being more vigilant about looking for return of sx's the next 2-3 months.  She'll discuss with her daughter as well, but hopefully sx's will remain okay.    - PRIMARY CARE FOLLOW-UP SCHEDULING    Endometrial cancer-   Doing well, cont follow-up with gyn/onc.    Need for shingles vaccine  Need for vaccination against respiratory syncytial virus  Discussed these vaccines, recommended getting at pharmacy.    Visit for screening mammogram  - MA Screening Bilateral w/ Zhen; Future    Patient has been advised of split billing requirements and indicates understanding: Yes        Nicotine/Tobacco Cessation  She reports that she has been smoking cigarettes. She started smoking about 40 years ago. She has a 10 pack-year smoking history. She uses smokeless tobacco.  Nicotine/Tobacco Cessation Plan  Discussed today, and pt now denies smoking      Counseling  Appropriate preventive  services were discussed with this patient, including applicable screening as appropriate for fall prevention, nutrition, physical activity, Tobacco-use cessation, weight loss and cognition.  Checklist reviewing preventive services available has been given to the patient.  Reviewed patient's diet, addressing concerns and/or questions.   She is at risk for psychosocial distress and has been provided with information to reduce risk.   The patient was provided with written information regarding signs of hearing loss.   Information on urinary incontinence and treatment options given to patient.       Follow-up....  Sent msg to TC team to schedule 7/17/24 for BP recheck with switch from chlorthal to sprionolact/hydrochlorothiazide. Recheck BMP today and next visit.  Also check in on mood after she weaned down/off sertraline.  Cardiology consult for bradycardia, HTN, AAA.                Swapnil Peralta is a 74 year old, presenting for the following:  Wellness Visit        6/4/2024     2:43 PM   Additional Questions   Roomed by lawrence khan   Accompanied by jr         6/4/2024     2:43 PM   Patient Reported Additional Medications   Patient reports taking the following new medications n/a               Health Care Directive  Patient has a Health Care Directive on file  Advance care planning document is on file and is current.    HPI    HTN - switched from hydrochlorothiazide to chlorthalidone prior to last visit in 3/24.  Noticing increased urination, unable to say details, or if more during day or night, or how much more urination.  BPs at home-   Was in [place] for a month.  Back about a week ago.  BP- taken once since then, thinks it was okay.  Would like to switch back to hydrochlorothiazide.    Low BPs- can go down to upper 30s at night per her watch.    Renin labs - okay from 3/24.    Endometrial cancer- had recent gyn/onc appt, doing okay, clear for now.  No follow-up paps needed.  Q6mo follow-up    Quit smoking-  thinks about 5 months ago?            6/3/2024   General Health   How would you rate your overall physical health? Good   Feel stress (tense, anxious, or unable to sleep) Only a little   (!) STRESS CONCERN      6/3/2024   Nutrition   Diet: Low salt         6/3/2024   Exercise   Days per week of moderate/strenous exercise 6 days   Average minutes spent exercising at this level 30 min         6/3/2024   Social Factors   Frequency of gathering with friends or relatives Twice a week   Worry food won't last until get money to buy more No   Food not last or not have enough money for food? No   Do you have housing?  Yes   Are you worried about losing your housing? No   Lack of transportation? No   Unable to get utilities (heat,electricity)? No         6/3/2024   Fall Risk   Fallen 2 or more times in the past year? No    No   Trouble with walking or balance? Yes    Yes           6/3/2024   Activities of Daily Living- Home Safety   Needs help with the following daily activites None of the above   Safety concerns in the home None of the above         6/3/2024   Dental   Dentist two times every year? Yes         6/3/2024   Hearing Screening   Hearing concerns? (!) I NEED TO ASK PEOPLE TO SPEAK UP OR REPEAT THEMSELVES.    (!) IT'S HARD TO FOLLOW A CONVERSATION IN A NOISY RESTAURANT OR CROWDED ROOM.    (!) TROUBLE FOLLOWING DIALOGUE IN THE THEATHER.         6/3/2024   Driving Risk Screening   Patient/family members have concerns about driving No         6/3/2024   General Alertness/Fatigue Screening   Have you been more tired than usual lately? No         6/3/2024   Urinary Incontinence Screening   Bothered by leaking urine in past 6 months Yes         6/3/2024   TB Screening   Were you born outside of the [place]? No       Today's PHQ-9 Score:       6/3/2024     8:31 PM   PHQ-9 SCORE   PHQ-9 Total Score MyChart 2 (Minimal depression)   PHQ-9 Total Score 2         6/3/2024   Substance Use   If I could quit smoking, I would Completely  agree   I want to quit somking, worry about health affects Completely agree   Willing to make a plan to quit smoking Completely agree   Willing to cut down before quitting Completely agree   Alcohol more than 3/day or more than 7/wk No   Do you have a current opioid prescription? No   How severe/bad is pain from 1 to 10? 5/10   Do you use any other substances recreationally? (!) CANNABIS PRODUCTS     Social History     Tobacco Use    Smoking status: Former     Current packs/day: 0.00     Average packs/day: 0.3 packs/day for 40.0 years (10.0 ttl pk-yrs)     Types: Cigarettes     Start date: 1984     Quit date: 2024     Years since quittin.4    Smokeless tobacco: Current    Tobacco comments:     Only uses nicotine gum now.    Vaping Use    Vaping status: Never Used   Substance Use Topics    Alcohol use: No     Comment: quit completely     Drug use: No           5/10/2023   LAST FHS-7 RESULTS   1st degree relative breast or ovarian cancer No   Any relative bilateral breast cancer No   Any male have breast cancer No   Any ONE woman have BOTH breast AND ovarian cancer No   Any woman with breast cancer before 50yrs No   2 or more relatives with breast AND/OR ovarian cancer No   2 or more relatives with breast AND/OR bowel cancer Yes            ASCVD Risk   The ASCVD Risk score (Opla DK, et al., 2019) failed to calculate for the following reasons:    The patient has a prior MI or stroke diagnosis            Reviewed and updated as needed this visit by Provider                      Current providers sharing in care for this patient include:  Patient Care Team:  Dee Castillo MD as PCP - General  Dee Castillo MD as Assigned PCP  Alicia Hewitt MD as MD (Psychiatry)  Miguel A Reina MD as Resident (Psychiatry)  Nesha Cook MD as MD (Gynecologic Oncology)  Claudia Dan MD as Assigned OBGYN Provider  Patience Ferguson RN as Specialty Care Coordinator  "(Gynecologic Oncology)  Edith Gutierrez MD as Assigned Cancer Care Provider    The following health maintenance items are reviewed in Epic and correct as of today:  Health Maintenance   Topic Date Due    RSV VACCINE (Pregnancy & 60+) (1 - 1-dose 60+ series) Never done    ZOSTER IMMUNIZATION (1 of 2) 05/09/2012    LUNG CANCER SCREENING  05/05/2020    COVID-19 Vaccine (7 - 2023-24 season) 01/30/2024    MAMMO SCREENING  05/10/2024    MEDICARE ANNUAL WELLNESS VISIT  05/26/2024    PHQ-9  12/04/2024    BMP  02/28/2025    LIPID  02/28/2025    MICROALBUMIN  02/28/2025    ANNUAL REVIEW OF HM ORDERS  02/28/2025    FALL RISK ASSESSMENT  06/04/2025    DEXA  02/08/2026    GLUCOSE  02/28/2027    COLORECTAL CANCER SCREENING  12/02/2027    ADVANCE CARE PLANNING  06/12/2028    DTAP/TDAP/TD IMMUNIZATION (4 - Td or Tdap) 01/25/2033    HEPATITIS C SCREENING  Completed    DEPRESSION ACTION PLAN  Completed    INFLUENZA VACCINE  Completed    Pneumococcal Vaccine: 65+ Years  Completed    URINALYSIS  Completed    IPV IMMUNIZATION  Aged Out    HPV IMMUNIZATION  Aged Out    MENINGITIS IMMUNIZATION  Aged Out    RSV MONOCLONAL ANTIBODY  Aged Out            Objective    Exam  LMP  (LMP Unknown)    Estimated body mass index is 21.72 kg/m  as calculated from the following:    Height as of 3/6/24: 1.651 m (5' 5\").    Weight as of 5/30/24: 59.2 kg (130 lb 8 oz).    Physical Exam           No data to display                       Signed Electronically by: Dee Castillo MD    "

## 2024-06-04 NOTE — Clinical Note
Please schedule pt for follow-up HTN/BMP, cardiology follow-up on 7/17/24 at 10:30am appt. Thanks! CW

## 2024-06-04 NOTE — NURSING NOTE
Per orders of CW, injection of COVID given by Erika Gould RN. Prior to immunization administration, verified patients identity using patient s name and date of birth. Patient instructed to remain in clinic for 15 minutes afterwards, and to report any adverse reaction to me or clinic staff immediately.    Erika Gould RN on 6/4/2024 at 4:03 PM.    Please see Immunization Activity for additional information.

## 2024-06-15 NOTE — RESULT ENCOUNTER NOTE
-Your basic metabolic panel (which includes electrolyte levels, blood sugar level and kidney function tests) is normal.     Please let me know if you have any questions.  Best,   Juwan Castillo MD

## 2024-07-17 NOTE — PROGRESS NOTES
Assessment & Plan     Essential hypertension with goal blood pressure less than 140/90  Chronic kidney disease, stage 1  Blood pressure in good control with change from chlorthalidone to hydrochlorothiazide/spironolactone.  She wanted to try off the chlorthalidone due to urinary sx's, but doesn't think her urinary sx's are better back on her previous regimen- still with nocturia and urinary incontinence (mixed) as below.  --Will continue current HTN meds for now.  BMP today.  Follow-up in ~4 months.    - Basic metabolic panel  (Ca, Cl, CO2, Creat, Gluc, K, Na, BUN); Future  - lisinopril (ZESTRIL) 40 MG tablet; Take 1 tablet (40 mg) by mouth daily  - amLODIPine (NORVASC) 10 MG tablet; Take 1 tablet (10 mg) by mouth at bedtime  - PRIMARY CARE FOLLOW-UP SCHEDULING; Future  - Basic metabolic panel  (Ca, Cl, CO2, Creat, Gluc, K, Na, BUN)      Mixed incontinence urge and stress (male)(female)  Nocturia  On max amlodipine and mod high dose of lisinopril.  Will likely be hard to keep BP under control without some diuretic.  No change in sx's per pt with trial to switch back off chlorthalidone.  ----She will try and really cut back on her bubbly water and her diet coke, and shift fluid intake to more early in day, less in evening (opposite now).  ----Strongly rec PFPT, urology if she desire.  Referral placed to PFPT.  - Physical Therapy  Referral; Future  - PRIMARY CARE FOLLOW-UP SCHEDULING; Future    Moderate episode of recurrent major depressive disorder (H)  Anxiety state  Grief  Really tough couple weeks- year from the anniversary of the death of her (estranged)  (daughter (very mentally ill) still in longterm for it, also very difficult for pt).  Working on clearing out the house they had lived in together. She is open to referral for therapy today- referral placed.  - Adult Mental Health  Referral; Future  - PRIMARY CARE FOLLOW-UP SCHEDULING; Future    Cardiology/bradycardia/aortic root dilation-    See notes from 6/24.  Has not been scheduled for cardiology follow-up appt. Reviewed that is looks like they tried to call her- she does not recall this. Letter sent here per chart but unsure if sent to pt?  Printed out referral- she will call to schedule.       Follow-up Visit   Expected date:  Nov 17, 2024 (Approximate)      Follow Up Appointment Details:     Follow-up with whom?: Me    Follow-Up for what?: Chronic Disease f/u    Chronic Disease f/u:  Hypertension  Depression  Anxiety  General (Other)       Additional Details: Incontinence/PFPT, cardiology consult follow-up    How?: In Person                       I spent a total of 35 minutes on the day of the visit.   Time spent by me doing chart review, history and exam, documentation and further activities per the note    The longitudinal plan of care for the diagnosis(es)/condition(s) as documented were addressed during this visit. Due to the added complexity in care, I will continue to support Kathryn in the subsequent management and with ongoing continuity of care.            Swapnil Peralta is a 74 year old, presenting for the following health issues:  Hypertension        7/17/2024    10:29 AM   Additional Questions   Roomed by Christy       Via the Health Maintenance questionnaire, the patient has reported the following services have been completed -Mammogram: [facility] 2022-05-11, this information has not been sent to the abstraction team.    History of Present Illness       Hypertension: She presents for follow up of hypertension.  She does check blood pressure  regularly outside of the clinic. Outside blood pressures have been over 140/90. She follows a low salt diet.     She eats 2-3 servings of fruits and vegetables daily.She consumes 1 sweetened beverage(s) daily.She exercises with enough effort to increase her heart rate 20 to 29 minutes per day.  She exercises with enough effort to increase her heart rate 4 days per week. She is  missing 1 dose(s) of medications per week.     HTN-  Switched last time to chlorthalidone to hydrochlorothiazide and spironolactone.  Switch as pt   Nocturia x 2.  Freq during the day- ~5x/day    Has noticed more stress incontinence sx's (cough, sneeze and laugh)- likely worse with the change.  Sometimes also can't get to the bathroom in time. Sx's before the change, but a bit worse since.    BPs are about the same.    Drinks 2 cups coffee in am.  Other fluids- drink two bubbly water and diet coke, orange juice one cup some days.  She thinks she could cut back on evening water/coke, and would be interested in referral to PFPT.      Cardiology/bradycardia- Hasn't scheduled cardiology follow-up appt.  Looks like they tried to call her- letter sent here unsre if sent to pt?  Printed out referral- she will lakeisha to schedule.    Clearing out the house - the one they shared with her  (killed by their daughter (now in [place], will eventually go to [place]).    MDD/anxiety- still off the sertraline.   Some bad days, ruth the yr anniversary of her 's death.  Better today.  Feels okay in general.    Patient Active Problem List   Diagnosis    Anxiety state    Urge incontinence    Hyperlipidemia LDL goal <100    Mild major depression (H)    Osteoarthritis    Essential hypertension with goal blood pressure less than 140/90    Alcohol abuse    Mild atherosclerosis of carotid artery    Insomnia    Bilateral hip pain    Chronic bilateral low back pain without sciatica    Nonallopathic lesion of sacroiliac region    Sacroiliac joint pain    Closed compression fracture of second lumbar vertebra (H)    Memory change    Mild coronary artery disease    Chronic kidney disease, stage 1    Aortic root dilatation (H24)    Postmenopausal bleeding    Endometrial cancer (H)    Osteopenia of both hips          Review of Systems  Constitutional, neuro, ENT, endocrine, pulmonary, cardiac, gastrointestinal, genitourinary,  "musculoskeletal, integument and psychiatric systems are negative, except as otherwise noted.      Objective    /63 (BP Location: Left arm, Patient Position: Sitting, Cuff Size: Adult Regular)   Pulse 52   Temp 97.3  F (36.3  C) (Temporal)   Resp 18   Ht 1.651 m (5' 5\")   Wt 59 kg (130 lb 1.6 oz)   LMP  (LMP Unknown)   SpO2 97%   Breastfeeding No   BMI 21.65 kg/m    Body mass index is 21.65 kg/m .  Physical Exam   GENERAL: alert and no distress  EYES: Eyes grossly normal to inspection, PERRL and conjunctivae and sclerae normal  HENT: ear canals and TM's normal, nose and mouth without ulcers or lesions  NECK: no adenopathy, no asymmetry, masses, or scars  RESP: lungs clear to auscultation - no rales, rhonchi or wheezes  CV: regular rate and rhythm, normal S1 S2, no S3 or S4, no murmur, click or rub, no peripheral edema  ABDOMEN: soft, nontender, no hepatosplenomegaly, no masses and bowel sounds normal  MS: no gross musculoskeletal defects noted, no edema  PSYCH: mentation appears normal, affect normal/bright      Office Visit on 06/04/2024   Component Date Value Ref Range Status    Sodium 06/04/2024 138  135 - 145 mmol/L Final    Reference intervals for this test were updated on 09/26/2023 to more accurately reflect our healthy population. There may be differences in the flagging of prior results with similar values performed with this method. Interpretation of those prior results can be made in the context of the updated reference intervals.     Potassium 06/04/2024 4.8  3.4 - 5.3 mmol/L Final    Chloride 06/04/2024 100  98 - 107 mmol/L Final    Carbon Dioxide (CO2) 06/04/2024 27  22 - 29 mmol/L Final    Anion Gap 06/04/2024 11  7 - 15 mmol/L Final    Urea Nitrogen 06/04/2024 16.0  8.0 - 23.0 mg/dL Final    Creatinine 06/04/2024 0.78  0.51 - 0.95 mg/dL Final    GFR Estimate 06/04/2024 79  >60 mL/min/1.73m2 Final    Calcium 06/04/2024 9.9  8.8 - 10.2 mg/dL Final    Glucose 06/04/2024 78  70 - 99 mg/dL " Final     Results for orders placed or performed in visit on 07/17/24   Basic metabolic panel  (Ca, Cl, CO2, Creat, Gluc, K, Na, BUN)     Status: Normal   Result Value Ref Range    Sodium 138 135 - 145 mmol/L    Potassium 3.9 3.4 - 5.3 mmol/L    Chloride 101 98 - 107 mmol/L    Carbon Dioxide (CO2) 27 22 - 29 mmol/L    Anion Gap 10 7 - 15 mmol/L    Urea Nitrogen 17.1 8.0 - 23.0 mg/dL    Creatinine 0.75 0.51 - 0.95 mg/dL    GFR Estimate 83 >60 mL/min/1.73m2    Calcium 9.5 8.8 - 10.4 mg/dL    Glucose 92 70 - 99 mg/dL           Signed Electronically by: Dee Castillo MD

## 2024-08-15 PROBLEM — N39.46 MIXED INCONTINENCE URGE AND STRESS (MALE)(FEMALE): Status: ACTIVE | Noted: 2024-01-01

## 2024-08-15 NOTE — PROGRESS NOTES
PHYSICAL THERAPY EVALUATION  Type of Visit: Evaluation        Fall Risk Screen:  Fall screen completed by: PT  Have you fallen 2 or more times in the past year?: No  Have you fallen and had an injury in the past year?: No  Is patient a fall risk?: No    Subjective       Pt has had urgency for many years, even as a child. Urgency has gotten worse with more recent medications. Also having ANGELES with laughing, coughing, sneezing. Not daily leaking but very situational. Voiding 4-5x per day. Pt reports she generally takes magnesium to manage bowels as feels she does not empty completely.  Presenting condition or subjective complaint: Incontinence  Date of onset: 07/17/24 (Md order)    Relevant medical history: Cancer; Hearing problems; High blood pressure; Incontinence; Osteoporosis; Radiation treatment   Dates & types of surgery: Removal of reproductive organs 2024    Prior diagnostic imaging/testing results:       Prior therapy history for the same diagnosis, illness or injury: No      Living Environment  Social support: With family members   Type of home: House   Stairs to enter the home: Yes   Is there a railing: No     Ramp: No   Stairs inside the home: Yes 12 Is there a railing: Yes     Help at home: Other  Equipment owned:       Employment: No    Hobbies/Interests: Reading, walks, traveling    Patient goals for therapy: Relax       Objective      PELVIC EVALUATION  ADDITIONAL HISTORY:  Sex assigned at birth: Female  Gender identity: Female    Pronouns: She/Her Hers      Bladder History:  Feels bladder filling: No  Triggers for feeling of inability to wait to go to the bathroom: Yes Laughing  How long can you wait to urinate: Not long-sometimes  Gets up at night to urinate: Yes 1or2  Can stop the flow of urine when urinating: No  Volume of urine usually released: Medium   Other issues:    Number of bladder infections in last 12 months:    Fluid intake per day: 50 oz 16    Medications taken for bladder: No      Activities causing urine leak: Cough; Jump; Hurrying to the bathroom due to a strong urge to urinate (pee)    Amount of urine typically leaked:    Pads used to help with leaking: Yes I use this many pads per day: 2   I use this type/brand: Always      Bowel History:  Frequency of bowel movement:    Consistency of stool: Soft-formed    Ignores the urge to defecate: No  Other bowel issues:    Length of time spent trying to have a bowel movement:      Sexual Function History:  Sexual orientation: Straight    Sexually active: No  Lubrication used: No No  Pelvic pain:      Pain or difficulty with orgasms/erection/ejaculation: No    State of menopause: Post-menopause (I am done with menopause)  Hormone medications: No      Are you currently pregnant: No  Number of previous pregnancies: 5  Number of deliveries: 4  If you have delivered before, did you have any of these issues during delivery: Episiotomy; Vaginal delivery  Have you been diagnosed with pelvic prolapse or abdominal separation: No  Do you get regular exercise: Yes  I do this type of exercise: Walking  Have you tried pelvic floor strengthening exercises for 4 weeks: No  Do you have any history of trauma that is relevant to your care that you d like to share: No      Discussed reason for referral regarding pelvic health needs and external/internal pelvic floor muscle examination with patient/guardian.  Opportunity provided to ask questions and verbal consent for assessment and intervention was given.    PELVIC EXAM  Pt needing to leave quite early from evaluation today, educated on use of pelvic exam in future to assess pelvic floor, pt agreeable         Assessment & Plan   CLINICAL IMPRESSIONS  Medical Diagnosis: mixed incontinence    Treatment Diagnosis: mixed incontinence   Impression/Assessment: Patient is a 74 year old female with incontinence complaints.  The following significant findings have been identified: Decreased ROM/flexibility, Decreased  strength, Impaired muscle performance, and Decreased activity tolerance. These impairments interfere with their ability to perform self care tasks, recreational activities, and community mobility as compared to previous level of function.     Clinical Decision Making (Complexity):  Clinical Presentation: Stable/Uncomplicated  Clinical Presentation Rationale: based on medical and personal factors listed in PT evaluation  Clinical Decision Making (Complexity): Low complexity    PLAN OF CARE  Treatment Interventions:  Interventions: Manual Therapy, Neuromuscular Re-education, Therapeutic Activity, Therapeutic Exercise, Self-Care/Home Management    Long Term Goals     PT Goal 1  Goal Identifier: ANGELES  Goal Description: pt will be able to laugh, cough, sneeze without ANGELES  Rationale: to maximize safety and independence with performance of ADLs and functional tasks  Target Date: 10/24/24  PT Goal 2  Goal Identifier: UUI  Goal Description: pt will be able to reduce feelings of urgency to 1/week or less  Rationale: to maximize safety and independence with performance of ADLs and functional tasks  Target Date: 10/24/24      Frequency of Treatment: once per week  Duration of Treatment: 10 weeks    Recommended Referrals to Other Professionals:  NA  Education Assessment:   Learner/Method: Patient  Education Comments: Pt educated on PT role and plan of care    Risks and benefits of evaluation/treatment have been explained.   Patient/Family/caregiver agrees with Plan of Care.     Evaluation Time:     PT Eval, Low Complexity Minutes (65410): 10     Signing Clinician: Amie Davies, PT        [facility]                                                                                   OUTPATIENT PHYSICAL THERAPY      PLAN OF TREATMENT FOR OUTPATIENT REHABILITATION   Patient's Last Name, First Name, CHRISGAGE  DillonKimmie  S YOB: 1950   Provider's Name   [facility]   Medical Record No.  [id number]     Onset Date: 07/17/24 (Md order)  Start of Care Date: 08/15/24     Medical Diagnosis:  mixed incontinence      PT Treatment Diagnosis:  mixed incontinence Plan of Treatment  Frequency/Duration: once per week/ 10 weeks    Certification date from 08/15/24 to 10/24/24         See note for plan of treatment details and functional goals     Amie Davies, PT                         I CERTIFY THE NEED FOR THESE SERVICES FURNISHED UNDER        THIS PLAN OF TREATMENT AND WHILE UNDER MY CARE     (Physician attestation of this document indicates review and certification of the therapy plan).              Referring Provider:  Dee Castillo    Initial Assessment  See Epic Evaluation- Start of Care Date: 08/15/24

## 2024-09-04 NOTE — PATIENT INSTRUCTIONS

## 2024-09-04 NOTE — PROGRESS NOTES
Preoperative Evaluation  [facility]  [address], [address]  [place] [address]  Phone: [phone]  Primary Provider: Dee Castillo MD  Pre-op Performing Provider: Latoya Eugene MD  Sep 4, 2024         9/4/2024   Surgical Information   What procedure is being done? pre op physical   Facility or Hospital where procedure/surgery will be performed: [facility]   Who is doing the procedure / surgery? dr Jimenez   Date of surgery / procedure: sept 11   Time of surgery / procedure: 800   Where do you plan to recover after surgery? at home with family      Fax number for surgical facility: Note does not need to be faxed, will be available electronically in Epic.    Assessment & Plan     The proposed surgical procedure is considered LOW risk.    Preop general physical exam  Cortical age-related cataract of both eyes  Patient scheduled for cataract surgery on September 11, 2024.  Patient was advised to continue with all of her prescribed medications.  I advised her to avoid supplements and over-the-counter medications prior to her procedure.      Risks and Recommendations  The patient has the following additional risks and recommendations for perioperative complications:   - No identified additional risk factors other than previously addressed         Recommendation  Approval given to proceed with proposed procedure, without further diagnostic evaluation.    Swapnil Peralta is a 74 year old, presenting for the following:  Pre-Op Exam        9/4/2024     2:40 PM   Additional Questions   Roomed by Marina PARSON     HPI related to upcoming procedure: Kimmie Carranza 74 year old who presents to clinic for preoperative history and physical exam prior to cataract surgery.  Patient reports difficulty driving at night.  She also reports halos around objects and glare's.  Denies any pain at this time.        9/4/2024   Pre-Op Questionnaire   Have you ever had a heart attack  or stroke? No   Have you ever had surgery on your heart or blood vessels, such as a stent placement, a coronary artery bypass, or surgery on an artery in your head, neck, heart, or legs? No   Do you have chest pain with activity? No   Do you have a history of heart failure? No   Do you currently have a cold, bronchitis or symptoms of other infection? No   Do you have a cough, shortness of breath, or wheezing? No   Do you or anyone in your family have previous history of blood clots? No   Do you or does anyone in your family have a serious bleeding problem such as prolonged bleeding following surgeries or cuts? No   Have you ever had problems with anemia or been told to take iron pills? No   Have you had any abnormal blood loss such as black, tarry or bloody stools, or abnormal vaginal bleeding? No   Have you ever had a blood transfusion? No   Are you willing to have a blood transfusion if it is medically needed before, during, or after your surgery? Yes   Have you or any of your relatives ever had problems with anesthesia? No   Do you have sleep apnea, excessive snoring or daytime drowsiness? No   Do you have any artifical heart valves or other implanted medical devices like a pacemaker, defibrillator, or continuous glucose monitor? No   Do you have artificial joints? No   Are you allergic to latex? No      Health Care Directive  Patient has a Health Care Directive on file      Preoperative Review of    reviewed - controlled substances reflected in medication list.    Patient Active Problem List    Diagnosis Date Noted    Mixed incontinence urge and stress (male)(female) 08/15/2024     Priority: Medium    Osteopenia of both hips 03/06/2024     Priority: Medium     Dxz 12/23 osteopenia.  Hip fx risk 4+%.  Discussed rx med option, but will continue ca/d wt bearing exercise and recheck DXA in 3 yrs.        Endometrial cancer (H) 05/18/2023     Priority: Medium       5/2/23 Dr. Manuel Guerrero ,Gyn/Onc overview of  plan- q6mo follow-up with NP x 5 yrs (4/28), then annually.  No need for further paps.    5/17/23- Dr. Gutierrez, Rad/Onc, planning vaginal brachytherapy in 6/23.      Oncology a/p--  Assessment:  Kimmie Carranza is a 72 year old woman with a diagnosis of stage 1B, grade 1, MMR proficient endometrial adenocarcinoma.   Plan:   1.)           Stage 1B, grade 1, MMR proficient endometrial adenocarcinoma.  Pathology was reviewed and she was provided with a copy of her results.  Discussed option of vaginal brachytherapy to decrease the risk of a vaginal recurrence but that this has no effect on the overall survival or recurrence outside the vagina.  After thorough discussion she would like to meet with radiation oncology to discuss further.  Discussed signs and symptoms of recurrence and to call if these should occur.  Discussed role of surveillance with history and physical exam and that labs/imaging would only be done based on symptoms but not routinely.  Discussed no need for further pap smear testing.  Discussed visits every 6 months for up to 5 years (4/28) then annually thereafter.  Discussed that these visits would be with the NP unless concerns arise. She will return in 6 months.   2.)           Genetic risk factors were assessed and the patient does not meet the qualifications for a referral.        Postmenopausal bleeding 09/23/2022     Priority: Medium    Aortic root dilatation (H24) 01/03/2022     Priority: Medium     11/22 Cardiology consult - noted mild dilation, stable since '19, can stop screening echo's, and only prn cardiology follow-up needed.    11/21 cardiology notes-   Aortic root dilation  - yearly echo to assess for stability- ordered  - control BP      Chronic kidney disease, stage 1 11/23/2021     Priority: Medium    Mild coronary artery disease 07/01/2019     Priority: Medium     Coronary Cath in 5/5/19- minimal obstructing CAD .  High dose statin, asa daily since.    11/21 Cardiology consult  recs;  HTN  - on amlodipine and lisinopril/hctz  - uncontrolled today discussed going up on the dose since she is taking one pill and is prescribed two pills, she will take your blood pressure an take 2 pills and will follow up with her primary care with a log of the blood pressure readings. Manual recheck 142/85  - she is hypertensive would like to see her blood pressure within the goal 120/80 before adding stimulants to her regimen of ADHD by psych.     HLP  -continue statin  -last LDL above goal at 167- crestor started after this.   -repeat lipid panel- if not at goal increase crestor to 20mg/d     Aortic root dilation  - yearly echo to assess for stability- ordered  - control BP      Memory change 02/28/2018     Priority: Medium     Pt concerned, seen by [facility] in 9/17- see consult.  Checked B12/Vit D- pt reports normal.    Neuropsych testing done in 2020, did not dx dementia, did dx ADHD.        Closed compression fracture of second lumbar vertebra (H) 07/06/2017     Priority: Medium    Chronic bilateral low back pain without sciatica 06/22/2016     Priority: Medium    Nonallopathic lesion of sacroiliac region 06/22/2016     Priority: Medium    Sacroiliac joint pain 06/22/2016     Priority: Medium    Bilateral hip pain 06/14/2016     Priority: Medium    Insomnia 05/24/2016     Priority: Medium     10/16- no trazodone for a couple months, will try melatonin if needed      Mild atherosclerosis of carotid artery 06/12/2015     Priority: Medium     Work on complete smoking cessation.  Good lipid/BP control, on asa, statin.      Alcohol abuse 03/09/2015     Priority: Medium     Sober since 2015.  Txt - at [facility]- 2d/wk for ~12 wks, done 3/15.  Doing AA, 1x/wk.        Essential hypertension with goal blood pressure less than 140/90 03/20/2012     Priority: Medium     Lisinopril 40mg/d, amlodipine 10mg/d, hydrochlorothiazide, spironolactone    Trial of chlorthalidone 25mg/d in 1/24 for poor HTN  control, but urinary sx's, so switched back to HTCZ w/ addition of spironolactone in ~6/24 (7/24 still w/ urinary freq and incont).    Prior zestoretic (lisinopril/hctz) 40/25mg/day, norvasc 10 mg/d.  Resistant HTN work-up 3/24--    Incr zestroretic to 40/25mg/d in 6/21  HTN dx and txt started in 3/12.        Osteoarthritis 03/14/2012     Priority: Medium     Hips, knees, feet- stiff in am.  Was going to try glucosamine/chondroitin, but pills too big.  Did add tumeric (3/12)- unsure if helpful or not.      Mild major depression (H)      Priority: Medium     Sertraline 100mg/d - weaned down/off, off by mid 5/24    Pt decr dose on her own ~3/23, from 150mg/d). Incr again in 8/23 (extreme stress/grief), pt again lowered dose on her own in 11/23 (noted foot 'ticks' on higher dose).    Zoloft- increase from 50mg to 100mg/d in 11/12.  Trial decrease back to 50mg in '18, but worse mood, so increased back to 100mg/d in 4/19.      Hyperlipidemia LDL goal <100 10/31/2010     Priority: Medium     Crestor 10mg/d     11/22 cardiology consult Dr. Cherry noted high LDL,(160s) likely not taking crestor consistently- rec taking daily, and LDL goal <70.  With coronary calcium score of 133, rec adding asa 81mg/d as well.    H/o inconsistent statin use, difficult with memory and staying at different houses.  Better in 2022-23, more consistently at her daughter's house.  Consistent use for 1/23 lipids recheck, and LDL is down to 75, close to goal.            Anxiety state      Priority: Medium     Zoloft 100mg/d- weaned down/off, off by mid 5/24    (Zoloft 150mg/d- increased in 8/20, pt lowered dose back to 100mg/d on her own ? ~2/23).    Zoloft dose has ranged from 50-150mg/d, works better than wellbutrin (stressors with daughter with bipolar- hospitalized x wks in 2/14).      Urge incontinence      Priority: Medium     trial of detrol [place] helped, uses for trips or if she is drinking a lot of fluids, gets thirsty - switch to detrol reg in  11/13 due to formulary issue- will likely be fine for how she uses it.        Past Medical History:   Diagnosis Date    Anxiety state, unspecified     on zoloft, better than wellbutrin, s/p traumatic death of daughter's boyfriend in '05    Endometrial cancer (H)     Hypertension     Mild major depression (H24)     zoloft (had been on citalopram)    Mixed hyperlipidemia     Urge incontinence     trial of detrol helped, uses for trips, gets thirsty     Past Surgical History:   Procedure Laterality Date    COLONOSCOPY N/A 10/24/2016    Procedure: COMBINED COLONOSCOPY, SINGLE OR MULTIPLE BIOPSY/POLYPECTOMY BY BIOPSY;  Surgeon: Kwaku Henry MD;  Location:  GI    COLONOSCOPY N/A 12/2/2022    Procedure: COLONOSCOPY, FLEXIBLE, WITH LESION REMOVAL USING SNARE polypectomies using exacto cold snare;  Surgeon: Zane Cavazos MD;  Location:  GI    CV CORONARY ANGIOGRAM N/A 5/6/2019    Procedure: Coronary Angiogram;  Surgeon: Viktor Brothers MD;  Location: [facility] CARDIAC CATH LAB    CYSTOSCOPY N/A 4/12/2023    Procedure: Cystoscopy;  Surgeon: Artis Tse MD;  Location:  OR    LAPAROSCOPIC HYSTERECTOMY TOTAL, BILATERAL SALPINGO-OOPHORECTOMY, NODE DISSECTION, COMBINED Bilateral 4/12/2023    Procedure: laparoscopic removal of uterus, cervix, both ovaries and fallopian tubes, sentinel lymph node dissection, possible full lymph node dissection, possible open;  Surgeon: Artis Tse MD;  Location:  OR    ZZC NONSPECIFIC PROCEDURE  1985    Tubal ligation    ZZC NONSPECIFIC PROCEDURE  1979    laporoscopy     Current Outpatient Medications   Medication Sig Dispense Refill    acetaminophen (TYLENOL) 325 MG tablet Take 2 tablets (650 mg) by mouth every 6 hours as needed for mild pain 24 tablet 0    amLODIPine (NORVASC) 10 MG tablet Take 1 tablet (10 mg) by mouth at bedtime 90 tablet 1    aspirin 81 MG EC tablet Take 1 tablet (81 mg) by mouth daily 90 tablet 3    Blood Pressure Monitor KIT 1 Units daily  "as needed (blood pressure check) 1 kit 0    coenzyme Q-10 (CO-Q10) 50 MG capsule Take 1 capsule (50 mg) by mouth daily      ibuprofen (ADVIL/MOTRIN) 600 MG tablet Take 1 tablet (600 mg) by mouth every 6 hours as needed for other (mild and/or inflammatory pain) 12 tablet 0    lisinopril (ZESTRIL) 40 MG tablet Take 1 tablet (40 mg) by mouth daily 90 tablet 1    Multiple Vitamins-Minerals (MULTIVITAMIN ADULT PO) Take 1 tablet by mouth daily      nicotine (COMMIT) 2 MG lozenge Place 1 lozenge (2 mg) inside cheek every hour as needed for smoking cessation Place 1 lozenge inside cheek as needed for smoking cessation. 100 lozenge 5    rosuvastatin (CRESTOR) 10 MG tablet Take 1 tablet (10 mg) by mouth daily 90 tablet 3    spironolactone-HCTZ (ALDACTAZIDE) 25-25 MG tablet TAKE 1 TABLET BY MOUTH EVERY DAY 90 tablet 1       Allergies   Allergen Reactions    No Known Drug Allergy         Social History     Tobacco Use    Smoking status: Some Days     Current packs/day: 0.00     Average packs/day: 0.3 packs/day for 40.0 years (10.0 ttl pk-yrs)     Types: Cigarettes     Start date: 1984     Last attempt to quit: 2024     Years since quittin.6    Smokeless tobacco: Current    Tobacco comments:     Only uses nicotine gum now.    Substance Use Topics    Alcohol use: No     Comment: quit completely      History   Drug Use No             Review of Systems  Constitutional, neuro, ENT, endocrine, pulmonary, cardiac, gastrointestinal, genitourinary, musculoskeletal, integument and psychiatric systems are negative, except as otherwise noted.    Objective    /66   Pulse 57   Temp 97.1  F (36.2  C) (Temporal)   Resp 18   Ht 1.651 m (5' 5\")   Wt 59.6 kg (131 lb 8 oz)   LMP  (LMP Unknown)   SpO2 100%   BMI 21.88 kg/m     Estimated body mass index is 21.88 kg/m  as calculated from the following:    Height as of this encounter: 1.651 m (5' 5\").    Weight as of this encounter: 59.6 kg (131 lb 8 oz).  Physical " Exam  GENERAL: alert and no distress  NECK: no adenopathy, no asymmetry, masses, or scars  RESP: lungs clear to auscultation - no rales, rhonchi or wheezes  CV: regular rate and rhythm, normal S1 S2, no S3 or S4, no murmur, click or rub, no peripheral edema  ABDOMEN: soft, nontender, no hepatosplenomegaly, no masses and bowel sounds normal  MS: no gross musculoskeletal defects noted, no edema    Recent Labs   Lab Test 07/17/24  1120 06/04/24  1626    138   POTASSIUM 3.9 4.8   CR 0.75 0.78        Diagnostics  No labs were ordered during this visit.   No EKG required for low risk surgery (cataract, skin procedure, breast biopsy, etc).    Revised Cardiac Risk Index (RCRI)  The patient has the following serious cardiovascular risks for perioperative complications:   - No serious cardiac risks = 0 points     RCRI Interpretation: 1 point: Class II (low risk - 0.9% complication rate)         Signed Electronically by: Latoya Eugene MD  A copy of this evaluation report is provided to the requesting physician.

## 2024-10-02 PROBLEM — N39.46 MIXED INCONTINENCE URGE AND STRESS (MALE)(FEMALE): Status: RESOLVED | Noted: 2024-01-01 | Resolved: 2024-01-01

## 2024-10-02 NOTE — PROGRESS NOTES
DISCHARGE  Reason for Discharge: Patient has failed to schedule further appointments.    Equipment Issued: none    Discharge Plan: Patient to continue home program.    Referring Provider:  Dee Castillo

## (undated) DEVICE — SUCTION CANISTER MEDIVAC LINER 1500ML W/LID 65651-515

## (undated) DEVICE — SHTH INTRO 0.021IN ID 6FR DIA

## (undated) DEVICE — FASTENER CATH BALLOON CLAMPX2 STATLOCK 0684-00-493

## (undated) DEVICE — SYSTEM LAPAROVUE VISIBILITY LAPVUE10

## (undated) DEVICE — TUBING IRR TUR Y TYPE 2C4041

## (undated) DEVICE — DRAPE LEGGINGS 8421

## (undated) DEVICE — KIT ENDO TURNOVER/PROCEDURE W/CLEAN A SCOPE LINERS 103888

## (undated) DEVICE — KIT PATIENT POSITIONING PIGAZZI LATEX FREE 40580

## (undated) DEVICE — KOH COLPOTOMIZER OCCLUDER  CPO-6

## (undated) DEVICE — SU VICRYL 0 UR-6 27" J603H

## (undated) DEVICE — DEVICE SUTURE GRASPER TROCAR CLOSURE 14GA PMITCSG

## (undated) DEVICE — NDL SPINAL 18GA 3.5" 405184

## (undated) DEVICE — KIT PINPOINT LYMPHATICS PP9037

## (undated) DEVICE — Device

## (undated) DEVICE — JELLY LUBRICATING SURGILUBE 2OZ TUBE

## (undated) DEVICE — SOL WATER IRRIG 1000ML BOTTLE 2F7114

## (undated) DEVICE — TUBING IRRIG CYSTO/BLADDER SET 81" LF 2C4040

## (undated) DEVICE — RETR ELEV / UTERINE MANIPULATOR V-CARE MED CUP 60-6085-201

## (undated) DEVICE — MANIFOLD KIT ANGIO AUTOMATED 014613

## (undated) DEVICE — TUBING PRESSURE 30"

## (undated) DEVICE — SU VICRYL 4-0 PS-2 18" UND J496H

## (undated) DEVICE — ENDO TROCAR FIRST ENTRY KII FIOS Z-THRD 05X100MM CTF03

## (undated) DEVICE — LINEN TOWEL PACK X5 5464

## (undated) DEVICE — ESU GROUND PAD ADULT W/CORD E7507

## (undated) DEVICE — PACK HEART LEFT CUSTOM

## (undated) DEVICE — CATH TRAY FOLEY SURESTEP 16FR WDRAIN BAG STLK LATEX A300316A

## (undated) DEVICE — SLEEVE TR BAND RADIAL COMPRESSION DEVICE 24CM TRB24-REG

## (undated) DEVICE — NDL INSUFFLATION 13GA 120MM C2201

## (undated) DEVICE — ENDO TROCAR SLEEVE KII Z-THREADED 05X100MM CTS02

## (undated) DEVICE — ENDO SNARE EXACTO COLD 9MM LOOP 2.4MMX230CM 00711115

## (undated) DEVICE — ENDO TROCAR OPTICAL ACCESS KII Z-THRD 08X100MM C0Q19

## (undated) DEVICE — SOL WATER IRRIG 3000ML BAG 2B7117

## (undated) DEVICE — SUCTION IRR STRYKERFLOW II W/TIP 250-070-520

## (undated) DEVICE — KIT HAND CONTROL ACIST 014644 AR-P54

## (undated) DEVICE — ESU CORD MONOPOLAR 10'  E0510

## (undated) DEVICE — SOL NACL 0.9% INJ 1000ML BAG 2B1324X

## (undated) DEVICE — SYR 10ML FINGER CONTROL W/O NDL 309695

## (undated) DEVICE — TUBING IV EXTENSION SET ANESTHESIA 34" MLL 2C6227

## (undated) DEVICE — SU WND CLOSURE VLOC 180 ABS 2-0 9" GS-21 VLOCL0345

## (undated) DEVICE — SYR 50ML LL W/O NDL 309653

## (undated) DEVICE — WIPES FOLEY CARE SURESTEP PROVON DFC100

## (undated) DEVICE — ESU LIGASURE LAPAROSCOPIC BLUNT TIP SEALER 5MMX37CM LF1837

## (undated) DEVICE — ESU ENDO SCISSORS 5MM CVD 5DCS

## (undated) DEVICE — ADH SKIN CLOSURE PREMIERPRO EXOFIN 1.0ML 3470

## (undated) RX ORDER — HEPARIN SODIUM 5000 [USP'U]/.5ML
INJECTION, SOLUTION INTRAVENOUS; SUBCUTANEOUS
Status: DISPENSED
Start: 2023-04-12

## (undated) RX ORDER — PROPOFOL 10 MG/ML
INJECTION, EMULSION INTRAVENOUS
Status: DISPENSED
Start: 2023-04-12

## (undated) RX ORDER — EPHEDRINE SULFATE 50 MG/ML
INJECTION, SOLUTION INTRAMUSCULAR; INTRAVENOUS; SUBCUTANEOUS
Status: DISPENSED
Start: 2023-04-12

## (undated) RX ORDER — DEXAMETHASONE SODIUM PHOSPHATE 4 MG/ML
INJECTION, SOLUTION INTRA-ARTICULAR; INTRALESIONAL; INTRAMUSCULAR; INTRAVENOUS; SOFT TISSUE
Status: DISPENSED
Start: 2023-04-12

## (undated) RX ORDER — OXYCODONE HYDROCHLORIDE 5 MG/1
TABLET ORAL
Status: DISPENSED
Start: 2023-04-12

## (undated) RX ORDER — FENTANYL CITRATE 50 UG/ML
INJECTION, SOLUTION INTRAMUSCULAR; INTRAVENOUS
Status: DISPENSED
Start: 2019-05-06

## (undated) RX ORDER — METRONIDAZOLE 500 MG/100ML
INJECTION, SOLUTION INTRAVENOUS
Status: DISPENSED
Start: 2023-04-12

## (undated) RX ORDER — NEOSTIGMINE METHYLSULFATE 1 MG/ML
VIAL (ML) INJECTION
Status: DISPENSED
Start: 2023-04-12

## (undated) RX ORDER — GLYCOPYRROLATE 0.2 MG/ML
INJECTION, SOLUTION INTRAMUSCULAR; INTRAVENOUS
Status: DISPENSED
Start: 2023-04-12

## (undated) RX ORDER — HYDROMORPHONE HYDROCHLORIDE 1 MG/ML
INJECTION, SOLUTION INTRAMUSCULAR; INTRAVENOUS; SUBCUTANEOUS
Status: DISPENSED
Start: 2023-04-12

## (undated) RX ORDER — PHENAZOPYRIDINE HYDROCHLORIDE 200 MG/1
TABLET, FILM COATED ORAL
Status: DISPENSED
Start: 2023-04-12

## (undated) RX ORDER — CEFAZOLIN SODIUM/WATER 2 G/20 ML
SYRINGE (ML) INTRAVENOUS
Status: DISPENSED
Start: 2023-04-12

## (undated) RX ORDER — ONDANSETRON 2 MG/ML
INJECTION INTRAMUSCULAR; INTRAVENOUS
Status: DISPENSED
Start: 2023-04-12

## (undated) RX ORDER — FENTANYL CITRATE 0.05 MG/ML
INJECTION, SOLUTION INTRAMUSCULAR; INTRAVENOUS
Status: DISPENSED
Start: 2022-12-02

## (undated) RX ORDER — HEPARIN SODIUM 1000 [USP'U]/ML
INJECTION, SOLUTION INTRAVENOUS; SUBCUTANEOUS
Status: DISPENSED
Start: 2019-05-06

## (undated) RX ORDER — NITROGLYCERIN 5 MG/ML
VIAL (ML) INTRAVENOUS
Status: DISPENSED
Start: 2019-05-06

## (undated) RX ORDER — FENTANYL CITRATE 50 UG/ML
INJECTION, SOLUTION INTRAMUSCULAR; INTRAVENOUS
Status: DISPENSED
Start: 2023-04-12